# Patient Record
Sex: FEMALE | Race: WHITE | Employment: OTHER
[De-identification: names, ages, dates, MRNs, and addresses within clinical notes are randomized per-mention and may not be internally consistent; named-entity substitution may affect disease eponyms.]

---

## 2017-01-03 RX ORDER — MELOXICAM 7.5 MG/1
7.5 TABLET ORAL DAILY
Qty: 30 TABLET | Refills: 1 | Status: SHIPPED | OUTPATIENT
Start: 2017-01-03 | End: 2017-02-10 | Stop reason: SDUPTHER

## 2017-01-10 DIAGNOSIS — F41.9 ANXIETY: ICD-10-CM

## 2017-01-10 DIAGNOSIS — F33.0 DEPRESSION, MAJOR, RECURRENT, MILD (HCC): Chronic | ICD-10-CM

## 2017-01-10 RX ORDER — ALPRAZOLAM 0.5 MG/1
0.5 TABLET ORAL DAILY PRN
Qty: 30 TABLET | Refills: 0 | Status: SHIPPED | OUTPATIENT
Start: 2017-01-10 | End: 2017-02-10 | Stop reason: SDUPTHER

## 2017-02-10 ENCOUNTER — OFFICE VISIT (OUTPATIENT)
Dept: FAMILY MEDICINE CLINIC | Facility: CLINIC | Age: 57
End: 2017-02-10

## 2017-02-10 VITALS
DIASTOLIC BLOOD PRESSURE: 76 MMHG | SYSTOLIC BLOOD PRESSURE: 120 MMHG | TEMPERATURE: 99 F | WEIGHT: 155.2 LBS | BODY MASS INDEX: 30.31 KG/M2 | RESPIRATION RATE: 16 BRPM | HEART RATE: 72 BPM

## 2017-02-10 DIAGNOSIS — I10 ESSENTIAL HYPERTENSION: Chronic | ICD-10-CM

## 2017-02-10 DIAGNOSIS — F41.1 GAD (GENERALIZED ANXIETY DISORDER): ICD-10-CM

## 2017-02-10 DIAGNOSIS — F33.0 DEPRESSION, MAJOR, RECURRENT, MILD (HCC): Primary | ICD-10-CM

## 2017-02-10 PROCEDURE — G8484 FLU IMMUNIZE NO ADMIN: HCPCS | Performed by: NURSE PRACTITIONER

## 2017-02-10 PROCEDURE — 3014F SCREEN MAMMO DOC REV: CPT | Performed by: NURSE PRACTITIONER

## 2017-02-10 PROCEDURE — 1036F TOBACCO NON-USER: CPT | Performed by: NURSE PRACTITIONER

## 2017-02-10 PROCEDURE — G8427 DOCREV CUR MEDS BY ELIG CLIN: HCPCS | Performed by: NURSE PRACTITIONER

## 2017-02-10 PROCEDURE — 3017F COLORECTAL CA SCREEN DOC REV: CPT | Performed by: NURSE PRACTITIONER

## 2017-02-10 PROCEDURE — G8417 CALC BMI ABV UP PARAM F/U: HCPCS | Performed by: NURSE PRACTITIONER

## 2017-02-10 PROCEDURE — 99214 OFFICE O/P EST MOD 30 MIN: CPT | Performed by: NURSE PRACTITIONER

## 2017-02-10 RX ORDER — ALPRAZOLAM 0.5 MG/1
0.5 TABLET ORAL DAILY PRN
Qty: 30 TABLET | Refills: 0 | Status: SHIPPED | OUTPATIENT
Start: 2017-02-10 | End: 2017-03-22 | Stop reason: SDUPTHER

## 2017-02-10 RX ORDER — MELOXICAM 7.5 MG/1
15 TABLET ORAL DAILY
Qty: 60 TABLET | Refills: 3 | Status: SHIPPED | OUTPATIENT
Start: 2017-02-10 | End: 2017-07-07 | Stop reason: ALTCHOICE

## 2017-02-10 ASSESSMENT — ENCOUNTER SYMPTOMS
RHINORRHEA: 0
CHEST TIGHTNESS: 0
NAUSEA: 0
VOMITING: 0
ABDOMINAL PAIN: 0
EYE DISCHARGE: 0
BLOOD IN STOOL: 0
BACK PAIN: 1
CONSTIPATION: 0
SINUS PRESSURE: 0
SORE THROAT: 0
WHEEZING: 0
SHORTNESS OF BREATH: 0
DIARRHEA: 1
COUGH: 0
EYE REDNESS: 0

## 2017-03-22 DIAGNOSIS — F41.1 GAD (GENERALIZED ANXIETY DISORDER): ICD-10-CM

## 2017-03-22 RX ORDER — ALPRAZOLAM 0.5 MG/1
0.5 TABLET ORAL DAILY PRN
Qty: 30 TABLET | Refills: 0 | Status: SHIPPED | OUTPATIENT
Start: 2017-03-22 | End: 2017-04-20 | Stop reason: SDUPTHER

## 2017-04-20 DIAGNOSIS — F41.1 GAD (GENERALIZED ANXIETY DISORDER): ICD-10-CM

## 2017-04-20 RX ORDER — ALPRAZOLAM 0.5 MG/1
0.5 TABLET ORAL DAILY PRN
Qty: 30 TABLET | Refills: 0 | Status: SHIPPED | OUTPATIENT
Start: 2017-04-20 | End: 2017-05-16 | Stop reason: SDUPTHER

## 2017-04-28 ENCOUNTER — TELEPHONE (OUTPATIENT)
Dept: FAMILY MEDICINE CLINIC | Age: 57
End: 2017-04-28

## 2017-05-16 DIAGNOSIS — F41.1 GAD (GENERALIZED ANXIETY DISORDER): ICD-10-CM

## 2017-05-19 RX ORDER — ALPRAZOLAM 0.5 MG/1
0.5 TABLET ORAL DAILY PRN
Qty: 30 TABLET | Refills: 0 | Status: SHIPPED | OUTPATIENT
Start: 2017-05-19 | End: 2017-06-22 | Stop reason: SDUPTHER

## 2017-06-22 DIAGNOSIS — F41.1 GAD (GENERALIZED ANXIETY DISORDER): ICD-10-CM

## 2017-06-22 RX ORDER — ALPRAZOLAM 0.5 MG/1
0.5 TABLET ORAL DAILY PRN
Qty: 30 TABLET | Refills: 0 | Status: SHIPPED | OUTPATIENT
Start: 2017-06-22 | End: 2017-07-19 | Stop reason: SDUPTHER

## 2017-07-07 ENCOUNTER — OFFICE VISIT (OUTPATIENT)
Dept: FAMILY MEDICINE CLINIC | Age: 57
End: 2017-07-07

## 2017-07-07 ENCOUNTER — HOSPITAL ENCOUNTER (OUTPATIENT)
Age: 57
Setting detail: SPECIMEN
Discharge: HOME | End: 2017-07-07

## 2017-07-07 VITALS
TEMPERATURE: 99.2 F | WEIGHT: 152.6 LBS | RESPIRATION RATE: 14 BRPM | HEIGHT: 60 IN | SYSTOLIC BLOOD PRESSURE: 108 MMHG | BODY MASS INDEX: 29.96 KG/M2 | HEART RATE: 96 BPM | DIASTOLIC BLOOD PRESSURE: 78 MMHG

## 2017-07-07 DIAGNOSIS — N90.89 LABIAL SKIN TAG: ICD-10-CM

## 2017-07-07 DIAGNOSIS — Z13.31 POSITIVE DEPRESSION SCREENING: ICD-10-CM

## 2017-07-07 DIAGNOSIS — Z01.419 ENCOUNTER FOR GYNECOLOGICAL EXAMINATION WITHOUT ABNORMAL FINDING: Primary | ICD-10-CM

## 2017-07-07 DIAGNOSIS — Z12.39 BREAST CANCER SCREENING: ICD-10-CM

## 2017-07-07 PROCEDURE — G8431 POS CLIN DEPRES SCRN F/U DOC: HCPCS | Performed by: NURSE PRACTITIONER

## 2017-07-07 PROCEDURE — 99396 PREV VISIT EST AGE 40-64: CPT | Performed by: NURSE PRACTITIONER

## 2017-07-07 PROCEDURE — 96127 BRIEF EMOTIONAL/BEHAV ASSMT: CPT | Performed by: NURSE PRACTITIONER

## 2017-07-07 RX ORDER — PAROXETINE HYDROCHLORIDE 20 MG/1
1 TABLET, FILM COATED ORAL EVERY MORNING
Refills: 5 | COMMUNITY
Start: 2017-06-08 | End: 2017-07-28 | Stop reason: SDUPTHER

## 2017-07-07 ASSESSMENT — PATIENT HEALTH QUESTIONNAIRE - PHQ9
3. TROUBLE FALLING OR STAYING ASLEEP: 2
5. POOR APPETITE OR OVEREATING: 0
1. LITTLE INTEREST OR PLEASURE IN DOING THINGS: 3
6. FEELING BAD ABOUT YOURSELF - OR THAT YOU ARE A FAILURE OR HAVE LET YOURSELF OR YOUR FAMILY DOWN: 1
8. MOVING OR SPEAKING SO SLOWLY THAT OTHER PEOPLE COULD HAVE NOTICED. OR THE OPPOSITE, BEING SO FIGETY OR RESTLESS THAT YOU HAVE BEEN MOVING AROUND A LOT MORE THAN USUAL: 2
7. TROUBLE CONCENTRATING ON THINGS, SUCH AS READING THE NEWSPAPER OR WATCHING TELEVISION: 3
2. FEELING DOWN, DEPRESSED OR HOPELESS: 2
SUM OF ALL RESPONSES TO PHQ QUESTIONS 1-9: 16
10. IF YOU CHECKED OFF ANY PROBLEMS, HOW DIFFICULT HAVE THESE PROBLEMS MADE IT FOR YOU TO DO YOUR WORK, TAKE CARE OF THINGS AT HOME, OR GET ALONG WITH OTHER PEOPLE: 1
9. THOUGHTS THAT YOU WOULD BE BETTER OFF DEAD, OR OF HURTING YOURSELF: 0
SUM OF ALL RESPONSES TO PHQ9 QUESTIONS 1 & 2: 5
4. FEELING TIRED OR HAVING LITTLE ENERGY: 3

## 2017-07-07 ASSESSMENT — ENCOUNTER SYMPTOMS
WHEEZING: 0
EYE DISCHARGE: 0
CONSTIPATION: 0
CHEST TIGHTNESS: 0
RHINORRHEA: 0
BLOOD IN STOOL: 0
SHORTNESS OF BREATH: 0
EYE REDNESS: 0
SORE THROAT: 0
COUGH: 0
DIARRHEA: 1
VOMITING: 0
ABDOMINAL PAIN: 0
NAUSEA: 0
SINUS PRESSURE: 0
BACK PAIN: 1

## 2017-07-10 LAB
HPV SAMPLE: NORMAL
HPV SOURCE: NORMAL
HPV, GENOTYPE 16: NOT DETECTED
HPV, GENOTYPE 18: NOT DETECTED
HPV, HIGH RISK OTHER: NOT DETECTED
HPV, INTERPRETATION: NORMAL

## 2017-07-12 LAB — CYTOLOGY REPORT: NORMAL

## 2017-07-19 DIAGNOSIS — F41.1 GAD (GENERALIZED ANXIETY DISORDER): ICD-10-CM

## 2017-07-19 RX ORDER — ALPRAZOLAM 0.5 MG/1
0.5 TABLET ORAL DAILY PRN
Qty: 30 TABLET | Refills: 0 | Status: SHIPPED | OUTPATIENT
Start: 2017-07-19 | End: 2017-08-31 | Stop reason: SDUPTHER

## 2017-07-28 DIAGNOSIS — F41.9 ANXIETY: ICD-10-CM

## 2017-07-28 DIAGNOSIS — F33.0 DEPRESSION, MAJOR, RECURRENT, MILD (HCC): Chronic | ICD-10-CM

## 2017-07-28 RX ORDER — PAROXETINE HYDROCHLORIDE 20 MG/1
20 TABLET, FILM COATED ORAL EVERY MORNING
Qty: 30 TABLET | Refills: 4 | Status: SHIPPED | OUTPATIENT
Start: 2017-07-28 | End: 2018-01-08 | Stop reason: SDUPTHER

## 2017-08-31 DIAGNOSIS — F41.1 GAD (GENERALIZED ANXIETY DISORDER): ICD-10-CM

## 2017-08-31 RX ORDER — ALPRAZOLAM 0.5 MG/1
0.5 TABLET ORAL DAILY PRN
Qty: 30 TABLET | Refills: 0 | Status: SHIPPED | OUTPATIENT
Start: 2017-08-31 | End: 2017-10-11 | Stop reason: SDUPTHER

## 2017-10-11 DIAGNOSIS — F41.1 GAD (GENERALIZED ANXIETY DISORDER): ICD-10-CM

## 2017-10-11 RX ORDER — ALPRAZOLAM 0.5 MG/1
0.5 TABLET ORAL DAILY PRN
Qty: 30 TABLET | Refills: 0 | Status: SHIPPED | OUTPATIENT
Start: 2017-10-11 | End: 2017-11-09 | Stop reason: SDUPTHER

## 2017-10-11 NOTE — TELEPHONE ENCOUNTER
Last OV 7/7, last refill 8/31. OARRs reviewed by Audra Baig 8/31. BP  Health Maintenance   Topic Date Due    DTaP/Tdap/Td vaccine (1 - Tdap) 06/02/1977    Flu vaccine (1) 12/01/2017 (Originally 9/1/2017)    Breast cancer screen  03/15/2018    Lipid screen  12/16/2021    Cervical cancer screen  07/07/2022    Colon cancer screen colonoscopy  04/26/2023    Hepatitis C screen  Completed    HIV screen  Completed             (applicable per patient's age: Cancer Screenings, Depression Screening, Fall Risk Screening, Immunizations)    LDL Cholesterol (mg/dL)   Date Value   06/17/2016 118     LDL Calculated (mg/dL)   Date Value   12/16/2016 131     AST (U/L)   Date Value   12/16/2016 11     ALT (U/L)   Date Value   12/16/2016 6     BUN (mg/dL)   Date Value   12/16/2016 11      (goal A1C is < 7)   (goal LDL is <100) need 30-50% reduction from baseline     BP Readings from Last 3 Encounters:   07/07/17 108/78   02/10/17 120/76   12/15/16 124/84    (goal /80)      All Future Testing planned in [facility]:  Lab Frequency Next Occurrence   Comprehensive Metabolic Panel Once 19/16/6155   Lipid Panel Once 01/02/2018   CBC Once 01/02/2018   ROBERTH Digital Screen Bilateral Once 08/06/2017       Next Visit Date:  No future appointments.          Patient Active Problem List:     Essential hypertension     Depression, major, recurrent, mild (HCC)     Hematuria     Arthritis     Collagenous colitis     Lupus (HCC)     Anxiety     Chronic pain of both knees

## 2017-11-09 DIAGNOSIS — F41.1 GAD (GENERALIZED ANXIETY DISORDER): ICD-10-CM

## 2017-11-09 NOTE — TELEPHONE ENCOUNTER
LOV: 07/07/17  LR: 10/11/17    Health Maintenance   Topic Date Due    DTaP/Tdap/Td vaccine (1 - Tdap) 06/02/1977    Flu vaccine (1) 12/01/2017 (Originally 9/1/2017)    Breast cancer screen  03/15/2018    Lipid screen  12/16/2021    Cervical cancer screen  07/07/2022    Colon cancer screen colonoscopy  04/26/2023    Hepatitis C screen  Completed    HIV screen  Completed             (applicable per patient's age: Cancer Screenings, Depression Screening, Fall Risk Screening, Immunizations)    LDL Cholesterol (mg/dL)   Date Value   06/17/2016 118     LDL Calculated (mg/dL)   Date Value   12/16/2016 131     AST (U/L)   Date Value   12/16/2016 11     ALT (U/L)   Date Value   12/16/2016 6     BUN (mg/dL)   Date Value   12/16/2016 11      (goal A1C is < 7)   (goal LDL is <100) need 30-50% reduction from baseline     BP Readings from Last 3 Encounters:   07/07/17 108/78   02/10/17 120/76   12/15/16 124/84    (goal /80)      All Future Testing planned in [facility]:  Lab Frequency Next Occurrence   Comprehensive Metabolic Panel Once 08/20/8621   Lipid Panel Once 01/02/2018   CBC Once 01/02/2018   ROBERTH Digital Screen Bilateral Once 08/06/2017       Next Visit Date:  No future appointments.          Patient Active Problem List:     Essential hypertension     Depression, major, recurrent, mild (HCC)     Hematuria     Arthritis     Collagenous colitis     Lupus     Anxiety     Chronic pain of both knees

## 2017-11-09 NOTE — LETTER
[address] [address]  Phone: [phone]  Fax: 00 [address] Lexa Faria        November 22, 2017    [id number] [facility] [id number]      Dear Triny Freitas: We have been trying to contact you regarding your medications. Please contact the office at your earliest convenience. If you have any questions or concerns, please don't hesitate to call.     Sincerely,        Jeffery Brown CNP/BP

## 2017-11-10 RX ORDER — ALPRAZOLAM 0.5 MG/1
0.5 TABLET ORAL DAILY PRN
Qty: 30 TABLET | Refills: 0 | Status: SHIPPED | OUTPATIENT
Start: 2017-11-10 | End: 2017-12-11 | Stop reason: SDUPTHER

## 2017-11-17 NOTE — TELEPHONE ENCOUNTER
Attempted to contact patient but phone was picked up and went to dead air. Attempted to contact pt back but it was busy.

## 2017-12-11 DIAGNOSIS — F41.1 GAD (GENERALIZED ANXIETY DISORDER): ICD-10-CM

## 2017-12-11 RX ORDER — ALPRAZOLAM 0.5 MG/1
0.5 TABLET ORAL DAILY PRN
Qty: 30 TABLET | Refills: 0 | Status: SHIPPED | OUTPATIENT
Start: 2017-12-11 | End: 2018-01-08 | Stop reason: SDUPTHER

## 2018-01-08 ENCOUNTER — OFFICE VISIT (OUTPATIENT)
Dept: FAMILY MEDICINE CLINIC | Age: 58
End: 2018-01-08

## 2018-01-08 VITALS
HEIGHT: 60 IN | TEMPERATURE: 97.3 F | HEART RATE: 74 BPM | SYSTOLIC BLOOD PRESSURE: 121 MMHG | RESPIRATION RATE: 20 BRPM | DIASTOLIC BLOOD PRESSURE: 85 MMHG | BODY MASS INDEX: 31.02 KG/M2 | WEIGHT: 158 LBS

## 2018-01-08 DIAGNOSIS — F41.1 GAD (GENERALIZED ANXIETY DISORDER): Primary | ICD-10-CM

## 2018-01-08 DIAGNOSIS — M25.562 CHRONIC PAIN OF BOTH KNEES: ICD-10-CM

## 2018-01-08 DIAGNOSIS — M25.561 CHRONIC PAIN OF BOTH KNEES: ICD-10-CM

## 2018-01-08 DIAGNOSIS — G89.29 CHRONIC PAIN OF BOTH KNEES: ICD-10-CM

## 2018-01-08 DIAGNOSIS — F33.0 DEPRESSION, MAJOR, RECURRENT, MILD (HCC): Chronic | ICD-10-CM

## 2018-01-08 DIAGNOSIS — Z12.39 BREAST CANCER SCREENING: ICD-10-CM

## 2018-01-08 PROCEDURE — 99214 OFFICE O/P EST MOD 30 MIN: CPT | Performed by: NURSE PRACTITIONER

## 2018-01-08 RX ORDER — PAROXETINE HYDROCHLORIDE 20 MG/1
20 TABLET, FILM COATED ORAL EVERY MORNING
Qty: 30 TABLET | Refills: 5 | Status: SHIPPED | OUTPATIENT
Start: 2018-01-08 | End: 2019-02-06 | Stop reason: SDUPTHER

## 2018-01-08 RX ORDER — MELOXICAM 7.5 MG/1
7.5 TABLET ORAL DAILY
COMMUNITY
End: 2018-01-08 | Stop reason: SDUPTHER

## 2018-01-08 RX ORDER — ALPRAZOLAM 0.5 MG/1
0.5 TABLET ORAL DAILY PRN
Qty: 30 TABLET | Refills: 0 | Status: SHIPPED | OUTPATIENT
Start: 2018-01-08 | End: 2018-02-19 | Stop reason: SDUPTHER

## 2018-01-08 RX ORDER — MELOXICAM 7.5 MG/1
15 TABLET ORAL DAILY
Qty: 60 TABLET | Refills: 2 | Status: SHIPPED | OUTPATIENT
Start: 2018-01-08 | End: 2018-06-22 | Stop reason: SDUPTHER

## 2018-01-08 ASSESSMENT — ENCOUNTER SYMPTOMS
SORE THROAT: 0
BACK PAIN: 0
CHEST TIGHTNESS: 0
WHEEZING: 0
RHINORRHEA: 0
DIARRHEA: 1
EYE REDNESS: 0
ABDOMINAL PAIN: 0
SHORTNESS OF BREATH: 0
SINUS PRESSURE: 0
VOMITING: 0
NAUSEA: 0
CONSTIPATION: 0
COUGH: 0
BLOOD IN STOOL: 0
EYE DISCHARGE: 0

## 2018-01-08 NOTE — PROGRESS NOTES
Subjective:      Patient ID: Kathryn Weber is a 59 y.o. female. Visit Information    Have you changed or started any medications since your last visit including any over-the-counter medicines, vitamins, or herbal medicines? no   Have you stopped taking any of your medications? Is so, why? -  no  Are you having any side effects from any of your medications? - no    Have you seen any other physician or provider since your last visit?  no   Have you had any other diagnostic tests since your last visit?  no   Have you been seen in the emergency room and/or had an admission in a hospital since we last saw you? no   Have you had your routine dental cleaning in the past 6 months?  yes - routine     Do you have an active MyChart account? If no, what is the barrier? Yes    Patient Care Team:  Dipti Casper CNP as PCP - General (Family Nurse Practitioner)  Petra Marcos MD (Gastroenterology)    Medical History Review  Past Medical, Family, and Social History reviewed and does contribute to the patient presenting condition    Health Maintenance   Topic Date Due    Potassium monitoring  12/16/2017    Creatinine monitoring  12/16/2017    DTaP/Tdap/Td vaccine (1 - Tdap) 11/01/2018 (Originally 6[birth year]2/1977)    Flu vaccine (1) 11/01/2018 (Originally 9/1/2017)    Breast cancer screen  03/15/2018    Lipid screen  12/16/2021    Cervical cancer screen  07/07/2022    Colon cancer screen colonoscopy  04/26/2023    Hepatitis C screen  Completed    HIV screen  Completed       Patient presents today for routine follow up on her anxiety / depression. Mood is controlled on current dose of paxil. Using xanax as needed which works well for her anxiety. She has not completed fasting labs as she was waiting to see if she would qualify for benefits through her job this year. She did not qualify and currently does not have insurance - I did provide her with list of cost from lab and she will complete at later date.  She does

## 2018-02-19 DIAGNOSIS — F41.1 GAD (GENERALIZED ANXIETY DISORDER): ICD-10-CM

## 2018-02-19 NOTE — TELEPHONE ENCOUNTER
LOV: 01/08/18  RTO: 3 months  LR: 01/11/18 - verbalized by Kami Dupont at pharmacy  Last CSM: Not available for view    Health Maintenance   Topic Date Due    Potassium monitoring  12/16/2017    Creatinine monitoring  12/16/2017    Breast cancer screen  03/15/2018    DTaP/Tdap/Td vaccine (1 - Tdap) 11/01/2018 (Originally 6/2/1977)    Flu vaccine (1) 11/01/2018 (Originally 9/1/2017)    Lipid screen  12/16/2021    Cervical cancer screen  07/07/2022    Colon cancer screen colonoscopy  04/26/2023    Hepatitis C screen  Completed    HIV screen  Completed             (applicable per patient's age: Cancer Screenings, Depression Screening, Fall Risk Screening, Immunizations)    LDL Cholesterol (mg/dL)   Date Value   06/17/2016 118     LDL Calculated (mg/dL)   Date Value   12/16/2016 131     AST (U/L)   Date Value   12/16/2016 11     ALT (U/L)   Date Value   12/16/2016 6     BUN (mg/dL)   Date Value   12/16/2016 11      (goal A1C is < 7)   (goal LDL is <100) need 30-50% reduction from baseline     BP Readings from Last 3 Encounters:   01/08/18 121/85   07/07/17 108/78   02/10/17 120/76    (goal /80)      All Future Testing planned in [facility]:  Lab Frequency Next Occurrence   Comprehensive Metabolic Panel Once 86/65/0686   Lipid Panel Once 01/02/2018   CBC Once 01/02/2018   ROBERTH DIGITAL SCREEN W CAD BILATERAL Once 02/07/2018       Next Visit Date:  No future appointments.          Patient Active Problem List:     Essential hypertension     Depression, major, recurrent, mild (HCC)     Hematuria     Collagenous colitis     Lupus     SHUN (generalized anxiety disorder)     Chronic pain of both knees

## 2018-02-20 RX ORDER — ALPRAZOLAM 0.5 MG/1
0.5 TABLET ORAL DAILY PRN
Qty: 30 TABLET | Refills: 0 | Status: SHIPPED | OUTPATIENT
Start: 2018-02-20 | End: 2018-03-19 | Stop reason: SDUPTHER

## 2018-03-19 DIAGNOSIS — F41.1 GAD (GENERALIZED ANXIETY DISORDER): ICD-10-CM

## 2018-03-19 RX ORDER — ALPRAZOLAM 0.5 MG/1
0.5 TABLET ORAL DAILY PRN
Qty: 30 TABLET | Refills: 0 | Status: SHIPPED | OUTPATIENT
Start: 2018-03-19 | End: 2018-04-18 | Stop reason: SDUPTHER

## 2018-03-20 ENCOUNTER — OFFICE VISIT (OUTPATIENT)
Dept: FAMILY MEDICINE CLINIC | Age: 58
End: 2018-03-20

## 2018-03-20 VITALS
DIASTOLIC BLOOD PRESSURE: 72 MMHG | HEIGHT: 60 IN | WEIGHT: 157 LBS | BODY MASS INDEX: 30.82 KG/M2 | SYSTOLIC BLOOD PRESSURE: 114 MMHG | RESPIRATION RATE: 20 BRPM | TEMPERATURE: 98.4 F | HEART RATE: 78 BPM

## 2018-03-20 DIAGNOSIS — Z87.898 H/O MOTION SICKNESS: ICD-10-CM

## 2018-03-20 DIAGNOSIS — L50.9 URTICARIA OF UNKNOWN ORIGIN: Primary | ICD-10-CM

## 2018-03-20 PROCEDURE — 99213 OFFICE O/P EST LOW 20 MIN: CPT | Performed by: NURSE PRACTITIONER

## 2018-03-20 RX ORDER — PREDNISONE 10 MG/1
10 TABLET ORAL DAILY
COMMUNITY
End: 2018-06-22 | Stop reason: ALTCHOICE

## 2018-03-20 RX ORDER — TRIAMCINOLONE ACETONIDE 1 MG/G
CREAM TOPICAL 2 TIMES DAILY
Qty: 1 TUBE | Refills: 0 | Status: SHIPPED | OUTPATIENT
Start: 2018-03-20 | End: 2018-06-22 | Stop reason: ALTCHOICE

## 2018-03-20 RX ORDER — SCOLOPAMINE TRANSDERMAL SYSTEM 1 MG/1
1 PATCH, EXTENDED RELEASE TRANSDERMAL
Qty: 2 PATCH | Refills: 0 | Status: SHIPPED | OUTPATIENT
Start: 2018-03-20 | End: 2018-06-22 | Stop reason: ALTCHOICE

## 2018-03-20 RX ORDER — TRIAMCINOLONE ACETONIDE 1 MG/G
CREAM TOPICAL 2 TIMES DAILY
COMMUNITY
End: 2018-03-20 | Stop reason: SDUPTHER

## 2018-03-20 ASSESSMENT — ENCOUNTER SYMPTOMS
SHORTNESS OF BREATH: 0
COUGH: 0
URTICARIA: 1

## 2018-03-20 NOTE — PATIENT INSTRUCTIONS
May also take Zyrtec over-the-counter as directed (or claritin). May also add Zantac (over-the-counter). Finish taper of medication (prednisone) from Urgent Care. Patient Education        Hives: Care Instructions  Your Care Instructions  Hives are raised, red, itchy patches of skin. They are also called wheals or welts. They usually have red borders and pale centers. Hives range in size from ¼ inch to 3 inches or more across. They may seem to move from place to place on the skin. Several hives may form a large area of raised, red skin. You can get hives after an insect sting, after taking medicine or eating certain foods, or because of infection or stress. Other causes include plants, things you breathe in, makeup, heat, cold, sunlight, and latex. You cannot spread hives to other people. Hives may last a few minutes or a few days, but a single spot may last less than 36 hours. Follow-up care is a key part of your treatment and safety. Be sure to make and go to all appointments, and call your doctor if you are having problems. It's also a good idea to know your test results and keep a list of the medicines you take. How can you care for yourself at home? · Avoid whatever you think may have caused your hives, such as a certain food or medicine. However, you may not know the cause. · Put a cool, wet towel on the area to relieve itching. · Take an over-the-counter antihistamine, such as diphenhydramine (Benadryl), cetirizine (Zyrtec), or loratadine (Claritin), to help stop the hives and calm the itching. Read and follow directions on the label. These medicines can make you feel sleepy. Do not drive while using them. · Stay away from strong soaps, detergents, and chemicals. These can make itching worse. When should you call for help? Call 911 anytime you think you may need emergency care. For example, call if:  ? · You have symptoms of a severe allergic reaction.  These may include:  ¨ Sudden raised, red areas (hives) all over your body. ¨ Swelling of the throat, mouth, lips, or tongue. ¨ Trouble breathing. ¨ Passing out (losing consciousness). Or you may feel very lightheaded or suddenly feel weak, confused, or restless. ?Call your doctor now or seek immediate medical care if:  ? · You have symptoms of an allergic reaction, such as:  ¨ A rash or hives (raised, red areas on the skin). ¨ Itching. ¨ Swelling. ¨ Belly pain, nausea, or vomiting. ? · You get hives after you start a new medicine. ? · Hives have not gone away after 24 hours. ? Watch closely for changes in your health, and be sure to contact your doctor if:  ? · You do not get better as expected. Where can you learn more? Go to [email or web address] org and sign in to your ybuy account. Enter Y386 in the Incentive Targeting box to learn more about \"Hives: Care Instructions. \"     If you do not have an account, please click on the \"Sign Up Now\" link. Current as of: March 20, 2017  Content Version: 11.5  © 9712-9548 [employer]. Care instructions adapted under license by [facility] ([facility]). If you have questions about a medical condition or this instruction, always ask your healthcare professional. [address] any warranty or liability for your use of this information. Patient Education          scopolamine transdermal  Pronunciation:  skoe PAL a meen  Brand:  Transderm-Scop  What is the most important information I should know about scopolamine transdermal?  You should not use this medication if you are allergic to scopolamine or similar medications such as methscopolamine (Pamine) or hyoscyamine (Hyospaz, Levsin, Symax), or if you have narrow-angle glaucoma.   Before using scopolamine transdermal, tell your doctor if you have glaucoma, kidney or liver disease, epilepsy or other seizure disorder, a blockage in your intestines, or if you have a bladder obstruction or are unable to blockage in your intestines; or  · if you have a bladder obstruction or are unable to urinate. FDA pregnancy category C. It is not known whether scopolamine transdermal will harm an unborn baby. Tell your doctor if you are pregnant or plan to become pregnant while using this medication. Scopolamine can pass into breast milk and may harm a nursing baby. Do not use this medication without telling your doctor if you are breast-feeding a baby. Older adults may be more sensitive to the side effects of scopolamine transdermal.  Do not use this medication on a child. How should I use scopolamine transdermal?  Use exactly as prescribed by your doctor. Do not use in larger or smaller amounts or for longer than recommended. Follow the directions on your prescription label. The scopolamine transdermal skin patch is applied to a hairless area of skin just behind your ear. Wear only 1 patch at a time. Do not cut or tear the patch. For preventing motion sickness, the skin patch should be applied at least 4 hours before you will be exposed to a situation that may cause motion sickness. For preventing nausea and vomiting after surgery, the skin patch is usually applied the evening before surgery. Keep wearing the patch for 24 hours after your surgery, then remove it and throw it away. If the skin patch falls off, replace it with a new one. One patch may be worn for up to 3 days. If you need to use the medication for longer than 3 days, remove the patch and place a new one behind your other ear. After removing a patch, fold it closed with the sticky side in, and throw it away in a place where pets and children cannot reach it. Always wash your hands with soap and water after handling a scopolamine transdermal skin patch, whether you are applying it or removing it. To make sure there are no traces of this medication left on your skin after a patch is removed, wash the skin behind your ear where the patch was worn.  Use doctor. Where can I get more information? Your pharmacist can provide more information about scopolamine transdermal.    Remember, keep this and all other medicines out of the reach of children, never share your medicines with others, and use this medication only for the indication prescribed. Every effort has been made to ensure that the information provided by [facility] [address] is accurate, up-to-date, and complete, but no guarantee is made to that effect. Drug information contained herein may be time sensitive. [facility] information has been compiled for use by healthcare practitioners and consumers in the [place] and therefore [facility] does not warrant that uses outside of the [place] are appropriate, unless specifically indicated otherwise. [facility]'s drug information does not endorse drugs, diagnose patients or recommend therapy. [facility]'s drug information is an informational resource designed to assist licensed healthcare practitioners in caring for their patients and/or to serve consumers viewing this service as a supplement to, and not a substitute for, the expertise, skill, knowledge and judgment of healthcare practitioners. The absence of a warning for a given drug or drug combination in no way should be construed to indicate that the drug or drug combination is safe, effective or appropriate for any given patient. [facility] does not assume any responsibility for any aspect of healthcare administered with the aid of information [facility] provides. The information contained herein is not intended to cover all possible uses, directions, precautions, warnings, drug interactions, allergic reactions, or adverse effects. If you have questions about the drugs you are taking, check with your doctor, nurse or pharmacist.  Copyright 4118-4621 [address]. Version: 8.01. Revision date: 3/26/2011. Care instructions adapted under license by [facility] ([facility]).  If you have questions about a medical

## 2018-03-20 NOTE — PROGRESS NOTES
[address]  [address] [address]  Dept: [phone]    3/20/2018    CHIEF COMPLAINT    Chief Complaint   Patient presents with    Urticaria       HPI    Rani Harris is a 59 y.o. female who presents   Chief Complaint   Patient presents with    Urticaria   . Urticaria   This is a new problem. The current episode started in the past 7 days (3 days ago). The problem has been rapidly improving since onset. The affected locations include the abdomen, right elbow, right arm, left upper leg, right upper leg and back. The rash is characterized by redness. It is unknown if there was an exposure to a precipitant. Pertinent negatives include no cough, fever or shortness of breath. Past treatments include topical steroids and oral steroids. The treatment provided no relief. There is no history of allergies or asthma. Was seen at an  (Dr. Kristy Wilson? Unknown location pt unable to recall) and received IM injection of steroids, rx for steroid taper PO and rx for steroid cream.  Unknown exposure, no new foods, cleaning products, or soaps; pt thinks it was related to stress. Did use some \" lotion\" however did not have a reaction to this in the past.    Also is requesting a scopolamine patch for an upcoming cruise; pt has had motion sickness in the past and is concerned about this during her travel. REVIEW OF SYSTEMS    Review of Systems   Constitutional: Negative for fever. Respiratory: Negative for cough and shortness of breath. Cardiovascular: Negative for chest pain and leg swelling. Skin: Positive for rash (resolved with steroids).        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     GERD (gastroesophageal reflux disease)     Myalgia and myositis     Palpitations     Sprain of thoracic region     Systemic lupus erythematosus ([address]     Viremia     Vitiligo        FAMILY HISTORY    Family History   Problem Relation Age of Onset   [facility] Crohn's Disease Father     Other Father     Diabetes Mother     Arthritis Mother     Hypertension Mother     Stomach Cancer Maternal Grandmother     Colon Cancer Maternal Grandfather     Ovarian Cancer Sister      complications of PVD    Colon Cancer Paternal Grandfather        SOCIAL HISTORY    Social History     Social History    Marital status:      Spouse name: N/A    Number of children: N/A    Years of education: N/A     Social History Main Topics    Smoking status: Former Smoker     Packs/day: 1.00     Years: 15.00     Types: Cigarettes     Quit date: 1/1/2007    Smokeless tobacco: Never Used    Alcohol use 0.0 oz/week      Comment: rarely    Drug use: No    Sexual activity: Not Currently     Other Topics Concern    None     Social History Narrative    None       SURGICAL HISTORY    Past Surgical History:   Procedure Laterality Date    JOINT REPLACEMENT Left 2009    lt knee    JOINT REPLACEMENT Right 2/2016    MANDIBLE SURGERY      TOTAL HIP ARTHROPLASTY Left 10/25/2016       CURRENT MEDICATIONS    Current Outpatient Prescriptions   Medication Sig Dispense Refill    predniSONE (DELTASONE) 10 MG tablet Take 10 mg by mouth daily Taper take as directed      scopolamine (TRANSDERM-SCOP) transdermal patch Place 1 patch onto the skin every 72 hours 2 patch 0    triamcinolone (KENALOG) 0.1 % cream Apply topically 2 times daily 1 Tube 0    ALPRAZolam (XANAX) 0.5 MG tablet Take 1 tablet by mouth daily as needed for Sleep or Anxiety.  30 tablet 0    PARoxetine (PAXIL) 20 MG tablet Take 1 tablet by mouth every morning 30 tablet 5    meloxicam (MOBIC) 7.5 MG tablet Take 2 tablets by mouth daily 60 tablet 2    Cholecalciferol (VITAMIN D3) 5000 UNITS CAPS Take 1 capsule by mouth daily 30 capsule 0    calcium carbonate (CALCIUM 600) 600 MG TABS tablet Take 1 tablet by mouth 2 times daily 30 tablet 3     No current facility-administered medications for this visit. ALLERGIES    Allergies   Allergen Reactions    Ibuprofen Other (See Comments)     Red spots         PHYSICAL EXAM   Physical Exam   Constitutional: She is oriented to person, place, and time. She appears well-developed and well-nourished. HENT:   Head: Normocephalic and atraumatic. Eyes: Pupils are equal, round, and reactive to light. Neck: Normal range of motion. Cardiovascular: Normal rate, regular rhythm and normal heart sounds. Pulmonary/Chest: Effort normal and breath sounds normal.   Musculoskeletal: Normal range of motion. Neurological: She is alert and oriented to person, place, and time. Skin: Skin is warm and dry. No rash noted. Rash is not urticarial.   Psychiatric: She has a normal mood and affect. Her behavior is normal. Judgment and thought content normal.   Nursing note and vitals reviewed. Assessment/PLan  1. Urticaria of unknown origin    -complete steroid taper from UC  -may add claritin or zyrtec or benadryl for itching/hives, also add Zantac  -try to identify potential food allergy, environmental exposure or chemical; this may be related to stress or idiopathic urticaria but pt does not have a history of this  -notify office if re-occurrence of symptoms  - triamcinolone (KENALOG) 0.1 % cream; Apply topically 2 times daily  Dispense: 1 Tube; Refill: 0    2. H/O motion sickness    - scopolamine (TRANSDERM-SCOP) transdermal patch; Place 1 patch onto the skin every 72 hours  Dispense: 2 patch; Refill: 0      Elvira Close received counseling on the following healthy behaviors: nutrition, exercise and medication adherence  Reviewed prior labs and health maintenance. Continue current medications, diet and exercise. Discussed use, benefit, and side effects of prescribed medications. Barriers to medication compliance addressed.    Patient given educational materials - see patient instructions. All patient questions answered. Patient voiced understanding. Return if symptoms worsen or fail to improve.         Electronically signed by Rickey Lees NP on 3/20/18 at 10:55 AM

## 2018-04-18 DIAGNOSIS — F41.1 GAD (GENERALIZED ANXIETY DISORDER): ICD-10-CM

## 2018-04-18 RX ORDER — ALPRAZOLAM 0.5 MG/1
0.5 TABLET ORAL DAILY PRN
Qty: 30 TABLET | Refills: 0 | Status: SHIPPED | OUTPATIENT
Start: 2018-04-18 | End: 2018-05-23 | Stop reason: SDUPTHER

## 2018-05-23 DIAGNOSIS — F41.1 GAD (GENERALIZED ANXIETY DISORDER): ICD-10-CM

## 2018-05-23 RX ORDER — ALPRAZOLAM 0.5 MG/1
0.5 TABLET ORAL DAILY PRN
Qty: 30 TABLET | Refills: 0 | Status: SHIPPED | OUTPATIENT
Start: 2018-05-23 | End: 2018-06-22 | Stop reason: SDUPTHER

## 2018-06-22 ENCOUNTER — OFFICE VISIT (OUTPATIENT)
Dept: FAMILY MEDICINE CLINIC | Age: 58
End: 2018-06-22

## 2018-06-22 VITALS
BODY MASS INDEX: 30.66 KG/M2 | TEMPERATURE: 98.1 F | WEIGHT: 157 LBS | DIASTOLIC BLOOD PRESSURE: 82 MMHG | RESPIRATION RATE: 18 BRPM | HEART RATE: 80 BPM | SYSTOLIC BLOOD PRESSURE: 128 MMHG

## 2018-06-22 DIAGNOSIS — G89.29 CHRONIC PAIN OF BOTH KNEES: ICD-10-CM

## 2018-06-22 DIAGNOSIS — M25.561 CHRONIC PAIN OF BOTH KNEES: ICD-10-CM

## 2018-06-22 DIAGNOSIS — S16.1XXA STRAIN OF NECK MUSCLE, INITIAL ENCOUNTER: Primary | ICD-10-CM

## 2018-06-22 DIAGNOSIS — M25.562 CHRONIC PAIN OF BOTH KNEES: ICD-10-CM

## 2018-06-22 DIAGNOSIS — Z96.642 STATUS POST LEFT HIP REPLACEMENT: ICD-10-CM

## 2018-06-22 DIAGNOSIS — F41.1 GAD (GENERALIZED ANXIETY DISORDER): ICD-10-CM

## 2018-06-22 DIAGNOSIS — M25.552 LEFT HIP PAIN: ICD-10-CM

## 2018-06-22 DIAGNOSIS — I10 ESSENTIAL HYPERTENSION: Chronic | ICD-10-CM

## 2018-06-22 PROCEDURE — 99214 OFFICE O/P EST MOD 30 MIN: CPT | Performed by: NURSE PRACTITIONER

## 2018-06-22 NOTE — PROGRESS NOTES
Subjective:      Patient ID: Leopold Conger is a 59 y.o. female. Visit Information    Have you changed or started any medications since your last visit including any over-the-counter medicines, vitamins, or herbal medicines? no   Are you having any side effects from any of your medications? -  no  Have you stopped taking any of your medications? Is so, why? -  no    Have you seen any other physician or provider since your last visit? No  Have you had any other diagnostic tests since your last visit? No  Have you been seen in the emergency room and/or had an admission to a hospital since we last saw you? No  Have you had your routine dental cleaning in the past 6 months? no    Have you activated your [employer] account? If not, what are your barriers?  Yes     Patient Care Team:  SLOAN Suggs - CNP as PCP - General (Family Nurse Practitioner)  Christen Campuzano MD (Gastroenterology)    Medical History Review  Past Medical, Family, and Social History reviewed and does contribute to the patient presenting condition    Health Maintenance   Topic Date Due    Shingles Vaccine (1 of 2 - 2 Dose Series) 06/02/2008    Potassium monitoring  12/16/2017    Creatinine monitoring  12/16/2017    DTaP/Tdap/Td vaccine (1 - Tdap) 11/01/2018 (Originally 6/2/1977)    Flu vaccine (1) 11/01/2018 (Originally 9/1/2018)    Breast cancer screen  04/10/2020    Lipid screen  12/16/2021    Cervical cancer screen  07/07/2022    Colon cancer screen colonoscopy  04/26/2023    Hepatitis C screen  Completed    HIV screen  Completed     /82   Pulse 80   Temp 98.1 °F (36.7 °C) (Tympanic)   Resp 18   Wt 157 lb (71.2 kg)   BMI 30.66 kg/m²      PHQ Scores 7/7/2017 1/15/2016 7/27/2015   PHQ2 Score 5 5 6   PHQ9 Score 16 14 16     Interpretation of Total Score Depression Severity: 1-4 = Minimal depression, 5-9 = Mild depression, 10-14 = Moderate depression, 15-19 = Moderately severe depression, 20-27 = Severe on the right side, and 2+ on the left side. Radial pulses are 2+ on the right side, and 2+ on the left side. Posterior tibial pulses are 2+ on the right side, and 2+ on the left side. Pulmonary/Chest: Effort normal and breath sounds normal. No accessory muscle usage. No respiratory distress. She has no decreased breath sounds. She has no wheezes. Abdominal: Soft. Bowel sounds are normal. There is no tenderness. There is no rebound. Musculoskeletal:        Left hip: She exhibits decreased range of motion and tenderness. She exhibits no swelling and no crepitus. Right knee: She exhibits decreased range of motion. Tenderness found. Left knee: She exhibits decreased range of motion. Tenderness found. Lymphadenopathy:     She has no cervical adenopathy. Neurological: She is alert and oriented to person, place, and time. No cranial nerve deficit or sensory deficit. She exhibits normal muscle tone. Coordination normal.   Skin: Skin is warm and dry. No rash noted. Psychiatric: Her speech is normal and behavior is normal. Judgment and thought content normal. Her mood appears anxious. She is not withdrawn. Cognition and memory are normal. She does not exhibit a depressed mood. She expresses no suicidal ideation. She expresses no suicidal plans. Nursing note and vitals reviewed. Assessment / Plan:     1. Strain of neck muscle, initial encounter  Stretching exercises as provided. Flexeril as needed. Warm compresses as needed. Maintain proper alignment while sleeping. Monitor for worsening symptoms. Consider physical therapy referral with persistent/worsening symptoms. - cyclobenzaprine (FLEXERIL) 5 MG tablet; Take 1 tablet by mouth 3 times daily as needed for Muscle spasms  Dispense: 30 tablet; Refill: 0    2. Left hip pain  Obtain x-rays as ordered. Continue meloxicam and Flexeril as needed. Continue to monitor for worsening symptoms. Call office with any concerns.   - XR

## 2018-06-22 NOTE — PATIENT INSTRUCTIONS
Patient Education        Neck Strain or Sprain: Rehab Exercises  Your Care Instructions  Here are some examples of typical rehabilitation exercises for your condition. Start each exercise slowly. Ease off the exercise if you start to have pain. Your doctor or physical therapist will tell you when you can start these exercises and which ones will work best for you. How to do the exercises  Neck rotation    1. Sit in a firm chair, or stand up straight. 2. Keeping your chin level, turn your head to the right, and hold for 15 to 30 seconds. 3. Turn your head to the left and hold for 15 to 30 seconds. 4. Repeat 2 to 4 times to each side. Neck stretches    1. Look straight ahead, and tip your right ear to your right shoulder. Do not let your left shoulder rise up as you tip your head to the right. 2. Hold for 15 to 30 seconds. 3. Tilt your head to the left. Do not let your right shoulder rise up as you tip your head to the left. 4. Hold for 15 to 30 seconds. 5. Repeat 2 to 4 times to each side. Forward neck flexion    1. Sit in a firm chair, or stand up straight. 2. Bend your head forward. 3. Hold for 15 to 30 seconds. 4. Repeat 2 to 4 times. Lateral (side) bend strengthening    1. With your right hand, place your first two fingers on your right temple. 2. Start to bend your head to the side while using gentle pressure from your fingers to keep your head from bending. 3. Hold for about 6 seconds. 4. Repeat 8 to 12 times. 5. Switch hands and repeat the same exercise on your left side. Forward bend strengthening    1. Place your first two fingers of either hand on your forehead. 2. Start to bend your head forward while using gentle pressure from your fingers to keep your head from bending. 3. Hold for about 6 seconds. 4. Repeat 8 to 12 times. Neutral position strengthening    1. Using one hand, place your fingertips on the back of your head at the top of your neck.   2. Start to bend your head backward while using gentle pressure from your fingers to keep your head from bending. 3. Hold for about 6 seconds. 4. Repeat 8 to 12 times. Chin tuck    1. Lie on the floor with a rolled-up towel under your neck. Your head should be touching the floor. 2. Slowly bring your chin toward your chest.  3. Hold for a count of 6, and then relax for up to 10 seconds. 4. Repeat 8 to 12 times. Follow-up care is a key part of your treatment and safety. Be sure to make and go to all appointments, and call your doctor if you are having problems. It's also a good idea to know your test results and keep a list of the medicines you take. Where can you learn more? Go to [email or web address] org and sign in to your [employer] account. Enter M679 in the [employer] box to learn more about \"Neck Strain or Sprain: Rehab Exercises. \"     If you do not have an account, please click on the \"Sign Up Now\" link. Current as of: November 29, 2017  Content Version: 11.6  © 4141-2024 [employer]. Care instructions adapted under license by [facility] ([facility]). If you have questions about a medical condition or this instruction, always ask your healthcare professional. [place] [address] any warranty or liability for your use of this information. Patient Education        Rhomboid Muscle Strain: Rehab Exercises  Your Care Instructions  Here are some examples of typical rehabilitation exercises for your condition. Start each exercise slowly. Ease off the exercise if you start to have pain. Your doctor or physical therapist will tell you when you can start these exercises and which ones will work best for you. How to do the exercises  Lower neck and upper back (rhomboid) stretch    5. Stretch your arms out in front of your body. Clasp one hand on top of your other hand. 6. Gently reach out so that you feel your shoulder blades stretching away from each other.   7. Gently bend your head Health. If you have questions about a medical condition or this instruction, always ask your healthcare professional. [place] [address] any warranty or liability for your use of this information.

## 2018-06-23 RX ORDER — ALPRAZOLAM 0.5 MG/1
0.5 TABLET ORAL DAILY PRN
Qty: 30 TABLET | Refills: 0 | Status: SHIPPED | OUTPATIENT
Start: 2018-06-23 | End: 2018-07-31 | Stop reason: SDUPTHER

## 2018-06-23 RX ORDER — CYCLOBENZAPRINE HCL 5 MG
5 TABLET ORAL 3 TIMES DAILY PRN
Qty: 30 TABLET | Refills: 0 | Status: SHIPPED | OUTPATIENT
Start: 2018-06-23 | End: 2018-08-08 | Stop reason: SDUPTHER

## 2018-06-23 RX ORDER — MELOXICAM 7.5 MG/1
15 TABLET ORAL DAILY
Qty: 60 TABLET | Refills: 2 | Status: SHIPPED | OUTPATIENT
Start: 2018-06-23 | End: 2018-10-03 | Stop reason: SDUPTHER

## 2018-07-01 ASSESSMENT — ENCOUNTER SYMPTOMS
SORE THROAT: 0
DIARRHEA: 0
BACK PAIN: 0
COUGH: 0
ABDOMINAL PAIN: 0
VOMITING: 0
RHINORRHEA: 0
WHEEZING: 0
CHEST TIGHTNESS: 0
NAUSEA: 0
SHORTNESS OF BREATH: 0
CONSTIPATION: 0

## 2018-07-31 DIAGNOSIS — F41.1 GAD (GENERALIZED ANXIETY DISORDER): ICD-10-CM

## 2018-07-31 NOTE — TELEPHONE ENCOUNTER
LOV: 06/22/18  RTO: 3 months  LR: 06/23/18  Last CSM: 06/22/18    PATIENT REQUESTING AN INCREASE IN HER MELOXICAM AS SHE IS STILL HAVING SOME PAIN SINCE SHE IS STANDING ON [employer] FOR 8 OR MORE HOURS FOR WORK. Health Maintenance   Topic Date Due    Shingles Vaccine (1 of 2 - 2 Dose Series) 06/02/2008    Potassium monitoring  12/16/2017    Creatinine monitoring  12/16/2017    DTaP/Tdap/Td vaccine (1 - Tdap) 11/01/2018 (Originally 6/2/1977)    Flu vaccine (1) 11/01/2018 (Originally 9/1/2018)    Breast cancer screen  04/10/2020    Lipid screen  12/16/2021    Cervical cancer screen  07/07/2022    Colon cancer screen colonoscopy  04/26/2023    Hepatitis C screen  Completed    HIV screen  Completed             (applicable per patient's age: Cancer Screenings, Depression Screening, Fall Risk Screening, Immunizations)    LDL Cholesterol (mg/dL)   Date Value   06/17/2016 118     LDL Calculated (mg/dL)   Date Value   12/16/2016 131     AST (U/L)   Date Value   12/16/2016 11     ALT (U/L)   Date Value   12/16/2016 6     BUN (mg/dL)   Date Value   12/16/2016 11      (goal A1C is < 7)   (goal LDL is <100) need 30-50% reduction from baseline     BP Readings from Last 3 Encounters:   06/22/18 128/82   03/20/18 114/72   01/08/18 121/85    (goal /80)      All Future Testing planned in [facility]:  Lab Frequency Next Occurrence   ROBERTH DIGITAL SCREEN W CAD BILATERAL Once 02/07/2018   XR HIP LEFT (1 VIEW) Once 06/22/2018   Lipid, Fasting Once 07/09/2018   Comprehensive Metabolic Panel, Fasting Once 07/09/2018   CBC Once 07/09/2018       Next Visit Date:  No future appointments.          Patient Active Problem List:     Essential hypertension     Depression, major, recurrent, mild (HCC)     Hematuria     Collagenous colitis     Lupus     SHUN (generalized anxiety disorder)     Chronic pain of both knees

## 2018-08-01 RX ORDER — ALPRAZOLAM 0.5 MG/1
0.5 TABLET ORAL DAILY PRN
Qty: 30 TABLET | Refills: 0 | Status: SHIPPED | OUTPATIENT
Start: 2018-08-01 | End: 2018-09-06 | Stop reason: SDUPTHER

## 2018-08-08 DIAGNOSIS — S16.1XXA STRAIN OF NECK MUSCLE, INITIAL ENCOUNTER: ICD-10-CM

## 2018-08-09 RX ORDER — CYCLOBENZAPRINE HCL 5 MG
5 TABLET ORAL 3 TIMES DAILY PRN
Qty: 30 TABLET | Refills: 0 | Status: SHIPPED | OUTPATIENT
Start: 2018-08-09 | End: 2019-02-08 | Stop reason: ALTCHOICE

## 2018-09-06 DIAGNOSIS — F41.1 GAD (GENERALIZED ANXIETY DISORDER): ICD-10-CM

## 2018-09-06 RX ORDER — ALPRAZOLAM 0.5 MG/1
0.5 TABLET ORAL DAILY PRN
Qty: 30 TABLET | Refills: 0 | Status: SHIPPED | OUTPATIENT
Start: 2018-09-06 | End: 2018-10-16 | Stop reason: SDUPTHER

## 2018-10-03 DIAGNOSIS — M25.562 CHRONIC PAIN OF BOTH KNEES: ICD-10-CM

## 2018-10-03 DIAGNOSIS — M25.561 CHRONIC PAIN OF BOTH KNEES: ICD-10-CM

## 2018-10-03 DIAGNOSIS — G89.29 CHRONIC PAIN OF BOTH KNEES: ICD-10-CM

## 2018-10-03 RX ORDER — MELOXICAM 7.5 MG/1
15 TABLET ORAL DAILY
Qty: 60 TABLET | Refills: 5 | Status: SHIPPED | OUTPATIENT
Start: 2018-10-03 | End: 2019-02-08 | Stop reason: SDUPTHER

## 2018-10-16 DIAGNOSIS — F41.1 GAD (GENERALIZED ANXIETY DISORDER): ICD-10-CM

## 2018-10-16 RX ORDER — ALPRAZOLAM 0.5 MG/1
0.5 TABLET ORAL DAILY PRN
Qty: 30 TABLET | Refills: 0 | Status: SHIPPED | OUTPATIENT
Start: 2018-10-16 | End: 2018-12-27 | Stop reason: SDUPTHER

## 2018-10-16 NOTE — TELEPHONE ENCOUNTER
LOV 1-8-18  LRF 9-6-18    Health Maintenance   Topic Date Due    Shingles Vaccine (1 of 2 - 2 Dose Series) 06/02/2008    Potassium monitoring  12/16/2017    Creatinine monitoring  12/16/2017    DTaP/Tdap/Td vaccine (1 - Tdap) 11/01/2018 (Originally 6/2/1977)    Flu vaccine (1) 11/01/2018 (Originally 9/1/2018)    Breast cancer screen  04/10/2020    Lipid screen  12/16/2021    Cervical cancer screen  07/07/2022    Colon cancer screen colonoscopy  04/26/2023    Hepatitis C screen  Completed    HIV screen  Completed             (applicable per patient's age: Cancer Screenings, Depression Screening, Fall Risk Screening, Immunizations)    LDL Cholesterol (mg/dL)   Date Value   06/17/2016 118     LDL Calculated (mg/dL)   Date Value   12/16/2016 131     AST (U/L)   Date Value   12/16/2016 11     ALT (U/L)   Date Value   12/16/2016 6     BUN (mg/dL)   Date Value   12/16/2016 11      (goal A1C is < 7)   (goal LDL is <100) need 30-50% reduction from baseline     BP Readings from Last 3 Encounters:   06/22/18 128/82   03/20/18 114/72   01/08/18 121/85    (goal /80)      All Future Testing planned in [facility]:  Lab Frequency Next Occurrence   ROBERTH DIGITAL SCREEN W CAD BILATERAL Once 02/07/2018   Lipid, Fasting Once 07/09/2018   Comprehensive Metabolic Panel, Fasting Once 07/09/2018   CBC Once 07/09/2018       Next Visit Date:  Future Appointments  Date Time Provider Anna Chu   11/5/2018 10:00 AM Aj Priest, APRN - CNP [facility]            Patient Active Problem List:     Essential hypertension     Depression, major, recurrent, mild (HCC)     Hematuria     Collagenous colitis     Lupus     SHUN (generalized anxiety disorder)     Chronic pain of both knees

## 2018-12-27 DIAGNOSIS — F41.1 GAD (GENERALIZED ANXIETY DISORDER): ICD-10-CM

## 2018-12-28 RX ORDER — ALPRAZOLAM 0.5 MG/1
0.5 TABLET ORAL DAILY PRN
Qty: 30 TABLET | Refills: 0 | Status: SHIPPED | OUTPATIENT
Start: 2018-12-28 | End: 2019-02-08 | Stop reason: SDUPTHER

## 2019-02-06 DIAGNOSIS — F41.1 GAD (GENERALIZED ANXIETY DISORDER): ICD-10-CM

## 2019-02-06 DIAGNOSIS — F33.0 DEPRESSION, MAJOR, RECURRENT, MILD (HCC): Chronic | ICD-10-CM

## 2019-02-07 RX ORDER — PAROXETINE HYDROCHLORIDE 20 MG/1
20 TABLET, FILM COATED ORAL EVERY MORNING
Qty: 30 TABLET | Refills: 5 | Status: SHIPPED | OUTPATIENT
Start: 2019-02-07 | End: 2019-02-08 | Stop reason: SDUPTHER

## 2019-02-08 ENCOUNTER — HOSPITAL ENCOUNTER (OUTPATIENT)
Facility: CLINIC | Age: 59
End: 2019-02-08
Payer: COMMERCIAL

## 2019-02-08 ENCOUNTER — HOSPITAL ENCOUNTER (OUTPATIENT)
Age: 59
Setting detail: SPECIMEN
Discharge: HOME | End: 2019-02-08
Payer: COMMERCIAL

## 2019-02-08 ENCOUNTER — HOSPITAL ENCOUNTER (OUTPATIENT)
Dept: GENERAL RADIOLOGY | Facility: CLINIC | Age: 59
Discharge: HOME | End: 2019-02-10
Payer: COMMERCIAL

## 2019-02-08 ENCOUNTER — OFFICE VISIT (OUTPATIENT)
Dept: FAMILY MEDICINE CLINIC | Age: 59
End: 2019-02-08
Payer: COMMERCIAL

## 2019-02-08 VITALS
HEART RATE: 68 BPM | TEMPERATURE: 98 F | DIASTOLIC BLOOD PRESSURE: 70 MMHG | RESPIRATION RATE: 16 BRPM | BODY MASS INDEX: 31.64 KG/M2 | WEIGHT: 162 LBS | SYSTOLIC BLOOD PRESSURE: 116 MMHG

## 2019-02-08 DIAGNOSIS — R20.0 NUMBNESS AND TINGLING OF RIGHT ARM: ICD-10-CM

## 2019-02-08 DIAGNOSIS — I10 ESSENTIAL HYPERTENSION: Chronic | ICD-10-CM

## 2019-02-08 DIAGNOSIS — R20.2 NUMBNESS AND TINGLING OF RIGHT ARM: ICD-10-CM

## 2019-02-08 DIAGNOSIS — G89.29 CHRONIC PAIN OF BOTH KNEES: ICD-10-CM

## 2019-02-08 DIAGNOSIS — R20.0 NUMBNESS AND TINGLING IN LEFT ARM: Primary | ICD-10-CM

## 2019-02-08 DIAGNOSIS — M54.2 CERVICAL PAIN (NECK): ICD-10-CM

## 2019-02-08 DIAGNOSIS — R20.2 NUMBNESS AND TINGLING IN LEFT ARM: Primary | ICD-10-CM

## 2019-02-08 DIAGNOSIS — M25.561 CHRONIC PAIN OF BOTH KNEES: ICD-10-CM

## 2019-02-08 DIAGNOSIS — F41.1 GAD (GENERALIZED ANXIETY DISORDER): ICD-10-CM

## 2019-02-08 DIAGNOSIS — F33.0 DEPRESSION, MAJOR, RECURRENT, MILD (HCC): Chronic | ICD-10-CM

## 2019-02-08 DIAGNOSIS — R20.2 NUMBNESS AND TINGLING IN LEFT ARM: ICD-10-CM

## 2019-02-08 DIAGNOSIS — M25.562 CHRONIC PAIN OF BOTH KNEES: ICD-10-CM

## 2019-02-08 DIAGNOSIS — R20.0 NUMBNESS AND TINGLING IN LEFT ARM: ICD-10-CM

## 2019-02-08 LAB
ALBUMIN SERPL-MCNC: 4.1 G/DL (ref 3.5–5.2)
ALBUMIN/GLOBULIN RATIO: 1.4 (ref 1–2.5)
ALP BLD-CCNC: 68 U/L (ref 35–104)
ALT SERPL-CCNC: 13 U/L (ref 5–33)
ANION GAP SERPL CALCULATED.3IONS-SCNC: 12 MMOL/L (ref 9–17)
AST SERPL-CCNC: 15 U/L
BILIRUB SERPL-MCNC: 0.75 MG/DL (ref 0.3–1.2)
BUN BLDV-MCNC: 10 MG/DL (ref 8–23)
BUN/CREAT BLD: NORMAL (ref 9–20)
CALCIUM SERPL-MCNC: 9.1 MG/DL (ref 8.6–10.4)
CHLORIDE BLD-SCNC: 107 MMOL/L (ref 98–107)
CHOLESTEROL/HDL RATIO: 3.8
CHOLESTEROL: 198 MG/DL
CO2: 24 MMOL/L (ref 20–31)
CREAT SERPL-MCNC: 0.86 MG/DL (ref 0.5–0.9)
GFR AFRICAN AMERICAN: >60 ML/MIN
GFR NON-AFRICAN AMERICAN: >60 ML/MIN
GFR SERPL CREATININE-BSD FRML MDRD: NORMAL ML/MIN/{1.73_M2}
GFR SERPL CREATININE-BSD FRML MDRD: NORMAL ML/MIN/{1.73_M2}
GLUCOSE BLD-MCNC: 87 MG/DL (ref 70–99)
HCT VFR BLD CALC: 41.3 % (ref 36.3–47.1)
HDLC SERPL-MCNC: 52 MG/DL
HEMOGLOBIN: 13.2 G/DL (ref 11.9–15.1)
LDL CHOLESTEROL: 131 MG/DL (ref 0–130)
MCH RBC QN AUTO: 32.1 PG (ref 25.2–33.5)
MCHC RBC AUTO-ENTMCNC: 32 G/DL (ref 28.4–34.8)
MCV RBC AUTO: 100.5 FL (ref 82.6–102.9)
NRBC AUTOMATED: 0 PER 100 WBC
PDW BLD-RTO: 14.4 % (ref 11.8–14.4)
PLATELET # BLD: 286 K/UL (ref 138–453)
PMV BLD AUTO: 12.5 FL (ref 8.1–13.5)
POTASSIUM SERPL-SCNC: 4.4 MMOL/L (ref 3.7–5.3)
RBC # BLD: 4.11 M/UL (ref 3.95–5.11)
SODIUM BLD-SCNC: 143 MMOL/L (ref 135–144)
TOTAL PROTEIN: 7.1 G/DL (ref 6.4–8.3)
TRIGL SERPL-MCNC: 76 MG/DL
VLDLC SERPL CALC-MCNC: ABNORMAL MG/DL (ref 1–30)
WBC # BLD: 4 K/UL (ref 3.5–11.3)

## 2019-02-08 PROCEDURE — 99214 OFFICE O/P EST MOD 30 MIN: CPT | Performed by: NURSE PRACTITIONER

## 2019-02-08 PROCEDURE — 72052 X-RAY EXAM NECK SPINE 6/>VWS: CPT

## 2019-02-08 PROCEDURE — G0444 DEPRESSION SCREEN ANNUAL: HCPCS | Performed by: NURSE PRACTITIONER

## 2019-02-08 RX ORDER — MELOXICAM 7.5 MG/1
15 TABLET ORAL DAILY
Qty: 60 TABLET | Refills: 5 | Status: SHIPPED | OUTPATIENT
Start: 2019-02-08 | End: 2020-02-21 | Stop reason: SDUPTHER

## 2019-02-08 RX ORDER — ALPRAZOLAM 0.5 MG/1
0.5 TABLET ORAL DAILY PRN
Qty: 30 TABLET | Refills: 0 | Status: SHIPPED | OUTPATIENT
Start: 2019-02-08 | End: 2019-03-26 | Stop reason: SDUPTHER

## 2019-02-08 RX ORDER — PAROXETINE HYDROCHLORIDE 20 MG/1
30 TABLET, FILM COATED ORAL EVERY MORNING
Qty: 45 TABLET | Refills: 0
Start: 2019-02-08 | End: 2019-05-08 | Stop reason: SDUPTHER

## 2019-02-08 ASSESSMENT — PATIENT HEALTH QUESTIONNAIRE - PHQ9
10. IF YOU CHECKED OFF ANY PROBLEMS, HOW DIFFICULT HAVE THESE PROBLEMS MADE IT FOR YOU TO DO YOUR WORK, TAKE CARE OF THINGS AT HOME, OR GET ALONG WITH OTHER PEOPLE: 1
9. THOUGHTS THAT YOU WOULD BE BETTER OFF DEAD, OR OF HURTING YOURSELF: 0
8. MOVING OR SPEAKING SO SLOWLY THAT OTHER PEOPLE COULD HAVE NOTICED. OR THE OPPOSITE, BEING SO FIGETY OR RESTLESS THAT YOU HAVE BEEN MOVING AROUND A LOT MORE THAN USUAL: 0
SUM OF ALL RESPONSES TO PHQ9 QUESTIONS 1 & 2: 4
5. POOR APPETITE OR OVEREATING: 0
6. FEELING BAD ABOUT YOURSELF - OR THAT YOU ARE A FAILURE OR HAVE LET YOURSELF OR YOUR FAMILY DOWN: 0
7. TROUBLE CONCENTRATING ON THINGS, SUCH AS READING THE NEWSPAPER OR WATCHING TELEVISION: 0
SUM OF ALL RESPONSES TO PHQ QUESTIONS 1-9: 9
1. LITTLE INTEREST OR PLEASURE IN DOING THINGS: 3
SUM OF ALL RESPONSES TO PHQ QUESTIONS 1-9: 9
3. TROUBLE FALLING OR STAYING ASLEEP: 2
2. FEELING DOWN, DEPRESSED OR HOPELESS: 1
4. FEELING TIRED OR HAVING LITTLE ENERGY: 3

## 2019-02-08 ASSESSMENT — ENCOUNTER SYMPTOMS
RHINORRHEA: 0
CHEST TIGHTNESS: 0
WHEEZING: 0
BACK PAIN: 0
DIARRHEA: 1
SORE THROAT: 0
EYE DISCHARGE: 0
SINUS PRESSURE: 0
SHORTNESS OF BREATH: 0
BLOOD IN STOOL: 0
NAUSEA: 0
EYE REDNESS: 0
CONSTIPATION: 0
COUGH: 0
VOMITING: 0
ABDOMINAL PAIN: 0

## 2019-02-12 DIAGNOSIS — R20.2 NUMBNESS AND TINGLING IN LEFT ARM: Primary | ICD-10-CM

## 2019-02-12 DIAGNOSIS — R20.0 NUMBNESS AND TINGLING IN LEFT ARM: Primary | ICD-10-CM

## 2019-02-12 DIAGNOSIS — R20.2 NUMBNESS AND TINGLING OF RIGHT ARM: ICD-10-CM

## 2019-02-12 DIAGNOSIS — R20.0 NUMBNESS AND TINGLING OF RIGHT ARM: ICD-10-CM

## 2019-02-12 DIAGNOSIS — M54.2 CERVICAL PAIN (NECK): ICD-10-CM

## 2019-03-07 ENCOUNTER — OFFICE VISIT (OUTPATIENT)
Dept: NEUROLOGY | Age: 59
End: 2019-03-07
Payer: COMMERCIAL

## 2019-03-07 VITALS
HEIGHT: 60 IN | BODY MASS INDEX: 31.41 KG/M2 | DIASTOLIC BLOOD PRESSURE: 94 MMHG | WEIGHT: 160 LBS | HEART RATE: 74 BPM | SYSTOLIC BLOOD PRESSURE: 135 MMHG

## 2019-03-07 DIAGNOSIS — R20.0 NUMBNESS: Primary | ICD-10-CM

## 2019-03-07 PROCEDURE — 99204 OFFICE O/P NEW MOD 45 MIN: CPT | Performed by: PSYCHIATRY & NEUROLOGY

## 2019-03-07 RX ORDER — M-VIT,TX,IRON,MINS/CALC/FOLIC 27MG-0.4MG
1 TABLET ORAL DAILY
COMMUNITY

## 2019-03-13 ENCOUNTER — TELEPHONE (OUTPATIENT)
Dept: FAMILY MEDICINE CLINIC | Age: 59
End: 2019-03-13

## 2019-03-13 ENCOUNTER — HOSPITAL ENCOUNTER (EMERGENCY)
Facility: CLINIC | Age: 59
Discharge: HOME | End: 2019-03-13
Attending: EMERGENCY MEDICINE
Payer: COMMERCIAL

## 2019-03-13 VITALS
BODY MASS INDEX: 30.43 KG/M2 | HEART RATE: 68 BPM | HEIGHT: 60 IN | RESPIRATION RATE: 18 BRPM | DIASTOLIC BLOOD PRESSURE: 89 MMHG | TEMPERATURE: 97.4 F | OXYGEN SATURATION: 98 % | SYSTOLIC BLOOD PRESSURE: 144 MMHG | WEIGHT: 155 LBS

## 2019-03-13 DIAGNOSIS — N30.01 ACUTE CYSTITIS WITH HEMATURIA: Primary | ICD-10-CM

## 2019-03-13 DIAGNOSIS — N30.00 ACUTE CYSTITIS WITHOUT HEMATURIA: Primary | ICD-10-CM

## 2019-03-13 LAB
-: ABNORMAL
AMORPHOUS: ABNORMAL
BACTERIA: ABNORMAL
BILIRUBIN URINE: NEGATIVE
CASTS UA: ABNORMAL /LPF (ref 0–2)
COLOR: YELLOW
COMMENT UA: ABNORMAL
CRYSTALS, UA: ABNORMAL /HPF
EPITHELIAL CELLS UA: ABNORMAL /HPF (ref 0–5)
GLUCOSE URINE: NEGATIVE
KETONES, URINE: ABNORMAL
LEUKOCYTE ESTERASE, URINE: ABNORMAL
MUCUS: ABNORMAL
NITRITE, URINE: POSITIVE
OTHER OBSERVATIONS UA: ABNORMAL
PH UA: 5.5 (ref 5–8)
PROTEIN UA: ABNORMAL
RBC UA: ABNORMAL /HPF (ref 0–2)
RENAL EPITHELIAL, UA: ABNORMAL /HPF
SPECIFIC GRAVITY UA: 1.02 (ref 1–1.03)
TRICHOMONAS: ABNORMAL
TURBIDITY: ABNORMAL
URINE HGB: ABNORMAL
UROBILINOGEN, URINE: NORMAL
WBC UA: ABNORMAL /HPF (ref 0–5)
YEAST: ABNORMAL

## 2019-03-13 PROCEDURE — 87186 SC STD MICRODIL/AGAR DIL: CPT

## 2019-03-13 PROCEDURE — 6370000000 HC RX 637 (ALT 250 FOR IP): Performed by: EMERGENCY MEDICINE

## 2019-03-13 PROCEDURE — 99283 EMERGENCY DEPT VISIT LOW MDM: CPT

## 2019-03-13 PROCEDURE — 87086 URINE CULTURE/COLONY COUNT: CPT

## 2019-03-13 PROCEDURE — 87088 URINE BACTERIA CULTURE: CPT

## 2019-03-13 PROCEDURE — 81001 URINALYSIS AUTO W/SCOPE: CPT

## 2019-03-13 RX ORDER — PHENAZOPYRIDINE HYDROCHLORIDE 100 MG/1
200 TABLET, FILM COATED ORAL ONCE
Status: COMPLETED | OUTPATIENT
Start: 2019-03-13 | End: 2019-03-13

## 2019-03-13 RX ORDER — SULFAMETHOXAZOLE AND TRIMETHOPRIM 800; 160 MG/1; MG/1
1 TABLET ORAL ONCE
Status: COMPLETED | OUTPATIENT
Start: 2019-03-13 | End: 2019-03-13

## 2019-03-13 RX ORDER — SULFAMETHOXAZOLE AND TRIMETHOPRIM 800; 160 MG/1; MG/1
1 TABLET ORAL 2 TIMES DAILY
Qty: 14 TABLET | Refills: 0 | Status: SHIPPED | OUTPATIENT
Start: 2019-03-13 | End: 2019-03-13 | Stop reason: SINTOL

## 2019-03-13 RX ORDER — PHENAZOPYRIDINE HYDROCHLORIDE 200 MG/1
200 TABLET, FILM COATED ORAL 3 TIMES DAILY PRN
Qty: 6 TABLET | Refills: 0 | Status: SHIPPED | OUTPATIENT
Start: 2019-03-13 | End: 2019-03-16

## 2019-03-13 RX ORDER — CIPROFLOXACIN 500 MG/1
500 TABLET, FILM COATED ORAL 2 TIMES DAILY
Qty: 14 TABLET | Refills: 0 | Status: SHIPPED | OUTPATIENT
Start: 2019-03-13 | End: 2019-03-20

## 2019-03-13 RX ADMIN — PHENAZOPYRIDINE HYDROCHLORIDE 200 MG: 100 TABLET ORAL at 04:45

## 2019-03-13 RX ADMIN — SULFAMETHOXAZOLE AND TRIMETHOPRIM 1 TABLET: 800; 160 TABLET ORAL at 04:45

## 2019-03-13 ASSESSMENT — PAIN DESCRIPTION - PAIN TYPE: TYPE: ACUTE PAIN

## 2019-03-13 ASSESSMENT — PAIN DESCRIPTION - DESCRIPTORS: DESCRIPTORS: ACHING

## 2019-03-13 ASSESSMENT — PAIN SCALES - GENERAL: PAINLEVEL_OUTOF10: 7

## 2019-03-13 ASSESSMENT — PAIN DESCRIPTION - LOCATION: LOCATION: GROIN

## 2019-03-14 LAB
CULTURE: ABNORMAL
Lab: ABNORMAL
SPECIMEN DESCRIPTION: ABNORMAL

## 2019-03-26 DIAGNOSIS — F41.1 GAD (GENERALIZED ANXIETY DISORDER): ICD-10-CM

## 2019-03-26 RX ORDER — ALPRAZOLAM 0.5 MG/1
0.5 TABLET ORAL DAILY PRN
Qty: 30 TABLET | Refills: 0 | Status: SHIPPED | OUTPATIENT
Start: 2019-03-26 | End: 2019-04-24 | Stop reason: SDUPTHER

## 2019-03-27 ENCOUNTER — TELEPHONE (OUTPATIENT)
Dept: FAMILY MEDICINE CLINIC | Age: 59
End: 2019-03-27

## 2019-03-28 ENCOUNTER — HOSPITAL ENCOUNTER (OUTPATIENT)
Dept: MRI IMAGING | Facility: CLINIC | Age: 59
Discharge: HOME | End: 2019-03-30
Payer: COMMERCIAL

## 2019-03-28 DIAGNOSIS — R20.2 NUMBNESS AND TINGLING IN LEFT ARM: ICD-10-CM

## 2019-03-28 DIAGNOSIS — R20.0 NUMBNESS: ICD-10-CM

## 2019-03-28 DIAGNOSIS — M54.2 CERVICAL PAIN (NECK): ICD-10-CM

## 2019-03-28 DIAGNOSIS — R20.0 NUMBNESS AND TINGLING IN LEFT ARM: ICD-10-CM

## 2019-03-28 DIAGNOSIS — R20.0 NUMBNESS AND TINGLING OF RIGHT ARM: ICD-10-CM

## 2019-03-28 DIAGNOSIS — R20.2 NUMBNESS AND TINGLING OF RIGHT ARM: ICD-10-CM

## 2019-03-28 PROCEDURE — 6360000004 HC RX CONTRAST MEDICATION: Performed by: NURSE PRACTITIONER

## 2019-03-28 PROCEDURE — A9579 GAD-BASE MR CONTRAST NOS,1ML: HCPCS | Performed by: NURSE PRACTITIONER

## 2019-03-28 PROCEDURE — 70553 MRI BRAIN STEM W/O & W/DYE: CPT

## 2019-03-28 PROCEDURE — 72141 MRI NECK SPINE W/O DYE: CPT

## 2019-03-28 RX ADMIN — GADOTERIDOL 15 ML: 279.3 INJECTION, SOLUTION INTRAVENOUS at 10:56

## 2019-03-29 ENCOUNTER — HOSPITAL ENCOUNTER (EMERGENCY)
Facility: CLINIC | Age: 59
Discharge: HOME | End: 2019-03-29
Attending: EMERGENCY MEDICINE
Payer: COMMERCIAL

## 2019-03-29 VITALS
RESPIRATION RATE: 16 BRPM | TEMPERATURE: 98 F | OXYGEN SATURATION: 94 % | BODY MASS INDEX: 30.43 KG/M2 | HEIGHT: 60 IN | SYSTOLIC BLOOD PRESSURE: 156 MMHG | HEART RATE: 80 BPM | WEIGHT: 155 LBS | DIASTOLIC BLOOD PRESSURE: 88 MMHG

## 2019-03-29 DIAGNOSIS — N30.00 ACUTE CYSTITIS WITHOUT HEMATURIA: Primary | ICD-10-CM

## 2019-03-29 LAB
-: ABNORMAL
AMORPHOUS: ABNORMAL
BACTERIA: ABNORMAL
BILIRUBIN URINE: NEGATIVE
CASTS UA: ABNORMAL /LPF (ref 0–2)
COLOR: YELLOW
COMMENT UA: ABNORMAL
CRYSTALS, UA: ABNORMAL /HPF
EPITHELIAL CELLS UA: ABNORMAL /HPF (ref 0–5)
GLUCOSE URINE: NEGATIVE
KETONES, URINE: ABNORMAL
LEUKOCYTE ESTERASE, URINE: ABNORMAL
MUCUS: ABNORMAL
NITRITE, URINE: NEGATIVE
OTHER OBSERVATIONS UA: ABNORMAL
PH UA: 5.5 (ref 5–8)
PROTEIN UA: NEGATIVE
RBC UA: ABNORMAL /HPF (ref 0–2)
RENAL EPITHELIAL, UA: ABNORMAL /HPF
SPECIFIC GRAVITY UA: 1.02 (ref 1–1.03)
TRICHOMONAS: ABNORMAL
TURBIDITY: CLEAR
URINE HGB: ABNORMAL
UROBILINOGEN, URINE: NORMAL
WBC UA: ABNORMAL /HPF (ref 0–5)
YEAST: ABNORMAL

## 2019-03-29 PROCEDURE — 99283 EMERGENCY DEPT VISIT LOW MDM: CPT

## 2019-03-29 PROCEDURE — 87086 URINE CULTURE/COLONY COUNT: CPT

## 2019-03-29 PROCEDURE — 81001 URINALYSIS AUTO W/SCOPE: CPT

## 2019-03-29 PROCEDURE — 6370000000 HC RX 637 (ALT 250 FOR IP): Performed by: EMERGENCY MEDICINE

## 2019-03-29 RX ORDER — PHENAZOPYRIDINE HYDROCHLORIDE 200 MG/1
200 TABLET, FILM COATED ORAL 3 TIMES DAILY PRN
Qty: 6 TABLET | Refills: 0 | Status: SHIPPED | OUTPATIENT
Start: 2019-03-29 | End: 2019-04-01

## 2019-03-29 RX ORDER — PHENAZOPYRIDINE HYDROCHLORIDE 100 MG/1
200 TABLET, FILM COATED ORAL ONCE
Status: COMPLETED | OUTPATIENT
Start: 2019-03-29 | End: 2019-03-29

## 2019-03-29 RX ORDER — NITROFURANTOIN 25; 75 MG/1; MG/1
100 CAPSULE ORAL ONCE
Status: COMPLETED | OUTPATIENT
Start: 2019-03-29 | End: 2019-03-29

## 2019-03-29 RX ORDER — NITROFURANTOIN 25; 75 MG/1; MG/1
100 CAPSULE ORAL 2 TIMES DAILY
Qty: 14 CAPSULE | Refills: 0 | Status: SHIPPED | OUTPATIENT
Start: 2019-03-29 | End: 2019-04-05

## 2019-03-29 RX ADMIN — NITROFURANTOIN MONOHYDRATE/MACROCRYSTALLINE 100 MG: 25; 75 CAPSULE ORAL at 21:46

## 2019-03-29 RX ADMIN — PHENAZOPYRIDINE HYDROCHLORIDE 200 MG: 100 TABLET ORAL at 21:46

## 2019-03-29 ASSESSMENT — PAIN DESCRIPTION - LOCATION: LOCATION: OTHER (COMMENT)

## 2019-03-29 ASSESSMENT — PAIN SCALES - GENERAL: PAINLEVEL_OUTOF10: 6

## 2019-03-30 NOTE — ED PROVIDER NOTES
eMERGENCY dEPARTMENT eNCOUnter      Pt Name: Amber Soto  MRN: [id number]  [place] 1960  Date of evaluation: 3/29/2019      CHIEF COMPLAINT       Chief Complaint   Patient presents with    Back Pain     started 3 days ago    Dysuria     same as above         HISTORY OF PRESENT ILLNESS    Amber Soto is a 59 y.o. female who presents with back pain and dysuria. Patient states she was here, couple weeks ago for urinary tract infection after getting severely ill on Bactrim. They had changed her antibiotics over to Cipro. She states that she was eating dairy products with and feels that Cipro may not have been functional.  She has been complaining of continuation of some dysuria. She is complaining of some back pain. 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 SURGICAL HISTORY      has a past surgical history that includes joint replacement (Left, 2009); Mandible surgery; joint replacement (Right, 2/2016); and Total hip arthroplasty (Left, 10/25/2016). CURRENT MEDICATIONS       Discharge Medication List as of 3/29/2019  9:46 PM      CONTINUE these medications which have NOT CHANGED    Details   ALPRAZolam (XANAX) 0.5 MG tablet Take 1 tablet by mouth daily as needed for Sleep or Anxiety. , Disp-30 tablet, R-0Normal      Multiple Vitamins-Minerals (THERAPEUTIC MULTIVITAMIN-MINERALS) tablet Take 1 tablet by mouth dailyHistorical Med      PARoxetine (PAXIL) 20 MG tablet Take 1.5 tablets by mouth every morning, Disp-45 tablet, R-0Adjust Sig      meloxicam (MOBIC) 7.5 MG tablet Take 2 tablets by mouth daily, Disp-60 tablet, R-5Normal      calcium carbonate (CALCIUM 600) 600 MG TABS tablet Take 1 tablet by mouth 2 times daily, Disp-30 tablet, R-3OTC      Cholecalciferol (VITAMIN D3) 5000 UNITS CAPS Take 1 capsule by mouth daily, Disp-30 capsule, R-0OTC             ALLERGIES     is allergic to bactrim [sulfamethoxazole-trimethoprim] and ibuprofen. FAMILY HISTORY     indicated that her mother is alive. She indicated that her father is . She indicated that the status of her sister is unknown. She indicated that her maternal grandmother is . She indicated that her maternal grandfather is . She indicated that her paternal grandmother is . She indicated that her paternal grandfather is . family history includes Arthritis in her mother; Colon Cancer in her maternal grandfather and paternal grandfather; Crohn's Disease in her father; Diabetes in her mother; Hypertension in her mother; Other in her father; Ovarian Cancer in her sister; Stomach Cancer in her maternal grandmother. SOCIAL HISTORY      reports that she quit smoking about 12 years ago. Her smoking use included cigarettes. She has a 15.00 pack-year smoking history. She has never used smokeless tobacco. She reports that she drinks alcohol. She reports that she does not use drugs. PHYSICAL EXAM     INITIAL VITALS:  height is 5' (1.524 m) and weight is 70.3 kg (155 lb). Her oral temperature is 98 °F (36.7 °C). Her blood pressure is 156/88 (abnormal) and her pulse is 80. Her respiration is 16 and oxygen saturation is 94%. Gen.: Patient is well-hydrated, nontoxic range female in no apparent distress. HEENT: Head is atraumatic. Respiratory: Lung sounds are clear bilateral.  Cardiac: Heart is regular rate and rhythm. Back: No CVA tenderness.   GI: Abdomen soft, nontender, good active bowel sounds    DIFFERENTIAL DIAGNOSIS/ MDM:     Kidney stone, these medications    Details   nitrofurantoin, macrocrystal-monohydrate, (MACROBID) 100 MG capsule Take 1 capsule by mouth 2 times daily for 7 days, Disp-14 capsule, R-0Print      phenazopyridine (PYRIDIUM) 200 MG tablet Take 1 tablet by mouth 3 times daily as needed for Pain (bladder spasm/pain), Disp-6 tablet, R-0Print             (Please note that portions of this note were completed with a voice recognition program.  Efforts were made to edit the dictations but occasionally words are mis-transcribed.)    Barragan MD, F.A.C.E.P.   Attending Emergency Physician        Rafita Woods MD  03/29/19 1448

## 2019-03-31 LAB
CULTURE: NORMAL
Lab: NORMAL
SPECIMEN DESCRIPTION: NORMAL

## 2019-04-02 DIAGNOSIS — R93.7 ABNORMAL MRI, CERVICAL SPINE: Primary | ICD-10-CM

## 2019-04-15 ENCOUNTER — OFFICE VISIT (OUTPATIENT)
Dept: NEUROLOGY | Age: 59
End: 2019-04-15
Payer: COMMERCIAL

## 2019-04-15 ENCOUNTER — TELEPHONE (OUTPATIENT)
Dept: NEUROLOGY | Age: 59
End: 2019-04-15

## 2019-04-15 VITALS
HEART RATE: 74 BPM | SYSTOLIC BLOOD PRESSURE: 127 MMHG | HEIGHT: 60 IN | BODY MASS INDEX: 32 KG/M2 | DIASTOLIC BLOOD PRESSURE: 90 MMHG | WEIGHT: 163 LBS

## 2019-04-15 DIAGNOSIS — M54.12 CERVICAL RADICULOPATHY: Primary | ICD-10-CM

## 2019-04-15 PROCEDURE — 99214 OFFICE O/P EST MOD 30 MIN: CPT | Performed by: PSYCHIATRY & NEUROLOGY

## 2019-04-15 NOTE — TELEPHONE ENCOUNTER
UNABLE TO SCHEDULE FOLLOW UP ON CHECK OUT, SHE WILL CALL BACK TO SCHEDULE ONCE SHE GETS HER WORK SCHEDULE.   KS

## 2019-04-15 NOTE — PROGRESS NOTES
[facility], [address], [address]  Ph: [phone] or [phone]  FAX: [phone]            Dear Dr. Karon Fabry, APRN - CNP    The patient comes in today as a follow-up visit. She has a previous history of having episodic numbness and tingling strength in the left upper extremity and radiating down through her shoulders and right upper extremity specifically provoked by lifting heavy objects. On the last visit she underwent MRI scan of the cervical spine which I reviewed with her. MRI scan shows multilevel DJD at C3-C4, C4-C5, C5-C6 and C6-C7. The patient reports neck pain. She does not report having any more of these transient spells. Her last episode was almost 2 months ago. The patient does not report exacerbation of neck pain with any neck movement. Neurological work up:    MRI C spine 3/28/2019  Multilevel degenerative disc disease with associated uncovertebral and facet   hypertrophy without significant canal stenosis.       Moderate left foraminal narrowing at C3-4, severe right foraminal narrowing   at C4-5, mild left and moderate right foraminal narrowing at C5-6, as well as   moderate left foraminal narrowing at C6-7.      EEG  CT head  CTA head Neck                            REVIEW OF SYSTEMS    Constitutional Weight: absent, Appetite: absent, Fatigue: present   HEENT Ears: ringing, Eyes: glasses, Visual disturbance: absent   Reespiratory Shortness of breath: absent, Cough: absent   Cardivascular Chest pain: absent, Leg swelling :absent   GI Constipation: absent, Diarrhea: present, Swallowing change: absent    Urinary frequency: absent, Urinary urgency: absent, Urinary incontinence: absent   Musculoskeletal Neck pain: present, Back pain: present, Stiffness: present, Muscle pain: absent, Joint pain: present   Dermatological Hair loss: absent, Skin changes: present   Neurological Memory loss: absent, Confusion: absent, Seizures: absent, Trouble walking or imbalance: absent, Dizziness: absent, Weakness: present, Numbness: present Tremor: absent, Spasm: absent, Speech difficulty: absent, Headache: absent, Light sensitivity: absent   Psychiatric Anxiety: present, Hallucination: absent, Mood disorder: present   Hematologic Abnormal bleeding: absent, Anemia: absent, Clotting disorder: absent, Lymph gland changes: absent       General examination:    Head: Normocephalic, atraumatic  Eyes: Extraocular movements intact  Lungs: Respirations unlabored, chest wall no deformity  ENT: Normal external ear canals, no sinus tenderness  Heart: Regular rate rhythm  Abdomen: No masses, tenderness  Extremities: No cyanosis or edema, 2+ pulses  Skin: Intact, normal skin color    Neurological examination:    Mental status   Alert and oriented; intact memory with no confusion, speech or language problems; no hallucinations or delusions     Cranial nerves   II - visual fields intact to confrontation                                                III, IV, VI - extra-ocular muscles full: no pupillary defect; no SOURAV, no nystagmus, no ptosis   V - normal facial sensation                                                               VII - normal facial symmetry                                                             VIII - intact hearing                                                                             IX, X - symmetrical palate                                                                  XI - symmetrical shoulder shrug                                                       XII - midline tongue without atrophy or fasciculation     Motor function  Normal muscle bulk and tone; normal power 5/5, including fine motor movements     Sensory function Intact to touch, pin, vibration, proprioception     Cerebellar Intact fine motor movement. No involuntary movements or tremors     Reflex function Intact 2+ DTR and symmetric.  Negative Babinski     Gait Normal station and gait       Assessment Recommendations:  Cervical radiculopathy with multilevel DJD and MRI scan    Due to ongoing neck pain, I would refer the patient to pain management for evaluation for possible cervical HARLAN    Patient to follow up with me next 2-3 months. BRUCE WILSON, SLOAN - CNP , I would like to thank you for the consult. Please feel free to contact me if there remains any further question about the patient care. Johnie Mars M.D. Diplomate, American Board of Psychiatry and Neurology  Diplomate, American Board of Clinical Neurophysiology  Diplomate, American Board of Epilepsy           This note is created with the assistance of a speech-recognition program. While intending to generate a document that actually reflects the content of the visit, the document can still have some errors including those of syntax and sound a- like substitutions which may escape proofreading. In such instances, actual meaning can be extrapolated by contextual derivation.

## 2019-04-24 DIAGNOSIS — F41.1 GAD (GENERALIZED ANXIETY DISORDER): ICD-10-CM

## 2019-04-24 RX ORDER — ALPRAZOLAM 0.5 MG/1
0.5 TABLET ORAL DAILY PRN
Qty: 30 TABLET | Refills: 0 | Status: SHIPPED | OUTPATIENT
Start: 2019-04-24 | End: 2019-05-29 | Stop reason: SDUPTHER

## 2019-04-24 NOTE — TELEPHONE ENCOUNTER
LOV:2-8-19  ROV:5-8-19  LRF:3-26-19  Health Maintenance   Topic Date Due    DTaP/Tdap/Td vaccine (1 - Tdap) 06/02/1977    Shingles Vaccine (1 of 2) 06/02/2008    Flu vaccine (Season Ended) 02/07/2020 (Originally 9/1/2019)    Potassium monitoring  02/08/2020    Creatinine monitoring  02/08/2020    Breast cancer screen  04/10/2020    Cervical cancer screen  07/07/2022    Colon cancer screen colonoscopy  04/26/2023    Lipid screen  02/08/2024    Hepatitis C screen  Completed    HIV screen  Completed    Pneumococcal 0-64 years Vaccine  Aged Out             (applicable per patient's age: Cancer Screenings, Depression Screening, Fall Risk Screening, Immunizations)    LDL Cholesterol (mg/dL)   Date Value   02/08/2019 131 (H)     LDL Calculated (mg/dL)   Date Value   12/16/2016 131     AST (U/L)   Date Value   02/08/2019 15     ALT (U/L)   Date Value   02/08/2019 13     BUN (mg/dL)   Date Value   02/08/2019 10      (goal A1C is < 7)   (goal LDL is <100) need 30-50% reduction from baseline     BP Readings from Last 3 Encounters:   04/15/19 (!) 127/90   03/29/19 (!) 156/88   03/13/19 (!) 144/89    (goal /80)      All Future Testing planned in [facility]:      Next Visit Date:  Future Appointments   Date Time Provider Anna Chu   5/8/2019 11:00 AM Ruy Hooks, APRN - [id number] [place] [address]            Patient Active Problem List:     Essential hypertension     Depression, major, recurrent, mild (HCC)     Hematuria     Collagenous colitis     Lupus     SHUN (generalized anxiety disorder)     Chronic pain of both knees

## 2019-05-08 ENCOUNTER — OFFICE VISIT (OUTPATIENT)
Dept: FAMILY MEDICINE CLINIC | Age: 59
End: 2019-05-08
Payer: COMMERCIAL

## 2019-05-08 VITALS
WEIGHT: 164 LBS | RESPIRATION RATE: 16 BRPM | BODY MASS INDEX: 32.03 KG/M2 | DIASTOLIC BLOOD PRESSURE: 78 MMHG | SYSTOLIC BLOOD PRESSURE: 124 MMHG | TEMPERATURE: 97.6 F | HEART RATE: 84 BPM

## 2019-05-08 DIAGNOSIS — Z12.39 BREAST CANCER SCREENING: ICD-10-CM

## 2019-05-08 DIAGNOSIS — F41.1 GAD (GENERALIZED ANXIETY DISORDER): Primary | ICD-10-CM

## 2019-05-08 DIAGNOSIS — I10 ESSENTIAL HYPERTENSION: ICD-10-CM

## 2019-05-08 DIAGNOSIS — Z13.820 SCREENING FOR OSTEOPOROSIS: ICD-10-CM

## 2019-05-08 PROCEDURE — 99214 OFFICE O/P EST MOD 30 MIN: CPT | Performed by: NURSE PRACTITIONER

## 2019-05-08 RX ORDER — PAROXETINE 30 MG/1
30 TABLET, FILM COATED ORAL EVERY MORNING
Qty: 30 TABLET | Refills: 3 | Status: SHIPPED | OUTPATIENT
Start: 2019-05-08 | End: 2019-11-11 | Stop reason: SDUPTHER

## 2019-05-08 SDOH — HEALTH STABILITY: MENTAL HEALTH: HOW MANY STANDARD DRINKS CONTAINING ALCOHOL DO YOU HAVE ON A TYPICAL DAY?: 1 OR 2

## 2019-05-08 SDOH — HEALTH STABILITY: MENTAL HEALTH: HOW OFTEN DO YOU HAVE A DRINK CONTAINING ALCOHOL?: MONTHLY OR LESS

## 2019-05-08 ASSESSMENT — ENCOUNTER SYMPTOMS
CONSTIPATION: 0
NAUSEA: 0
ABDOMINAL PAIN: 0
WHEEZING: 0
BLOOD IN STOOL: 0
VOMITING: 0
BACK PAIN: 0
SHORTNESS OF BREATH: 0
EYE DISCHARGE: 0
EYE REDNESS: 0
RHINORRHEA: 0
COUGH: 0
CHEST TIGHTNESS: 0
DIARRHEA: 1
SORE THROAT: 0
SINUS PRESSURE: 0

## 2019-05-08 NOTE — PROGRESS NOTES
Subjective:      Patient ID: Momo Schaeffer is a 59 y.o. female. Visit Information    Have you changed or started any medications since your last visit including any over-the-counter medicines, vitamins, or herbal medicines? no   Are you having any side effects from any of your medications? -  no  Have you stopped taking any of your medications? Is so, why? -  no    Have you seen any other physician or provider since your last visit? No  Have you had any other diagnostic tests since your last visit? No  Have you been seen in the emergency room and/or had an admission to a hospital since we last saw you? No  Have you had your routine dental cleaning in the past 6 months? yes - routine    Have you activated your [employer] account? If not, what are your barriers?  Yes     Patient Care Team:  SLOAN Tello - CNP as PCP - General (Family Nurse Practitioner)  Deven Gonzalez MD (Gastroenterology)    Medical History Review  Past Medical, Family, and Social History reviewed and does contribute to the patient presenting condition    Health Maintenance   Topic Date Due    DTaP/Tdap/Td vaccine (1 - Tdap) 06/02/1977    Shingles Vaccine (1 of 2) 06/02/2008    Flu vaccine (Season Ended) 02/07/2020 (Originally 9/1/2019)    Potassium monitoring  02/08/2020    Creatinine monitoring  02/08/2020    Breast cancer screen  04/10/2020    Cervical cancer screen  07/07/2022    Colon cancer screen colonoscopy  04/26/2023    Lipid screen  02/08/2024    Hepatitis C screen  Completed    HIV screen  Completed    Pneumococcal 0-64 years Vaccine  Aged Out       [facility] Scores 2/8/2019 7/7/2017 1/15/2016 7/27/2015   PHQ2 Score 4 5 5 6   PHQ9 Score 9 16 14 16     Interpretation of Total Score DepressionSeverity: 1-4 = Minimal depression, 5-9 = Mild depression, 10-14 = Moderate depression, 15-19 = Moderately severe depression, 20-27 = Severe depression    Current Outpatient Medications   Medication Sig Dispense Refill    PARoxetine (PAXIL) 30 MG tablet Take 1 tablet by mouth every morning 30 tablet 3    metoprolol tartrate (LOPRESSOR) 25 MG tablet Take 1 tablet by mouth daily for 365 doses Hold if heart rate less than 60 30 tablet 3    ALPRAZolam (XANAX) 0.5 MG tablet Take 1 tablet by mouth daily as needed for Sleep or Anxiety. 30 tablet 0    Multiple Vitamins-Minerals (THERAPEUTIC MULTIVITAMIN-MINERALS) tablet Take 1 tablet by mouth daily      meloxicam (MOBIC) 7.5 MG tablet Take 2 tablets by mouth daily 60 tablet 5    Cholecalciferol (VITAMIN D3) 5000 UNITS CAPS Take 1 capsule by mouth daily 30 capsule 0    calcium carbonate (CALCIUM 600) 600 MG TABS tablet Take 1 tablet by mouth 2 times daily 30 tablet 3     No current facility-administered medications for this visit. Patient presents today for routine follow up on her anxiety / depression. Mood is ok would like to increase dose of paxil. Using xanax as needed which works well for her anxiety. Follow with neurology regarding neck pain. Will be starting physical therapy. Plan to see pain management for neck injections to help with pain. Has no other concerns. raul       Review of Systems   Constitutional: Positive for fatigue. Negative for activity change, appetite change, chills, diaphoresis, fever and unexpected weight change. Works as  on [employer]: Positive for congestion, postnasal drip and tinnitus (both ears for last couple of months). Negative for ear pain, rhinorrhea, sinus pressure and sore throat. Has been to dentist in last 6 months   Eyes: Negative for discharge, redness and visual disturbance. Diagnosed with blister behind retina, routinely to eye doctor   Respiratory: Negative for cough, chest tightness, shortness of breath and wheezing. Cardiovascular: Negative for chest pain, palpitations and leg swelling. Gastrointestinal: Positive for diarrhea (intermittent).  Negative for abdominal pain, blood in stool, constipation, nausea and vomiting. History of colitis. Last colonoscopy 4/2013   Genitourinary: Negative for decreased urine volume, difficulty urinating, dysuria, flank pain, frequency, hematuria and urgency. Musculoskeletal: Positive for arthralgias (chronic bilateral knees - mobic helps). Negative for back pain and myalgias. Skin: Negative. Negative for rash. Neurological: Positive for weakness and numbness (not as bad). Negative for dizziness, tremors, syncope, speech difficulty and headaches. Hematological: Does not bruise/bleed easily. Psychiatric/Behavioral: Positive for dysphoric mood (controlled with paxil) and sleep disturbance. Negative for agitation, self-injury and suicidal ideas. The patient is nervous/anxious (worse at night - ok control). Objective:     /78 (Site: Right Upper Arm, Position: Sitting, Cuff Size: Medium Adult)   Pulse 84   Temp 97.6 °F (36.4 °C) (Tympanic)   Resp 16   Wt 164 lb (74.4 kg)   BMI 32.03 kg/m²      Physical Exam   Constitutional: She is oriented to person, place, and time. Vital signs are normal. She appears well-developed. She is cooperative. No distress. overweight   HENT:   Head: Normocephalic and atraumatic. Right Ear: External ear normal.   Left Ear: External ear normal.   Nose: Nose normal.   Mouth/Throat: Oropharynx is clear and moist and mucous membranes are normal.   Eyes: Pupils are equal, round, and reactive to light. Conjunctivae and lids are normal. Right eye exhibits no discharge. Left eye exhibits no discharge. Neck: Trachea normal and normal range of motion. Neck supple. No JVD present. Carotid bruit is not present. Cardiovascular: Normal rate, regular rhythm, normal heart sounds and intact distal pulses. Pulses:       Carotid pulses are 2+ on the right side, and 2+ on the left side. Radial pulses are 2+ on the right side, and 2+ on the left side.         Posterior tibial pulses are 2+ on the right side, and Results   Component Value Date    LDLCHOLESTEROL 131 (H) 02/08/2019    LDLCHOLESTEROL 118 06/17/2016    LDLCHOLESTEROL 161 (H) 01/15/2016    LDLCALC 131 12/16/2016     Lab Results   Component Value Date    VLDL NOT REPORTED 02/08/2019    VLDL 15 12/16/2016    VLDL NOT REPORTED 06/17/2016     Lab Results   Component Value Date    CHOLHDLRATIO 3.8 02/08/2019    CHOLHDLRATIO 3.9 12/16/2016    CHOLHDLRATIO 3.6 06/17/2016       Plan:     Proceed with DEXA scan and mammogram as ordered  Ordered labs for August 2019  Restart lopressor with parameters to hold if heart rate less than 60  Increase Paxil to 30 mg daily  OARRS reviewed and appropriate  Continue Xanax as needed for severe anxiety only  Discussed proper use, side effects, and habit-forming potential  Advised may take up to 2 weeks to experience full effects of medication adjustment   Advised to participate in stress relieving activities  Recommend regular exercise  Recommend eating a healthy well-balanced diet  Advise on good sleep hygiene-going to the bed at same time and aiming for 8 hours of interrupted sleep each night  Monitor for worsening symptoms  Call office with concerns  Return in about 3 months (around 8/8/2019) for anxiety. Dharmesh Barrios received counseling on the following healthy behaviors: nutrition, exercise and medication adherence  Reviewed prior labs and health maintenance  Continue current medications, diet and exercise. Discussed use, benefit, and side effects of prescribed medications. Barriers to medication compliance addressed. Patient given educational materials - see patient instructions  Was a self-tracking handout given in paper form or via [employer]?  No    Requested Prescriptions     Signed Prescriptions Disp Refills    PARoxetine (PAXIL) 30 MG tablet 30 tablet 3     Sig: Take 1 tablet by mouth every morning    metoprolol tartrate (LOPRESSOR) 25 MG tablet 30 tablet 3     Sig: Take 1 tablet by mouth daily for 365 doses Hold if heart rate less than 60       All patient questions answered. Patient voiced understanding. Quality Measures    Body mass index is 32.03 kg/m². Elevated. Weight control planned discussed Healthy diet and regular exercise. BP: 124/78 Blood pressure is normal. Treatment plan consists of No treatment change needed.     Lab Results   Component Value Date    LDLCALC 131 12/16/2016    LDLCHOLESTEROL 131 (H) 02/08/2019    (goal LDL reduction with dx if diabetes is 50% LDL reduction)      PHQ Scores 2/8/2019 7/7/2017 1/15/2016 7/27/2015   PHQ2 Score 4 5 5 6   PHQ9 Score 9 16 14 16     Interpretation of Total Score Depression Severity: 1-4 = Minimal depression, 5-9 = Mild depression, 10-14 = Moderate depression, 15-19 = Moderately severe depression, 20-27 = Severe depression    The 10-year ASCVD risk score (Chencho Zaman, et al., 2013) is: 3.2%    Values used to calculate the score:      Age: 59 years      Sex: Female      Is Non- : No      Diabetic: No      Tobacco smoker: No      Systolic Blood Pressure: 221 mmHg      Is BP treated: No      HDL Cholesterol: 52 mg/dL      Total Cholesterol: 198 mg/dL            Electronically signed by SLOAN Vidal CNP on 5/8/2019 at 12:02 PM

## 2019-05-08 NOTE — PATIENT INSTRUCTIONS
another 5 to 10 minutes:  · Tighten and relax each muscle group in your body. You can begin at your toes and work your way up to your head. · Imagine your muscle groups relaxing and becoming heavy. · Empty your mind of all thoughts. · Let yourself relax more and more deeply. · Become aware of the state of calmness that surrounds you. · When your relaxation time is over, you can bring yourself back to alertness by moving your fingers and toes and then your hands and feet and then stretching and moving your entire body. Sometimes people fall asleep during relaxation, but they usually wake up shortly afterward. · Always give yourself time to return to full alertness before you drive a car or do anything that might cause an accident if you are not fully alert. Never play a relaxation tape while you drive a car. When should you call for help? Call 911 anytime you think you may need emergency care. For example, call if:    · You feel you cannot stop from hurting yourself or someone else.   Hiram Dial the numbers for these national suicide hotlines: 7-744-285-TALK (1-169.694.6367) and 2-940-THEXGZJ (2-859.826.1833). If you or someone you know talks about suicide or feeling hopeless, get help right away.   Watch closely for changes in your health, and be sure to contact your doctor if:    · You have anxiety or fear that affects your life.     · You have symptoms of anxiety that are new or different from those you had before. Where can you learn more? Go to [email or web address] and sign in to your [employer] account. Enter P754 in the [employer] box to learn more about \"Anxiety Disorder: Care Instructions. \"     If you do not have an account, please click on the \"Sign Up Now\" link. Current as of: September 11, 2018  Content Version: 12.0  © 5974-8159 Healthwise, Incorporated. Care instructions adapted under license by [facility] ([facility]).  If you have questions about a medical condition or this instruction, always ask your healthcare professional. [place] [address] any warranty or liability for your use of this information.

## 2019-05-21 ENCOUNTER — TELEPHONE (OUTPATIENT)
Dept: FAMILY MEDICINE CLINIC | Age: 59
End: 2019-05-21

## 2019-05-21 DIAGNOSIS — M81.0 OSTEOPOROSIS, UNSPECIFIED OSTEOPOROSIS TYPE, UNSPECIFIED PATHOLOGICAL FRACTURE PRESENCE: Primary | ICD-10-CM

## 2019-05-21 NOTE — TELEPHONE ENCOUNTER
Patient phoned and would like to know if patient can take Culturelle Probiotics with the medication that patient is currently taking. Please advise.

## 2019-05-22 RX ORDER — LACTOBACILLUS RHAMNOSUS GG 10B CELL
1 CAPSULE ORAL DAILY
COMMUNITY
End: 2019-07-13

## 2019-05-25 RX ORDER — IBANDRONATE SODIUM 150 MG/1
TABLET, FILM COATED ORAL
Qty: 1 TABLET | Refills: 5 | Status: SHIPPED | OUTPATIENT
Start: 2019-05-25 | End: 2019-08-27 | Stop reason: SDUPTHER

## 2019-05-28 DIAGNOSIS — F41.1 GAD (GENERALIZED ANXIETY DISORDER): ICD-10-CM

## 2019-05-28 NOTE — TELEPHONE ENCOUNTER
LOV:5-8-19  ROV:3 months  LRF:4-24-19  Health Maintenance   Topic Date Due    DTaP/Tdap/Td vaccine (1 - Tdap) 06/02/1977    Shingles Vaccine (1 of 2) 06/02/2008    Flu vaccine (Season Ended) 02/07/2020 (Originally 9/1/2019)    Potassium monitoring  02/08/2020    Creatinine monitoring  02/08/2020    Breast cancer screen  05/24/2021    Cervical cancer screen  07/07/2022    Colon cancer screen colonoscopy  04/26/2023    Lipid screen  02/08/2024    Hepatitis C screen  Completed    HIV screen  Completed    Pneumococcal 0-64 years Vaccine  Aged Out             (applicable per patient's age: Cancer Screenings, Depression Screening, Fall Risk Screening, Immunizations)    LDL Cholesterol (mg/dL)   Date Value   02/08/2019 131 (H)     LDL Calculated (mg/dL)   Date Value   12/16/2016 131     AST (U/L)   Date Value   02/08/2019 15     ALT (U/L)   Date Value   02/08/2019 13     BUN (mg/dL)   Date Value   02/08/2019 10      (goal A1C is < 7)   (goal LDL is <100) need 30-50% reduction from baseline     BP Readings from Last 3 Encounters:   05/08/19 124/78   04/15/19 (!) 127/90   03/29/19 (!) 156/88    (goal /80)      All Future Testing planned in [facility]:  Lab Frequency Next Occurrence   Comprehensive Metabolic Panel Once 82/20/9708   Lipid Panel Once 08/06/2019       Next Visit Date:  No future appointments.          Patient Active Problem List:     Essential hypertension     Depression, major, recurrent, mild (HCC)     Hematuria     Collagenous colitis     Lupus     SHUN (generalized anxiety disorder)     Chronic pain of both knees

## 2019-05-29 RX ORDER — ALPRAZOLAM 0.5 MG/1
0.5 TABLET ORAL DAILY PRN
Qty: 30 TABLET | Refills: 0 | Status: SHIPPED | OUTPATIENT
Start: 2019-05-29 | End: 2019-06-26 | Stop reason: SDUPTHER

## 2019-06-26 DIAGNOSIS — F41.1 GAD (GENERALIZED ANXIETY DISORDER): ICD-10-CM

## 2019-06-26 RX ORDER — ALPRAZOLAM 0.5 MG/1
0.5 TABLET ORAL DAILY PRN
Qty: 30 TABLET | Refills: 0 | Status: SHIPPED | OUTPATIENT
Start: 2019-06-26 | End: 2019-07-25 | Stop reason: SDUPTHER

## 2019-07-13 ENCOUNTER — APPOINTMENT (OUTPATIENT)
Dept: GENERAL RADIOLOGY | Facility: CLINIC | Age: 59
End: 2019-07-13
Payer: COMMERCIAL

## 2019-07-13 ENCOUNTER — HOSPITAL ENCOUNTER (EMERGENCY)
Facility: CLINIC | Age: 59
Discharge: HOME | End: 2019-07-13
Attending: SPECIALIST
Payer: COMMERCIAL

## 2019-07-13 VITALS
DIASTOLIC BLOOD PRESSURE: 101 MMHG | HEIGHT: 60 IN | RESPIRATION RATE: 18 BRPM | BODY MASS INDEX: 29.45 KG/M2 | OXYGEN SATURATION: 96 % | HEART RATE: 76 BPM | WEIGHT: 150 LBS | TEMPERATURE: 97.6 F | SYSTOLIC BLOOD PRESSURE: 139 MMHG

## 2019-07-13 DIAGNOSIS — M50.30 DDD (DEGENERATIVE DISC DISEASE), CERVICAL: Primary | ICD-10-CM

## 2019-07-13 DIAGNOSIS — M47.816 OSTEOARTHRITIS OF LUMBAR SPINE, UNSPECIFIED SPINAL OSTEOARTHRITIS COMPLICATION STATUS: ICD-10-CM

## 2019-07-13 LAB
ABSOLUTE EOS #: 0 K/UL (ref 0–0.4)
ABSOLUTE IMMATURE GRANULOCYTE: NORMAL K/UL (ref 0–0.3)
ABSOLUTE LYMPH #: 1.4 K/UL (ref 1–4.8)
ABSOLUTE MONO #: 0.4 K/UL (ref 0.1–1.2)
ANION GAP SERPL CALCULATED.3IONS-SCNC: 11 MMOL/L (ref 9–17)
BASOPHILS # BLD: 1 % (ref 0–2)
BASOPHILS ABSOLUTE: 0.1 K/UL (ref 0–0.2)
BUN BLDV-MCNC: 10 MG/DL (ref 8–23)
BUN/CREAT BLD: NORMAL (ref 9–20)
CALCIUM SERPL-MCNC: 9.2 MG/DL (ref 8.6–10.4)
CHLORIDE BLD-SCNC: 105 MMOL/L (ref 98–107)
CO2: 25 MMOL/L (ref 20–31)
CREAT SERPL-MCNC: 0.8 MG/DL (ref 0.5–0.9)
DIFFERENTIAL TYPE: NORMAL
EOSINOPHILS RELATIVE PERCENT: 1 % (ref 1–4)
GFR AFRICAN AMERICAN: >60 ML/MIN
GFR NON-AFRICAN AMERICAN: >60 ML/MIN
GFR SERPL CREATININE-BSD FRML MDRD: NORMAL ML/MIN/{1.73_M2}
GFR SERPL CREATININE-BSD FRML MDRD: NORMAL ML/MIN/{1.73_M2}
GLUCOSE BLD-MCNC: 90 MG/DL (ref 70–99)
HCT VFR BLD CALC: 39.3 % (ref 36–46)
HEMOGLOBIN: 13 G/DL (ref 12–16)
IMMATURE GRANULOCYTES: NORMAL %
LYMPHOCYTES # BLD: 25 % (ref 24–44)
MCH RBC QN AUTO: 31.6 PG (ref 26–34)
MCHC RBC AUTO-ENTMCNC: 33 G/DL (ref 31–37)
MCV RBC AUTO: 95.9 FL (ref 80–100)
MONOCYTES # BLD: 8 % (ref 2–11)
NRBC AUTOMATED: NORMAL PER 100 WBC
PDW BLD-RTO: 14.5 % (ref 12.5–15.4)
PLATELET # BLD: 252 K/UL (ref 140–450)
PLATELET ESTIMATE: NORMAL
PMV BLD AUTO: 10.1 FL (ref 6–12)
POTASSIUM SERPL-SCNC: 3.9 MMOL/L (ref 3.7–5.3)
RBC # BLD: 4.1 M/UL (ref 4–5.2)
RBC # BLD: NORMAL 10*6/UL
SEG NEUTROPHILS: 65 % (ref 36–66)
SEGMENTED NEUTROPHILS ABSOLUTE COUNT: 3.5 K/UL (ref 1.8–7.7)
SODIUM BLD-SCNC: 141 MMOL/L (ref 135–144)
WBC # BLD: 5.4 K/UL (ref 3.5–11)
WBC # BLD: NORMAL 10*3/UL

## 2019-07-13 PROCEDURE — 80048 BASIC METABOLIC PNL TOTAL CA: CPT

## 2019-07-13 PROCEDURE — 99283 EMERGENCY DEPT VISIT LOW MDM: CPT

## 2019-07-13 PROCEDURE — 36415 COLL VENOUS BLD VENIPUNCTURE: CPT

## 2019-07-13 PROCEDURE — 72100 X-RAY EXAM L-S SPINE 2/3 VWS: CPT

## 2019-07-13 PROCEDURE — 85025 COMPLETE CBC W/AUTO DIFF WBC: CPT

## 2019-07-13 RX ORDER — PREDNISONE 20 MG/1
20 TABLET ORAL 2 TIMES DAILY
Qty: 10 TABLET | Refills: 0 | Status: SHIPPED | OUTPATIENT
Start: 2019-07-13 | End: 2019-07-18

## 2019-07-13 ASSESSMENT — ENCOUNTER SYMPTOMS
ABDOMINAL PAIN: 0
WHEEZING: 0
SHORTNESS OF BREATH: 0
BACK PAIN: 1
NAUSEA: 0

## 2019-07-13 NOTE — ED PROVIDER NOTES
indicated that her maternal grandmother is . She indicated that her maternal grandfather is . She indicated that her paternal grandmother is . She indicated that her paternal grandfather is . family history includes Arthritis in her mother; Colon Cancer in her maternal grandfather and paternal grandfather; Crohn's Disease in her father; Diabetes in her mother; Hypertension in her mother; Other in her father; Ovarian Cancer in her sister; Stomach Cancer in her maternal grandmother. SOCIAL HISTORY      reports that she quit smoking about 12 years ago. Her smoking use included cigarettes. She has a 15.00 pack-year smoking history. She has never used smokeless tobacco. She reports that she drinks alcohol. She reports that she does not use drugs. PHYSICAL EXAM     INITIAL VITALS:  height is 5' (1.524 m) and weight is 68 kg (150 lb). Her temperature is 97.6 °F (36.4 °C). Her blood pressure is 139/101 (abnormal) and her pulse is 76. Her respiration is 18 and oxygen saturation is 96%. Physical Exam   Constitutional: She is oriented to person, place, and time. She appears well-developed and well-nourished. HENT:   Head: Normocephalic and atraumatic. Nose: Nose normal.   Mouth/Throat: Oropharynx is clear and moist.   Eyes: Pupils are equal, round, and reactive to light. EOM are normal.   Neck: Normal range of motion. Neck supple. Cardiovascular: Normal rate, regular rhythm, normal heart sounds and intact distal pulses. No murmur heard. Pulmonary/Chest: Effort normal and breath sounds normal. No respiratory distress. Abdominal: Soft. Bowel sounds are normal. She exhibits no distension. There is no tenderness. Neurological: She is alert and oriented to person, place, and time. Skin: Skin is warm and dry. Nursing note and vitals reviewed.         DIFFERENTIAL DIAGNOSIS/ MDM:     Degenerative disc disease, spinal stenosis, peripheral neuropathy    DIAGNOSTIC RESULTS

## 2019-07-25 DIAGNOSIS — F41.1 GAD (GENERALIZED ANXIETY DISORDER): ICD-10-CM

## 2019-07-25 RX ORDER — ALPRAZOLAM 0.5 MG/1
0.5 TABLET ORAL DAILY PRN
Qty: 30 TABLET | Refills: 0 | Status: SHIPPED | OUTPATIENT
Start: 2019-07-25 | End: 2019-08-27 | Stop reason: SDUPTHER

## 2019-08-15 ENCOUNTER — OFFICE VISIT (OUTPATIENT)
Dept: FAMILY MEDICINE CLINIC | Age: 59
End: 2019-08-15
Payer: COMMERCIAL

## 2019-08-15 VITALS
BODY MASS INDEX: 30.47 KG/M2 | SYSTOLIC BLOOD PRESSURE: 126 MMHG | WEIGHT: 156 LBS | TEMPERATURE: 97.7 F | HEART RATE: 72 BPM | RESPIRATION RATE: 14 BRPM | DIASTOLIC BLOOD PRESSURE: 80 MMHG

## 2019-08-15 DIAGNOSIS — M54.2 CERVICAL PAIN (NECK): ICD-10-CM

## 2019-08-15 DIAGNOSIS — M25.552 LEFT HIP PAIN: ICD-10-CM

## 2019-08-15 DIAGNOSIS — M70.62 TROCHANTERIC BURSITIS OF LEFT HIP: Primary | ICD-10-CM

## 2019-08-15 PROCEDURE — 99214 OFFICE O/P EST MOD 30 MIN: CPT | Performed by: NURSE PRACTITIONER

## 2019-08-15 PROCEDURE — 20610 DRAIN/INJ JOINT/BURSA W/O US: CPT | Performed by: PEDIATRICS

## 2019-08-15 RX ORDER — METHYLPREDNISOLONE ACETATE 40 MG/ML
40 INJECTION, SUSPENSION INTRA-ARTICULAR; INTRALESIONAL; INTRAMUSCULAR; SOFT TISSUE ONCE
Status: COMPLETED | OUTPATIENT
Start: 2019-08-15 | End: 2019-08-15

## 2019-08-15 RX ADMIN — METHYLPREDNISOLONE ACETATE 40 MG: 40 INJECTION, SUSPENSION INTRA-ARTICULAR; INTRALESIONAL; INTRAMUSCULAR; SOFT TISSUE at 17:05

## 2019-08-15 ASSESSMENT — ENCOUNTER SYMPTOMS
CONSTIPATION: 0
NAUSEA: 0
DIARRHEA: 0
ABDOMINAL PAIN: 0
SHORTNESS OF BREATH: 0
VOMITING: 0
COUGH: 0

## 2019-08-15 NOTE — PATIENT INSTRUCTIONS
Patient Education        Bursitis: Care Instructions  Your Care Instructions    A bursa is a small sac of fluid that helps the tissues around a joint slide over one another easily. Injury or overuse of a joint can cause pain, redness, and inflammation in the bursa (bursitis). Bursitis usually gets better if you avoid the activity that caused it. You can help prevent bursitis from coming back by doing stretching and strengthening exercises. You may also need to change the way you do some activities. Follow-up care is a key part of your treatment and safety. Be sure to make and go to all appointments, and call your doctor if you are having problems. It's also a good idea to know your test results and keep a list of the medicines you take. How can you care for yourself at home? · Put ice or a cold pack on the area for 10 to 20 minutes at a time. Try to do this every 1 to 2 hours for the next 3 days (when you are awake) or until the swelling goes down. Put a thin cloth between the ice and your skin. · After the 3 days of using ice, you may use heat on the area. You can use a hot water bottle; a warm, moist towel; or a heating pad set on low. You can also try alternating heat and ice. · Rest the area where you have pain. Stop any activities that cause pain. Switch to activities that do not stress the area. · Take pain medicines exactly as directed. ? If the doctor gave you a prescription medicine for pain, take it as prescribed. ? If you are not taking a prescription pain medicine, ask your doctor if you can take an over-the-counter medicine. ? Do not take two or more pain medicines at the same time unless the doctor told you to. Many pain medicines have acetaminophen, which is Tylenol. Too much acetaminophen (Tylenol) can be harmful. · To prevent stiffness, gently move the joint as much as you can without pain every day. As the pain gets better, keep doing range-of-motion exercises.  Ask your doctor for

## 2019-08-27 DIAGNOSIS — M81.0 OSTEOPOROSIS, UNSPECIFIED OSTEOPOROSIS TYPE, UNSPECIFIED PATHOLOGICAL FRACTURE PRESENCE: ICD-10-CM

## 2019-08-27 DIAGNOSIS — F41.1 GAD (GENERALIZED ANXIETY DISORDER): ICD-10-CM

## 2019-08-27 RX ORDER — IBANDRONATE SODIUM 150 MG/1
150 TABLET, FILM COATED ORAL
Qty: 1 TABLET | Refills: 5 | Status: SHIPPED | OUTPATIENT
Start: 2019-08-27 | End: 2020-01-10

## 2019-08-27 RX ORDER — ALPRAZOLAM 0.5 MG/1
0.5 TABLET ORAL DAILY PRN
Qty: 30 TABLET | Refills: 0 | Status: SHIPPED | OUTPATIENT
Start: 2019-08-27 | End: 2019-09-26 | Stop reason: SDUPTHER

## 2019-09-23 ENCOUNTER — HOSPITAL ENCOUNTER (OUTPATIENT)
Age: 59
Setting detail: SPECIMEN
Discharge: HOME | End: 2019-09-23
Payer: COMMERCIAL

## 2019-09-23 DIAGNOSIS — I10 ESSENTIAL HYPERTENSION: ICD-10-CM

## 2019-09-23 LAB
ALBUMIN SERPL-MCNC: 3.8 G/DL (ref 3.5–5.2)
ALBUMIN/GLOBULIN RATIO: 1.2 (ref 1–2.5)
ALP BLD-CCNC: 58 U/L (ref 35–104)
ALT SERPL-CCNC: 8 U/L (ref 5–33)
ANION GAP SERPL CALCULATED.3IONS-SCNC: 13 MMOL/L (ref 9–17)
AST SERPL-CCNC: 11 U/L
BILIRUB SERPL-MCNC: 0.3 MG/DL (ref 0.3–1.2)
BUN BLDV-MCNC: 13 MG/DL (ref 8–23)
BUN/CREAT BLD: ABNORMAL (ref 9–20)
CALCIUM SERPL-MCNC: 8.9 MG/DL (ref 8.6–10.4)
CHLORIDE BLD-SCNC: 106 MMOL/L (ref 98–107)
CHOLESTEROL/HDL RATIO: 3.7
CHOLESTEROL: 162 MG/DL
CO2: 27 MMOL/L (ref 20–31)
CREAT SERPL-MCNC: 0.79 MG/DL (ref 0.5–0.9)
GFR AFRICAN AMERICAN: >60 ML/MIN
GFR NON-AFRICAN AMERICAN: >60 ML/MIN
GFR SERPL CREATININE-BSD FRML MDRD: ABNORMAL ML/MIN/{1.73_M2}
GFR SERPL CREATININE-BSD FRML MDRD: ABNORMAL ML/MIN/{1.73_M2}
GLUCOSE BLD-MCNC: 72 MG/DL (ref 70–99)
HDLC SERPL-MCNC: 44 MG/DL
LDL CHOLESTEROL: 93 MG/DL (ref 0–130)
POTASSIUM SERPL-SCNC: 4.3 MMOL/L (ref 3.7–5.3)
SODIUM BLD-SCNC: 146 MMOL/L (ref 135–144)
TOTAL PROTEIN: 7.1 G/DL (ref 6.4–8.3)
TRIGL SERPL-MCNC: 123 MG/DL
VLDLC SERPL CALC-MCNC: NORMAL MG/DL (ref 1–30)

## 2019-09-26 DIAGNOSIS — F41.1 GAD (GENERALIZED ANXIETY DISORDER): ICD-10-CM

## 2019-09-26 RX ORDER — ALPRAZOLAM 0.5 MG/1
0.5 TABLET ORAL DAILY PRN
Qty: 30 TABLET | Refills: 0 | Status: SHIPPED | OUTPATIENT
Start: 2019-09-26 | End: 2019-11-01 | Stop reason: SDUPTHER

## 2019-09-26 NOTE — TELEPHONE ENCOUNTER
LOV 5/8/19  LRF 8/28/19  RTO 3 months    Health Maintenance   Topic Date Due    DTaP/Tdap/Td vaccine (1 - Tdap) 06/02/1977    Shingles Vaccine (1 of 2) 06/02/2008    Flu vaccine (1) 02/07/2020 (Originally 9/1/2019)    Potassium monitoring  09/23/2020    Creatinine monitoring  09/23/2020    Breast cancer screen  05/24/2021    Cervical cancer screen  07/07/2022    Colon cancer screen colonoscopy  04/26/2023    Lipid screen  09/23/2024    Hepatitis C screen  Completed    HIV screen  Completed    Pneumococcal 0-64 years Vaccine  Aged Out             (applicable per patient's age: Cancer Screenings, Depression Screening, Fall Risk Screening, Immunizations)    LDL Cholesterol (mg/dL)   Date Value   09/23/2019 93     LDL Calculated (mg/dL)   Date Value   12/16/2016 131     AST (U/L)   Date Value   09/23/2019 11     ALT (U/L)   Date Value   09/23/2019 8     BUN (mg/dL)   Date Value   09/23/2019 13      (goal A1C is < 7)   (goal LDL is <100) need 30-50% reduction from baseline     BP Readings from Last 3 Encounters:   08/15/19 126/80   07/13/19 (!) 139/101   05/08/19 124/78    (goal /80)      All Future Testing planned in [facility]:      Next Visit Date:  No future appointments.          Patient Active Problem List:     Essential hypertension     Depression, major, recurrent, mild (HCC)     Hematuria     Collagenous colitis     Lupus (HCC)     SHUN (generalized anxiety disorder)     Chronic pain of both knees  ,

## 2019-11-01 DIAGNOSIS — M81.0 OSTEOPOROSIS, UNSPECIFIED OSTEOPOROSIS TYPE, UNSPECIFIED PATHOLOGICAL FRACTURE PRESENCE: ICD-10-CM

## 2019-11-01 DIAGNOSIS — F41.1 GAD (GENERALIZED ANXIETY DISORDER): ICD-10-CM

## 2019-11-01 RX ORDER — ALPRAZOLAM 0.5 MG/1
0.5 TABLET ORAL DAILY PRN
Qty: 30 TABLET | Refills: 0 | Status: SHIPPED | OUTPATIENT
Start: 2019-11-01 | End: 2019-12-11 | Stop reason: SDUPTHER

## 2019-11-11 DIAGNOSIS — F41.1 GAD (GENERALIZED ANXIETY DISORDER): ICD-10-CM

## 2019-11-11 RX ORDER — PAROXETINE 30 MG/1
30 TABLET, FILM COATED ORAL EVERY MORNING
Qty: 30 TABLET | Refills: 5 | Status: SHIPPED | OUTPATIENT
Start: 2019-11-11 | End: 2020-02-21 | Stop reason: DRUGHIGH

## 2019-12-11 DIAGNOSIS — F41.1 GAD (GENERALIZED ANXIETY DISORDER): ICD-10-CM

## 2019-12-11 RX ORDER — ALPRAZOLAM 0.5 MG/1
0.5 TABLET ORAL DAILY PRN
Qty: 30 TABLET | Refills: 0 | Status: SHIPPED | OUTPATIENT
Start: 2019-12-11 | End: 2020-01-14 | Stop reason: SDUPTHER

## 2020-01-10 ENCOUNTER — TELEPHONE (OUTPATIENT)
Dept: FAMILY MEDICINE CLINIC | Age: 60
End: 2020-01-10

## 2020-01-10 RX ORDER — ALENDRONATE SODIUM 70 MG/1
70 TABLET ORAL
Qty: 4 TABLET | Refills: 3 | Status: SHIPPED | OUTPATIENT
Start: 2020-01-10 | End: 2020-02-21 | Stop reason: SDUPTHER

## 2020-01-14 ENCOUNTER — TELEPHONE (OUTPATIENT)
Dept: FAMILY MEDICINE CLINIC | Age: 60
End: 2020-01-14

## 2020-01-15 RX ORDER — ALPRAZOLAM 0.5 MG/1
0.5 TABLET ORAL DAILY PRN
Qty: 30 TABLET | Refills: 0 | Status: SHIPPED | OUTPATIENT
Start: 2020-01-15 | End: 2020-02-14

## 2020-02-21 ENCOUNTER — OFFICE VISIT (OUTPATIENT)
Dept: FAMILY MEDICINE CLINIC | Age: 60
End: 2020-02-21
Payer: COMMERCIAL

## 2020-02-21 VITALS
HEART RATE: 77 BPM | WEIGHT: 162.8 LBS | DIASTOLIC BLOOD PRESSURE: 84 MMHG | SYSTOLIC BLOOD PRESSURE: 112 MMHG | TEMPERATURE: 98.4 F | BODY MASS INDEX: 31.79 KG/M2

## 2020-02-21 LAB
AMPHETAMINE SCREEN, URINE: NEGATIVE
BARBITURATE SCREEN, URINE: NEGATIVE
BENZODIAZEPINE SCREEN, URINE: POSITIVE
BUPRENORPHINE URINE: NEGATIVE
COCAINE METABOLITE SCREEN URINE: NEGATIVE
GABAPENTIN SCREEN, URINE: NEGATIVE
MDMA URINE: NEGATIVE
METHADONE SCREEN, URINE: NEGATIVE
METHAMPHETAMINE, URINE: NEGATIVE
OPIATE SCREEN URINE: NEGATIVE
OXYCODONE SCREEN URINE: NEGATIVE
PHENCYCLIDINE SCREEN URINE: NEGATIVE
PROPOXYPHENE SCREEN, URINE: NEGATIVE
THC SCREEN, URINE: NEGATIVE
TRICYCLIC ANTIDEPRESSANTS, UR: NEGATIVE

## 2020-02-21 PROCEDURE — 99214 OFFICE O/P EST MOD 30 MIN: CPT | Performed by: NURSE PRACTITIONER

## 2020-02-21 PROCEDURE — 80305 DRUG TEST PRSMV DIR OPT OBS: CPT | Performed by: NURSE PRACTITIONER

## 2020-02-21 RX ORDER — ALPRAZOLAM 0.5 MG/1
0.5 TABLET ORAL NIGHTLY PRN
Qty: 30 TABLET | Refills: 0 | Status: SHIPPED | OUTPATIENT
Start: 2020-02-21 | End: 2020-04-06 | Stop reason: SDUPTHER

## 2020-02-21 RX ORDER — ALPRAZOLAM 0.5 MG/1
0.5 TABLET ORAL NIGHTLY PRN
COMMUNITY
End: 2020-02-21 | Stop reason: SDUPTHER

## 2020-02-21 RX ORDER — PAROXETINE HYDROCHLORIDE 40 MG/1
40 TABLET, FILM COATED ORAL DAILY
Qty: 30 TABLET | Refills: 1 | Status: SHIPPED | OUTPATIENT
Start: 2020-02-21 | End: 2020-07-02

## 2020-02-21 RX ORDER — ALENDRONATE SODIUM 70 MG/1
70 TABLET ORAL
Qty: 4 TABLET | Refills: 5 | Status: SHIPPED | OUTPATIENT
Start: 2020-02-21 | End: 2021-08-17 | Stop reason: SDUPTHER

## 2020-02-21 RX ORDER — MELOXICAM 7.5 MG/1
7.5 TABLET ORAL DAILY
Qty: 30 TABLET | Refills: 5 | Status: SHIPPED | OUTPATIENT
Start: 2020-02-21 | End: 2020-10-15

## 2020-02-21 ASSESSMENT — ENCOUNTER SYMPTOMS
VOMITING: 0
RHINORRHEA: 0
SORE THROAT: 0
WHEEZING: 0
BACK PAIN: 0
SINUS PRESSURE: 0
EYE ITCHING: 0
NAUSEA: 0
CHEST TIGHTNESS: 0
COUGH: 0
SHORTNESS OF BREATH: 0
EYE DISCHARGE: 0
ABDOMINAL PAIN: 0

## 2020-02-21 NOTE — PATIENT INSTRUCTIONS
Patient Education        Skin Cancer Prevention: Care Instructions  Your Care Instructions    Skin cancer is the abnormal growth of cells in the skin. It usually appears as a growth that changes in color, shape, or size. This can be a sore that does not heal or a change in a wart or a mole. Skin cancer is almost always curable when found early and treated. So it is important to see your doctor if you have any of these changes in your skin. Skin cancer is the most common type of cancer. It often appears on areas of the body that have been exposed to the sun, such as the head, face, neck, back, chest, or shoulders. Follow-up care is a key part of your treatment and safety. Be sure to make and go to all appointments, and call your doctor if you are having problems. It's also a good idea to know your test results and keep a list of the medicines you take. How can you care for yourself at home? · Wear a wide-brimmed hat and long sleeves and pants if you are going to be outdoors for a long time. · Avoid the sun between 10 a.m. and 4 p.m., which is the peak time for UV rays. · Wear sunscreen on exposed skin. Make sure to use a broad-spectrum sunscreen that has a sun protection factor (SPF) of 30 or higher. Use it every day, even when it is cloudy. · Do not use tanning booths or sunlamps. · Use lip balm or cream that has sun protection factor (SPF) to protect your lips from getting sunburned. · Wear sunglasses that block UV rays. When should you call for help? Call your doctor now or seek immediate medical care if:    · You have signs of infection, such as:  ? Increased pain, swelling, warmth, or redness. ? Red streaks leading from the area. ? Pus draining from the area. ? A fever.    Watch closely for changes in your health, and be sure to contact your doctor if:    · You see a change in your skin, such as a growth or mole that:  ? Grows bigger. This may happen very slowly. ? Changes color. ?  Changes

## 2020-02-21 NOTE — PROGRESS NOTES
[address] [facility]   [address]   [phone]    2/21/2020    Marline Bowles is a 62 y.o. female who presents today for her  medical conditions/complaints as noted below. Marline Bowles is c/o of No chief complaint on file. Linnea Ibarra HPI:     Patient presents to [facility] for follow up in care and to establish care with new provider. She denies any chest pain, shortness of breath, recent illness. She does need majority of her medications refilled today . She follows healthy diet regimen and cares for her grandchildren. Is concerned that having skin color changes and would like to be evaluated by dermatology to be safe.        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     GERD (gastroesophageal reflux disease)     Myalgia and myositis     Palpitations     RA (rheumatoid arthritis) ([address]     Sprain of thoracic region     Systemic lupus erythematosus ([address]     UTI (urinary tract infection)     Viremia     Vitiligo       Past Surgical History:   Procedure Laterality Date    JOINT REPLACEMENT Left 2009    lt knee    JOINT REPLACEMENT Right 2/2016    MANDIBLE SURGERY      TOTAL HIP ARTHROPLASTY Left 10/25/2016     Family History   Problem Relation Age of Onset    Crohn's Disease Father     Other Father     Diabetes Mother     Arthritis Mother     Hypertension Mother     Stomach Cancer Maternal Grandmother     Colon Cancer Maternal Grandfather     Ovarian Cancer Sister         complications of PVD    Colon Cancer Paternal Grandfather      Social History     Tobacco Use    Smoking status:  Meningococcal (ACWY) vaccine  Aged Out    Pneumococcal 0-64 years Vaccine  Aged Out       Subjective:      Review of Systems   Constitutional: Negative. Negative for activity change, appetite change, chills, fatigue and fever. HENT: Negative for congestion, ear pain, postnasal drip, rhinorrhea, sinus pressure and sore throat. Eyes: Negative for discharge and itching. Respiratory: Negative for cough, chest tightness, shortness of breath and wheezing. Cardiovascular: Negative for chest pain, palpitations and leg swelling. Gastrointestinal: Negative for abdominal pain, nausea and vomiting. Endocrine: Negative for polydipsia, polyphagia and polyuria. Genitourinary: Negative for dysuria. Musculoskeletal: Negative for arthralgias and back pain. Skin:        Patient concerned re: changes to skin pigmentation throughout    Neurological: Negative for dizziness, weakness, light-headedness and headaches. Psychiatric/Behavioral: Negative for agitation, decreased concentration, dysphoric mood and sleep disturbance. The patient is not nervous/anxious. Objective:     Physical Exam  Vitals signs and nursing note reviewed. Constitutional:       General: She is not in acute distress. Appearance: Normal appearance. She is well-developed, well-groomed and overweight. HENT:      Head: Normocephalic. Right Ear: Tympanic membrane, ear canal and external ear normal. There is no impacted cerumen. Left Ear: Tympanic membrane, ear canal and external ear normal. There is no impacted cerumen. Nose: Nose normal. No congestion or rhinorrhea. Mouth/Throat:      Mouth: Mucous membranes are moist.      Pharynx: Oropharynx is clear. No oropharyngeal exudate or posterior oropharyngeal erythema. Eyes:      Extraocular Movements: Extraocular movements intact. Conjunctiva/sclera: Conjunctivae normal.      Pupils: Pupils are equal, round, and reactive to light.    Neck: nightly as needed for Sleep for up to 30 doses. Dispense: 30 tablet; Refill: 0  - PARoxetine (PAXIL) 40 MG tablet; Take 1 tablet by mouth daily for 60 doses  Dispense: 30 tablet; Refill: 1  - POCT Rapid Drug Screen    3. Essential hypertension  - metoprolol tartrate (LOPRESSOR) 25 MG tablet; Take 1 tablet by mouth daily  Dispense: 30 tablet; Refill: 5    4. Chronic pain of both knees  - meloxicam (MOBIC) 7.5 MG tablet; Take 1 tablet by mouth daily  Dispense: 30 tablet; Refill: 5    5. Skin abnormalities  - Mikayla Mac MD, Dermatology, [place]    6. Osteoporosis, unspecified osteoporosis type, unspecified pathological fracture presence  - alendronate (FOSAMAX) 70 MG tablet; Take 1 tablet by mouth every 7 days  Dispense: 4 tablet; Refill: 5       Plan:      No follow-ups on file. Orders Placed This Encounter   Procedures   Mikayla Mac MD, Dermatology, [place]     Referral Priority:   Routine     Referral Type:   Eval and Treat     Referral Reason:   Specialty Services Required     Referred to Provider:   Abby Sanford MD     Requested Specialty:   Dermatology     Number of Visits Requested:   1    POCT Rapid Drug Screen     Orders Placed This Encounter   Medications    metoprolol tartrate (LOPRESSOR) 25 MG tablet     Sig: Take 1 tablet by mouth daily     Dispense:  30 tablet     Refill:  5    ALPRAZolam (XANAX) 0.5 MG tablet     Sig: Take 1 tablet by mouth nightly as needed for Sleep for up to 30 doses. Dispense:  30 tablet     Refill:  0    PARoxetine (PAXIL) 40 MG tablet     Sig: Take 1 tablet by mouth daily for 60 doses     Dispense:  30 tablet     Refill:  1    alendronate (FOSAMAX) 70 MG tablet     Sig: Take 1 tablet by mouth every 7 days     Dispense:  4 tablet     Refill:  5    meloxicam (MOBIC) 7.5 MG tablet     Sig: Take 1 tablet by mouth daily     Dispense:  30 tablet     Refill:  5       Patient given educational materials - see patient instructions.   Discussed use,

## 2020-03-30 ENCOUNTER — HOSPITAL ENCOUNTER (EMERGENCY)
Facility: CLINIC | Age: 60
Discharge: HOME | End: 2020-03-30
Attending: EMERGENCY MEDICINE
Payer: COMMERCIAL

## 2020-03-30 VITALS
HEIGHT: 60 IN | HEART RATE: 80 BPM | OXYGEN SATURATION: 93 % | BODY MASS INDEX: 29.45 KG/M2 | SYSTOLIC BLOOD PRESSURE: 134 MMHG | TEMPERATURE: 97.6 F | RESPIRATION RATE: 15 BRPM | WEIGHT: 150 LBS | DIASTOLIC BLOOD PRESSURE: 84 MMHG

## 2020-03-30 LAB
ABSOLUTE EOS #: 0.1 K/UL (ref 0–0.4)
ABSOLUTE IMMATURE GRANULOCYTE: NORMAL K/UL (ref 0–0.3)
ABSOLUTE LYMPH #: 1.3 K/UL (ref 1–4.8)
ABSOLUTE MONO #: 0.4 K/UL (ref 0.1–1.2)
ANION GAP SERPL CALCULATED.3IONS-SCNC: 15 MMOL/L (ref 9–17)
BASOPHILS # BLD: 1 % (ref 0–2)
BASOPHILS ABSOLUTE: 0.1 K/UL (ref 0–0.2)
BUN BLDV-MCNC: 11 MG/DL (ref 8–23)
BUN/CREAT BLD: NORMAL (ref 9–20)
CALCIUM SERPL-MCNC: 9.4 MG/DL (ref 8.6–10.4)
CHLORIDE BLD-SCNC: 102 MMOL/L (ref 98–107)
CO2: 22 MMOL/L (ref 20–31)
CREAT SERPL-MCNC: 0.9 MG/DL (ref 0.5–0.9)
DIFFERENTIAL TYPE: NORMAL
EOSINOPHILS RELATIVE PERCENT: 1 % (ref 1–4)
GFR AFRICAN AMERICAN: >60 ML/MIN
GFR NON-AFRICAN AMERICAN: >60 ML/MIN
GFR SERPL CREATININE-BSD FRML MDRD: NORMAL ML/MIN/{1.73_M2}
GFR SERPL CREATININE-BSD FRML MDRD: NORMAL ML/MIN/{1.73_M2}
GLUCOSE BLD-MCNC: 93 MG/DL (ref 70–99)
HCT VFR BLD CALC: 40.3 % (ref 36–46)
HEMOGLOBIN: 13.2 G/DL (ref 12–16)
IMMATURE GRANULOCYTES: NORMAL %
LYMPHOCYTES # BLD: 25 % (ref 24–44)
MCH RBC QN AUTO: 31.6 PG (ref 26–34)
MCHC RBC AUTO-ENTMCNC: 32.8 G/DL (ref 31–37)
MCV RBC AUTO: 96.1 FL (ref 80–100)
MONOCYTES # BLD: 8 % (ref 2–11)
NRBC AUTOMATED: NORMAL PER 100 WBC
PDW BLD-RTO: 14.3 % (ref 12.5–15.4)
PLATELET # BLD: 278 K/UL (ref 140–450)
PLATELET ESTIMATE: NORMAL
PMV BLD AUTO: 9.3 FL (ref 6–12)
POTASSIUM SERPL-SCNC: 4.1 MMOL/L (ref 3.7–5.3)
RBC # BLD: 4.19 M/UL (ref 4–5.2)
RBC # BLD: NORMAL 10*6/UL
SEG NEUTROPHILS: 65 % (ref 36–66)
SEGMENTED NEUTROPHILS ABSOLUTE COUNT: 3.5 K/UL (ref 1.8–7.7)
SODIUM BLD-SCNC: 139 MMOL/L (ref 135–144)
TROPONIN INTERP: NORMAL
TROPONIN T: NORMAL NG/ML
TROPONIN, HIGH SENSITIVITY: 8 NG/L (ref 0–14)
WBC # BLD: 5.4 K/UL (ref 3.5–11)
WBC # BLD: NORMAL 10*3/UL

## 2020-03-30 PROCEDURE — 85025 COMPLETE CBC W/AUTO DIFF WBC: CPT

## 2020-03-30 PROCEDURE — 84484 ASSAY OF TROPONIN QUANT: CPT

## 2020-03-30 PROCEDURE — 93005 ELECTROCARDIOGRAM TRACING: CPT | Performed by: EMERGENCY MEDICINE

## 2020-03-30 PROCEDURE — 99285 EMERGENCY DEPT VISIT HI MDM: CPT

## 2020-03-30 PROCEDURE — 80048 BASIC METABOLIC PNL TOTAL CA: CPT

## 2020-03-30 PROCEDURE — 36415 COLL VENOUS BLD VENIPUNCTURE: CPT

## 2020-03-30 NOTE — ED PROVIDER NOTES
tachycardic she is not hypoxic given this I do feel able to be followed up as an outpatient recommending that she continue her previous medications as already directed return to the ER for worsening of symptoms difficulty breathing fever or other concerns otherwise to follow-up with her family doctor within the next few days  At this time the patient is without objective evidence of an acute process requiring hospitalization or inpatient management. They have remained hemodynamically stable throughout their entire ED visit and are stable for discharge with outpatient follow-up. The patient understands that at this time there is no evidence for a more malignant underlying process, but the patient also understands that early in the process of an illness or injury, an emergency department workup can be falsely reassuring. Routine discharge counseling was given, and the patient understands that worsening, changing or persistent symptoms should prompt an immediate call or follow up with their primary physician or return to the emergency department. The importance of appropriate follow up was also discussed. I have reviewed the disposition diagnosis with the patient and or their family/guardian. I have answered their questions and given discharge instructions. They voiced understanding of these instructions and did not have any further questions or complaints. PROCEDURES:  None    FINAL IMPRESSION      1.  Palpitations          DISPOSITION/PLAN   DISPOSITION        CONDITION ON DISPOSITION:   Stable    PATIENT REFERRED TO:  SLOAN Perez - LAURA  [address]  [phone]    Call in 2 days        DISCHARGE MEDICATIONS:  New Prescriptions    No medications on file       (Please note that portions of this note were completed with a voicerecognition program.  Efforts were made to edit the dictations but occasionally words are mis-transcribed.)    Morales MD,

## 2020-03-31 LAB
EKG ATRIAL RATE: 80 BPM
EKG P AXIS: 56 DEGREES
EKG P-R INTERVAL: 174 MS
EKG Q-T INTERVAL: 380 MS
EKG QRS DURATION: 74 MS
EKG QTC CALCULATION (BAZETT): 438 MS
EKG R AXIS: 0 DEGREES
EKG T AXIS: 61 DEGREES
EKG VENTRICULAR RATE: 80 BPM

## 2020-04-06 DIAGNOSIS — F41.1 GAD (GENERALIZED ANXIETY DISORDER): ICD-10-CM

## 2020-04-06 RX ORDER — ALPRAZOLAM 0.5 MG/1
0.5 TABLET ORAL NIGHTLY PRN
Qty: 30 TABLET | Refills: 0 | Status: SHIPPED | OUTPATIENT
Start: 2020-04-06 | End: 2020-05-18 | Stop reason: SDUPTHER

## 2020-04-06 NOTE — TELEPHONE ENCOUNTER
LOV 2/21/20  RTO 6 months  LRF 2/21/20    Health Maintenance   Topic Date Due    DTaP/Tdap/Td vaccine (1 - Tdap) 06/02/1977    Shingles Vaccine (1 of 2) 06/02/2008    Flu vaccine (Season Ended) 09/01/2020    Potassium monitoring  03/30/2021    Creatinine monitoring  03/30/2021    Breast cancer screen  05/24/2021    Cervical cancer screen  07/07/2022    Colon cancer screen colonoscopy  04/26/2023    Lipid screen  09/23/2024    Hepatitis C screen  Completed    HIV screen  Completed    Hepatitis A vaccine  Aged Out    Hepatitis B vaccine  Aged Out    Hib vaccine  Aged Out    Meningococcal (ACWY) vaccine  Aged Out    Pneumococcal 0-64 years Vaccine  Aged Out             (applicable per patient's age: Cancer Screenings, Depression Screening, Fall Risk Screening, Immunizations)    LDL Cholesterol (mg/dL)   Date Value   09/23/2019 93     LDL Calculated (mg/dL)   Date Value   12/16/2016 131     AST (U/L)   Date Value   09/23/2019 11     ALT (U/L)   Date Value   09/23/2019 8     BUN (mg/dL)   Date Value   03/30/2020 11      (goal A1C is < 7)   (goal LDL is <100) need 30-50% reduction from baseline     BP Readings from Last 3 Encounters:   03/30/20 134/84   02/21/20 112/84   08/15/19 126/80    (goal /80)      All Future Testing planned in [facility]:      Next Visit Date:  Future Appointments   Date Time Provider Anna Chu   4/14/2020  2:45 PM Teo Chao MD [id number]            Patient Active Problem List:     Essential hypertension     Depression, major, recurrent, mild (HCC)     Hematuria     Collagenous colitis     Lupus ([facility] [address]     SHUN (generalized anxiety disorder)     Chronic pain of both knees

## 2020-04-06 NOTE — TELEPHONE ENCOUNTER
I would like patient to keep her appointment on April 14. If she would like she can make this a virtual visit.

## 2020-05-18 RX ORDER — ALPRAZOLAM 0.5 MG/1
0.5 TABLET ORAL NIGHTLY PRN
Qty: 30 TABLET | Refills: 0 | Status: SHIPPED | OUTPATIENT
Start: 2020-05-18 | End: 2020-07-02

## 2020-05-18 NOTE — TELEPHONE ENCOUNTER
Please call patient to make follow-up appointment. She is on a controlled substance and needs to be seen every 3 months. Only gave her enough of her Xanax for 30 days, she needs to make an appointment prior to her medication running out.

## 2020-06-29 DIAGNOSIS — F41.1 GAD (GENERALIZED ANXIETY DISORDER): ICD-10-CM

## 2020-06-29 RX ORDER — ALPRAZOLAM 0.5 MG/1
0.5 TABLET ORAL NIGHTLY PRN
Qty: 30 TABLET | Refills: 0 | Status: CANCELLED | OUTPATIENT
Start: 2020-06-29 | End: 2021-08-29

## 2020-07-02 RX ORDER — ALPRAZOLAM 0.5 MG/1
0.5 TABLET ORAL NIGHTLY PRN
Qty: 30 TABLET | Refills: 0 | Status: SHIPPED | OUTPATIENT
Start: 2020-07-02 | End: 2020-08-04 | Stop reason: SDUPTHER

## 2020-07-02 RX ORDER — PAROXETINE HYDROCHLORIDE 40 MG/1
40 TABLET, FILM COATED ORAL DAILY
Qty: 30 TABLET | Refills: 5 | Status: SHIPPED | OUTPATIENT
Start: 2020-07-02 | End: 2020-07-22 | Stop reason: DRUGHIGH

## 2020-07-02 NOTE — TELEPHONE ENCOUNTER
LOV 2-21-20  LRF Paxil 2-21-20, Xanax 5-18-20    Health Maintenance   Topic Date Due    DTaP/Tdap/Td vaccine (1 - Tdap) 06/02/1977    Shingles Vaccine (1 of 2) 06/02/2008    Flu vaccine (1) 09/01/2020    Potassium monitoring  03/30/2021    Creatinine monitoring  03/30/2021    Breast cancer screen  05/24/2021    Cervical cancer screen  07/07/2022    Colon cancer screen colonoscopy  04/26/2023    Lipid screen  09/23/2024    Hepatitis C screen  Completed    HIV screen  Completed    Hepatitis A vaccine  Aged Out    Hepatitis B vaccine  Aged Out    Hib vaccine  Aged Out    Meningococcal (ACWY) vaccine  Aged Out    Pneumococcal 0-64 years Vaccine  Aged Out             (applicable per patient's age: Cancer Screenings, Depression Screening, Fall Risk Screening, Immunizations)    LDL Cholesterol (mg/dL)   Date Value   09/23/2019 93     LDL Calculated (mg/dL)   Date Value   12/16/2016 131     AST (U/L)   Date Value   09/23/2019 11     ALT (U/L)   Date Value   09/23/2019 8     BUN (mg/dL)   Date Value   03/30/2020 11      (goal A1C is < 7)   (goal LDL is <100) need 30-50% reduction from baseline     BP Readings from Last 3 Encounters:   03/30/20 134/84   02/21/20 112/84   08/15/19 126/80    (goal /80)      All Future Testing planned in [facility]:      Next Visit Date:  Future Appointments   Date Time Provider Anna Chu   7/16/2020  2:20 PM Jeanine Whelan, APRN - CNP [facility]            Patient Active Problem List:     Essential hypertension     Depression, major, recurrent, mild (HCC)     Hematuria     Collagenous colitis     Lupus ([facility] [address]     SHUN (generalized anxiety disorder)     Chronic pain of both knees

## 2020-07-22 ENCOUNTER — OFFICE VISIT (OUTPATIENT)
Dept: FAMILY MEDICINE CLINIC | Age: 60
End: 2020-07-22
Payer: COMMERCIAL

## 2020-07-22 VITALS
OXYGEN SATURATION: 97 % | DIASTOLIC BLOOD PRESSURE: 80 MMHG | WEIGHT: 158.8 LBS | RESPIRATION RATE: 20 BRPM | BODY MASS INDEX: 31.01 KG/M2 | HEART RATE: 73 BPM | SYSTOLIC BLOOD PRESSURE: 112 MMHG

## 2020-07-22 PROCEDURE — 99214 OFFICE O/P EST MOD 30 MIN: CPT | Performed by: NURSE PRACTITIONER

## 2020-07-22 PROCEDURE — 90715 TDAP VACCINE 7 YRS/> IM: CPT | Performed by: NURSE PRACTITIONER

## 2020-07-22 PROCEDURE — 90471 IMMUNIZATION ADMIN: CPT | Performed by: NURSE PRACTITIONER

## 2020-07-22 RX ORDER — ZOSTER VACCINE RECOMBINANT, ADJUVANTED 50 MCG/0.5
0.5 KIT INTRAMUSCULAR SEE ADMIN INSTRUCTIONS
Qty: 0.5 ML | Refills: 0 | Status: SHIPPED | OUTPATIENT
Start: 2020-07-22 | End: 2021-01-18

## 2020-07-22 RX ORDER — PAROXETINE 30 MG/1
30 TABLET, FILM COATED ORAL DAILY
Qty: 30 TABLET | Refills: 3 | Status: SHIPPED | OUTPATIENT
Start: 2020-07-22 | End: 2021-01-22 | Stop reason: SDUPTHER

## 2020-07-22 RX ORDER — HYDROXYZINE PAMOATE 25 MG/1
25 CAPSULE ORAL 3 TIMES DAILY PRN
Qty: 30 CAPSULE | Refills: 1 | Status: SHIPPED | OUTPATIENT
Start: 2020-07-22 | End: 2020-08-21

## 2020-07-22 ASSESSMENT — ENCOUNTER SYMPTOMS
BACK PAIN: 0
CHEST TIGHTNESS: 0
VOMITING: 0
COUGH: 0
WHEEZING: 0
SINUS PRESSURE: 0
DIARRHEA: 0
SORE THROAT: 0
NAUSEA: 0
RHINORRHEA: 0
TROUBLE SWALLOWING: 0
CONSTIPATION: 0
SHORTNESS OF BREATH: 0
ABDOMINAL PAIN: 0

## 2020-07-22 NOTE — PROGRESS NOTES
[facility]   [address]  [phone]    7/26/2020    Visit Information    Have you changed or started any medications since your last visit including any over-the-counter medicines, vitamins, or herbal medicines? yes - Med list updated   Are you having any side effects from any of your medications? -  Mild jaw pain after taking fosamax. Notices 30 min after taking. Have you stopped taking any of your medications? Is so, why? -  no    Have you seen any other physician or provider since your last visit? Yes - Records Obtained [facility] ED 3/30/20   Have you had any other diagnostic tests since your last visit? Yes - Records Obtained EKG, Blood work   Have you been seen in the emergency room and/or had an admission to a hospital since we last saw you? Yes - Records Obtained [place] [facility]   Have you had your routine dental cleaning in the past 6 months? yes -      Have you activated your [employer] account? If not, what are your barriers? Yes     Patient Care Team:  SLOAN Hermosillo CNP as PCP - General (Nurse Practitioner Family)  SLOAN Hermosillo CNP [facility] PCP [facility]  Cathy Mccarthy MD (Gastroenterology)      Per Davalos is a 56 y.o. female who presents today for her  medical conditions/complaints as noted below. Per Davalos is c/o of Hypertension; Anxiety; Lupus; and Other (Mammogram order)  . HPI:     And is a pleasant 67-year-old  female. She presents today for follow-up. Patient states she takes her medications as prescribed with no side effects. Today patient denies any chest pain, shortness breath, recent upper respiratory infections, or fever. Patient does complain of increasing anxiety. She states the Xanax does not help her. Her  was recently diagnosed with lymphoma, the cancer is at an unknown source. In addition to that patient is currently staying with her 43-year-old mother to care for her. This is increasing stress and anxiety as she is unsure if she needs to stay with her mother, or go back home intent to her . Hypertension   This is a chronic problem. The current episode started more than 1 year ago. The problem is controlled. Associated symptoms include anxiety and palpitations. Pertinent negatives include no chest pain, headaches, peripheral edema or shortness of breath. Anxiety: Patient complains of evaluation of anxiety disorder and sleep disturbance. She has the following anxiety symptoms: difficulty concentrating, fatigue, insomnia, irritable. Onset of symptoms was approximately several months ago, gradually worsening since that time. She denies current suicidal and homicidal ideation. Possible organic causes contributing are: endocrine/metabolic. Risk factors: negative life event Spouse recently diagnosed with cancer, currently caring for her ill mother, recent pandemic. and previous episode of depression Previous treatment includes Paxil and Xanax and none. She complains of the following side effects from the treatment: none. Patient states her Paxil was more recently increased to 40 mg. Patient states that she feels this is too high, as it may make her jittery, and makes her not as focused. Patient is asking to be weaned back down to 30 mg on a daily basis. Also encourage patient to increase use of Atarax to help with her anxiety as it may also assist her to sleep, due to patient stating that the Xanax is not helping her insomnia symptoms.       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     GERD (gastroesophageal reflux disease)     Myalgia and myositis     Palpitations     RA (rheumatoid arthritis) (HCC)     Sprain of thoracic region     Systemic lupus erythematosus (HCC)     UTI (urinary tract infection)     Viremia     Vitiligo       Past Surgical History:   Procedure Laterality Date    JOINT REPLACEMENT Left     lt knee    JOINT REPLACEMENT Right 2016    MANDIBLE SURGERY      TOTAL HIP ARTHROPLASTY Left 10/25/2016     Family History   Problem Relation Age of Onset    Crohn's Disease Father     Other Father     Diabetes Mother     Arthritis Mother     Hypertension Mother     Stomach Cancer Maternal Grandmother     Colon Cancer Maternal Grandfather     Ovarian Cancer Sister         complications of PVD    Colon Cancer Paternal Grandfather      Social History     Tobacco Use    Smoking status: Former Smoker     Packs/day: 1.00     Years: 15.00     Pack years: 15.00     Types: Cigarettes     Last attempt to quit: 2007     Years since quittin.5    Smokeless tobacco: Never Used    Tobacco comment: nicorette gum use   Substance Use Topics    Alcohol use: Yes     Alcohol/week: 0.0 standard drinks     Frequency: Monthly or less     Drinks per session: 1 or 2     Binge frequency: Never     Comment: rarely      Current Outpatient Medications   Medication Sig Dispense Refill    hydrOXYzine (VISTARIL) 25 MG capsule Take 1 capsule by mouth 3 times daily as needed for Itching or Anxiety 30 capsule 1    PARoxetine (PAXIL) 30 MG tablet Take 1 tablet by mouth daily 30 tablet 3    zoster recombinant adjuvanted vaccine (SHINGRIX) 50 MCG/0.5ML SUSR injection Inject 0.5 mLs into the muscle See Admin Instructions 1 dose now and repeat in 2-6 months 0.5 mL 0    Handicap Placard MISC by Does not apply route 1 each 0    ALPRAZolam (XANAX) 0.5 MG tablet Take 1 tablet by mouth nightly as needed for Sleep.  30 tablet 0    metoprolol tartrate (LOPRESSOR) 25 MG tablet Take 1 tablet by mouth daily 30 tablet 5    Negative for arthralgias, back pain, gait problem and myalgias. Generalized   Skin: Negative for rash. Neurological: Negative for dizziness, syncope, weakness, light-headedness, numbness and headaches. Psychiatric/Behavioral: Positive for sleep disturbance. Negative for agitation. The patient is nervous/anxious. Objective:     Physical Exam  Vitals signs and nursing note reviewed. Constitutional:       General: She is not in acute distress. Appearance: Normal appearance. She is well-developed and well-groomed. She is obese. She is not ill-appearing or toxic-appearing. HENT:      Head: Normocephalic. Right Ear: Hearing, tympanic membrane, ear canal and external ear normal. There is no impacted cerumen. Tympanic membrane is not erythematous. Left Ear: Hearing, tympanic membrane, ear canal and external ear normal. There is no impacted cerumen. Tympanic membrane is not erythematous. Nose: Nose normal. No congestion or rhinorrhea. Mouth/Throat:      Lips: Pink. Mouth: Mucous membranes are moist.      Pharynx: Oropharynx is clear. No oropharyngeal exudate or posterior oropharyngeal erythema. Eyes:      General: Lids are normal.      Extraocular Movements: Extraocular movements intact. Right eye: No nystagmus. Left eye: No nystagmus. Conjunctiva/sclera: Conjunctivae normal.      Pupils: Pupils are equal, round, and reactive to light. Neck:      Musculoskeletal: No neck rigidity or muscular tenderness. Thyroid: No thyroid mass or thyroid tenderness. Cardiovascular:      Rate and Rhythm: Normal rate and regular rhythm. Pulses: Normal pulses. Carotid pulses are 2+ on the right side and 2+ on the left side. Radial pulses are 2+ on the right side and 2+ on the left side. Dorsalis pedis pulses are 2+ on the right side and 2+ on the left side. Heart sounds: Normal heart sounds, S1 normal and S2 normal. No murmur. - Handicap Placard MISC; by Does not apply route  Dispense: 1 each; Refill: 0    5. Chronic pain of both knees  -Stable, medicated, monitered   - Handicap Placard MISC; by Does not apply route  Dispense: 1 each; Refill: 0    6. Encounter for screening mammogram for breast cancer  - [facility]ERTH DIGITAL SCREEN W OR WO CAD BILATERAL; Future    7. Need for vaccination  - zoster recombinant adjuvanted vaccine [facility]) 50 MCG/0.5ML SUSR injection; Inject 0.5 mLs into the muscle See Admin Instructions 1 dose now and repeat in 2-6 months  Dispense: 0.5 mL; Refill: 0  - Tdap (age 10y-63y) IM (Adacel)       Plan:      Return in about 3 months (around 10/22/2020). Orders Placed This Encounter   Procedures    ROBERTH DIGITAL SCREEN W OR WO CAD BILATERAL     Standing Status:   Future     Standing Expiration Date:   10/22/2020     Order Specific Question:   Reason for exam:     Answer:   yearly    Tdap (age 10y-63y) IM (Adacel)   [address] [place]     Referral Priority:   Routine     Referral Type:   Eval and Treat     Referral Reason:   Specialty Services Required     Requested Specialty:   Psychology     Number of Visits Requested:   1     Orders Placed This Encounter   Medications    hydrOXYzine (VISTARIL) 25 MG capsule     Sig: Take 1 capsule by mouth 3 times daily as needed for Itching or Anxiety     Dispense:  30 capsule     Refill:  1    PARoxetine (PAXIL) 30 MG tablet     Sig: Take 1 tablet by mouth daily     Dispense:  30 tablet     Refill:  3    zoster recombinant adjuvanted vaccine (SHINGRIX) 50 MCG/0.5ML SUSR injection     Sig: Inject 0.5 mLs into the muscle See Admin Instructions 1 dose now and repeat in 2-6 months     Dispense:  0.5 mL     Refill:  0    Handicap Placard MISC     Sig: by Does not apply route     Dispense:  1 each     Refill:  0       Reviewed health maintenance, prior labs and imaging. Patient given educational materials - see patient instructions.     Discussed use, benefit, and side effects

## 2020-08-04 RX ORDER — ALPRAZOLAM 0.5 MG/1
0.5 TABLET ORAL NIGHTLY PRN
Qty: 30 TABLET | Refills: 0 | Status: SHIPPED | OUTPATIENT
Start: 2020-08-04 | End: 2020-09-11

## 2020-10-01 ENCOUNTER — TELEPHONE (OUTPATIENT)
Dept: FAMILY MEDICINE CLINIC | Age: 60
End: 2020-10-01

## 2020-10-01 RX ORDER — CYCLOBENZAPRINE HCL 5 MG
5 TABLET ORAL 2 TIMES DAILY PRN
Qty: 28 TABLET | Refills: 0 | Status: SHIPPED | OUTPATIENT
Start: 2020-10-01 | End: 2020-10-15

## 2020-10-01 NOTE — TELEPHONE ENCOUNTER
Patient requesting muscle relaxer for her neck, says she is in lots of stress from  being diagnosed with cancer. Says the neck pain causes nausea and headaches. Please advise.

## 2020-10-15 ENCOUNTER — OFFICE VISIT (OUTPATIENT)
Dept: FAMILY MEDICINE CLINIC | Age: 60
End: 2020-10-15
Payer: COMMERCIAL

## 2020-10-15 ENCOUNTER — HOSPITAL ENCOUNTER (OUTPATIENT)
Age: 60
Setting detail: SPECIMEN
Discharge: HOME | End: 2020-10-15
Payer: COMMERCIAL

## 2020-10-15 VITALS
TEMPERATURE: 97.9 F | BODY MASS INDEX: 31.41 KG/M2 | HEART RATE: 78 BPM | DIASTOLIC BLOOD PRESSURE: 74 MMHG | RESPIRATION RATE: 18 BRPM | SYSTOLIC BLOOD PRESSURE: 126 MMHG | HEIGHT: 60 IN | WEIGHT: 160 LBS | OXYGEN SATURATION: 99 %

## 2020-10-15 LAB
BILIRUBIN, POC: NEGATIVE
BLOOD URINE, POC: NORMAL
CLARITY, POC: CLEAR
COLOR, POC: YELLOW
COMPLEMENT C3: 122 MG/DL (ref 90–180)
COMPLEMENT C4: 21 MG/DL (ref 10–40)
GLUCOSE URINE, POC: NEGATIVE
KETONES, POC: NEGATIVE
LEUKOCYTE EST, POC: NORMAL
NITRITE, POC: NEGATIVE
PH, POC: 5.5
PROTEIN, POC: NEGATIVE
SPECIFIC GRAVITY, POC: >=1.03
UROBILINOGEN, POC: NORMAL

## 2020-10-15 PROCEDURE — 99214 OFFICE O/P EST MOD 30 MIN: CPT | Performed by: STUDENT IN AN ORGANIZED HEALTH CARE EDUCATION/TRAINING PROGRAM

## 2020-10-15 PROCEDURE — 81003 URINALYSIS AUTO W/O SCOPE: CPT | Performed by: STUDENT IN AN ORGANIZED HEALTH CARE EDUCATION/TRAINING PROGRAM

## 2020-10-15 RX ORDER — NITROFURANTOIN 25; 75 MG/1; MG/1
100 CAPSULE ORAL 2 TIMES DAILY
Qty: 20 CAPSULE | Refills: 0 | Status: SHIPPED | OUTPATIENT
Start: 2020-10-15 | End: 2020-10-25

## 2020-10-15 RX ORDER — HYDROXYCHLOROQUINE SULFATE 200 MG/1
200 TABLET, FILM COATED ORAL 2 TIMES DAILY
Qty: 60 TABLET | Refills: 1 | Status: SHIPPED | OUTPATIENT
Start: 2020-10-15 | End: 2020-12-30

## 2020-10-15 RX ORDER — TRAZODONE HYDROCHLORIDE 100 MG/1
100 TABLET ORAL NIGHTLY
Qty: 90 TABLET | Refills: 1 | Status: SHIPPED | OUTPATIENT
Start: 2020-10-15 | End: 2020-12-30 | Stop reason: ALTCHOICE

## 2020-10-15 SDOH — ECONOMIC STABILITY: FOOD INSECURITY: WITHIN THE PAST 12 MONTHS, YOU WORRIED THAT YOUR FOOD WOULD RUN OUT BEFORE YOU GOT MONEY TO BUY MORE.: NEVER TRUE

## 2020-10-15 SDOH — ECONOMIC STABILITY: TRANSPORTATION INSECURITY
IN THE PAST 12 MONTHS, HAS LACK OF TRANSPORTATION KEPT YOU FROM MEETINGS, WORK, OR FROM GETTING THINGS NEEDED FOR DAILY LIVING?: NO

## 2020-10-15 SDOH — ECONOMIC STABILITY: FOOD INSECURITY: WITHIN THE PAST 12 MONTHS, THE FOOD YOU BOUGHT JUST DIDN'T LAST AND YOU DIDN'T HAVE MONEY TO GET MORE.: NEVER TRUE

## 2020-10-15 SDOH — ECONOMIC STABILITY: INCOME INSECURITY: HOW HARD IS IT FOR YOU TO PAY FOR THE VERY BASICS LIKE FOOD, HOUSING, MEDICAL CARE, AND HEATING?: NOT HARD AT ALL

## 2020-10-15 SDOH — ECONOMIC STABILITY: TRANSPORTATION INSECURITY
IN THE PAST 12 MONTHS, HAS THE LACK OF TRANSPORTATION KEPT YOU FROM MEDICAL APPOINTMENTS OR FROM GETTING MEDICATIONS?: NO

## 2020-10-15 ASSESSMENT — ENCOUNTER SYMPTOMS
ABDOMINAL DISTENTION: 0
SORE THROAT: 0
COUGH: 0
WHEEZING: 0
ABDOMINAL PAIN: 0
BACK PAIN: 0
DIARRHEA: 0
SHORTNESS OF BREATH: 0
CHEST TIGHTNESS: 0
CONSTIPATION: 0

## 2020-10-15 NOTE — PROGRESS NOTES
Visit Information    Have you changed or started any medications since your last visit including any over-the-counter medicines, vitamins, or herbal medicines? no   Have you stopped taking any of your medications? Is so, why? -  no  Are you having any side effects from any of your medications? - no    Have you seen any other physician or provider since your last visit?  no   Have you had any other diagnostic tests since your last visit?  no   Have you been seen in the emergency room and/or had an admission in a hospital since we last saw you?  no   Have you had your routine dental cleaning in the past 6 months?  no     Do you have an active MyChart account? If no, what is the barrier?   Yes    Patient Care Team:  SLOAN Arroyo CNP as PCP - General (Nurse Practitioner Family)  SLOAN Arroyo CNP [facility]  Jenna Sun MD (Gastroenterology)    Medical History Review  Past Medical, Family, and Social History reviewed and does contribute to the patient presenting condition    Health Maintenance   Topic Date Due    Shingles Vaccine (1 of 2) 06/02/2008    Flu vaccine (1) 09/01/2020    Potassium monitoring  03/30/2021    Creatinine monitoring  03/30/2021    Breast cancer screen  05/24/2021    Cervical cancer screen  07/07/2022    Colon cancer screen colonoscopy  04/26/2023    Lipid screen  09/23/2024    DTaP/Tdap/Td vaccine (2 - Td) 07/22/2030    Hepatitis C screen  Completed    HIV screen  Completed    Hepatitis A vaccine  Aged Out    Hepatitis B vaccine  Aged Out    Hib vaccine  Aged Out    Meningococcal (ACWY) vaccine  Aged Out    Pneumococcal 0-64 years Vaccine  Aged Out

## 2020-10-15 NOTE — PROGRESS NOTES
Extraocular Movements: Extraocular movements intact. Neck:      Musculoskeletal: Normal range of motion and neck supple. Cardiovascular:      Rate and Rhythm: Normal rate and regular rhythm. Abdominal:      Comments: Suprapubic tenderness lower abdominal pain tenderness to moderate palpation   Musculoskeletal: Normal range of motion. Skin:     General: Skin is warm. Neurological:      General: No focal deficit present. Mental Status: She is alert and oriented to person, place, and time. Psychiatric:      Comments: Depressed mood high anxiety          Prior to Visit Medications    Medication Sig Taking? Authorizing Provider   nitrofurantoin, macrocrystal-monohydrate, (MACROBID) 100 MG capsule Take 1 capsule by mouth 2 times daily for 10 days Yes Kira Murphy MD   hydroxychloroquine (PLAQUENIL) 200 MG tablet Take 1 tablet by mouth 2 times daily Yes Kira Murphy MD   traZODone (DESYREL) 100 MG tablet Take 1 tablet by mouth nightly Yes Kira Murphy MD   cyclobenzaprine (FLEXERIL) 5 MG tablet Take 1 tablet by mouth 2 times daily as needed for Muscle spasms Yes SLOAN Steele CNP   ALPRAZolam (XANAX) 0.5 MG tablet Take 1 tablet by mouth nightly as needed for Sleep.  Yes SLOAN Steele CNP   PARoxetine (PAXIL) 30 MG tablet Take 1 tablet by mouth daily Yes SLOAN Steele CNP   Handicap Placard MISC by Does not apply route Yes LSOAN Steele CNP   metoprolol tartrate (LOPRESSOR) 25 MG tablet Take 1 tablet by mouth daily Yes SLOAN Steele CNP   alendronate (FOSAMAX) 70 MG tablet Take 1 tablet by mouth every 7 days Yes SLOAN Steele CNP   Multiple Vitamins-Minerals (THERAPEUTIC MULTIVITAMIN-MINERALS) tablet Take 1 tablet by mouth daily Yes Historical Provider, MD   Cholecalciferol (VITAMIN D3) 5000 UNITS CAPS Take 1 capsule by mouth daily Yes SLOAN Kerr CNP   calcium carbonate (CALCIUM 600) 600 MG TABS tablet Take 1 tablet by mouth 2 times daily Yes SLOAN Mckenzie CNP   zoster recombinant adjuvanted vaccine [facility]) 50 MCG/0.5ML SUSR injection Inject 0.5 mLs into the muscle See Admin Instructions 1 dose now and repeat in 2-6 months  Patient not taking: Reported on 10/15/2020  Ajdania NegronSLOAN costello CNP        Social History     Tobacco Use    Smoking status: Former Smoker     Packs/day: 1.00     Years: 15.00     Pack years: 15.00     Types: Cigarettes     Last attempt to quit: 2007     Years since quittin.7    Smokeless tobacco: Never Used    Tobacco comment: nicorette gum use   Substance Use Topics    Alcohol use: Yes     Alcohol/week: 0.0 standard drinks     Frequency: Monthly or less     Drinks per session: 1 or 2     Binge frequency: Never     Comment: rarely       Body mass index is 31.25 kg/m². Vitals:    10/15/20 1054   BP: 126/74   Site: Right Upper Arm   Position: Sitting   Cuff Size: Large Adult   Pulse: 78   Resp: 18   Temp: 97.9 °F (36.6 °C)   TempSrc: Temporal   SpO2: 99%   Weight: 160 lb (72.6 kg)   Height: 5' (1.524 m)        Diagnoses and all orders for this visit:    Urinary tract infection with hematuria, site unspecified  -     POCT Urinalysis No Micro (Auto)  -     Urinalysis, Micro; Future    Lupus (HCC)  -     AFL([facility]) - Patrica Lopez MD, Nephrology, [place]  -     hydroxychloroquine (PLAQUENIL) 200 MG tablet; Take 1 tablet by mouth 2 times daily  -     Protein / Creatinine Ratio, Urine; Future  -     Anti-Dna Antibody, Double-Stranded; Future  -     C3 Complement; Future  -     C4 Complement; Future    Hematuria due to chronic cystitis  -     AFL([facility]) - Patrica Lopez MD, Nephrology, [place]  -     hydroxychloroquine (PLAQUENIL) 200 MG tablet; Take 1 tablet by mouth 2 times daily  -     Protein / Creatinine Ratio, Urine; Future  -     Anti-Dna Antibody, Double-Stranded; Future  -     C3 Complement; Future  -     C4 Complement;  Future    Psychophysiological insomnia  -     traZODone (DESYREL) 100 MG tablet; Take 1 tablet by mouth nightly    Other orders  -     nitrofurantoin, macrocrystal-monohydrate, (MACROBID) 100 MG capsule; Take 1 capsule by mouth 2 times daily for 10 days      Patient has symptoms consistent with UTI  We will treat her with 10 days of Macrobid  She is to see a nephrologist because she has persistent hematuria in the setting of a previous lupus diagnosis I would like to rule out interstitial nephritis/nephritis/lupus nephritis. Has high anxiety is on Paxil take Xanax 3 times a week. We will repeat all lupus testing. Plaquenil sent to pharmacy but patient given strict instructions to not take this medication unless her lab work comes back positive for lupus. She is familiar with a rheumatologist that her daughter sees at [facility] she will be able to see him for consultation if her pending lab work comes back positive. She was prescribed trazodone she is on Paxil I gave her an explanation about the possibility of serotonin syndrome although it is not likely. In the meantime she will try valerian root and CBD prior to trying the trazodone.     She understands the possible side effects of her trazodone and Paxil being combined she is in agreement to try this and look out for side effects she understands risks and benefits including but not limited to serotonin syndrome and/or death    Saige Haskins MD

## 2020-10-16 LAB
-: ABNORMAL
AMORPHOUS: ABNORMAL
ANTI DNA DOUBLE STRANDED: 0 IU/ML
BACTERIA: ABNORMAL
BILIRUBIN URINE: NEGATIVE
CASTS UA: ABNORMAL /LPF (ref 0–8)
COLOR: YELLOW
CREATININE URINE: 193.9 MG/DL (ref 28–217)
CRYSTALS, UA: ABNORMAL /HPF
EPITHELIAL CELLS UA: ABNORMAL /HPF (ref 0–5)
GLUCOSE URINE: NEGATIVE
KETONES, URINE: NEGATIVE
LEUKOCYTE ESTERASE, URINE: ABNORMAL
MUCUS: ABNORMAL
NITRITE, URINE: NEGATIVE
OTHER OBSERVATIONS UA: ABNORMAL
PH UA: 5 (ref 5–8)
PROTEIN UA: NEGATIVE
RBC UA: ABNORMAL /HPF (ref 0–4)
RENAL EPITHELIAL, UA: ABNORMAL /HPF
SPECIFIC GRAVITY UA: 1.02 (ref 1–1.03)
TOTAL PROTEIN, URINE: 19 MG/DL
TRICHOMONAS: ABNORMAL
TURBIDITY: ABNORMAL
URINE HGB: ABNORMAL
URINE TOTAL PROTEIN CREATININE RATIO: 0.1 (ref 0–0.2)
UROBILINOGEN, URINE: NORMAL
WBC UA: ABNORMAL /HPF (ref 0–5)
YEAST: ABNORMAL

## 2020-10-26 ENCOUNTER — TELEPHONE (OUTPATIENT)
Dept: FAMILY MEDICINE CLINIC | Age: 60
End: 2020-10-26

## 2020-10-26 RX ORDER — ALPRAZOLAM 0.5 MG/1
0.5 TABLET ORAL 2 TIMES DAILY
Qty: 28 TABLET | Refills: 0 | Status: SHIPPED | OUTPATIENT
Start: 2020-10-26 | End: 2020-12-03 | Stop reason: SDUPTHER

## 2020-10-26 NOTE — TELEPHONE ENCOUNTER
Pt asking to have Xanax increased because  is going through chemo and pt feels very overwhelmed. Pt also stated she hasn't felt good since starting new medications from last appt. Pharmacy confirmed.

## 2020-12-03 DIAGNOSIS — F41.9 ANXIETY: ICD-10-CM

## 2020-12-03 RX ORDER — ALPRAZOLAM 0.5 MG/1
0.5 TABLET ORAL 2 TIMES DAILY
Qty: 28 TABLET | Refills: 0 | Status: SHIPPED | OUTPATIENT
Start: 2020-12-03 | End: 2020-12-17

## 2020-12-03 NOTE — TELEPHONE ENCOUNTER
LV 7/22/20  LRF 10/26/20  RTO 3 months    Health Maintenance   Topic Date Due    Shingles Vaccine (1 of 2) 06/02/2008    Flu vaccine (1) 09/01/2020    Potassium monitoring  03/30/2021    Creatinine monitoring  03/30/2021    Breast cancer screen  05/24/2021    Cervical cancer screen  07/07/2022    Colon cancer screen colonoscopy  04/26/2023    Lipid screen  09/23/2024    DTaP/Tdap/Td vaccine (2 - Td) 07/22/2030    Hepatitis C screen  Completed    HIV screen  Completed    Hepatitis A vaccine  Aged Out    Hepatitis B vaccine  Aged Out    Hib vaccine  Aged Out    Meningococcal (ACWY) vaccine  Aged Out    Pneumococcal 0-64 years Vaccine  Aged Out             (applicable per patient's age: Cancer Screenings, Depression Screening, Fall Risk Screening, Immunizations)    LDL Cholesterol (mg/dL)   Date Value   09/23/2019 93     LDL Calculated (mg/dL)   Date Value   12/16/2016 131     AST (U/L)   Date Value   09/23/2019 11     ALT (U/L)   Date Value   09/23/2019 8     BUN (mg/dL)   Date Value   03/30/2020 11      (goal A1C is < 7)   (goal LDL is <100) need 30-50% reduction from baseline     BP Readings from Last 3 Encounters:   10/15/20 126/74   07/22/20 112/80   03/30/20 134/84    (goal /80)      All Future Testing planned in [facility]:  Lab Frequency Next Occurrence   Urinalysis, Micro Once 10/15/2020   Protein / Creatinine Ratio, Urine Once 10/15/2020       Next Visit Date:  No future appointments.          Patient Active Problem List:     Essential hypertension     Depression, major, recurrent, mild (HCC)     Hematuria     Collagenous colitis     Lupus (HCC)     SHUN (generalized anxiety disorder)     Chronic pain of both knees

## 2020-12-21 ENCOUNTER — OFFICE VISIT (OUTPATIENT)
Dept: FAMILY MEDICINE CLINIC | Age: 60
End: 2020-12-21
Payer: COMMERCIAL

## 2020-12-21 VITALS
RESPIRATION RATE: 20 BRPM | OXYGEN SATURATION: 97 % | SYSTOLIC BLOOD PRESSURE: 116 MMHG | DIASTOLIC BLOOD PRESSURE: 84 MMHG | WEIGHT: 156.6 LBS | HEART RATE: 80 BPM | BODY MASS INDEX: 30.58 KG/M2 | TEMPERATURE: 98.3 F

## 2020-12-21 PROCEDURE — 1036F TOBACCO NON-USER: CPT | Performed by: NURSE PRACTITIONER

## 2020-12-21 PROCEDURE — 99214 OFFICE O/P EST MOD 30 MIN: CPT | Performed by: NURSE PRACTITIONER

## 2020-12-21 PROCEDURE — G8427 DOCREV CUR MEDS BY ELIG CLIN: HCPCS | Performed by: NURSE PRACTITIONER

## 2020-12-21 PROCEDURE — 3017F COLORECTAL CA SCREEN DOC REV: CPT | Performed by: NURSE PRACTITIONER

## 2020-12-21 PROCEDURE — G8417 CALC BMI ABV UP PARAM F/U: HCPCS | Performed by: NURSE PRACTITIONER

## 2020-12-21 PROCEDURE — 99354 PR PROLONGED SVC OUTPATIENT SETTING 1ST HOUR: CPT | Performed by: NURSE PRACTITIONER

## 2020-12-21 PROCEDURE — G8484 FLU IMMUNIZE NO ADMIN: HCPCS | Performed by: NURSE PRACTITIONER

## 2020-12-21 RX ORDER — ALPRAZOLAM 1 MG/1
1 TABLET ORAL 2 TIMES DAILY PRN
Qty: 30 TABLET | Refills: 0 | Status: SHIPPED | OUTPATIENT
Start: 2020-12-21 | End: 2021-01-19 | Stop reason: SDUPTHER

## 2020-12-21 RX ORDER — QUETIAPINE FUMARATE 25 MG/1
25 TABLET, FILM COATED ORAL NIGHTLY
Qty: 30 TABLET | Refills: 0 | Status: SHIPPED | OUTPATIENT
Start: 2020-12-21 | End: 2021-01-25 | Stop reason: SDUPTHER

## 2020-12-21 NOTE — PATIENT INSTRUCTIONS
Will increase Xanax from 0.5 mg to 1 mg twice a day as needed  Seroquel 25 mg at night for sleep  DO NOT take xanax with Seroquel   Appointment with Petr Sosa 12/22/2020 10:40 am  Follow up in office with me in 2 weeks

## 2020-12-21 NOTE — PROGRESS NOTES
[facility] [address]  [phone]    12/22/2020     Patient ID: Trista Gil is a 56 y.o. female. CHIEF COMPLAINT:     Trista Gil presents today for   Chief Complaint   Patient presents with    Anxiety     Shaking, nausea, throat tightness    Depression     Appettite is ok     Insomnia     Some bad dreams, difficulty falling asleep/staying asleep   . VISIT INFORMATION:      Have you changed or started any medications since your last visit including any over-the-counter medicines, vitamins, or herbal medicines? yes - Med list updated   Are you having any side effects from any of your medications? -  no  Have you stopped taking any of your medications? Is so, why? -  yes - Med list updated    Have you seen any other physician or provider since your last visit? No  Have you had any other diagnostic tests since your last visit? Yes - Records Obtained Labs   Have you been seen in the emergency room and/or had an admission to a hospital since we last saw you? No    Have you activated your [email or web address] account? If not, what are your barriers?  Yes    Patient Care Team:  SLOAN Torrez CNP as PCP - General (Nurse Practitioner Family)  SLOAN Torrez CNP as PCP - [facility] Gurmeet Jenkinsled Provider  Pawan Lynn MD (Gastroenterology)  Osmel Miller MD as Consulting Physician (Pain Management)  Bruna Narayan MD as Consulting Physician (Nephrology)  ILIR Leon (Licensed Clinical )    REVIEWED:     [x] Laboratory Results, Vital signs, Imaging, Active Problems, Immunizations, Current/Recently Discontinued Medications, Health Maintenance Activities Due, Referral Notes (if available) were reviewed per writer     [x] Reviewed Depression screening if taken or valid today or any other valid screening tool (others seen below) Interpretation of Total Score DepressionSeverity: 1-4 = Minimal depression, 5-9 = Mild depression, 10-14 = Moderate depression, 15-19 = Moderately severe depression, 20-27 =Severe depression    PHQ Scores 2/8/2019 7/7/2017 1/15/2016 7/27/2015   PHQ2 Score 4 5 5 6   PHQ9 Score 9 16 14 16       Interpretation of Total Score DepressionSeverity: 1-4 = Minimal depression, 5-9 = Mild depression, 10-14 = Moderate depression, 15-19 = Moderately severe depression, 20-27 = Severe depression    Allergies   Allergen Reactions    Bactrim [Sulfamethoxazole-Trimethoprim] Nausea And Vomiting     Current Outpatient Medications   Medication Sig Dispense Refill    ALPRAZolam (XANAX) 1 MG tablet Take 1 tablet by mouth 2 times daily as needed for Anxiety for up to 30 doses. 30 tablet 0    QUEtiapine (SEROQUEL) 25 MG tablet Take 1 tablet by mouth nightly 30 tablet 0    PARoxetine (PAXIL) 30 MG tablet Take 1 tablet by mouth daily 30 tablet 3    Handicap Placard MISC by Does not apply route 1 each 0    alendronate (FOSAMAX) 70 MG tablet Take 1 tablet by mouth every 7 days 4 tablet 5    Multiple Vitamins-Minerals (THERAPEUTIC MULTIVITAMIN-MINERALS) tablet Take 1 tablet by mouth daily      Cholecalciferol (VITAMIN D3) 5000 UNITS CAPS Take 1 capsule by mouth daily 30 capsule 0    calcium carbonate (CALCIUM 600) 600 MG TABS tablet Take 1 tablet by mouth 2 times daily 30 tablet 3    zoster recombinant adjuvanted vaccine (SHINGRIX) 50 MCG/0.5ML SUSR injection Inject 0.5 mLs into the muscle See Admin Instructions 1 dose now and repeat in 2-6 months (Patient not taking: Reported on 10/15/2020) 0.5 mL 0     No current facility-administered medications for this visit.       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     GERD (gastroesophageal reflux disease)     Myalgia and myositis     Palpitations     RA (rheumatoid arthritis) ([address]     Sprain of thoracic region     Systemic lupus erythematosus (HCC)     UTI (urinary tract infection)     Viremia     Vitiligo       Past Surgical History:   Procedure Laterality Date    JOINT REPLACEMENT Left     lt knee    JOINT REPLACEMENT Right 2016    MANDIBLE SURGERY      TOTAL HIP ARTHROPLASTY Left 10/25/2016     Family History   Problem Relation Age of Onset    Crohn's Disease Father     Other Father     Diabetes Mother     Arthritis Mother     Hypertension Mother     Stomach Cancer Maternal Grandmother     Colon Cancer Maternal Grandfather     Ovarian Cancer Sister         complications of PVD    Colon Cancer Paternal Grandfather      Social History     Tobacco Use    Smoking status: Former Smoker     Packs/day: 1.00     Years: 15.00     Pack years: 15.00     Types: Cigarettes     Quit date: 2007     Years since quittin.0    Smokeless tobacco: Never Used    Tobacco comment: nicorette gum use   Substance Use Topics    Alcohol use: Yes     Alcohol/week: 0.0 standard drinks     Frequency: Monthly or less     Drinks per session: 1 or 2     Binge frequency: Never     Comment: rarely      Health Maintenance   Topic Date Due    Shingles Vaccine (1 of 2) 2008    Flu vaccine (1) 2020    Potassium monitoring  2021    Creatinine monitoring  2021    Breast cancer screen  2021    Cervical cancer screen  2022    Colon cancer screen colonoscopy  2023    Lipid screen  2024    DTaP/Tdap/Td vaccine (2 - Td) 2030    Hepatitis C screen  Completed    HIV screen  Completed    Hepatitis A vaccine  Aged Out    Hepatitis B vaccine  Aged Out    Hib vaccine  Aged Out    Meningococcal (ACWY) vaccine  Aged Out    Pneumococcal 0-64 years Vaccine  Aged Out       VISIT SUMMARY:      Patient is a pleasant 78-year-old  female. She presents the office today unknowingly. Patient came without an appointment and was sent to  in tears.   Patient commenting she just needs to talk to somebody and needs something for her nerves. Due to high concern patient was asked to wait patiently for provider to see her. During the time patient was waiting she was able to relax and calm her nerves. It was noted the patient was very anxious and shaky upon initial encounter. I spent a significant amount of time with patient today. It is noted that she cares for her  at home who is currently undergoing chemotherapy for cancer. He has not been doing well. It is also noted that last Friday was a  of her 44-year-old son. Patient comments that she found him in the hallway at the Saturday prior unresponsive. They called 911 and she did everything including CPR until he arrived. Patient was taken to the hospital and he did not survive. Patient has complaints of extreme anxiety, sadness and insomnia. She denies any suicidal ideations or thoughts. During the visit we scheduled an urgent referral to behavioral health. An appointment was made for patient to speak with them first thing the next morning at 1030 per doxy. me visit. HISTORY OF PRESENT ILLNESS:     Anxiety: Patient complains of evaluation of anxiety disorder, panic attacks and sleep disturbance. She has the following anxiety symptoms: chest pain, difficulty concentrating, fatigue, feelings of losing control, insomnia, irritable, palpitations, racing thoughts. Onset of symptoms was approximately several months ago, rapidly worsening since that time. She denies current suicidal and homicidal ideation. Family history significant for alcoholism, anxiety, depression and substance abuse. Possible organic causes contributing are: endocrine/metabolic, neuro, nutritional. Risk factors: negative life event  undergoing chemotherapy, loss of son. Previous treatment includes Paxil and none. She complains of the following side effects from the treatment: none.     Depression: Patient complains of depression. She complains of depressed mood, difficulty concentrating, fatigue, feelings of worthlessness/guilt, impaired memory, insomnia and psychomotor agitation. Onset was approximately several months ago, rapidly worsening since that time. She denies current suicidal and homicidal plan or intent. REVIEW OF SYMPTOMS:      Review of Systems   Constitutional: Positive for appetite change and fatigue. Negative for unexpected weight change. Respiratory: Positive for chest tightness. Negative for cough, shortness of breath and wheezing. Cardiovascular: Positive for palpitations. Negative for chest pain and leg swelling. Neurological: Negative for dizziness, light-headedness and headaches. Psychiatric/Behavioral: Positive for agitation, decreased concentration and sleep disturbance. Negative for behavioral problems, self-injury and suicidal ideas. The patient is nervous/anxious. PHYSICAL EXAM:     /84 (Site: Right Upper Arm, Position: Sitting, Cuff Size: Medium Adult)   Pulse 80   Temp 98.3 °F (36.8 °C) (Temporal)   Resp 20   Wt 156 lb 9.6 oz (71 kg)   SpO2 97%   BMI 30.58 kg/m²      Physical Exam  Vitals signs and nursing note reviewed. Constitutional:       General: She is not in acute distress. Appearance: Normal appearance. She is well-developed and well-groomed. She is not ill-appearing or toxic-appearing. HENT:      Head: Normocephalic. Nose: Nose normal.      Mouth/Throat:      Lips: Pink. Pulmonary:      Effort: Pulmonary effort is normal. No respiratory distress. Skin:     Coloration: Skin is not cyanotic, jaundiced or pale. Neurological:      Mental Status: She is alert and oriented to person, place, and time. Psychiatric:         Attention and Perception: Attention and perception normal.         Mood and Affect: Mood is anxious and depressed. Affect is tearful. Speech: Speech normal.         Behavior: Behavior normal. Behavior is cooperative. appointment for tomorrow morning at 1030 per doxy. me  All patient questions answered  Patient voiced understanding to plan of care  Instructed to continue medications as discussed, healthy diet and exercise    Greater than 50% of this 75 minute visit was spent discussing plan of care in detail including ,but not limited to, education, counseling, treatment options for visit diagnosis, and coordination of care. Spent 65 minutes of this 75 minute appointment discussing plan of care in detail including but not limited to coordination of care, counseling, and education. Total visit time = 75 minutes     QUALITY MEASURES:     BMI Readings from Last 1 Encounters:   12/21/20 30.58 kg/m²        No results found for: LABA1C    BP Readings from Last 3 Encounters:   12/21/20 116/84   10/15/20 126/74   07/22/20 112/80        The 10-year ASCVD risk score (Lionel Phillips, et al., 2013) is: 3.3%    Values used to calculate the score:      Age: 56 years      Sex: Female      Is Non- : No      Diabetic: No      Tobacco smoker: No      Systolic Blood Pressure: 950 mmHg      Is BP treated: No      HDL Cholesterol: 44 mg/dL      Total Cholesterol: 162 mg/dL     This note is created with the assistance of a speech-recognition program. While intending to generate a document that actually reflects the content of the visit, no guarantees can be provided that every mistake has been identified and corrected by editing.     Electronically signed by ZACH Soto on 12/30/2020 at 5:14 AM

## 2020-12-22 ENCOUNTER — TELEPHONE (OUTPATIENT)
Dept: FAMILY MEDICINE CLINIC | Age: 60
End: 2020-12-22

## 2020-12-22 ENCOUNTER — VIRTUAL VISIT (OUTPATIENT)
Dept: PSYCHOLOGY | Age: 60
End: 2020-12-22
Payer: COMMERCIAL

## 2020-12-22 DIAGNOSIS — F33.0 DEPRESSION, MAJOR, RECURRENT, MILD (HCC): Primary | ICD-10-CM

## 2020-12-22 PROCEDURE — 90791 PSYCH DIAGNOSTIC EVALUATION: CPT | Performed by: SOCIAL WORKER

## 2020-12-22 ASSESSMENT — ENCOUNTER SYMPTOMS
COUGH: 0
SHORTNESS OF BREATH: 0
WHEEZING: 0
CHEST TIGHTNESS: 1

## 2021-01-05 ENCOUNTER — OFFICE VISIT (OUTPATIENT)
Dept: FAMILY MEDICINE CLINIC | Age: 61
End: 2021-01-05
Payer: COMMERCIAL

## 2021-01-05 VITALS — TEMPERATURE: 97.8 F | HEART RATE: 78 BPM

## 2021-01-05 DIAGNOSIS — F41.0 PANIC ATTACK DUE TO EXCEPTIONAL STRESS: Primary | ICD-10-CM

## 2021-01-05 DIAGNOSIS — F41.1 GAD (GENERALIZED ANXIETY DISORDER): ICD-10-CM

## 2021-01-05 DIAGNOSIS — F33.0 DEPRESSION, MAJOR, RECURRENT, MILD (HCC): Chronic | ICD-10-CM

## 2021-01-05 DIAGNOSIS — F43.0 PANIC ATTACK DUE TO EXCEPTIONAL STRESS: Primary | ICD-10-CM

## 2021-01-05 PROCEDURE — 1036F TOBACCO NON-USER: CPT | Performed by: NURSE PRACTITIONER

## 2021-01-05 PROCEDURE — 3017F COLORECTAL CA SCREEN DOC REV: CPT | Performed by: NURSE PRACTITIONER

## 2021-01-05 PROCEDURE — 99213 OFFICE O/P EST LOW 20 MIN: CPT | Performed by: NURSE PRACTITIONER

## 2021-01-05 PROCEDURE — G8427 DOCREV CUR MEDS BY ELIG CLIN: HCPCS | Performed by: NURSE PRACTITIONER

## 2021-01-05 PROCEDURE — G8484 FLU IMMUNIZE NO ADMIN: HCPCS | Performed by: NURSE PRACTITIONER

## 2021-01-05 PROCEDURE — G8417 CALC BMI ABV UP PARAM F/U: HCPCS | Performed by: NURSE PRACTITIONER

## 2021-01-05 NOTE — PROGRESS NOTES
2021    Kay Pro (:  1960) is a 56 y.o. female,Established patient, here for evaluation of the following chief complaint(s):  Panic Attack and Medication Check      ASSESSMENT/PLAN:  1. Panic attack due to exceptional stress  2. SHUN (generalized anxiety disorder)  3. Depression, major, recurrent, mild ([address]    Continue medications and follow with behavioral health for therapy. Return for Call if systems do not improve or get worse. SUBJECTIVE/OBJECTIVE:  Patient is a pleasant 29-year-old  female. She presents the office today to follow-up. Patient was recently seen in the office due to a significant episode of depression and anxiety. At that time patient was having a panic attack while in the office. We did start to treat her with pharmacology. She also is talking with a counselor/within our behavioral health. Patient is still tearful at today's visit however she is able to control her emotions. She does not feels necessary to make any changes in the pharmacology we initiated. She is having no significant side effects or symptoms. She states she still not able to rest or sleep during the night. Anxiety: Patient complains of evaluation of anxiety disorder, panic attacks and sleep disturbance. She has the following anxiety symptoms: chest pain, difficulty concentrating, fatigue, feelings of losing control, insomnia, irritable, paresthesias. Onset of symptoms was approximately a few months ago, stable since that time. She denies current suicidal and homicidal ideation. Family history significant for anxiety and depression. Possible organic causes contributing are: endocrine/metabolic, neuro, nutritional. Risk factors: negative life event loss of son. Review of Systems   Constitutional: Positive for activity change, appetite change and fatigue. Negative for unexpected weight change. Respiratory: Positive for chest tightness.  Negative for cough, shortness of breath and wheezing. Cardiovascular: Positive for palpitations. Negative for chest pain and leg swelling. Neurological: Negative for dizziness, light-headedness and headaches. Psychiatric/Behavioral: Positive for agitation, decreased concentration and sleep disturbance. Negative for behavioral problems, self-injury and suicidal ideas. The patient is nervous/anxious. Physical Exam  Vitals signs and nursing note reviewed. Constitutional:       General: She is not in acute distress. Appearance: Normal appearance. She is well-developed, well-groomed and overweight. She is not ill-appearing or toxic-appearing. HENT:      Head: Normocephalic. Nose: Nose normal.      Mouth/Throat:      Lips: Pink. Pulmonary:      Effort: Pulmonary effort is normal. No respiratory distress. Skin:     Coloration: Skin is not cyanotic, jaundiced or pale. Neurological:      Mental Status: She is alert and oriented to person, place, and time. Psychiatric:         Attention and Perception: Attention and perception normal.         Mood and Affect: Mood is anxious and depressed. Affect is labile. Speech: Speech normal.         Behavior: Behavior normal. Behavior is cooperative. Thought Content: Thought content normal. Thought content does not include homicidal or suicidal ideation. Thought content does not include homicidal or suicidal plan. Cognition and Memory: Cognition and memory normal.         Judgment: Judgment normal.           An electronic signature was used to authenticate this note. --SLOAN Pandey - CNP     This note is created with the assistance of a speech-recognition program. While intending to generate a document that actually reflects the content of the visit, no guarantees can be provided that every mistake has been identified and corrected by editing.

## 2021-01-06 PROBLEM — F43.0 PANIC ATTACK DUE TO EXCEPTIONAL STRESS: Status: ACTIVE | Noted: 2021-01-06

## 2021-01-06 PROBLEM — F41.0 PANIC ATTACK DUE TO EXCEPTIONAL STRESS: Status: ACTIVE | Noted: 2021-01-06

## 2021-01-06 ASSESSMENT — ENCOUNTER SYMPTOMS
SHORTNESS OF BREATH: 0
COUGH: 0
WHEEZING: 0
CHEST TIGHTNESS: 1

## 2021-01-08 NOTE — PROGRESS NOTES
ADULT BEHAVIORAL HEALTH ASSESSMENT  Paulie YossiNICK  Licensed Independent      Visit Date: 12/22/2020   Time of appointment: 10:40   Time spent with Patient: 60 minutes. This is patient's first appointment. Reason for Consult:  Depression     PCP: SLOAN Klein CNP      Pt and/or guardian was educated on the model of service to include a short-term intervention focused approach and as well as the limits of confidentiality (i.e. abuse reporting, suicide intervention, etc.). Pt and/or guardian indicated understanding. Pt and/or guardian provided informed consent for the behavioral health program.  Visit completed via audio over the telephone and consent for virtual visit was given. .   Patient Location: privately in pt home, [facility]  Provider Location: home office, [facility]  Ray Ovalle is a 56 y.o. female who presents for new evaluation and treatment of depression, sleeping problems and grief. She reports the following symptoms: depressed mood, decreased sleep (says has had insomnia for many years), fatigue/lack of energy and lack of motivation. Symptoms are causing impairment in her activities of daily living. Onset of symptoms was approximately several years ago. Symptoms occur most days of the week and have been gradually worsening since onset. Stressors contributing to the presenting problem include: recent sudden death of her son,  is on dialysis and she is his primary caretaker, elderly mother's primary caretaker as well    Ray Ovalle reports that her main concern and focus for the referral to [facility] is to focus on helping improve sleep and a daily schedule to accomplish tasks. CURRENT MEDICATIONS    Current Outpatient Medications:     ALPRAZolam (XANAX) 1 MG tablet, Take 1 tablet by mouth 2 times daily as needed for Anxiety for up to 30 doses. , Disp: 30 tablet, Rfl: 0 OTHER SUBSTANCES: She reports no history of drug use. MENTAL STATUS EXAM  Affective and emotional state appeared unable to assess affect due to phone visit. Suicidal ideation was denied. Homicidal ideation was denied. Hygiene was unable to assess due to phone visit  Dress was unable to assess due to phone visit  Behavior was Unable to assess due to phone visit  Attitude was Cooperative. Eye-contact was unable to assess due to phone visit. Speech is described as normal rate, normal volume and well articulated  Thought processes were logical without evidence of delusions, hallucinations, obsessions, or ming  Pt was oriented to person, place, time, and general circumstances with recent memory:  good and remote memory:  good. Insight is estimated to be good AEB a good  understanding of cyclical maladaptive patterns, and the ability to use insight to inform behavior change. Judgment was estimated to be good    PHQ Scores 2/8/2019 7/7/2017 1/15/2016 7/27/2015   PHQ2 Score 4 5 5 6   PHQ9 Score 9 16 14 16     Interpretation of Total Score Depression Severity: 1-4 = Minimal depression, 5-9 = Mild depression, 10-14 = Moderate depression, 15-19 = Moderately severe depression, 20-27 = Severe depression        ASSESSMENT  Nahed Raman presented to the appointment today for evaluation and treatment of symptoms of depression and grief. She is currently deemed no risk to herself or others and meets criteria for:  Major Depressive Disorder, recurrent, mild, AEB depressed most of the day, nearly every day, markedly diminished interest or pleasure in most activities, disturbance in sleep, psychomotor agitation or retardation, fatigue and low motivation. She will benefit from will benefit from brief and solution-focused consultation to address cognitive and behavioral interventions for depression symptoms. Tana Nation and/or guardian were in agreement with recommendations.     INTERVENTION orienting pt to process of brief behavioral health services and completing initial assessment of symptoms and needs, provided recommendations, sleep strategies    DIAGNOSIS  Haile Aranda was seen today for depression. Diagnoses and all orders for this visit:    Depression, major, recurrent, mild ([facility]        PLAN  Follow up in 2 weeks with [facility]  Is interactive complexity present?   No  Reason:  N/A  Additional Supporting Information:  N/A       Electronically signed by Brianna Sam on 1/8/2021 at 2:21 PM

## 2021-01-11 ENCOUNTER — VIRTUAL VISIT (OUTPATIENT)
Dept: PSYCHOLOGY | Age: 61
End: 2021-01-11
Payer: COMMERCIAL

## 2021-01-11 DIAGNOSIS — F33.0 DEPRESSION, MAJOR, RECURRENT, MILD (HCC): Primary | ICD-10-CM

## 2021-01-11 PROCEDURE — 90834 PSYTX W PT 45 MINUTES: CPT | Performed by: SOCIAL WORKER

## 2021-01-11 NOTE — PROGRESS NOTES
BEHAVIORAL HEALTH FOLLOW UP  NICK Cuadra  Behavioral Health Consultant      Visit Date: 1/11/2021   Time of appointment:  12:04   Time spent with Patient: 38 minutes. This is patient's second appointment. Pt and/or guardian was educated on the model of service to include a short-term intervention focused approach and as well as the limits of confidentiality (i.e. abuse reporting, suicide intervention, etc.). Pt and/or guardian indicated understanding. Pt and/or guardian provided informed consent for the behavioral health program.  Visit completed via audio over the telephone and consent for virtual visit was given. .   Patient Location: privately in pt home, [facility]  Provider Location: home office, [facility]    PCP: SLOAN Raymundo CNP      Reason for Consult: Follow-up       ISAI Cartagena is a 56 y.o. female who presents for follow up of depression, sleeping problems and grief. She continues to endorse frequently feeling stressed or overwhelmed, difficulty with sleep, crying spells, low mood and poor energy or motivation. Pt reports some improvement with rest with medication. Pt otherwise continues to not sleep well at night and take naps throughout the day. Pt discusses difficulty getting tasks done and feeling overwhelmed. Brings up grief over son. OBJECTIVE  Affective and emotional state appeared unable to assess affect due to phone visit. Suicidal thoughts or behaviors not present  Homicidal thoughts or behaviors not present  Hygiene was unable to assess due to phone visit  Dress was unable to assess due to phone visit  Behavior was Calm and engaged and Unable to assess due to phone visit  Attitude was Cooperative. Eye-contact was unable to assess due to phone visit.   Speech is described as normal rate, normal volume and well articulated  Thought processes were logical without evidence of delusions, hallucinations, obsessions, or ming Pt was oriented to person, place, time, and general circumstances with recent memory:  good and remote memory:  good. Insight and judgment are estimated to be good     PHQ Scores 2/8/2019 7/7/2017 1/15/2016 7/27/2015   PHQ2 Score 4 5 5 6   PHQ9 Score 9 16 14 16     Interpretation of Total Score Depression Severity: 1-4 = Minimal depression, 5-9 = Mild depression, 10-14 = Moderate depression, 15-19 = Moderately severe depression, 20-27 = Severe depression    No flowsheet data found. Interpretation of SHUN-7 score: 5-9 = mile anxiety, 10-14 - moderate anxiety, 15+ = severe anxiety. Recommend referral to behavioral health for scores 10 or greater. ASSESSMENT  Otilia Bloom presented to the appointment today for follow up and treatment of depression. She is currently deemed no risk to self or others. The main focus or themes of the visit today included reduction in depression and sadness, addressing sleep disturbance, work toward changes in target behavior (motivation to complete needed tasks at home) and working through grief. Debbie Cartagena is making progress with current treatment. She would benefit from further behavioral health consultation to address depression, grief. Debbie Cartagena was in agreement with recommendations. DIAGNOSIS  Debbie Cartagena was seen today for follow-up. Diagnoses and all orders for this visit:    Depression, major, recurrent, mild ([facility]        INTERVENTION  Therapeutic strategies included encouragement of expression of emotion, development of self-care mindset and commitment to self-care behaviors, building awareness, evaluation and challenging of problematic thinking patterns and developing behavioral activation plans. PLAN  Follow up in 2 weeks with [facility]  Is interactive complexity present?  No  Reason:  N/A  Additional Supporting Information:  N/A       Electronically signed by Jeanette Membreno on 1/11/2021 at 3:30 PM

## 2021-01-19 DIAGNOSIS — F41.1 GAD (GENERALIZED ANXIETY DISORDER): ICD-10-CM

## 2021-01-19 RX ORDER — ALPRAZOLAM 1 MG/1
1 TABLET ORAL 2 TIMES DAILY PRN
Qty: 30 TABLET | Refills: 0 | Status: SHIPPED | OUTPATIENT
Start: 2021-01-19 | End: 2021-02-25 | Stop reason: SDUPTHER

## 2021-01-22 DIAGNOSIS — F33.0 DEPRESSION, MAJOR, RECURRENT, MILD (HCC): Chronic | ICD-10-CM

## 2021-01-22 DIAGNOSIS — F41.1 GAD (GENERALIZED ANXIETY DISORDER): ICD-10-CM

## 2021-01-22 NOTE — TELEPHONE ENCOUNTER
Lov: 1/5/21  Lrf: 7/22/20  Na: 0  Health Maintenance   Topic Date Due    Shingles Vaccine (1 of 2) 06/02/2008    Flu vaccine (1) 09/01/2020    Potassium monitoring  03/30/2021    Creatinine monitoring  03/30/2021    Breast cancer screen  05/24/2021    Cervical cancer screen  07/07/2022    Colon cancer screen colonoscopy  04/26/2023    Lipid screen  09/23/2024    DTaP/Tdap/Td vaccine (2 - Td) 07/22/2030    Hepatitis C screen  Completed    HIV screen  Completed    Hepatitis A vaccine  Aged Out    Hepatitis B vaccine  Aged Out    Hib vaccine  Aged Out    Meningococcal (ACWY) vaccine  Aged Out    Pneumococcal 0-64 years Vaccine  Aged Out             (applicable per patient's age: Cancer Screenings, Depression Screening, Fall Risk Screening, Immunizations)    LDL Cholesterol (mg/dL)   Date Value   09/23/2019 93     LDL Calculated (mg/dL)   Date Value   12/16/2016 131     AST (U/L)   Date Value   09/23/2019 11     ALT (U/L)   Date Value   09/23/2019 8     BUN (mg/dL)   Date Value   03/30/2020 11      (goal A1C is < 7)   (goal LDL is <100) need 30-50% reduction from baseline     BP Readings from Last 3 Encounters:   12/21/20 116/84   10/15/20 126/74   07/22/20 112/80    (goal /80)      All Future Testing planned in [facility]:  Lab Frequency Next Occurrence   Urinalysis, Micro Once 10/15/2020   Protein / Creatinine Ratio, Urine Once 10/15/2020       Next Visit Date:  Future Appointments   Date Time Provider Anna Chu   1/25/2021 12:00 PM ILIR Garcia psyc [facility]            Patient Active Problem List:     Essential hypertension     Depression, major, recurrent, mild (HCC)     Hematuria     Collagenous colitis     Lupus ([facility]     SHUN (generalized anxiety disorder)     Chronic pain of both knees     Panic attack due to exceptional stress

## 2021-01-23 RX ORDER — PAROXETINE 30 MG/1
30 TABLET, FILM COATED ORAL DAILY
Qty: 30 TABLET | Refills: 2 | Status: SHIPPED | OUTPATIENT
Start: 2021-01-23 | End: 2021-05-03

## 2021-01-25 DIAGNOSIS — F33.0 DEPRESSION, MAJOR, RECURRENT, MILD (HCC): Chronic | ICD-10-CM

## 2021-01-25 DIAGNOSIS — F41.1 GAD (GENERALIZED ANXIETY DISORDER): ICD-10-CM

## 2021-01-25 DIAGNOSIS — G47.09 OTHER INSOMNIA: ICD-10-CM

## 2021-01-25 RX ORDER — PAROXETINE 30 MG/1
30 TABLET, FILM COATED ORAL DAILY
Qty: 30 TABLET | Refills: 2 | Status: CANCELLED | OUTPATIENT
Start: 2021-01-25 | End: 2021-04-25

## 2021-01-26 RX ORDER — QUETIAPINE FUMARATE 25 MG/1
25 TABLET, FILM COATED ORAL NIGHTLY
Qty: 30 TABLET | Refills: 2 | Status: SHIPPED | OUTPATIENT
Start: 2021-01-26 | End: 2021-04-19 | Stop reason: SDUPTHER

## 2021-01-26 NOTE — TELEPHONE ENCOUNTER
LOV 1/5/21  RTO If symptoms worsen  LRF 12/21/20    Health Maintenance   Topic Date Due    Shingles Vaccine (1 of 2) 06/02/2008    Flu vaccine (1) 09/01/2020    Potassium monitoring  03/30/2021    Creatinine monitoring  03/30/2021    Breast cancer screen  05/24/2021    Cervical cancer screen  07/07/2022    Colon cancer screen colonoscopy  04/26/2023    Lipid screen  09/23/2024    DTaP/Tdap/Td vaccine (2 - Td) 07/22/2030    Hepatitis C screen  Completed    HIV screen  Completed    Hepatitis A vaccine  Aged Out    Hepatitis B vaccine  Aged Out    Hib vaccine  Aged Out    Meningococcal (ACWY) vaccine  Aged Out    Pneumococcal 0-64 years Vaccine  Aged Out             (applicable per patient's age: Cancer Screenings, Depression Screening, Fall Risk Screening, Immunizations)    LDL Cholesterol (mg/dL)   Date Value   09/23/2019 93     LDL Calculated (mg/dL)   Date Value   12/16/2016 131     AST (U/L)   Date Value   09/23/2019 11     ALT (U/L)   Date Value   09/23/2019 8     BUN (mg/dL)   Date Value   03/30/2020 11      (goal A1C is < 7)   (goal LDL is <100) need 30-50% reduction from baseline     BP Readings from Last 3 Encounters:   12/21/20 116/84   10/15/20 126/74   07/22/20 112/80    (goal /80)      All Future Testing planned in [facility]:  Lab Frequency Next Occurrence   Urinalysis, Micro Once 10/15/2020   Protein / Creatinine Ratio, Urine Once 10/15/2020       Next Visit Date:  No future appointments.          Patient Active Problem List:     Essential hypertension     Depression, major, recurrent, mild (HCC)     Hematuria     Collagenous colitis     Lupus (HCC)     SHUN (generalized anxiety disorder)     Chronic pain of both knees     Panic attack due to exceptional stress

## 2021-02-25 DIAGNOSIS — F41.1 GAD (GENERALIZED ANXIETY DISORDER): ICD-10-CM

## 2021-02-25 RX ORDER — ALPRAZOLAM 1 MG/1
1 TABLET ORAL 2 TIMES DAILY PRN
Qty: 30 TABLET | Refills: 0 | Status: SHIPPED | OUTPATIENT
Start: 2021-02-25 | End: 2021-04-19 | Stop reason: SDUPTHER

## 2021-02-25 NOTE — TELEPHONE ENCOUNTER
LOV 1/5/21  RTO If symptoms worsen   LRF 1/19/21  CSM 12/11/19    Health Maintenance   Topic Date Due    Shingles Vaccine (1 of 2) 06/02/2008    Flu vaccine (1) 09/01/2020    Potassium monitoring  03/30/2021    Creatinine monitoring  03/30/2021    Breast cancer screen  05/24/2021    Cervical cancer screen  07/07/2022    Colon cancer screen colonoscopy  04/26/2023    Lipid screen  09/23/2024    DTaP/Tdap/Td vaccine (2 - Td) 07/22/2030    Hepatitis C screen  Completed    HIV screen  Completed    Hepatitis A vaccine  Aged Out    Hepatitis B vaccine  Aged Out    Hib vaccine  Aged Out    Meningococcal (ACWY) vaccine  Aged Out    Pneumococcal 0-64 years Vaccine  Aged Out             (applicable per patient's age: Cancer Screenings, Depression Screening, Fall Risk Screening, Immunizations)    LDL Cholesterol (mg/dL)   Date Value   09/23/2019 93     LDL Calculated (mg/dL)   Date Value   12/16/2016 131     AST (U/L)   Date Value   09/23/2019 11     ALT (U/L)   Date Value   09/23/2019 8     BUN (mg/dL)   Date Value   03/30/2020 11      (goal A1C is < 7)   (goal LDL is <100) need 30-50% reduction from baseline     BP Readings from Last 3 Encounters:   12/21/20 116/84   10/15/20 126/74   07/22/20 112/80    (goal /80)      All Future Testing planned in [facility]:  Lab Frequency Next Occurrence   Urinalysis, Micro Once 10/15/2021   Protein / Creatinine Ratio, Urine Once 10/15/2021       Next Visit Date:  No future appointments.          Patient Active Problem List:     Essential hypertension     Depression, major, recurrent, mild (HCC)     Hematuria     Collagenous colitis     Lupus (HCC)     SHUN (generalized anxiety disorder)     Chronic pain of both knees     Panic attack due to exceptional stress

## 2021-04-19 DIAGNOSIS — F41.1 GAD (GENERALIZED ANXIETY DISORDER): ICD-10-CM

## 2021-04-19 DIAGNOSIS — G47.09 OTHER INSOMNIA: ICD-10-CM

## 2021-04-19 RX ORDER — QUETIAPINE FUMARATE 25 MG/1
25 TABLET, FILM COATED ORAL NIGHTLY
Qty: 30 TABLET | Refills: 2 | Status: SHIPPED
Start: 2021-04-19 | End: 2021-05-24 | Stop reason: DRUGHIGH

## 2021-04-19 RX ORDER — ALPRAZOLAM 1 MG/1
1 TABLET ORAL 2 TIMES DAILY PRN
Qty: 30 TABLET | Refills: 0 | Status: SHIPPED | OUTPATIENT
Start: 2021-04-19 | End: 2021-07-08 | Stop reason: SDUPTHER

## 2021-04-19 NOTE — TELEPHONE ENCOUNTER
LOV 1/5/21  RTO If symptoms worsen  LRF 1/26/21 and 2/25/21  CSM 12/11/19    Health Maintenance   Topic Date Due    COVID-19 Vaccine (1) Never done    Shingles Vaccine (1 of 2) Never done    Potassium monitoring  03/30/2021    Creatinine monitoring  03/30/2021    Breast cancer screen  05/24/2021    Flu vaccine (Season Ended) 09/01/2021    Cervical cancer screen  07/07/2022    Colon cancer screen colonoscopy  04/26/2023    Lipid screen  09/23/2024    DTaP/Tdap/Td vaccine (2 - Td) 07/22/2030    Hepatitis C screen  Completed    HIV screen  Completed    Hepatitis A vaccine  Aged Out    Hepatitis B vaccine  Aged Out    Hib vaccine  Aged Out    Meningococcal (ACWY) vaccine  Aged Out    Pneumococcal 0-64 years Vaccine  Aged Out             (applicable per patient's age: Cancer Screenings, Depression Screening, Fall Risk Screening, Immunizations)    LDL Cholesterol (mg/dL)   Date Value   09/23/2019 93     LDL Calculated (mg/dL)   Date Value   12/16/2016 131     AST (U/L)   Date Value   09/23/2019 11     ALT (U/L)   Date Value   09/23/2019 8     BUN (mg/dL)   Date Value   03/30/2020 11      (goal A1C is < 7)   (goal LDL is <100) need 30-50% reduction from baseline     BP Readings from Last 3 Encounters:   12/21/20 116/84   10/15/20 126/74   07/22/20 112/80    (goal /80)      All Future Testing planned in [facility]:  Lab Frequency Next Occurrence   ROBERTH DIGITAL SCREEN W OR WO CAD BILATERAL Once 07/22/2021   Urinalysis, Micro Once 10/15/2021   Protein / Creatinine Ratio, Urine Once 10/15/2021       Next Visit Date:  No future appointments.          Patient Active Problem List:     Essential hypertension     Depression, major, recurrent, mild (HCC)     Hematuria     Collagenous colitis     Lupus (HCC)     SHUN (generalized anxiety disorder)     Chronic pain of both knees     Panic attack due to exceptional stress

## 2021-05-03 DIAGNOSIS — F33.0 DEPRESSION, MAJOR, RECURRENT, MILD (HCC): Chronic | ICD-10-CM

## 2021-05-03 DIAGNOSIS — F41.1 GAD (GENERALIZED ANXIETY DISORDER): ICD-10-CM

## 2021-05-03 RX ORDER — PAROXETINE 30 MG/1
30 TABLET, FILM COATED ORAL DAILY
Qty: 90 TABLET | Refills: 1 | Status: SHIPPED | OUTPATIENT
Start: 2021-05-03 | End: 2021-12-09

## 2021-05-03 NOTE — TELEPHONE ENCOUNTER
Last visit: 1/5/2021  Last Med refill: 1/23/21  Does patient have enough medication for 72 hours: No:     Next Visit Date:  No future appointments.     Health Maintenance   Topic Date Due    COVID-19 Vaccine (1) Never done    Shingles Vaccine (1 of 2) Never done    Potassium monitoring  03/30/2021    Creatinine monitoring  03/30/2021    Breast cancer screen  05/24/2021    Flu vaccine (Season Ended) 09/01/2021    Cervical cancer screen  07/07/2022    Colon cancer screen colonoscopy  04/26/2023    Lipid screen  09/23/2024    DTaP/Tdap/Td vaccine (2 - Td) 07/22/2030    Hepatitis C screen  Completed    HIV screen  Completed    Hepatitis A vaccine  Aged Out    Hepatitis B vaccine  Aged Out    Hib vaccine  Aged Out    Meningococcal (ACWY) vaccine  Aged Out    Pneumococcal 0-64 years Vaccine  Aged Out       No results found for: LABA1C          ( goal A1C is < 7)   No results found for: LABMICR  LDL Cholesterol (mg/dL)   Date Value   09/23/2019 93   02/08/2019 131 (H)     LDL Calculated (mg/dL)   Date Value   12/16/2016 131       (goal LDL is <100)   AST (U/L)   Date Value   09/23/2019 11     ALT (U/L)   Date Value   09/23/2019 8     BUN (mg/dL)   Date Value   03/30/2020 11     BP Readings from Last 3 Encounters:   12/21/20 116/84   10/15/20 126/74   07/22/20 112/80          (goal 120/80)    All Future Testing planned in [facility]  Lab Frequency Next Occurrence   ROBERTH DIGITAL SCREEN W OR WO CAD BILATERAL Once 07/22/2021   Urinalysis, Micro Once 10/15/2021   Protein / Creatinine Ratio, Urine Once 10/15/2021               Patient Active Problem List:     Essential hypertension     Depression, major, recurrent, mild (HCC)     Hematuria     Collagenous colitis     Lupus ([facility]     SHUN (generalized anxiety disorder)     Chronic pain of both knees     Panic attack due to exceptional stress

## 2021-05-07 ENCOUNTER — OFFICE VISIT (OUTPATIENT)
Dept: PRIMARY CARE CLINIC | Age: 61
End: 2021-05-07
Payer: COMMERCIAL

## 2021-05-07 VITALS
DIASTOLIC BLOOD PRESSURE: 87 MMHG | OXYGEN SATURATION: 99 % | BODY MASS INDEX: 30.47 KG/M2 | TEMPERATURE: 98 F | HEART RATE: 61 BPM | SYSTOLIC BLOOD PRESSURE: 128 MMHG | WEIGHT: 156 LBS

## 2021-05-07 DIAGNOSIS — M54.50 ACUTE BILATERAL LOW BACK PAIN, UNSPECIFIED WHETHER SCIATICA PRESENT: Primary | ICD-10-CM

## 2021-05-07 PROCEDURE — 1036F TOBACCO NON-USER: CPT | Performed by: FAMILY MEDICINE

## 2021-05-07 PROCEDURE — G8417 CALC BMI ABV UP PARAM F/U: HCPCS | Performed by: FAMILY MEDICINE

## 2021-05-07 PROCEDURE — 99214 OFFICE O/P EST MOD 30 MIN: CPT | Performed by: FAMILY MEDICINE

## 2021-05-07 PROCEDURE — G8427 DOCREV CUR MEDS BY ELIG CLIN: HCPCS | Performed by: FAMILY MEDICINE

## 2021-05-07 PROCEDURE — 96372 THER/PROPH/DIAG INJ SC/IM: CPT | Performed by: FAMILY MEDICINE

## 2021-05-07 PROCEDURE — 3017F COLORECTAL CA SCREEN DOC REV: CPT | Performed by: FAMILY MEDICINE

## 2021-05-07 RX ORDER — KETOROLAC TROMETHAMINE 30 MG/ML
60 INJECTION, SOLUTION INTRAMUSCULAR; INTRAVENOUS ONCE
Status: COMPLETED | OUTPATIENT
Start: 2021-05-07 | End: 2021-05-07

## 2021-05-07 RX ADMIN — KETOROLAC TROMETHAMINE 60 MG: 30 INJECTION, SOLUTION INTRAMUSCULAR; INTRAVENOUS at 13:44

## 2021-05-07 NOTE — PROGRESS NOTES
Subjective:  Ninfa Damon presents for   Chief Complaint   Patient presents with    Back Pain     Lowback/buttock pain x 5days. She states that she had bursitis in that area before. Cant recall any incident that would of caused this. Had a hip replacement in 2017, and sometimes it bothers her, but last night she couldnt turn in bed or anything. Then today it is worse and she can hardly walk or move. Hasnt taken anything for pain. Is slowly getting  Worse. Denies any trauma or other reason why this flared. Has know oa. Always has some discomfort in this same area. She has taken nothing for this. She does have mobic at home    No gi/gu sx. No radiculopathy    Patient Active Problem List   Diagnosis    Essential hypertension    Depression, major, recurrent, mild (HCC)    Hematuria    Collagenous colitis    Lupus ([facility]    SHUN (generalized anxiety disorder)    Chronic pain of both knees    Panic attack due to exceptional stress     ·     Objective:  Physical Exam   Vitals:   Vitals:    05/07/21 1313   BP: 128/87   Site: Left Upper Arm   Position: Sitting   Cuff Size: Medium Adult   Pulse: 61   Temp: 98 °F (36.7 °C)   SpO2: 99%   Weight: 156 lb (70.8 kg)     Wt Readings from Last 3 Encounters:   05/07/21 156 lb (70.8 kg)   12/21/20 156 lb 9.6 oz (71 kg)   10/15/20 160 lb (72.6 kg)     Ht Readings from Last 3 Encounters:   10/15/20 5' (1.524 m)   03/30/20 5' (1.524 m)   07/13/19 5' (1.524 m)     Body mass index is 30.47 kg/m². Constitutional: She is oriented to person, place, and time. She appears well-developed and well-nourished and in no acute distress. Answers all my questions appropriately. Head: Normocephalic and atraumatic. Walks very carefully and slowly. Unable to bend over and touch her toes. The whole time she has her hand over her left SI joint    Can twist her torso with slight increase in discomfort.     Has palpable discomfort only over her left SI joint    dtrs are

## 2021-05-24 ENCOUNTER — OFFICE VISIT (OUTPATIENT)
Dept: FAMILY MEDICINE CLINIC | Age: 61
End: 2021-05-24
Payer: COMMERCIAL

## 2021-05-24 ENCOUNTER — HOSPITAL ENCOUNTER (OUTPATIENT)
Age: 61
Setting detail: SPECIMEN
Discharge: HOME | End: 2021-05-24
Payer: COMMERCIAL

## 2021-05-24 VITALS
DIASTOLIC BLOOD PRESSURE: 83 MMHG | SYSTOLIC BLOOD PRESSURE: 117 MMHG | BODY MASS INDEX: 29.64 KG/M2 | HEART RATE: 80 BPM | OXYGEN SATURATION: 95 % | HEIGHT: 61 IN | WEIGHT: 157 LBS

## 2021-05-24 DIAGNOSIS — N30.21 HEMATURIA DUE TO CHRONIC CYSTITIS: ICD-10-CM

## 2021-05-24 DIAGNOSIS — R79.89 ELEVATED TSH: Primary | ICD-10-CM

## 2021-05-24 DIAGNOSIS — F33.0 DEPRESSION, MAJOR, RECURRENT, MILD (HCC): ICD-10-CM

## 2021-05-24 DIAGNOSIS — M32.9 LUPUS (HCC): ICD-10-CM

## 2021-05-24 DIAGNOSIS — I10 ESSENTIAL HYPERTENSION: ICD-10-CM

## 2021-05-24 DIAGNOSIS — F41.1 GAD (GENERALIZED ANXIETY DISORDER): ICD-10-CM

## 2021-05-24 DIAGNOSIS — F41.1 GAD (GENERALIZED ANXIETY DISORDER): Primary | ICD-10-CM

## 2021-05-24 LAB
-: NORMAL
ALBUMIN SERPL-MCNC: 4.2 G/DL (ref 3.5–5.2)
ALBUMIN/GLOBULIN RATIO: 1.3 (ref 1–2.5)
ALP BLD-CCNC: 75 U/L (ref 35–104)
ALT SERPL-CCNC: 13 U/L (ref 5–33)
AMORPHOUS: NORMAL
ANION GAP SERPL CALCULATED.3IONS-SCNC: 14 MMOL/L (ref 9–17)
AST SERPL-CCNC: 16 U/L
BACTERIA: NORMAL
BILIRUB SERPL-MCNC: 0.49 MG/DL (ref 0.3–1.2)
BILIRUBIN URINE: NEGATIVE
BUN BLDV-MCNC: 10 MG/DL (ref 8–23)
BUN/CREAT BLD: NORMAL (ref 9–20)
C-REACTIVE PROTEIN: <3 MG/L (ref 0–5)
CALCIUM SERPL-MCNC: 9.1 MG/DL (ref 8.6–10.4)
CASTS UA: NORMAL /LPF (ref 0–8)
CHLORIDE BLD-SCNC: 105 MMOL/L (ref 98–107)
CHOLESTEROL/HDL RATIO: 3.7
CHOLESTEROL: 212 MG/DL
CO2: 23 MMOL/L (ref 20–31)
COLOR: YELLOW
COMMENT UA: ABNORMAL
CREAT SERPL-MCNC: 0.9 MG/DL (ref 0.5–0.9)
CRYSTALS, UA: NORMAL /HPF
EPITHELIAL CELLS UA: NORMAL /HPF (ref 0–5)
FERRITIN: 56 UG/L (ref 13–150)
FOLATE: 9.8 NG/ML
GFR AFRICAN AMERICAN: >60 ML/MIN
GFR NON-AFRICAN AMERICAN: >60 ML/MIN
GFR SERPL CREATININE-BSD FRML MDRD: NORMAL ML/MIN/{1.73_M2}
GFR SERPL CREATININE-BSD FRML MDRD: NORMAL ML/MIN/{1.73_M2}
GLUCOSE FASTING: 76 MG/DL (ref 70–99)
GLUCOSE URINE: NEGATIVE
HCT VFR BLD CALC: 40.7 % (ref 36.3–47.1)
HDLC SERPL-MCNC: 58 MG/DL
HEMOGLOBIN: 12.7 G/DL (ref 11.9–15.1)
KETONES, URINE: NEGATIVE
LDL CHOLESTEROL: 139 MG/DL (ref 0–130)
LEUKOCYTE ESTERASE, URINE: ABNORMAL
MCH RBC QN AUTO: 31.3 PG (ref 25.2–33.5)
MCHC RBC AUTO-ENTMCNC: 31.2 G/DL (ref 28.4–34.8)
MCV RBC AUTO: 100.2 FL (ref 82.6–102.9)
MUCUS: NORMAL
NITRITE, URINE: NEGATIVE
NRBC AUTOMATED: 0 PER 100 WBC
OTHER OBSERVATIONS UA: NORMAL
PDW BLD-RTO: 14.3 % (ref 11.8–14.4)
PH UA: 6.5 (ref 5–8)
PLATELET # BLD: 331 K/UL (ref 138–453)
PMV BLD AUTO: 12.4 FL (ref 8.1–13.5)
POTASSIUM SERPL-SCNC: 4.1 MMOL/L (ref 3.7–5.3)
PROTEIN UA: NEGATIVE
RBC # BLD: 4.06 M/UL (ref 3.95–5.11)
RBC UA: NORMAL /HPF (ref 0–4)
RENAL EPITHELIAL, UA: NORMAL /HPF
RHEUMATOID FACTOR: <10 IU/ML
SEDIMENTATION RATE, ERYTHROCYTE: 47 MM (ref 0–30)
SODIUM BLD-SCNC: 142 MMOL/L (ref 135–144)
SPECIFIC GRAVITY UA: 1.02 (ref 1–1.03)
THYROXINE, FREE: 1.03 NG/DL (ref 0.93–1.7)
TOTAL PROTEIN: 7.5 G/DL (ref 6.4–8.3)
TRICHOMONAS: NORMAL
TRIGL SERPL-MCNC: 74 MG/DL
TSH SERPL DL<=0.05 MIU/L-ACNC: 5.95 MIU/L (ref 0.3–5)
TURBIDITY: CLEAR
URINE HGB: NEGATIVE
UROBILINOGEN, URINE: NORMAL
VITAMIN B-12: 291 PG/ML (ref 232–1245)
VLDLC SERPL CALC-MCNC: ABNORMAL MG/DL (ref 1–30)
WBC # BLD: 5.1 K/UL (ref 3.5–11.3)
WBC UA: NORMAL /HPF (ref 0–5)
YEAST: NORMAL

## 2021-05-24 PROCEDURE — 99214 OFFICE O/P EST MOD 30 MIN: CPT | Performed by: NURSE PRACTITIONER

## 2021-05-24 PROCEDURE — G8427 DOCREV CUR MEDS BY ELIG CLIN: HCPCS | Performed by: NURSE PRACTITIONER

## 2021-05-24 PROCEDURE — 1036F TOBACCO NON-USER: CPT | Performed by: NURSE PRACTITIONER

## 2021-05-24 PROCEDURE — G8417 CALC BMI ABV UP PARAM F/U: HCPCS | Performed by: NURSE PRACTITIONER

## 2021-05-24 PROCEDURE — 3017F COLORECTAL CA SCREEN DOC REV: CPT | Performed by: NURSE PRACTITIONER

## 2021-05-24 RX ORDER — QUETIAPINE FUMARATE 50 MG/1
50 TABLET, FILM COATED ORAL NIGHTLY
Qty: 30 TABLET | Refills: 2 | Status: SHIPPED | OUTPATIENT
Start: 2021-05-24 | End: 2021-08-17 | Stop reason: SDUPTHER

## 2021-05-24 ASSESSMENT — ENCOUNTER SYMPTOMS
BACK PAIN: 0
TROUBLE SWALLOWING: 0
CONSTIPATION: 0
DIARRHEA: 0
ABDOMINAL PAIN: 0
CHEST TIGHTNESS: 0
SHORTNESS OF BREATH: 0
BLOOD IN STOOL: 0
RHINORRHEA: 0
SINUS PRESSURE: 0
WHEEZING: 0
SORE THROAT: 0
COUGH: 0
NAUSEA: 0

## 2021-05-24 ASSESSMENT — VISUAL ACUITY: OU: 1

## 2021-05-24 NOTE — PROGRESS NOTES
are 2+ on the right side and 2+ on the left side. Heart sounds: Normal heart sounds, S1 normal and S2 normal. No murmur heard. Pulmonary:      Effort: Pulmonary effort is normal. No accessory muscle usage, prolonged expiration or respiratory distress. Breath sounds: Normal breath sounds and air entry. Abdominal:      General: There is no distension. Palpations: Abdomen is soft. Tenderness: There is no abdominal tenderness. Musculoskeletal:      Cervical back: Normal range of motion. No torticollis. No pain with movement. Right lower leg: No edema. Left lower leg: No edema. Lymphadenopathy:      Cervical: No cervical adenopathy. Skin:     General: Skin is warm and dry. Coloration: Skin is not ashen, cyanotic, jaundiced or pale. Neurological:      General: No focal deficit present. Mental Status: She is alert and oriented to person, place, and time. Motor: Tremor (hands, minimal) present. Gait: Gait is intact. Psychiatric:         Attention and Perception: Attention and perception normal.         Mood and Affect: Mood and affect normal.         Speech: Speech normal.         Behavior: Behavior normal. Behavior is cooperative. Thought Content: Thought content normal. Thought content does not include suicidal ideation. Thought content does not include suicidal plan. Cognition and Memory: Cognition and memory normal.         Judgment: Judgment normal.            On this date 5/24/2021 I have spent 30+ minutes reviewing previous notes, test results and face to face with the patient discussing the diagnosis and importance of compliance with the treatment plan as well as documenting on the day of the visit. An electronic signature was used to authenticate this note.     --SLOAN Hidalgo - CNP

## 2021-05-25 LAB
IRON SATURATION: 24 % (ref 20–55)
IRON: 70 UG/DL (ref 37–145)
TOTAL IRON BINDING CAPACITY: 288 UG/DL (ref 250–450)
UNSATURATED IRON BINDING CAPACITY: 218 UG/DL (ref 112–347)

## 2021-05-26 LAB
THYROGLOBULIN AB: <12 IU/ML (ref 0–40)
THYROID PEROXIDASE (TPO) AB: <4 IU/ML (ref 0–25)
VITAMIN D 25-HYDROXY: 28.5 NG/ML (ref 30–100)

## 2021-06-04 DIAGNOSIS — R76.8 ELEVATED ANTINUCLEAR ANTIBODY (ANA) LEVEL: Primary | ICD-10-CM

## 2021-06-04 LAB
ANTI DNA DOUBLE STRANDED: 5.5 IU/ML
ANTI-NUCLEAR ANTIBODY (ANA): POSITIVE
ENA ANTIBODIES SCREEN: 11 U/ML

## 2021-06-05 NOTE — RESULT ENCOUNTER NOTE
Response sent in [address] Please ensure patient is aware of results/response. Please fax referral with recent labs.

## 2021-06-08 LAB
ANTI JO-1 IGG: <0.4 U/ML
ANTI SSA: 109 U/ML
ANTI SSB: <0.3 U/ML
ANTI-CENTROMERE: <0.4 U/ML
ANTI-RNP70: <0.3 U/ML
ANTI-SCLERODERMA: <0.6 U/ML
ANTI-SMITH: 2 U/ML
ANTI-U1RNP: 0.8 U/ML

## 2021-07-08 DIAGNOSIS — F41.1 GAD (GENERALIZED ANXIETY DISORDER): ICD-10-CM

## 2021-07-08 RX ORDER — ALPRAZOLAM 1 MG/1
1 TABLET ORAL 2 TIMES DAILY PRN
Qty: 30 TABLET | Refills: 0 | Status: SHIPPED | OUTPATIENT
Start: 2021-07-08 | End: 2021-10-05 | Stop reason: SDUPTHER

## 2021-07-08 NOTE — TELEPHONE ENCOUNTER
----- Message from Becca Kraft sent at 7/7/2021  4:37 PM EDT -----  Subject: Refill Request    QUESTIONS  Name of Medication? ALPRAZolam (XANAX) 1 MG tablet  Patient-reported dosage and instructions? Take 1 tablet by mouth 2 times   daily as needed for Anxiety for up to 30 doses. 1MG  How many days do you have left? 1  Preferred Pharmacy? [id number] [facility] phone number (if available)? [phone]  ---------------------------------------------------------------------------  --------------  CALL BACK INFO  What is the best way for the office to contact you? OK to leave message on   voicemail, OK to respond with electronic message via [employer] portal (only   for patients who have registered [employer] account)  Preferred Call Back Phone Number?  [phone]

## 2021-07-08 NOTE — TELEPHONE ENCOUNTER
Last visit: 5/24/21  Last Med refill: 4/19/21    Next Visit Date:  Future Appointments   Date Time Provider Anna Chu   7/30/2021 11:00 AM SLOAN Sims CNP [facility] [address] Maintenance   Topic Date Due    COVID-19 Vaccine (1) Never done    Shingles Vaccine (1 of 2) Never done    Breast cancer screen  05/24/2021    Flu vaccine (1) 09/01/2021    Potassium monitoring  05/24/2022    Creatinine monitoring  05/24/2022    Cervical cancer screen  07/07/2022    Colon cancer screen colonoscopy  04/26/2023    Lipid screen  05/24/2026    DTaP/Tdap/Td vaccine (2 - Td or Tdap) 07/22/2030    Hepatitis C screen  Completed    HIV screen  Completed    Hepatitis A vaccine  Aged Out    Hepatitis B vaccine  Aged Out    Hib vaccine  Aged Out    Meningococcal (ACWY) vaccine  Aged Out    Pneumococcal 0-64 years Vaccine  Aged Out       No results found for: LABA1C          ( goal A1C is < 7)   No results found for: LABMICR  LDL Cholesterol (mg/dL)   Date Value   05/24/2021 139 (H)   09/23/2019 93     LDL Calculated (mg/dL)   Date Value   12/16/2016 131       (goal LDL is <100)   AST (U/L)   Date Value   05/24/2021 16     ALT (U/L)   Date Value   05/24/2021 13     BUN (mg/dL)   Date Value   05/24/2021 10     BP Readings from Last 3 Encounters:   05/24/21 117/83   05/07/21 128/87   12/21/20 116/84          (goal 120/80)    All Future Testing planned in [facility]  Lab Frequency Next Occurrence   ROBERTH DIGITAL SCREEN W OR WO CAD BILATERAL Once 07/22/2021   Urinalysis, Micro Once 10/15/2021   Protein / Creatinine Ratio, Urine Once 10/15/2021   Thyroid Antibodies Once 05/24/2022               Patient Active Problem List:     Essential hypertension     Depression, major, recurrent, mild (HCC)     Hematuria     Collagenous colitis     Lupus (HCC)     SHUN (generalized anxiety disorder)     Chronic pain of both knees     Panic attack due to exceptional stress

## 2021-08-17 ENCOUNTER — OFFICE VISIT (OUTPATIENT)
Dept: FAMILY MEDICINE CLINIC | Age: 61
End: 2021-08-17
Payer: COMMERCIAL

## 2021-08-17 VITALS
HEIGHT: 60 IN | DIASTOLIC BLOOD PRESSURE: 88 MMHG | BODY MASS INDEX: 30.55 KG/M2 | HEART RATE: 74 BPM | SYSTOLIC BLOOD PRESSURE: 117 MMHG | WEIGHT: 155.6 LBS | OXYGEN SATURATION: 99 %

## 2021-08-17 DIAGNOSIS — F41.1 GAD (GENERALIZED ANXIETY DISORDER): ICD-10-CM

## 2021-08-17 DIAGNOSIS — Z12.31 ENCOUNTER FOR SCREENING MAMMOGRAM FOR BREAST CANCER: ICD-10-CM

## 2021-08-17 DIAGNOSIS — M54.2 MUSCLE PAIN, CERVICAL: ICD-10-CM

## 2021-08-17 DIAGNOSIS — M81.0 OSTEOPOROSIS, UNSPECIFIED OSTEOPOROSIS TYPE, UNSPECIFIED PATHOLOGICAL FRACTURE PRESENCE: ICD-10-CM

## 2021-08-17 DIAGNOSIS — F33.0 DEPRESSION, MAJOR, RECURRENT, MILD (HCC): Primary | ICD-10-CM

## 2021-08-17 DIAGNOSIS — M62.838 MUSCLE SPASM: ICD-10-CM

## 2021-08-17 PROCEDURE — G8417 CALC BMI ABV UP PARAM F/U: HCPCS | Performed by: NURSE PRACTITIONER

## 2021-08-17 PROCEDURE — 1036F TOBACCO NON-USER: CPT | Performed by: NURSE PRACTITIONER

## 2021-08-17 PROCEDURE — G8427 DOCREV CUR MEDS BY ELIG CLIN: HCPCS | Performed by: NURSE PRACTITIONER

## 2021-08-17 PROCEDURE — 99214 OFFICE O/P EST MOD 30 MIN: CPT | Performed by: NURSE PRACTITIONER

## 2021-08-17 PROCEDURE — 3017F COLORECTAL CA SCREEN DOC REV: CPT | Performed by: NURSE PRACTITIONER

## 2021-08-17 RX ORDER — ALENDRONATE SODIUM 70 MG/1
70 TABLET ORAL
Qty: 12 TABLET | Refills: 1 | Status: SHIPPED | OUTPATIENT
Start: 2021-08-17 | End: 2021-11-20 | Stop reason: SDUPTHER

## 2021-08-17 RX ORDER — TIZANIDINE 2 MG/1
2 TABLET ORAL 3 TIMES DAILY PRN
Qty: 30 TABLET | Refills: 0 | Status: SHIPPED | OUTPATIENT
Start: 2021-08-17 | End: 2021-09-27

## 2021-08-17 RX ORDER — MELOXICAM 15 MG/1
15 TABLET ORAL DAILY
Qty: 90 TABLET | Refills: 1 | Status: SHIPPED | OUTPATIENT
Start: 2021-08-17 | End: 2022-08-16 | Stop reason: SDUPTHER

## 2021-08-17 RX ORDER — CAL/D3/MAG11/ZINC/COP/MANG/BOR 600 MG-800
1 TABLET ORAL 2 TIMES DAILY
Qty: 180 TABLET | Refills: 1 | Status: SHIPPED | OUTPATIENT
Start: 2021-08-17

## 2021-08-17 RX ORDER — ALPRAZOLAM 1 MG/1
1 TABLET ORAL NIGHTLY PRN
COMMUNITY
End: 2021-10-07 | Stop reason: SDUPTHER

## 2021-08-17 RX ORDER — QUETIAPINE FUMARATE 50 MG/1
50 TABLET, FILM COATED ORAL NIGHTLY
Qty: 90 TABLET | Refills: 1 | Status: SHIPPED
Start: 2021-08-17 | End: 2022-02-09 | Stop reason: ALTCHOICE

## 2021-08-17 RX ORDER — MELOXICAM 15 MG/1
15 TABLET ORAL PRN
COMMUNITY
End: 2021-08-17 | Stop reason: SDUPTHER

## 2021-08-17 ASSESSMENT — ENCOUNTER SYMPTOMS
ABDOMINAL PAIN: 0
CHEST TIGHTNESS: 0
SHORTNESS OF BREATH: 0
RHINORRHEA: 0
BACK PAIN: 0
WHEEZING: 0
CONSTIPATION: 0
DIARRHEA: 0
NAUSEA: 0
COUGH: 0
SINUS PRESSURE: 0
SORE THROAT: 0
BLOOD IN STOOL: 0
TROUBLE SWALLOWING: 0

## 2021-08-17 NOTE — PROGRESS NOTES
[address]  [phone]    8/17/21     Patient ID  Nereyda Campbell is a 59 y.o. female  Established patient    Chief Complaint  Nereyda Campbell presents today for Discuss Medications and Discuss Labs      ASSESSMENT  1. Depression, major, recurrent, mild (HCC)  -     QUEtiapine (SEROQUEL) 50 MG tablet; Take 1 tablet by mouth nightly, Disp-90 tablet, R-1Normal  2. SHUN (generalized anxiety disorder)  3. Muscle pain, cervical  -     meloxicam (MOBIC) 15 MG tablet; Take 1 tablet by mouth daily, Disp-90 tablet, R-1Normal  4. Muscle spasm  -     tiZANidine (ZANAFLEX) 2 MG tablet; Take 1 tablet by mouth 3 times daily as needed (muscle spasm), Disp-30 tablet, R-0Normal  5. Osteoporosis, unspecified osteoporosis type, unspecified pathological fracture presence  -     alendronate (FOSAMAX) 70 MG tablet; Take 1 tablet by mouth every 7 days for 12 days, Disp-12 tablet, R-1Normal  -     Caltrate 600+D Plus Minerals (CALTRATE) 600-800 MG-UNIT TABS tablet; Take 1 tablet by mouth 2 times daily, Disp-180 tablet, R-1Normal  6. Encounter for screening mammogram for breast cancer  -     ROBERTH DIGITAL SCREEN W OR WO CAD BILATERAL; Future       PLAN  Patient is to resume Seroquel. May take Xanax to help her sleep. She is not to take Xanax at the same time as muscle relaxer. Encouraged to please take your medications as prescribed. It is noted she has not taken her Mobic, or Fosamax recently    Return in about 3 months (around 11/17/2021) for Depression.     Patient Care Team:  SLOAN Ovalle CNP as PCP - General (Nurse Practitioner Family)  SLOAN Ovalle CNP as PCP - [facility]  Torsten Florian MD as Consulting Physician (Pain Management)  Meliza Womack MD as Consulting Physician (Nephrology)  Karla Negron MD as Consulting Physician (Internal Medicine)    SUBJECTIVE/OBJECTIVE    History of Present illness / Visit Summary     Have you seen any other physician or provider since your last visit? Rheumatologist  Have you had any other diagnostic tests since your last visit? No  Have you been seen in the emergency room and/or had an admission to a hospital since we last saw you? No    Is here today for follow-up appointment. She has been visiting her grandchildren in [place] quite often. After the passing of her son her daughter-in-law with her 2 grandchildren back to [place] where her parents reside. Patient's  is also currently receiving chemotherapy. He also has renal disease other comorbidities. Patient is struggling, more in the past 3 weeks. It is then noted that we recently sent her to rheumatology due to abnormal anti-inflammatory markers and an elevated RANDALL titer. Rheumatology did start her on a new medication, Zanaflex, which is a muscle relaxer. Patient was concerned taking this at night with her Seroquel. Patient chose to stop taking her Seroquel. After further discussion she is able to correlate the worsening of her symptoms at the same time she stopped the Seroquel. I strongly encourage patient to please take this medication and it is to be weaned off in the near future. I did emphasize that it was safe to take them both at night if she needs to take a muscle relaxer. We also discussed that she could take Xanax prior to bed as well. Xanax is for anxiety can also help with insomnia. It also can be used to relax her muscles. I did stress to patient I do not want her using Zanaflex at the same time of using the Xanax. Review of Systems  Review of Systems   Constitutional: Positive for fatigue. Negative for activity change, appetite change, chills and fever. HENT: Negative for congestion, ear pain, postnasal drip, rhinorrhea, sinus pressure, sneezing, sore throat and trouble swallowing. Respiratory: Negative for cough, chest tightness, shortness of breath and wheezing.     Cardiovascular: Negative for chest Speech: Speech normal.         Behavior: Behavior normal. Behavior is cooperative. Thought Content: Thought content normal. Thought content does not include suicidal ideation. Thought content does not include suicidal plan. Cognition and Memory: Cognition and memory normal.         Judgment: Judgment normal.       Electronically signed by SLOAN Ovalle CNP, APRN-CNP on 8/23/2021 at 12:45 AM    Please note that this chart was generated using voice recognition Dragon dictation software. Although every effort was made to ensure the accuracy of this automated transcription, some errors in transcription may have occurred.

## 2021-08-23 PROBLEM — M81.0 OSTEOPOROSIS: Status: ACTIVE | Noted: 2021-08-23

## 2021-09-16 ENCOUNTER — HOSPITAL ENCOUNTER (OUTPATIENT)
Dept: MAMMOGRAPHY | Facility: CLINIC | Age: 61
Discharge: HOME | End: 2021-09-18
Payer: COMMERCIAL

## 2021-09-16 DIAGNOSIS — Z12.31 ENCOUNTER FOR SCREENING MAMMOGRAM FOR BREAST CANCER: ICD-10-CM

## 2021-09-16 PROCEDURE — 77067 SCR MAMMO BI INCL CAD: CPT

## 2021-09-25 DIAGNOSIS — M62.838 MUSCLE SPASM: ICD-10-CM

## 2021-09-28 RX ORDER — TIZANIDINE 2 MG/1
2 TABLET ORAL 3 TIMES DAILY PRN
Qty: 30 TABLET | Refills: 0 | Status: SHIPPED | OUTPATIENT
Start: 2021-09-28 | End: 2021-11-04

## 2021-10-04 ENCOUNTER — PATIENT MESSAGE (OUTPATIENT)
Dept: FAMILY MEDICINE CLINIC | Age: 61
End: 2021-10-04

## 2021-10-04 DIAGNOSIS — F41.1 GAD (GENERALIZED ANXIETY DISORDER): ICD-10-CM

## 2021-10-05 NOTE — TELEPHONE ENCOUNTER
From: Emily Molina  To:  SLOAN Gonzalez CNP  Sent: 10/4/2021 8:06 PM EDT  Subject: Prescription Question    Refill on my xanax
Osteoporosis

## 2021-10-07 RX ORDER — ALPRAZOLAM 1 MG/1
1 TABLET ORAL NIGHTLY PRN
Qty: 15 TABLET | Refills: 0 | Status: SHIPPED | OUTPATIENT
Start: 2021-10-07 | End: 2021-11-19 | Stop reason: SDUPTHER

## 2021-11-03 DIAGNOSIS — M62.838 MUSCLE SPASM: ICD-10-CM

## 2021-11-04 RX ORDER — TIZANIDINE 2 MG/1
2 TABLET ORAL 3 TIMES DAILY PRN
Qty: 30 TABLET | Refills: 0 | Status: SHIPPED | OUTPATIENT
Start: 2021-11-04 | End: 2021-12-10 | Stop reason: SDUPTHER

## 2021-11-04 NOTE — TELEPHONE ENCOUNTER
LOV 8/17/21  RTO 3 months; F/U scheduled  [facility] 9/28/21    Health Maintenance   Topic Date Due    COVID-19 Vaccine (1) Never done    Shingles Vaccine (1 of 2) Never done    Flu vaccine (1) Never done    Potassium monitoring  05/24/2022    Creatinine monitoring  05/24/2022    Cervical cancer screen  07/07/2022    Colon cancer screen colonoscopy  04/26/2023    Breast cancer screen  09/16/2023    Lipid screen  05/24/2026    DTaP/Tdap/Td vaccine (2 - Td or Tdap) 07/22/2030    Hepatitis C screen  Completed    HIV screen  Completed    Hepatitis A vaccine  Aged Out    Hepatitis B vaccine  Aged Out    Hib vaccine  Aged Out    Meningococcal (ACWY) vaccine  Aged Out    Pneumococcal 0-64 years Vaccine  Aged Out             (applicable per patient's age: Cancer Screenings, Depression Screening, Fall Risk Screening, Immunizations)    LDL Cholesterol (mg/dL)   Date Value   05/24/2021 139 (H)     LDL Calculated (mg/dL)   Date Value   12/16/2016 131     AST (U/L)   Date Value   05/24/2021 16     ALT (U/L)   Date Value   05/24/2021 13     BUN (mg/dL)   Date Value   05/24/2021 10      (goal A1C is < 7)   (goal LDL is <100) need 30-50% reduction from baseline     BP Readings from Last 3 Encounters:   08/17/21 117/88   05/24/21 117/83   05/07/21 128/87    (goal /80)      All Future Testing planned in [facility]:  Lab Frequency Next Occurrence   Thyroid Antibodies Once 05/24/2022       Next Visit Date:  Future Appointments   Date Time Provider Anna Chu   11/17/2021 12:30 PM Jeanine Vasquez, APRN - CNP [facility]            Patient Active Problem List:     Essential hypertension     Depression, major, recurrent, mild (HCC)     Hematuria     Collagenous colitis     Lupus ([address]     SHUN (generalized anxiety disorder)     Chronic pain of both knees     Panic attack due to exceptional stress     Osteoporosis

## 2021-11-19 DIAGNOSIS — M81.0 OSTEOPOROSIS, UNSPECIFIED OSTEOPOROSIS TYPE, UNSPECIFIED PATHOLOGICAL FRACTURE PRESENCE: ICD-10-CM

## 2021-11-19 DIAGNOSIS — F41.1 GAD (GENERALIZED ANXIETY DISORDER): ICD-10-CM

## 2021-11-19 RX ORDER — ALPRAZOLAM 1 MG/1
1 TABLET ORAL NIGHTLY PRN
Qty: 15 TABLET | Refills: 0 | Status: SHIPPED | OUTPATIENT
Start: 2021-11-19 | End: 2021-12-10 | Stop reason: SDUPTHER

## 2021-11-19 NOTE — TELEPHONE ENCOUNTER
Lov: 8-17-21  Lrf: 8-17-21  Na: 12-15-21  Fosamax   Health Maintenance   Topic Date Due    COVID-19 Vaccine (1) Never done    Shingles Vaccine (1 of 2) Never done    Flu vaccine (1) Never done    Potassium monitoring  05/24/2022    Creatinine monitoring  05/24/2022    Cervical cancer screen  07/07/2022    Colon cancer screen colonoscopy  04/26/2023    Breast cancer screen  09/16/2023    Lipid screen  05/24/2026    DTaP/Tdap/Td vaccine (2 - Td or Tdap) 07/22/2030    Hepatitis C screen  Completed    HIV screen  Completed    Hepatitis A vaccine  Aged Out    Hepatitis B vaccine  Aged Out    Hib vaccine  Aged Out    Meningococcal (ACWY) vaccine  Aged Out    Pneumococcal 0-64 years Vaccine  Aged Out             (applicable per patient's age: Cancer Screenings, Depression Screening, Fall Risk Screening, Immunizations)    LDL Cholesterol (mg/dL)   Date Value   05/24/2021 139 (H)     LDL Calculated (mg/dL)   Date Value   12/16/2016 131     AST (U/L)   Date Value   05/24/2021 16     ALT (U/L)   Date Value   05/24/2021 13     BUN (mg/dL)   Date Value   05/24/2021 10      (goal A1C is < 7)   (goal LDL is <100) need 30-50% reduction from baseline     BP Readings from Last 3 Encounters:   08/17/21 117/88   05/24/21 117/83   05/07/21 128/87    (goal /80)      All Future Testing planned in [facility]:  Lab Frequency Next Occurrence   Thyroid Antibodies Once 05/24/2022       Next Visit Date:  Future Appointments   Date Time Provider Anna Chu   12/15/2021 11:30 AM Jeanine Griffiths APRN - CNP [facility]            Patient Active Problem List:     Essential hypertension     Depression, major, recurrent, mild (HCC)     Hematuria     Collagenous colitis     Lupus ([address]     SHUN (generalized anxiety disorder)     Chronic pain of both knees     Panic attack due to exceptional stress     Osteoporosis

## 2021-11-20 RX ORDER — ALENDRONATE SODIUM 70 MG/1
70 TABLET ORAL
Qty: 12 TABLET | Refills: 1 | Status: SHIPPED | OUTPATIENT
Start: 2021-11-20 | End: 2022-04-18 | Stop reason: SDUPTHER

## 2021-12-08 DIAGNOSIS — M62.838 MUSCLE SPASM: ICD-10-CM

## 2021-12-08 DIAGNOSIS — F41.1 GAD (GENERALIZED ANXIETY DISORDER): ICD-10-CM

## 2021-12-08 DIAGNOSIS — F33.0 DEPRESSION, MAJOR, RECURRENT, MILD (HCC): Chronic | ICD-10-CM

## 2021-12-09 NOTE — TELEPHONE ENCOUNTER
Last visit: 8/17/2021  Last Med refill:5/3/2021  Does patient have enough medication for 72 hours: Yes    Next Visit Date:  Future Appointments   Date Time Provider Anna Chu   12/15/2021 11:30 AM SLOAN Cunha - CNP [facility] [address] Maintenance   Topic Date Due    COVID-19 Vaccine (1) Never done    Shingles Vaccine (1 of 2) Never done    Flu vaccine (1) Never done    Potassium monitoring  05/24/2022    Creatinine monitoring  05/24/2022    Cervical cancer screen  07/07/2022    Colon cancer screen colonoscopy  04/26/2023    Breast cancer screen  09/16/2023    Lipid screen  05/24/2026    DTaP/Tdap/Td vaccine (2 - Td or Tdap) 07/22/2030    Hepatitis C screen  Completed    HIV screen  Completed    Hepatitis A vaccine  Aged Out    Hepatitis B vaccine  Aged Out    Hib vaccine  Aged Out    Meningococcal (ACWY) vaccine  Aged Out    Pneumococcal 0-64 years Vaccine  Aged Out       No results found for: LABA1C          ( goal A1C is < 7)   No results found for: LABMICR  LDL Cholesterol (mg/dL)   Date Value   05/24/2021 139 (H)   09/23/2019 93     LDL Calculated (mg/dL)   Date Value   12/16/2016 131       (goal LDL is <100)   AST (U/L)   Date Value   05/24/2021 16     ALT (U/L)   Date Value   05/24/2021 13     BUN (mg/dL)   Date Value   05/24/2021 10     BP Readings from Last 3 Encounters:   08/17/21 117/88   05/24/21 117/83   05/07/21 128/87          (goal 120/80)    All Future Testing planned in [facility]  Lab Frequency Next Occurrence   Thyroid Antibodies Once 05/24/2022               Patient Active Problem List:     Essential hypertension     Depression, major, recurrent, mild (HCC)     Hematuria     Collagenous colitis     Lupus (HCC)     SHUN (generalized anxiety disorder)     Chronic pain of both knees     Panic attack due to exceptional stress     Osteoporosis

## 2021-12-10 RX ORDER — ALPRAZOLAM 1 MG/1
1 TABLET ORAL NIGHTLY PRN
Qty: 15 TABLET | Refills: 0 | Status: SHIPPED | OUTPATIENT
Start: 2021-12-10 | End: 2022-01-09

## 2021-12-10 RX ORDER — TIZANIDINE 2 MG/1
2 TABLET ORAL 3 TIMES DAILY PRN
Qty: 30 TABLET | Refills: 0 | Status: SHIPPED | OUTPATIENT
Start: 2021-12-10 | End: 2022-01-11 | Stop reason: SDUPTHER

## 2021-12-10 RX ORDER — PAROXETINE 30 MG/1
30 TABLET, FILM COATED ORAL DAILY
Qty: 90 TABLET | Refills: 1 | Status: SHIPPED
Start: 2021-12-10 | End: 2022-02-09 | Stop reason: DRUGHIGH

## 2021-12-30 NOTE — TELEPHONE ENCOUNTER
Anesthesia Pre Eval Note    Anesthesia ROS/Med Hx    Overall Review:  EKG was reviewed     Anesthetic Complication History:  Patient does not have a history of anesthetic complications      Pulmonary Review:  Patient does not have a pulmonary history      Neuro/Psych Review:  Patient does not have a neuro/psych history       Cardiovascular Review:    Positive for hyperlipidemia    GI/HEPATIC/RENAL Review:  Patient does not have a GI/hepatic/renalhistory       End/Other Review:    Positive for anemia - Acute  Positive for cancer  Additional Results:     ALLERGIES:   -- Simvastatin -- MYALGIA    --  Joint pains, knees       Lab Results       Component                Value               Date                       WBC                      4.3                 12/29/2021                 WBC                      4.0 (L)             07/17/2019                 RBC                      2.33 (L)            12/29/2021                 RBC                      4.37                07/17/2019                 HGB                      7.7 (L)             12/30/2021                 HGB                      12.9                07/17/2019                 HCT                      24.6 (L)            12/30/2021                 HCT                      40.6                07/17/2019                 MCHC                     27.9 (L)            12/29/2021                 MCHC                     31.8 (L)            07/17/2019                 SODIUM                   139                 12/29/2021                 SODIUM                   143                 07/17/2019                 POTASSIUM                4.1                 12/29/2021                 POTASSIUM                4.2                 07/17/2019                 CHLORIDE                 104                 12/29/2021                 CHLORIDE                 105                 07/17/2019                 CO2                      24                  12/29/2021                 CO2    Please advise that she is on the highest dose of meloxicam that she can take. Do not recommend increase.                    28                  07/17/2019                 GLUCOSE                  106 (H)             12/29/2021                 GLUCOSE                  86                  07/17/2019                 BUN                      25 (H)              12/29/2021                 BUN                      24 (H)              07/17/2019                 CREATININE               1.48 (H)            12/29/2021                 CREATININE               1.07 (H)            07/17/2019                 GFRESTIMATE              33 (L)              12/29/2021                 GFRA                     58                  07/17/2019                 GFRNA                    50                  07/17/2019                 CALCIUM                  8.8                 12/29/2021                 CALCIUM                  9.2                 07/17/2019                 CALCIUM                  9.6                 04/07/2006                 PLT                      230                 12/29/2021                 PLT                      181                 07/17/2019                 PLT                      194                 07/30/2010                 INR                      1.0                 12/29/2021             Past Medical History:  No date: BCC (basal cell carcinoma of skin)      Comment:  Right edge ala  No date: IBS (irritable bowel syndrome)      Comment:  Placed on low-dose nortriptyline at bedtime by her                OB/GYN nurse practitioner  No date: Osteoarthritis of both knees  No date: Osteopenia      Comment:  Confirmed on DEXA scan in 2014 11/2015: Renal mass, right      Comment:  pT1aNX clear cell renal cell caricinoma, Grade 2, SM                negative S/P Partial R Nephrectomy  No date: Spondylosis of thoracic and lumbar spine    Past Surgical History:  04/01/1976: Appendectomy; N/A  10/12/2020: Colonoscopy remove lesions by snare      Comment:  Dr. Rocha, Serrated Adenoma, F/U PRN  12/01/1995: Colonoscopy w  biopsy; N/A      Comment:  Dr. Boyce, WNL, biopsies benign  09/16/2010: Colonoscopy w biopsy; N/A      Comment:  Dr. Rocha, Diverticulosis/Polyps, F/U 5 years  06/01/1975: D and c; N/A      Comment:  D&C, menorrhagia  10/12/2020: Esophagogastroduodenoscopy transoral flex w/bx single or   mult      Comment:  Dr. Rocha, Large Hiatal Hernia w/Avinash Lesions - Refer               to Surgery  10/01/1994: Flexible sigmoidoscopy diagnostic include specimens; N/A      Comment:  Dr. Lagunas, WNL to 30 cm.  No date: Joint replacement  02/24/2017: Laminec/facetect/foramin,lumbar; N/A      Comment:  Lumbar laminotomy, cyst resection and foraminotomy./[facility]               Dr Salter/ 2/24/17 01/29/2019: Lap partial nephrectomy; Right      Comment:  pT1aNX clear cell renal cell caricinoma, Grade 2, SM                negative/ Right robotic partial nephrectomy/ Dr. Alvarado  01/01/2003: Shoulder arthroscopy; Right      Comment:  Right. for torn rotator cuff  04/01/1976: Total abdom hysterectomy; N/A      Comment:  ERON with Ovaries Intact, fibroids  11/30/2015: Total hip replacement; Left      Comment:  Left hip replacement/abductor repair/Dr Mast   05/25/2016: Total hip replacement; Right      Comment:  Right hip replacement/Dr Mast        Prior to Admission medications :  Medication doxycycline hyclate (VIBRAMYCIN) 100 MG capsule, Sig Take 100 mg by mouth 2 times daily. Start date 12/05/21  Completed on 12/10/21, Start Date , End Date , Taking? Yes, Authorizing Provider Outside Provider    Medication nortriptyline (PAMELOR) 10 MG capsule, Sig Take 1 capsule by mouth nightly., Start Date 11/8/21, End Date , Taking? Yes, Authorizing Provider Rafat Rocha MD    Medication Calcium Carb-Cholecalciferol 600-500 MG-UNIT Cap, Sig Take 1 tablet by mouth daily. , Start Date , End Date , Taking? Yes, Authorizing Provider Outside Provider         Patient Vitals in the past 24 hrs:  12/30/21 1530, BP:(!) 172/80, Temp:36.6 °C  (97.8 °F), Temp src:Oral, Pulse:70, Resp:16, SpO2:99 %  12/30/21 1245, BP:(!) 164/72, Temp:36.6 °C (97.9 °F), Temp src:Oral, Pulse:62, Resp:16, SpO2:99 %  12/30/21 0930, BP:(!) 152/67, Temp:36.6 °C (97.9 °F), Temp src:Oral, Pulse:69, Resp:16, SpO2:100 %  12/30/21 0629, BP:132/59, Temp:36.8 °C (98.2 °F), Temp src:Oral, Pulse:62, Resp:18  12/30/21 0515, BP:(!) 144/60, Temp:36.9 °C (98.4 °F), Temp src:Oral, Pulse:67, Resp:18 12/30/21 0415, BP:(!) 146/66, Temp:36.3 °C (97.3 °F), Temp src:Oral, Pulse:66, Resp:18 12/30/21 0313, BP:(!) 151/67, Temp:36.9 °C (98.5 °F), Temp src:Oral, Pulse:67, Resp:18 12/30/21 0300, BP:(!) 170/68, Temp:36.8 °C (98.2 °F), Temp src:Oral, Pulse:67, Resp:18 12/30/21 0245, BP:(!) 163/71, Temp:36.9 °C (98.4 °F), Pulse:68, Resp:18  12/30/21 0045, Height:5' 4\" (1.626 m)  12/30/21 0044, BP:(!) 177/76, Temp:37.1 °C (98.7 °F), Temp src:Oral, Pulse:72, Resp:16, SpO2:100 %  12/30/21 0030, BP:(!) 152/71, Pulse:69, SpO2:98 %  12/30/21 0025, Temp:37.1 °C (98.8 °F), Temp src:Oral, Pulse:70, Resp:16  12/30/21 0001, BP:(!) 141/64, Pulse:77, SpO2:98 %  12/29/21 2330, BP:(!) 145/67, Pulse:71, SpO2:97 %  12/29/21 2300, BP:(!) 143/64, Pulse:72, SpO2:97 %  12/29/21 2245, BP:138/65, Pulse:71, SpO2:97 %  12/29/21 2235, BP:(!) 143/66, Pulse:71, SpO2:97 %  12/29/21 2234, Resp:16  12/29/21 2230, BP:(!) 140/64, Temp:37.1 °C (98.8 °F), Temp src:Oral, Pulse:71, SpO2:97 %  12/29/21 2225, BP:138/64, Pulse:72, SpO2:97 %  12/29/21 2220, BP:134/61, Pulse:71, SpO2:97 %  12/29/21 2215, BP:132/63, Temp:37 °C (98.6 °F), Pulse:71, Resp:16  12/29/21 2157, BP:(!) 163/66, Temp:37 °C (98.6 °F), Temp src:Oral, Pulse:71, Resp:16, SpO2:97 %  12/29/21 2037, BP:(!) 146/65, Pulse:75, SpO2:97 %  12/29/21 1719, BP:133/60, Temp:36.1 °C (97 °F), Pulse:92, Resp:12, SpO2:100 %, Height:5' 6\" (1.676 m), Weight:76 kg (167 lb 8.8 oz)      Relevant Problems   No relevant active problems       Physical Exam     Airway   Mallampati: II  TM Distance:  >3 FB  Neck ROM: Full    Cardiovascular  Cardiovascular exam normal    Dental Exam  Dental exam normal    Pulmonary Exam  Pulmonary exam normal    Abdominal Exam  Abdominal exam normal      Anesthesia Plan:    ASA Status: 3  Anesthesia Type: MAC    Induction: IntravenousPatient does not have a difficult airway or is not at risk of aspiration.   Premedication: None      Post-op Pain Management: Per Surgeon      Checklist  Reviewed: Lab Results, EKG, Beta Blocker Status, Nursing Notes, DNR Status, Medications, Care Everywhere, Allergies, Past Med History, Patient Summary, Problem list, NPO Status and Consultations  Consent/Risks Discussed Statement:  The proposed anesthetic plan, including its risks and benefits, have been discussed with the Patient along with the risks and benefits of alternatives. Questions were encouraged and answered and the patient and/or representative understands and agrees to proceed.        I discussed with the patient (and/or patient's legal representative) the risks and benefits of the proposed anesthesia plan, MAC, which may include services performed by other anesthesia providers.    Alternative anesthesia plans, if available, were reviewed with the patient (and/or patient's legal representative). Discussion has been held with the patient (and/or patient's legal representative) regarding risks of anesthesia, which include emergent situations that may require change in anesthesia plan.  The patient (and/or patient's legal representative) has indicated understanding, his/her questions have been answered, and he/she wishes to proceed with the planned anesthetic.      Informed Consent for Blood: Consented    Comments  Plan Comments: Discussed MAC. GA for backup discussed. Discussed risk of sore throat, dental and oropharyngeal trauma, allergic reaction, nerve injury, pulmonary complications, cardiac arrest and even death.

## 2022-01-11 DIAGNOSIS — F41.1 GAD (GENERALIZED ANXIETY DISORDER): Primary | ICD-10-CM

## 2022-01-11 DIAGNOSIS — M62.838 MUSCLE SPASM: ICD-10-CM

## 2022-01-11 RX ORDER — TIZANIDINE 2 MG/1
2 TABLET ORAL 3 TIMES DAILY PRN
Qty: 30 TABLET | Refills: 0 | Status: SHIPPED | OUTPATIENT
Start: 2022-01-11 | End: 2022-02-09 | Stop reason: SDUPTHER

## 2022-01-11 RX ORDER — ALPRAZOLAM 1 MG/1
1 TABLET ORAL NIGHTLY PRN
Qty: 15 TABLET | Refills: 0 | Status: SHIPPED | OUTPATIENT
Start: 2022-01-11 | End: 2022-02-01 | Stop reason: SDUPTHER

## 2022-01-11 NOTE — TELEPHONE ENCOUNTER
Last visit: 8-17-21  Last Med refill: 12-10-21  Does patient have enough medication for 72 hours: Yes    Next Visit Date:  No future appointments.     Health Maintenance   Topic Date Due    COVID-19 Vaccine (1) Never done    Depression Monitoring  Never done    Shingles Vaccine (1 of 2) Never done    Flu vaccine (1) Never done    Potassium monitoring  05/24/2022    Creatinine monitoring  05/24/2022    Cervical cancer screen  07/07/2022    Colon cancer screen colonoscopy  04/26/2023    Breast cancer screen  09/16/2023    Lipid screen  05/24/2026    DTaP/Tdap/Td vaccine (2 - Td or Tdap) 07/22/2030    Hepatitis C screen  Completed    HIV screen  Completed    Hepatitis A vaccine  Aged Out    Hepatitis B vaccine  Aged Out    Hib vaccine  Aged Out    Meningococcal (ACWY) vaccine  Aged Out    Pneumococcal 0-64 years Vaccine  Aged Out       No results found for: LABA1C          ( goal A1C is < 7)   No results found for: LABMICR  LDL Cholesterol (mg/dL)   Date Value   05/24/2021 139 (H)   09/23/2019 93     LDL Calculated (mg/dL)   Date Value   12/16/2016 131       (goal LDL is <100)   AST (U/L)   Date Value   05/24/2021 16     ALT (U/L)   Date Value   05/24/2021 13     BUN (mg/dL)   Date Value   05/24/2021 10     BP Readings from Last 3 Encounters:   08/17/21 117/88   05/24/21 117/83   05/07/21 128/87          (goal 120/80)    All Future Testing planned in [facility]  Lab Frequency Next Occurrence   Thyroid Antibodies Once 05/24/2022               Patient Active Problem List:     Essential hypertension     Depression, major, recurrent, mild (HCC)     Hematuria     Collagenous colitis     Lupus (HCC)     SHUN (generalized anxiety disorder)     Chronic pain of both knees     Panic attack due to exceptional stress     Osteoporosis

## 2022-02-01 DIAGNOSIS — F41.1 GAD (GENERALIZED ANXIETY DISORDER): ICD-10-CM

## 2022-02-01 RX ORDER — ALPRAZOLAM 1 MG/1
1 TABLET ORAL NIGHTLY PRN
Qty: 15 TABLET | Refills: 0 | Status: SHIPPED | OUTPATIENT
Start: 2022-02-01 | End: 2022-02-09 | Stop reason: SDUPTHER

## 2022-02-01 NOTE — TELEPHONE ENCOUNTER
Last visit: 08/17/21  Last Med refill: 01/11/22  Does patient have enough medication for 72 hours: NO.     Next Visit Date:  Future Appointments   Date Time Provider Anna Chu   2/9/2022  9:30 AM SLOAN Gomes - CNP [facility] [address] Maintenance   Topic Date Due    COVID-19 Vaccine (1) Never done    Depression Monitoring  Never done    Shingles Vaccine (1 of 2) Never done    Flu vaccine (1) Never done    Potassium monitoring  05/24/2022    Creatinine monitoring  05/24/2022    Cervical cancer screen  07/07/2022    Colon cancer screen colonoscopy  04/26/2023    Breast cancer screen  09/16/2023    Lipid screen  05/24/2026    DTaP/Tdap/Td vaccine (2 - Td or Tdap) 07/22/2030    Hepatitis C screen  Completed    HIV screen  Completed    Hepatitis A vaccine  Aged Out    Hepatitis B vaccine  Aged Out    Hib vaccine  Aged Out    Meningococcal (ACWY) vaccine  Aged Out    Pneumococcal 0-64 years Vaccine  Aged Out       No results found for: LABA1C          ( goal A1C is < 7)   No results found for: LABMICR  LDL Cholesterol (mg/dL)   Date Value   05/24/2021 139 (H)   09/23/2019 93     LDL Calculated (mg/dL)   Date Value   12/16/2016 131       (goal LDL is <100)   AST (U/L)   Date Value   05/24/2021 16     ALT (U/L)   Date Value   05/24/2021 13     BUN (mg/dL)   Date Value   05/24/2021 10     BP Readings from Last 3 Encounters:   08/17/21 117/88   05/24/21 117/83   05/07/21 128/87          (goal 120/80)    All Future Testing planned in [facility]  Lab Frequency Next Occurrence   Thyroid Antibodies Once 05/24/2022               Patient Active Problem List:     Essential hypertension     Depression, major, recurrent, mild (HCC)     Hematuria     Collagenous colitis     Lupus (HCC)     SUHN (generalized anxiety disorder)     Chronic pain of both knees     Panic attack due to exceptional stress     Osteoporosis

## 2022-02-09 ENCOUNTER — OFFICE VISIT (OUTPATIENT)
Dept: FAMILY MEDICINE CLINIC | Age: 62
End: 2022-02-09
Payer: COMMERCIAL

## 2022-02-09 VITALS
BODY MASS INDEX: 30.82 KG/M2 | SYSTOLIC BLOOD PRESSURE: 102 MMHG | HEART RATE: 68 BPM | RESPIRATION RATE: 16 BRPM | OXYGEN SATURATION: 96 % | WEIGHT: 157.8 LBS | TEMPERATURE: 98.2 F | DIASTOLIC BLOOD PRESSURE: 62 MMHG

## 2022-02-09 DIAGNOSIS — F33.0 DEPRESSION, MAJOR, RECURRENT, MILD (HCC): Primary | Chronic | ICD-10-CM

## 2022-02-09 DIAGNOSIS — F41.0 PANIC ATTACK DUE TO EXCEPTIONAL STRESS: ICD-10-CM

## 2022-02-09 DIAGNOSIS — G25.81 RESTLESS LEG SYNDROME: ICD-10-CM

## 2022-02-09 DIAGNOSIS — F41.1 GAD (GENERALIZED ANXIETY DISORDER): ICD-10-CM

## 2022-02-09 DIAGNOSIS — F43.0 PANIC ATTACK DUE TO EXCEPTIONAL STRESS: ICD-10-CM

## 2022-02-09 DIAGNOSIS — M62.838 MUSCLE SPASM: ICD-10-CM

## 2022-02-09 DIAGNOSIS — I10 ESSENTIAL HYPERTENSION: Chronic | ICD-10-CM

## 2022-02-09 DIAGNOSIS — R79.89 ELEVATED TSH: ICD-10-CM

## 2022-02-09 DIAGNOSIS — M81.0 OSTEOPOROSIS, UNSPECIFIED OSTEOPOROSIS TYPE, UNSPECIFIED PATHOLOGICAL FRACTURE PRESENCE: ICD-10-CM

## 2022-02-09 LAB
ALBUMIN SERPL-MCNC: 4.3 G/DL
ALP BLD-CCNC: 68 U/L
ALT SERPL-CCNC: 19 U/L
AST SERPL-CCNC: 16 U/L
BASOPHILS ABSOLUTE: NORMAL
BASOPHILS RELATIVE PERCENT: NORMAL
BILIRUB SERPL-MCNC: 0.8 MG/DL (ref 0.1–1.4)
BILIRUBIN URINE: 0 MG/DL
BLOOD, URINE: NEGATIVE
BUN BLDV-MCNC: 11 MG/DL
CALCIUM SERPL-MCNC: 9.4 MG/DL
CHLORIDE BLD-SCNC: 105 MMOL/L
CHOLESTEROL, TOTAL: 211 MG/DL
CHOLESTEROL/HDL RATIO: 4.1
CLARITY: CLEAR
CO2: 25 MMOL/L
COLOR: YELLOW
CREAT SERPL-MCNC: 0.81 MG/DL
EOSINOPHILS ABSOLUTE: NORMAL
EOSINOPHILS RELATIVE PERCENT: NORMAL
FERRITIN: 35 NG/ML (ref 9–150)
FOLATE: 12.5
GLUCOSE FASTING: 86 MG/DL
GLUCOSE URINE: NEGATIVE
HCT VFR BLD CALC: 38.5 % (ref 36–46)
HDLC SERPL-MCNC: 51 MG/DL (ref 35–70)
HEMOGLOBIN: 13.1 G/DL (ref 12–16)
IRON: 81
KETONES, URINE: NEGATIVE
LDL CHOLESTEROL CALCULATED: 144 MG/DL (ref 0–160)
LEUKOCYTE ESTERASE, URINE: ABNORMAL
LYMPHOCYTES ABSOLUTE: NORMAL
LYMPHOCYTES RELATIVE PERCENT: NORMAL
MAGNESIUM: 2.4 MG/DL
MCH RBC QN AUTO: 32.4 PG
MCHC RBC AUTO-ENTMCNC: 33.9 G/DL
MCV RBC AUTO: 96 FL
MONOCYTES ABSOLUTE: NORMAL
MONOCYTES RELATIVE PERCENT: NORMAL
NEUTROPHILS ABSOLUTE: NORMAL
NEUTROPHILS RELATIVE PERCENT: NORMAL
NITRITE, URINE: NEGATIVE
NONHDLC SERPL-MCNC: ABNORMAL MG/DL
PH UA: 5.5 (ref 4.5–8)
PLATELET # BLD: 293 K/ΜL
PMV BLD AUTO: 10.7 FL
POTASSIUM SERPL-SCNC: 3.9 MMOL/L
PROTEIN UA: NEGATIVE
RBC # BLD: 4.03 10^6/ΜL
SODIUM BLD-SCNC: 140 MMOL/L
SPECIFIC GRAVITY UA: 1.02 (ref 1–1.03)
TOTAL IRON BINDING CAPACITY: 354
TOTAL PROTEIN: 7.6 G/DL (ref 6.4–8.2)
TRIGL SERPL-MCNC: 79 MG/DL
TSH SERPL DL<=0.05 MIU/L-ACNC: 2.86 UIU/ML
UROBILINOGEN, URINE: NORMAL
VITAMIN B-12: 186
VITAMIN D 25-HYDROXY: 21.9
VITAMIN D2, 25 HYDROXY: ABNORMAL
VITAMIN D3,25 HYDROXY: ABNORMAL
VLDLC SERPL CALC-MCNC: 16 MG/DL
WBC # BLD: 5.1 10^3/ML

## 2022-02-09 PROCEDURE — 99215 OFFICE O/P EST HI 40 MIN: CPT | Performed by: NURSE PRACTITIONER

## 2022-02-09 RX ORDER — TIZANIDINE 2 MG/1
TABLET ORAL
Qty: 120 TABLET | Refills: 0 | Status: SHIPPED | OUTPATIENT
Start: 2022-02-09 | End: 2022-05-16 | Stop reason: SDUPTHER

## 2022-02-09 RX ORDER — ALPRAZOLAM 1 MG/1
TABLET ORAL
Qty: 60 TABLET | Refills: 0 | Status: SHIPPED | OUTPATIENT
Start: 2022-02-09 | End: 2022-05-10

## 2022-02-09 RX ORDER — PAROXETINE HYDROCHLORIDE 40 MG/1
40 TABLET, FILM COATED ORAL DAILY
Qty: 90 TABLET | Refills: 1 | Status: SHIPPED | OUTPATIENT
Start: 2022-02-09 | End: 2022-08-16 | Stop reason: SDUPTHER

## 2022-02-09 ASSESSMENT — PATIENT HEALTH QUESTIONNAIRE - PHQ9
6. FEELING BAD ABOUT YOURSELF - OR THAT YOU ARE A FAILURE OR HAVE LET YOURSELF OR YOUR FAMILY DOWN: 1
1. LITTLE INTEREST OR PLEASURE IN DOING THINGS: 2
2. FEELING DOWN, DEPRESSED OR HOPELESS: 3
8. MOVING OR SPEAKING SO SLOWLY THAT OTHER PEOPLE COULD HAVE NOTICED. OR THE OPPOSITE, BEING SO FIGETY OR RESTLESS THAT YOU HAVE BEEN MOVING AROUND A LOT MORE THAN USUAL: 0
10. IF YOU CHECKED OFF ANY PROBLEMS, HOW DIFFICULT HAVE THESE PROBLEMS MADE IT FOR YOU TO DO YOUR WORK, TAKE CARE OF THINGS AT HOME, OR GET ALONG WITH OTHER PEOPLE: 1
SUM OF ALL RESPONSES TO PHQ QUESTIONS 1-9: 16
3. TROUBLE FALLING OR STAYING ASLEEP: 3
5. POOR APPETITE OR OVEREATING: 3
7. TROUBLE CONCENTRATING ON THINGS, SUCH AS READING THE NEWSPAPER OR WATCHING TELEVISION: 1
SUM OF ALL RESPONSES TO PHQ QUESTIONS 1-9: 16
SUM OF ALL RESPONSES TO PHQ9 QUESTIONS 1 & 2: 5
4. FEELING TIRED OR HAVING LITTLE ENERGY: 3
9. THOUGHTS THAT YOU WOULD BE BETTER OFF DEAD, OR OF HURTING YOURSELF: 0

## 2022-02-09 ASSESSMENT — ENCOUNTER SYMPTOMS
TROUBLE SWALLOWING: 0
SHORTNESS OF BREATH: 0
COUGH: 0
ABDOMINAL PAIN: 0
BLOOD IN STOOL: 0
SINUS PRESSURE: 0
SORE THROAT: 0
CHEST TIGHTNESS: 0
BACK PAIN: 0
RHINORRHEA: 0
NAUSEA: 0
CONSTIPATION: 0
DIARRHEA: 0
WHEEZING: 0

## 2022-02-09 ASSESSMENT — COLUMBIA-SUICIDE SEVERITY RATING SCALE - C-SSRS
2. HAVE YOU ACTUALLY HAD ANY THOUGHTS OF KILLING YOURSELF?: NO
1. WITHIN THE PAST MONTH, HAVE YOU WISHED YOU WERE DEAD OR WISHED YOU COULD GO TO SLEEP AND NOT WAKE UP?: NO
6. HAVE YOU EVER DONE ANYTHING, STARTED TO DO ANYTHING, OR PREPARED TO DO ANYTHING TO END YOUR LIFE?: NO

## 2022-02-09 NOTE — PROGRESS NOTES
[address]  [phone]    2/9/22     Patient ID  April Macias is a 59 y.o. female  Established patient    Chief Complaint  April Macias presents today for Depression, Anxiety, Hypertension, and Fatigue (Ongoing years. Difficulty falling sleep. Worsening. Getting 3-4 hours of sleep at night. No motivation. )    Have you seen any other physician or provider since your last visit? Yes - Records Obtained Rheumatology- Dr. Odette Lee   Have you had any other diagnostic tests since your last visit? Yes - Records Obtained Mammogram   Have you been seen in the emergency room and/or had an admission to a hospital since we last saw you? No    ASSESSMENT/PLAN  1. Depression, major, recurrent, mild ([facility] [address]  Assessment & Plan:   Uncontrolled, continue current medications, continue current treatment plan, medication adherence emphasized and lifestyle modifications recommended  Orders:  -     Vitamin D 25 Hydroxy; Future  -     Vitamin B12 & Folate; Future  -     PARoxetine (PAXIL) 40 MG tablet; Take 1 tablet by mouth daily, Disp-90 tablet, R-1Normal  2. SHUN (generalized anxiety disorder)  -     ALPRAZolam (XANAX) 1 MG tablet; Take take up to twice a day for anxiety/insomnia, Disp-60 tablet, R-0Normal  3. Panic attack due to exceptional stress  4. Restless leg syndrome  -     Comprehensive Metabolic Panel, Fasting; Future  -     Ferritin; Future  -     Iron and TIBC; Future  5. Osteoporosis, unspecified osteoporosis type, unspecified pathological fracture presence  -     DEXA BONE DENSITY AXIAL SKELETON; Future  6. Essential hypertension  -     CBC; Future  -     Lipid Panel; Future  -     Urinalysis Reflex to Culture; Future  7. Muscle spasm  -     tiZANidine (ZANAFLEX) 2 MG tablet; Take 1-2 tabs every 8 hours as needed, Disp-120 tablet, R-0Normal  -     Magnesium; Future  8. Elevated TSH  -     TSH with Reflex;  Future    medications refilled   Monitor lab results for any potential changed required in pharmacology        Return in about 4 weeks (around 3/9/2022) for Anxiety, Depression. On this date 2/9/2022 I have spent 44+ minutes reviewing previous notes, test results and face to face with the patient discussing the diagnosis and importance of compliance with the treatment plan as well as documenting on the day of the visit. Patient Care Team:  SLOAN Bazzi CNP as PCP - General (Nurse Practitioner Family)  SLOAN Bazzi CNP as PCP - [facility]  Bunny Arias MD as Consulting Physician (Pain Management)  Naresh Doyle MD as Consulting Physician (Nephrology)  Tal Arguelles MD as Consulting Physician (Internal Medicine)    SUBJECTIVE/OBJECTIVE  History of Present illness / Visit Summary   Patient presents for follow up   Reviewed health maintenance and any recent labs/imaging   Anniversary of sonlilia death on 2/10       Anxiety  Patient complains of evaluation of anxiety disorder. Complains of the following anxiety symptoms: difficulty concentrating, fatigue, insomnia. Denies current suicidal and homicidal ideation. Complains of the following side effects from the treatment: none. Depression  Patient complains of depression. Complains of depressed mood, fatigue, hopelessness and insomnia. Denies current suicidal and homicidal plan or intent. Current treatment includes benzodiazepines xanax and Paxil and none. Review of Systems  Review of Systems   Constitutional: Positive for fatigue (worsening). Negative for activity change, appetite change, chills and fever. HENT: Negative for congestion, ear pain, postnasal drip, rhinorrhea, sinus pressure, sneezing, sore throat and trouble swallowing. Respiratory: Negative for cough, chest tightness, shortness of breath and wheezing. Cardiovascular: Negative for chest pain, palpitations and leg swelling.    Gastrointestinal: Negative for abdominal pain, blood in stool, constipation, diarrhea and nausea. Genitourinary: Negative for difficulty urinating, dysuria, frequency, hematuria and urgency. Follows with nephrology    Musculoskeletal: Positive for arthralgias (generalized ). Negative for back pain, gait problem, joint swelling and myalgias. Skin: Negative for rash and wound. Allergic/Immunologic: Negative for environmental allergies. Follows with rheumatology    Neurological: Positive for tremors (bilateral hands - improved). Negative for dizziness, syncope, light-headedness, numbness and headaches. Hematological: Does not bruise/bleed easily. Psychiatric/Behavioral: Positive for agitation, decreased concentration, dysphoric mood and sleep disturbance. Negative for self-injury and suicidal ideas. The patient is nervous/anxious. Worsening mood, anxiety, and depression  Stopped Seroquel due to concern with new medication from Rheumatology           Physical exam   Physical Exam  Vitals and nursing note reviewed. Constitutional:       General: She is not in acute distress. Appearance: Normal appearance. She is well-developed, well-groomed and overweight. She is not ill-appearing or toxic-appearing. Cardiovascular:      Rate and Rhythm: Normal rate and regular rhythm. No extrasystoles are present. Heart sounds: Normal heart sounds, S1 normal and S2 normal. No murmur heard. Pulmonary:      Effort: Pulmonary effort is normal. No prolonged expiration or respiratory distress. Breath sounds: Normal breath sounds and air entry. Skin:     General: Skin is warm and dry. Coloration: Skin is not ashen, cyanotic, jaundiced or pale. Neurological:      Mental Status: She is alert and oriented to person, place, and time. Motor: Motor function is intact. Gait: Gait is intact. Psychiatric:         Attention and Perception: Attention and perception normal.         Mood and Affect: Mood is depressed.  Affect is labile, flat and tearful. Speech: Speech normal.         Behavior: Behavior normal. Behavior is cooperative. Thought Content: Thought content normal. Thought content does not include suicidal ideation. Thought content does not include suicidal plan. Cognition and Memory: Cognition and memory normal.         Judgment: Judgment normal.           Electronically signed by SLOAN Hebert CNP, APRN-CNP on 2/20/2022 at 7:21 PM    Please note that this chart was generated using voice recognition Dragon dictation software. Although every effort was made to ensure the accuracy of this automated transcription, some errors in transcription may have occurred.     Chart review, assessment findings, and documentation completed with assistance of  of Nurse Practitioner program.

## 2022-02-11 DIAGNOSIS — G25.81 RESTLESS LEG SYNDROME: ICD-10-CM

## 2022-02-11 DIAGNOSIS — I10 ESSENTIAL HYPERTENSION: Chronic | ICD-10-CM

## 2022-02-11 DIAGNOSIS — R79.89 ELEVATED TSH: ICD-10-CM

## 2022-02-11 DIAGNOSIS — F33.0 DEPRESSION, MAJOR, RECURRENT, MILD (HCC): Chronic | ICD-10-CM

## 2022-02-11 DIAGNOSIS — M62.838 MUSCLE SPASM: ICD-10-CM

## 2022-02-13 DIAGNOSIS — E55.9 VITAMIN D DEFICIENCY: Primary | ICD-10-CM

## 2022-02-13 RX ORDER — ERGOCALCIFEROL 1.25 MG/1
50000 CAPSULE ORAL WEEKLY
Qty: 12 CAPSULE | Refills: 3 | Status: SHIPPED | OUTPATIENT
Start: 2022-02-13

## 2022-02-20 ASSESSMENT — ENCOUNTER SYMPTOMS: ALLERGIC/IMMUNOLOGIC COMMENTS: FOLLOWS WITH RHEUMATOLOGY

## 2022-04-18 ENCOUNTER — OFFICE VISIT (OUTPATIENT)
Dept: FAMILY MEDICINE CLINIC | Age: 62
End: 2022-04-18
Payer: COMMERCIAL

## 2022-04-18 VITALS
DIASTOLIC BLOOD PRESSURE: 70 MMHG | HEIGHT: 60 IN | BODY MASS INDEX: 31.18 KG/M2 | SYSTOLIC BLOOD PRESSURE: 120 MMHG | WEIGHT: 158.8 LBS

## 2022-04-18 DIAGNOSIS — M70.62 TROCHANTERIC BURSITIS OF LEFT HIP: Primary | ICD-10-CM

## 2022-04-18 DIAGNOSIS — M81.0 OSTEOPOROSIS, UNSPECIFIED OSTEOPOROSIS TYPE, UNSPECIFIED PATHOLOGICAL FRACTURE PRESENCE: ICD-10-CM

## 2022-04-18 PROCEDURE — 99214 OFFICE O/P EST MOD 30 MIN: CPT | Performed by: STUDENT IN AN ORGANIZED HEALTH CARE EDUCATION/TRAINING PROGRAM

## 2022-04-18 PROCEDURE — 96372 THER/PROPH/DIAG INJ SC/IM: CPT | Performed by: STUDENT IN AN ORGANIZED HEALTH CARE EDUCATION/TRAINING PROGRAM

## 2022-04-18 RX ORDER — METHYLPREDNISOLONE ACETATE 40 MG/ML
40 INJECTION, SUSPENSION INTRA-ARTICULAR; INTRALESIONAL; INTRAMUSCULAR; SOFT TISSUE ONCE
Status: SHIPPED | OUTPATIENT
Start: 2022-04-18

## 2022-04-18 RX ORDER — ALENDRONATE SODIUM 70 MG/1
70 TABLET ORAL
Qty: 12 TABLET | Refills: 3 | Status: SHIPPED | OUTPATIENT
Start: 2022-04-18 | End: 2023-05-16

## 2022-04-18 RX ORDER — KETOROLAC TROMETHAMINE 30 MG/ML
60 INJECTION, SOLUTION INTRAMUSCULAR; INTRAVENOUS ONCE
Status: COMPLETED | OUTPATIENT
Start: 2022-04-18 | End: 2022-04-18

## 2022-04-18 RX ADMIN — KETOROLAC TROMETHAMINE 60 MG: 30 INJECTION, SOLUTION INTRAMUSCULAR; INTRAVENOUS at 17:06

## 2022-04-18 SDOH — ECONOMIC STABILITY: FOOD INSECURITY: WITHIN THE PAST 12 MONTHS, THE FOOD YOU BOUGHT JUST DIDN'T LAST AND YOU DIDN'T HAVE MONEY TO GET MORE.: NEVER TRUE

## 2022-04-18 SDOH — ECONOMIC STABILITY: FOOD INSECURITY: WITHIN THE PAST 12 MONTHS, YOU WORRIED THAT YOUR FOOD WOULD RUN OUT BEFORE YOU GOT MONEY TO BUY MORE.: NEVER TRUE

## 2022-04-18 ASSESSMENT — SOCIAL DETERMINANTS OF HEALTH (SDOH): HOW HARD IS IT FOR YOU TO PAY FOR THE VERY BASICS LIKE FOOD, HOUSING, MEDICAL CARE, AND HEATING?: NOT HARD AT ALL

## 2022-04-18 NOTE — PROGRESS NOTES
Marie Madrid (:  1960) is a 59 y.o. female,Established patient, here for evaluation of the following chief complaint(s):  Hip Pain (bursitis flare on going 7-10 days)         ASSESSMENT/PLAN:  1. Trochanteric bursitis of left hip  -     ketorolac (TORADOL) injection 60 mg; 60 mg, IntraMUSCular, ONCE, 1 dose, On 22 at 1700Do not administer for more than 5 days. -     XR HIP LEFT (2-3 VIEWS); Future  -     methylPREDNISolone acetate (DEPO-MEDROL) injection 40 mg; 40 mg, IntraMUSCular, ONCE, 1 dose, On 22 at 1700  -     diclofenac (VOLTAREN) 50 MG EC tablet; Take 1 tablet by mouth 3 times daily (with meals), Disp-60 tablet, R-3Normal  2. Osteoporosis, unspecified osteoporosis type, unspecified pathological fracture presence  -     alendronate (FOSAMAX) 70 MG tablet; Take 1 tablet by mouth every 7 days for 12 days, Disp-12 tablet, R-3Normal      No follow-ups on file. Subjective   SUBJECTIVE/OBJECTIVE:  HPI:     Review of Systems Pertinent positives and negatives included in HPI 14 point ROS otherwise negative         Objective   Physical Exam  Vitals and nursing note reviewed. Constitutional:       Appearance: Normal appearance. HENT:      Head: Normocephalic and atraumatic. Eyes:      Extraocular Movements: Extraocular movements intact. Cardiovascular:      Rate and Rhythm: Normal rate and regular rhythm. Pulses: Normal pulses. Heart sounds: Normal heart sounds. Pulmonary:      Effort: Pulmonary effort is normal.      Breath sounds: Normal breath sounds. Abdominal:      General: Abdomen is flat. Palpations: Abdomen is soft. Musculoskeletal:         General: Normal range of motion. Cervical back: Normal range of motion and neck supple. Legs:       Comments: Point tenderness exquisitely tender   Skin:     General: Skin is warm. Neurological:      General: No focal deficit present.       Mental Status: She is alert and oriented to person, place, and time. An electronic signature was used to authenticate this note.     --Makeda Mack MD

## 2022-04-18 NOTE — PATIENT INSTRUCTIONS
Patient Education        Learning About a Hip Bursa Injection  What is a hip bursa injection? A bursa is a small sac of fluid that cushions an area between tendons and bones. The bursa on the outer side of the hip bone is called the trochanteric (say \"DHIW-hib-TKVX-ik\") bursa. Sometimes it can become swollen and painful. This condition is called bursitis. An injection into the bursa is a shot of medicine to reduce pain and swelling. The medicines may include pain relievers and steroid medicines. A steroid shot can sometimes help with short-term pain relief when other treatments haven'tworked. How is a hip bursa injection done? First the area over the bursa will be cleaned. Your doctor may use a tinyneedle to numb the skin over the area where you will get the injection. If a tiny needle is used to numb the area, your doctor will use another needle to inject the medicine. Your doctor may use a pain reliever, a steroid, orboth. You may feel some pressure or discomfort. Your doctor may put ice on the area before you go home. What can you expect after the injection? You will probably go home soon after your shot. You may have numbness over yourhip for a few hours. If your shot included both a pain reliever and a steroid, the pain will probably go away right away. But it might come back after a few hours. This might happen if the pain reliever wears off and the steroid has not started towork yet. The steroid medicine generally takes a few days to work. If the pain comes back, you can put ice or a cold pack on your hip for 10 to 20minutes at a time. Put a thin cloth between the ice and your skin. Follow your doctor's instructions carefully. Avoid strenuous activities on theday you get the shot, especially those that put stress on your hip. Steroids don't always work. But when they do, the pain relief can last forseveral days to a few months or longer.   Follow-up care is a key part of your treatment and safety. Be sure to make and go to all appointments, and call your doctor if you are having problems. It's also a good idea to know your test results and keep alist of the medicines you take. Where can you learn more? Go to https://chnaomie.[employer]. org and sign in to your [employer] account. Enter K566 in the [facility] box to learn more about \"Learning About a Hip Bursa Injection. \"     If you do not have an account, please click on the \"Sign Up Now\" link. Current as of: July 1, 2021               Content Version: 13.2  © 2006-2022 [employer]. Care instructions adapted under license by [facility] ([facility]). If you have questions about a medical condition or this instruction, always ask your healthcare professional. [address] any warranty or liability for your use of this information. Patient Education        Hip Bursitis: Care Instructions  Your Care Instructions     Bursitis is inflammation of the bursa. A bursa is a small sac of fluid that cushions a joint and helps it move easily. A bursa sits between a bone in the hip and the muscles and tendons in the thigh and buttock. Injury or overuse of the hip can cause bursitis. Activities that can lead to bursitis includetwisting and rapid joint movement. Bursitis can cause hip pain. Bursitis usually gets better if you avoid the activity that caused it. If pain lasts or gets worse despite home treatment, your doctor may draw fluid from the bursa through a needle. This may relieve your pain and help your doctor know if you have an infection. If so, your doctor will prescribe antibiotics. If you have inflammation only, you may get a corticosteroid shot to reduce swellingand pain. Sometimes surgery is needed to drain or remove the bursa. Follow-up care is a key part of your treatment and safety. Be sure to make and go to all appointments, and call your doctor if you are having problems.  It's also a good idea to know your test results and keep alist of the medicines you take. How can you care for yourself at home?  Put ice or a cold pack on your hip for 10 to 20 minutes at a time. Put a thin cloth between the ice and your skin.  After 3 days of using ice, you may use heat on your hip. You can use a hot water bottle, a heating pad set on low, or a warm, moist towel.  Rest your hip. Stop any activities that cause pain. Switch to activities that do not stress your hip.  Take your medicines exactly as prescribed. Call your doctor if you think you are having a problem with your medicine.  Ask your doctor if you can take an over-the-counter pain medicine, such as acetaminophen (Tylenol), ibuprofen (Advil, Motrin), or naproxen (Aleve). Be safe with medicines. Read and follow all instructions on the label.  To prevent stiffness, gently move the hip joint as much as you can without pain every day. As the pain gets better, keep doing range-of-motion exercises. Ask your doctor for exercises that will make the muscles around the hip joint stronger. Do these as directed.  You can slowly return to the activity that caused the pain, but do it with less effort until you can do it without pain or swelling. Be sure to warm up before and stretch after you do the activity. When should you call for help? Call your doctor now or seek immediate medical care if:     You have a fever.      You have increased swelling or redness in your hip.      You cannot use your hip, or the pain in your hip gets worse. Watch closely for changes in your health, and be sure to contact your doctor if:     You have pain for 2 weeks or longer despite home treatment. Where can you learn more? Go to https://danny.[employer]. org and sign in to your [employer] account. Enter F578 in the [employer] box to learn more about \"Hip Bursitis: Care Instructions. \"     If you do not have an account, please click on the \"Sign Up Now\" link. Current as of: July 1, 2021               Content Version: 13.2  © 2006-2022 [employer]. Care instructions adapted under license by [facility] ([facility]). If you have questions about a medical condition or this instruction, always ask your healthcare professional. [address] any warranty or liability for your use of this information. Patient Education        Hip Bursitis: Exercises  Introduction  Here are some examples of exercises for you to try. The exercises may be suggested for a condition or for rehabilitation. Start each exercise slowly. Ease off the exercises if you start to have pain. You will be told when to start these exercises and which ones will work bestfor you. How to do the exercises  Hip rotator stretch    1. Lie on your back with both knees bent and your feet flat on the floor. 2. Put the ankle of your affected leg on your opposite thigh near your knee. 3. Use your hand to gently push your knee away from your body until you feel a gentle stretch around your hip. 4. Hold the stretch for 15 to 30 seconds. 5. Repeat 2 to 4 times. 6. Repeat steps 1 through 5, but this time use your hand to gently pull your knee toward your opposite shoulder. Iliotibial band stretch    1. Lean sideways against a wall. If you are not steady on your feet, hold on to a chair or counter. 2. Stand on the leg with the affected hip, with that leg close to the wall. Then cross your other leg in front of it. 3. Let your affected hip drop out to the side of your body and against wall. Then lean away from your affected hip until you feel a stretch. 4. Hold the stretch for 15 to 30 seconds. 5. Repeat 2 to 4 times. Straight-leg raises to the outside    1. Lie on your side, with your affected hip on top. 2. Tighten the front thigh muscles of your top leg to keep your knee straight.   3. Keep your hip and your leg straight in line with the rest of your body, and keep your knee pointing forward. Do not drop your hip back. 4. Lift your top leg straight up toward the ceiling, about 12 inches off the floor. Hold for about 6 seconds, then slowly lower your leg. 5. Repeat 8 to 12 times. Clamshell    1. Lie on your side, with your affected hip on top and your head propped on a pillow. Keep your feet and knees together and your knees bent. 2. Raise your top knee, but keep your feet together. Do not let your hips roll back. Your legs should open up like a clamshell. 3. Hold for 6 seconds. 4. Slowly lower your knee back down. Rest for 10 seconds. 5. Repeat 8 to 12 times. Follow-up care is a key part of your treatment and safety. Be sure to make and go to all appointments, and call your doctor if you are having problems. It's also a good idea to know your test results and keep alist of the medicines you take. Where can you learn more? Go to https://Kuaidi Dache.[employer]. org and sign in to your [employer] account. Enter Y692 in the [employer] box to learn more about \"Hip Bursitis: Exercises. \"     If you do not have an account, please click on the \"Sign Up Now\" link. Current as of: July 1, 2021               Content Version: 13.2  © 5109-6648 [employer]. Care instructions adapted under license by [facility] ([facility]). If you have questions about a medical condition or this instruction, always ask your healthcare professional. [place] [address] any warranty or liability for your use of this information.

## 2022-04-28 ENCOUNTER — HOSPITAL ENCOUNTER (OUTPATIENT)
Dept: MAMMOGRAPHY | Facility: CLINIC | Age: 62
Discharge: HOME | End: 2022-04-30
Payer: COMMERCIAL

## 2022-04-28 DIAGNOSIS — M81.0 OSTEOPOROSIS, UNSPECIFIED OSTEOPOROSIS TYPE, UNSPECIFIED PATHOLOGICAL FRACTURE PRESENCE: ICD-10-CM

## 2022-04-28 PROCEDURE — 77080 DXA BONE DENSITY AXIAL: CPT

## 2022-05-09 DIAGNOSIS — F41.1 GAD (GENERALIZED ANXIETY DISORDER): ICD-10-CM

## 2022-05-09 NOTE — TELEPHONE ENCOUNTER
Last visit: 04/18/22  Last Med refill: 02/09/22  Does patient have enough medication for 72 hours: Yes    Next Visit Date:  Future Appointments   Date Time Provider Anna Chu   6/1/2022  2:45 PM Juanjo Valverde, [address] Maintenance   Topic Date Due    Shingles vaccine (1 of 2) 04/18/2023 (Originally 6/2/2008)    COVID-19 Vaccine (1) 04/14/2025 (Originally 6/2/1965)    Cervical cancer screen  07/07/2022    Flu vaccine (Season Ended) 09/01/2022    Depression Monitoring  02/09/2023    Potassium  02/09/2023    Creatinine  02/09/2023    Colorectal Cancer Screen  04/26/2023    Breast cancer screen  09/16/2023    Lipids  02/09/2027    DTaP/Tdap/Td vaccine (2 - Td or Tdap) 07/22/2030    Hepatitis C screen  Completed    HIV screen  Completed    Hepatitis A vaccine  Aged Out    Hepatitis B vaccine  Aged Out    Hib vaccine  Aged Out    Meningococcal (ACWY) vaccine  Aged Out    Pneumococcal 0-64 years Vaccine  Aged Out       No results found for: LABA1C          ( goal A1C is < 7)   No results found for: LABMICR  LDL Cholesterol (mg/dL)   Date Value   05/24/2021 139 (H)   09/23/2019 93     LDL Calculated (mg/dL)   Date Value   02/09/2022 144 (H)   12/16/2016 131       (goal LDL is <100)   AST (U/L)   Date Value   02/09/2022 16     ALT (U/L)   Date Value   02/09/2022 19     BUN (mg/dL)   Date Value   02/09/2022 11     BP Readings from Last 3 Encounters:   04/18/22 120/70   02/09/22 102/62   08/17/21 117/88          (goal 120/80)    All Future Testing planned in [facility]  Lab Frequency Next Occurrence   Thyroid Antibodies Once 05/24/2022   XR HIP LEFT (2-3 VIEWS) Once 04/18/2022               Patient Active Problem List:     Essential hypertension     Depression, major, recurrent, mild (HCC)     Hematuria     Collagenous colitis     Lupus ([facility] [address]     SHUN (generalized anxiety disorder)     Chronic pain of both knees     Panic attack due to exceptional stress Osteoporosis

## 2022-05-10 RX ORDER — ALPRAZOLAM 1 MG/1
TABLET ORAL
Qty: 60 TABLET | Refills: 0 | Status: SHIPPED | OUTPATIENT
Start: 2022-05-10 | End: 2022-07-12 | Stop reason: SDUPTHER

## 2022-05-11 NOTE — TELEPHONE ENCOUNTER
Given to scheduling for F/U. Alar Island Pedicle Flap Text: The defect edges were debeveled with a #15c scalpel blade.  Given the location of the defect, shape of the defect and the proximity to the alar rim an island pedicle advancement flap was deemed most appropriate.  Using a sterile surgical marker, an appropriate advancement flap was drawn incorporating the defect, outlining the appropriate donor tissue and placing the expected incisions within the nasal ala running parallel to the alar rim. The area thus outlined was incised with a #15 scalpel blade.  The skin margins were undermined minimally to an appropriate distance in all directions around the primary defect and laterally outward around the island pedicle utilizing iris scissors.  There was minimal undermining beneath the pedicle flap.

## 2022-05-16 ENCOUNTER — OFFICE VISIT (OUTPATIENT)
Dept: FAMILY MEDICINE CLINIC | Age: 62
End: 2022-05-16
Payer: COMMERCIAL

## 2022-05-16 VITALS
SYSTOLIC BLOOD PRESSURE: 112 MMHG | TEMPERATURE: 98.1 F | OXYGEN SATURATION: 95 % | DIASTOLIC BLOOD PRESSURE: 88 MMHG | WEIGHT: 157.2 LBS | BODY MASS INDEX: 30.7 KG/M2 | HEART RATE: 75 BPM | RESPIRATION RATE: 18 BRPM

## 2022-05-16 DIAGNOSIS — F41.1 GAD (GENERALIZED ANXIETY DISORDER): ICD-10-CM

## 2022-05-16 DIAGNOSIS — F33.0 DEPRESSION, MAJOR, RECURRENT, MILD (HCC): Chronic | ICD-10-CM

## 2022-05-16 DIAGNOSIS — F43.0 PANIC ATTACK DUE TO EXCEPTIONAL STRESS: ICD-10-CM

## 2022-05-16 DIAGNOSIS — F41.0 PANIC ATTACK DUE TO EXCEPTIONAL STRESS: ICD-10-CM

## 2022-05-16 DIAGNOSIS — M62.838 MUSCLE SPASM: ICD-10-CM

## 2022-05-16 DIAGNOSIS — M25.552 ACUTE HIP PAIN, LEFT: Primary | ICD-10-CM

## 2022-05-16 DIAGNOSIS — S76.012A STRAIN OF LEFT PSOAS MUSCLE, INITIAL ENCOUNTER: ICD-10-CM

## 2022-05-16 PROCEDURE — 99214 OFFICE O/P EST MOD 30 MIN: CPT | Performed by: NURSE PRACTITIONER

## 2022-05-16 RX ORDER — METHYLPREDNISOLONE 4 MG/1
TABLET ORAL
Qty: 21 TABLET | Refills: 0 | Status: SHIPPED | OUTPATIENT
Start: 2022-05-16 | End: 2022-05-22

## 2022-05-16 RX ORDER — TIZANIDINE 2 MG/1
2 TABLET ORAL EVERY 8 HOURS PRN
Qty: 90 TABLET | Refills: 0 | Status: SHIPPED | OUTPATIENT
Start: 2022-05-16

## 2022-05-16 RX ORDER — QUETIAPINE FUMARATE 50 MG/1
50 TABLET, FILM COATED ORAL NIGHTLY
Qty: 90 TABLET | Refills: 1 | Status: SHIPPED
Start: 2022-05-16 | End: 2022-08-16 | Stop reason: DRUGHIGH

## 2022-05-16 ASSESSMENT — ENCOUNTER SYMPTOMS
SINUS PRESSURE: 0
NAUSEA: 0
CHEST TIGHTNESS: 0
ABDOMINAL PAIN: 0
TROUBLE SWALLOWING: 0
BLOOD IN STOOL: 0
SORE THROAT: 0
RHINORRHEA: 0
COUGH: 0
SHORTNESS OF BREATH: 0
BACK PAIN: 0
ALLERGIC/IMMUNOLOGIC COMMENTS: FOLLOWS WITH RHEUMATOLOGY
WHEEZING: 0
CONSTIPATION: 0
DIARRHEA: 0

## 2022-05-16 NOTE — PROGRESS NOTES
[address]  [phone]    5/15/22     Patient ID  Cecily Palomo is a 59 y.o. female  Established patient    Chief Complaint  Cecily Palomo presents today for Depression, Anxiety, and Hypertension      ASSESSMENT/PLAN  1. Acute hip pain, left  -     methylPREDNISolone (MEDROL DOSEPACK) 4 MG tablet; Take by mouth., Disp-21 tablet, R-0Normal  2. Strain of left psoas muscle, initial encounter  3. Muscle spasm  -     tiZANidine (ZANAFLEX) 2 MG tablet; Take 1 tablet by mouth every 8 hours as needed (muslce spasm), Disp-90 tablet, R-0Normal  4. Depression, major, recurrent, mild (HCC)  -     QUEtiapine (SEROQUEL) 50 MG tablet; Take 1 tablet by mouth nightly, Disp-90 tablet, R-1Normal  5. SHUN (generalized anxiety disorder)  6. Panic attack due to exceptional stress     Okay to take Seroquel with Paxil  I do not want her to also take Xanax with these, xanax is for extreme anxiety, patient verbalized understanding   Encouraged deep tissue massage for psoas muscle strain     Return in about 3 months (around 8/16/2022) for Anxiety, Depression. On this date 5/16/2022 I have spent 32+ minutes reviewing previous notes, test results and face to face with the patient discussing the diagnosis and importance of compliance with the treatment plan as well as documenting on the day of the visit.     Patient Care Team:  SLOAN Bianchi CNP as PCP - General (Nurse Practitioner Family)  SLOAN Bianchi CNP as PCP - [facility] Provider  Cliff Benito MD as Consulting Physician (Pain Management)  Zahida Mcelroy MD as Consulting Physician (Nephrology)  Juan Pablo Garrett MD as Consulting Physician (Internal Medicine)  Dorothy Burch DO as Consulting Physician (Orthopedic Surgery)    SUBJECTIVE/OBJECTIVE  History of Present illness / Visit Summary   Patient presents for follow up   Reviewed health maintenance and any recent labs/imaging    Did not compelte XR order, reviewed dexa scan   Reviewed last OV noted from another provider in the office for acute hip pain. Noted IM injection ordered. PCP called in steroids to pharmacy, not documented? Confirmed with pharmacy     Anxiety  Patient complains of evaluation of anxiety disorder, panic attacks, post traumatic stress  disorder and sleep disturbance. Complains of the following anxiety symptoms: difficulty concentrating, fatigue, feelings of losing control, insomnia, irritable. Denies current suicidal and homicidal ideation. Complains of the following side effects from the treatment: none. Depression  Patient complains of depression. Complains of depressed mood, difficulty concentrating, fatigue, impaired memory, insomnia and psychomotor agitation. Denies current suicidal and homicidal plan or intent. Current treatment includes Paxil and Seroquel and Xanax  and none. Review of Systems  Review of Systems   Constitutional: Positive for fatigue (worsening). Negative for activity change, appetite change, chills and fever. HENT: Negative for congestion, ear pain, postnasal drip, rhinorrhea, sinus pressure, sneezing, sore throat and trouble swallowing. Respiratory: Negative for cough, chest tightness, shortness of breath and wheezing. Cardiovascular: Negative for chest pain, palpitations and leg swelling. Gastrointestinal: Negative for abdominal pain, blood in stool, constipation, diarrhea and nausea. Genitourinary: Negative for difficulty urinating, dysuria, frequency, hematuria and urgency. Follows with nephrology    Musculoskeletal: Positive for arthralgias (generalized ). Negative for back pain, gait problem, joint swelling and myalgias. Worsening left hip pain    Skin: Negative for rash and wound. Allergic/Immunologic: Negative for environmental allergies.         Follows with rheumatology    Neurological: Positive for tremors (bilateral hands - improved) and numbness (LLE ). Negative for dizziness, syncope, light-headedness and headaches. Hematological: Does not bruise/bleed easily. Psychiatric/Behavioral: Positive for agitation, decreased concentration, dysphoric mood and sleep disturbance. Negative for self-injury and suicidal ideas. The patient is nervous/anxious. Worsening mood, anxiety, and depression  Sons friend  of OD recently, her sons birthday was yesterday ( of OD)          Physical exam   Physical Exam  Vitals and nursing note reviewed. Constitutional:       General: She is not in acute distress. Appearance: Normal appearance. She is well-developed, well-groomed and overweight. She is not ill-appearing or toxic-appearing. Cardiovascular:      Rate and Rhythm: Normal rate and regular rhythm. No extrasystoles are present. Heart sounds: Normal heart sounds, S1 normal and S2 normal. No murmur heard. Pulmonary:      Effort: Pulmonary effort is normal. No prolonged expiration or respiratory distress. Breath sounds: Normal breath sounds and air entry. Musculoskeletal:      Lumbar back: Spasms and tenderness present. Decreased range of motion. Left hip: Tenderness present. No crepitus. Decreased range of motion. Normal strength. Right lower le+ Edema present. Left lower le+ Edema present. Skin:     General: Skin is warm and dry. Coloration: Skin is not ashen, cyanotic, jaundiced or pale. Neurological:      Mental Status: She is alert and oriented to person, place, and time. Motor: Motor function is intact. Gait: Gait is intact. Psychiatric:         Attention and Perception: Attention and perception normal.         Mood and Affect: Affect normal. Mood is anxious and depressed. Speech: Speech normal.         Behavior: Behavior normal. Behavior is cooperative. Thought Content: Thought content normal. Thought content does not include suicidal ideation.  Thought content does not include suicidal plan. Cognition and Memory: Cognition and memory normal.         Judgment: Judgment normal.           Electronically signed by SLOAN Bianchi CNP, APRN-CNP on 5/22/2022 at 9:31 PM    Please note that this chart was generated using voice recognition Dragon dictation software. Although every effort was made to ensure the accuracy of this automated transcription, some errors in transcription may have occurred.

## 2022-05-16 NOTE — PATIENT INSTRUCTIONS
May take Seroquel and Paxil together  Xanax for anxiety   Prefer to wait to take Xanax at night only if extreme anxiety as already should have taken Seroquel   Zanaflex for muscle spasm - try not to take with Xanax     Sudeep Willowbrook [phone] text     Hold Mobic when take steroid

## 2022-06-01 ENCOUNTER — OFFICE VISIT (OUTPATIENT)
Dept: ORTHOPEDIC SURGERY | Age: 62
End: 2022-06-01

## 2022-06-01 VITALS — BODY MASS INDEX: 30.82 KG/M2 | RESPIRATION RATE: 16 BRPM | HEIGHT: 60 IN | WEIGHT: 157 LBS | OXYGEN SATURATION: 100 %

## 2022-06-01 DIAGNOSIS — M70.62 GREATER TROCHANTERIC BURSITIS OF LEFT HIP: Primary | ICD-10-CM

## 2022-06-01 DIAGNOSIS — M25.552 LEFT HIP PAIN: Primary | ICD-10-CM

## 2022-06-01 PROCEDURE — 20610 DRAIN/INJ JOINT/BURSA W/O US: CPT | Performed by: ORTHOPAEDIC SURGERY

## 2022-06-01 PROCEDURE — 99204 OFFICE O/P NEW MOD 45 MIN: CPT | Performed by: ORTHOPAEDIC SURGERY

## 2022-06-01 NOTE — LETTER
Dr. Stvee Beltran, [facility] AND SPORTS MEDICINE  [place] [address]  Dept: [phone]  Dept Fax: [phone]        6/3/22    Patient: Talisha Luciano  YOB: 1960    Dear SLOAN Maldonado CNP,    I had the pleasure of seeing one of your patients, Chalino Tapia today in the office. Below are the relevant portions of my assessment and plan of care. IMPRESSION:  1. Greater trochanteric bursitis of left hip      PLAN:  The patient underwent corticosteroid injection to the left greater trochanteric bursa area today without complications and tolerated it well. We are going to get her into physical therapy for deep instrumented soft tissue massage, dry needling, other modalities as needed. I plan to see the patient back in 3 months or sooner as necessary. Thank you for allowing me to participate in the care of this patient. I will keep you updated on this patient's follow up and I look forward to serving you and your patients again in the future. Please don't hesitate to contact me at my mobile number [phone].         Doug Link

## 2022-06-03 RX ORDER — METHYLPREDNISOLONE ACETATE 80 MG/ML
80 INJECTION, SUSPENSION INTRA-ARTICULAR; INTRALESIONAL; INTRAMUSCULAR; SOFT TISSUE ONCE
Status: SHIPPED | OUTPATIENT
Start: 2022-06-03

## 2022-06-03 RX ORDER — BUPIVACAINE HYDROCHLORIDE 2.5 MG/ML
2 INJECTION, SOLUTION INFILTRATION; PERINEURAL ONCE
Status: SHIPPED | OUTPATIENT
Start: 2022-06-03

## 2022-06-03 ASSESSMENT — ENCOUNTER SYMPTOMS
SHORTNESS OF BREATH: 0
ROS SKIN COMMENTS: NEGATIVE FOR RASH
ABDOMINAL PAIN: 0
EYE DISCHARGE: 0

## 2022-06-03 NOTE — PROGRESS NOTES
[address] [place] [address] AND SPORTS MEDICINE  [place] [address]  Dept: [phone]    Ambulatory Orthopedic Consult      CHIEF COMPLAINT:    Chief Complaint   Patient presents with    Hip Pain     Left        HISTORY OF PRESENT ILLNESS:      The patient is a 57 y.o. female who is being seen at the request of  SLOAN Finn CNP for consultation and evaluation of left hip pain. The patient reportedly has a long history of left hip greater trochanteric bursitis. She has had a left total hip arthroplasty by Dr. Sangeetha Low in 2017 and notes the greater trochanteric bursa pain ever since. She has had multiple hip injections in the past and these seem to help. She had one 6 months ago by Dr. Kailey Quinteros and noted a short period of time with improvement. She would like to consider another corticosteroid injection today. .      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     GERD (gastroesophageal reflux disease)     Myalgia and myositis     Palpitations     RA (rheumatoid arthritis) ([address]     Sprain of thoracic region     Systemic lupus erythematosus ([address]     UTI (urinary tract infection)     Viremia     Vitiligo        Past Surgical History:    Past Surgical History:   Procedure Laterality Date    JOINT REPLACEMENT Left 2009    lt knee    JOINT REPLACEMENT Right 2/2016    MANDIBLE SURGERY      TOTAL HIP ARTHROPLASTY Left 10/25/2016       Current Medications:   Current Outpatient Medications   Medication Sig Dispense Refill    tiZANidine (ZANAFLEX) 2 MG tablet Take 1 tablet by mouth every 8 hours as needed (muslce spasm) 90 tablet 0    QUEtiapine (SEROQUEL) 50 MG tablet Take 1 tablet by mouth nightly 90 tablet 1  ALPRAZolam (XANAX) 1 MG tablet TAKE ONE TABLET BY MOUTH TWICE A DAY FOR ANXIETY/INSOMNIA 60 tablet 0    alendronate (FOSAMAX) 70 MG tablet Take 1 tablet by mouth every 7 days for 12 days 12 tablet 3    vitamin D (ERGOCALCIFEROL) 1.25 MG (87502 UT) CAPS capsule Take 1 capsule by mouth once a week 12 capsule 3    PARoxetine (PAXIL) 40 MG tablet Take 1 tablet by mouth daily 90 tablet 1    Caltrate 600+D Plus Minerals (CALTRATE) 600-800 MG-UNIT TABS tablet Take 1 tablet by mouth 2 times daily 180 tablet 1    meloxicam (MOBIC) 15 MG tablet Take 1 tablet by mouth daily 90 tablet 1    Handicap Placard MISC by Does not apply route 1 each 0    Multiple Vitamins-Minerals (THERAPEUTIC MULTIVITAMIN-MINERALS) tablet Take 1 tablet by mouth daily        Current Facility-Administered Medications   Medication Dose Route Frequency Provider Last Rate Last Admin    bupivacaine (MARCAINE) 0.25 % injection 5 mg  2 mL Intra-artICUlar Once Essiedilma Mendoza,         methylPREDNISolone acetate (DEPO-MEDROL) injection 80 mg  80 mg Intra-artICUlar Once Essiedilma Mendoza,         methylPREDNISolone acetate (DEPO-MEDROL) injection 40 mg  40 mg IntraMUSCular Once Linh Barnhart MD           Allergies:    Bactrim [sulfamethoxazole-trimethoprim]    Social History:   Social History     Socioeconomic History    Marital status:      Spouse name: Not on file    Number of children: Not on file    Years of education: Not on file    Highest education level: Not on file   Occupational History    Not on file   Tobacco Use    Smoking status: Former Smoker     Packs/day: 1.00     Years: 15.00     Pack years: 15.00     Types: Cigarettes     Quit date: 1/1/2007     Years since quitting: 15.4    Smokeless tobacco: Never Used    Tobacco comment: nicorette gum use   Vaping Use    Vaping Use: Never used   Substance and Sexual Activity    Alcohol use:  Yes     Alcohol/week: 0.0 standard drinks     Comment: rarely    Drug use: No    Sexual activity: Not Currently   Other Topics Concern    Not on file   Social History Narrative    Not on file     Social Determinants of Health     Financial Resource Strain: Low Risk     Difficulty of Paying Living Expenses: Not hard at all   Food Insecurity: No Food Insecurity    Worried About Running Out of Food in the Last Year: Never true    [address] in the Last Year: Never true   Transportation Needs:     Lack of Transportation (Medical): Not on file    Lack of Transportation (Non-Medical): Not on file   Physical Activity:     Days of Exercise per Week: Not on file    Minutes of Exercise per Session: Not on file   Stress:     Feeling of Stress : Not on file   Social Connections:     Frequency of Communication with Friends and Family: Not on file    Frequency of Social Gatherings with Friends and Family: Not on file    Attends [employer] Services: Not on file    Active Member of Clubs or Organizations: Not on file    Attends Club or Organization Meetings: Not on file    Marital Status: Not on file   Intimate Partner Violence:     Fear of Current or Ex-Partner: Not on file    Emotionally Abused: Not on file    Physically Abused: Not on file    Sexually Abused: Not on file   Housing Stability:     Unable to Pay for Housing in the Last Year: Not on file    Number of [place] in the Last Year: Not on file    Unstable Housing in the Last Year: Not on file       Family History:  Family History   Problem Relation Age of Onset    Crohn's Disease Father     Other Father     Diabetes Mother     Arthritis Mother     Hypertension Mother     Stomach Cancer Maternal Grandmother     Colon Cancer Maternal Grandfather     Ovarian Cancer Sister         complications of PVD    Colon Cancer Paternal Grandfather          REVIEW OF SYSTEMS:  Review of Systems   Constitutional: Positive for activity change. Negative for fever. HENT: Negative for dental problem.     Eyes: Negative for discharge. Respiratory: Negative for shortness of breath. Cardiovascular: Negative for chest pain. Gastrointestinal: Negative for abdominal pain. Genitourinary: Negative. Musculoskeletal: Positive for arthralgias. Skin:        Negative for rash   Neurological: Positive for weakness. Psychiatric/Behavioral: Negative for confusion. I have reviewed the CC, HPI, ROS, PMH, FHX, Social History, and if not present in this note, I have reviewed in the patient's chart. I agree with the documentation provided by other staff and have reviewed their documentation prior to providing my signature indicating agreement. PHYSICAL EXAM:  Resp 16   Ht 5' (1.524 m)   Wt 157 lb (71.2 kg)   SpO2 100%   BMI 30.66 kg/m²  Body mass index is 30.66 kg/m². Physical Exam  Gen: alert and oriented to person and place. Psych:  Appropriate affect; Appropriate knowledge base; Appropriate mood; No hallucinations; Head: normocephalic, atraumatic   Chest: symmetric chest excursion; nonlabored respiratory effort. Pelvis: stable; no obvious pelvis deformity  Ortho Exam  Extremity:Evaluation of the Left  hip reveals no outward deformity. Patient ambulates with minimal shortening weightbearing phase to the  Left hip. Negative hip log roll and Stinchfield test is appreciated on the affected side. Increased pain with palpation of the greater trochanteric region of the Left  hip and posterior to the greater trochanteric region is appreciated. Increased pain with piriformis stretch of the hip is noted. Motor, sensory, vascular examination to the affected lower extremity is grossly intact without focal deficits. Patient has full range of motion of the lumbosacral spine and the knee on the affected side. Left lateral hip incision is well-healed without signs of infection.     Radiology: XR HIP 2-3 VW W PELVIS LEFT    Result Date: 6/1/2022  History: Left hip pain status post left total hip arthroplasty Findings: Low AP pelvis, AP of the left hip, crosstable lateral x-ray of the left hip done in the office today shows left total hip arthroplasty in good position. No gross signs of loosening. Pedestal at the distal femoral stem is appreciated. Significant degenerative changes right hip are appreciated on the AP pelvis. No further evidence of fracture, subluxation, dislocation, radiopaque foreign body, radiopaque tumors noted. Impression: Left total hip arthroplasty in good position as described above. Incidental finding of right hip degenerative changes as described above. After informed consent was obtained verbally, the  Left  greater trochanteric area was prepped in a sterile fashion with betadine and locally anesthesized cutaneously with ethyl chloride spray. The greater trochanteric bursal area was then injected with 2 ml 0.25% marcaine plain mixed with 80mg depo-medrol. Sterile dressing was applied. The patient tolerated the procedure well without post-injection complication noted. ASSESSMENT:     1. Greater trochanteric bursitis of left hip         PLAN:       The patient underwent corticosteroid injection to the left greater trochanteric bursa area today without complications and tolerated it well. We are going to get her into physical therapy for deep instrumented soft tissue massage, dry needling, other modalities as needed. I plan to see the patient back in 3 months or sooner as necessary. No follow-ups on file.     Orders Placed This Encounter   Medications    bupivacaine (MARCAINE) 0.25 % injection 5 mg    methylPREDNISolone acetate (DEPO-MEDROL) injection 80 mg       Orders Placed This Encounter   Procedures    Ambulatory referral to Physical Therapy     Referral Priority:   Routine     Referral Type:   Eval and Treat     Referral Reason:   Specialty Services Required     Requested Specialty:   Physical Therapist     Number of Visits Requested:   1    ND ARTHROCENTESIS ASPIR&/INJ MAJOR JT/YOLANDA W/O US       This note is created with the assistance of a speech recognition program.  While intending to generate a document that actually reflects the content of the visit, the document can still have some errors including those of syntax and sound a like substitutions which may escape proof reading.  In such instances, actual meaning can be extrapolated by contextual diversion      Electronically signed by Colonel Latif on 6/3/2022 at 4:32 PM

## 2022-06-13 ENCOUNTER — HOSPITAL ENCOUNTER (OUTPATIENT)
Dept: PHYSICAL THERAPY | Facility: CLINIC | Age: 62
Setting detail: THERAPIES SERIES
Discharge: HOME | End: 2022-06-13
Payer: COMMERCIAL

## 2022-06-13 PROCEDURE — 97161 PT EVAL LOW COMPLEX 20 MIN: CPT

## 2022-06-13 PROCEDURE — 97110 THERAPEUTIC EXERCISES: CPT

## 2022-06-13 NOTE — CONSULTS
[x] [facility]  Outpatient Rehabilitation &  Therapy  [facility]   [place]: [phone]  F: [phone]      Physical Therapy Lower Extremity Evaluation    Date:  2022  Patient: Deven Valencia  : 1960  MRN: [id number]  Physician: Dr Fany Noble: Pita Wiggins Vs.BMN, Auth After Eval (Fill out form), $0Copay;Ded$0; Coinsurance 50%, OOP$8700/$291.59met  Medical Diagnosis: LLE hip trochanteric bursitis  Rehab Codes: M70.62, M62.81 (Muscle Weakness), M62.9 (Disorder of Muscle)  Onset date: 22    Next Dr's appt. : As needed     Subjective:   CC/HPI: Pt is a 58 yo female with left hip pain that has been present since her LLE FARHAN in . She had injections in the past 6 months ago being the most recent. She had an injection with Dr Garcia Brood 22 and has felt better since. Sent for PT at this time.        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      GERD (gastroesophageal reflux disease)      Myalgia and myositis      Palpitations      RA (rheumatoid arthritis) ([address]      Sprain of thoracic region      Systemic lupus erythematosus [address] [address]      UTI (urinary tract infection)      Viremia      Vitiligo                         [x] Refer to full medical chart  In EPIC     Tests: [x] X-Ray: LLE hip negative     [] MRI:    [] Other:     Medications:  [x] Refer to full medical record [] None [] Other:  Allergies:       [x] Refer to full medical record [] None [] Other:    ADL/IADL [x] Previously independent with all [x] Currently independent with all Who currently assists the patient with task     [] Previously independent with all except: [] Currently independent with all except:     Bathing  [] Assist [] Assist     Dress/grooming [] Assist [] Assist     Transfer/mobility [] Assist [] Assist     Feeding [] Assist [] Assist     Toileting [] Assist [] Assist     Driving [] Assist [] Assist     Housekeeping [] Assist [] Assist     Grocery shop/meal prep [] Assist [] Assist          Gait Prior level of function Current level of function    [x] Independent  [] Assist [x] Independent  [] Assist   Device: [] Independent [] Independent    [] Straight Cane [] Quad cane [] Straight Cane [] Quad cane    [] Standard walker [] Rolling walker   [] 4 wheeled walker [] Standard walker [] Rolling walker   [] 4 wheeled walker    [] Wheelchair [] Wheelchair       Marital Status    Home type 1 story home   Stairs from outside 3   Stairs inside -   Employment Retired    Job status -   Work Activities/duties  -   Recreational Activities No exercise regularly     She has a pool, bike at home        Pain present?  Yes    Location LLE lateral hip   Pain Rating currently 3/10   Pain at worse 6/10   Pain at best 3/10   Description of pain ache   Altered Sensation Intermittent with pain, superficial since FARHAN surgery    What makes it worse Walking, laundry up and down the steps    What makes it better rest   Symptom progression improved since shot    Sleep              Objective:    STRENGTH    Left Right   Hip Flex 4+    Ext 3-    ABD 3-    ADD 4    Knee Flex 5    Ext 5    Ankle DF 5    PF 5    INV 5    EVER 5             ROM  ° A/P    Left Right   Hip Flex     Ext 20    ER 45    IR 30    ABD 60          OBSERVATION No Deficit Deficit Not Tested Comments   Posture       Forward Head [] [x] []    Rounded Shoulders [] [x] []    Kyphosis [] [x] []    Gait [] [x] [] Analysis:  Decreased LLE hip strength, increased genu valgus/hip add bilat        FUNCTION Normal Difficult Unable   Ambulation [] [x] []   Lift/Carry [] [x] []   Stairs [] [x] []   Bending [] [x] []   Squat [] [x] []       Flexibility Normal Left tight Right tight   Hip flexor [] [x] []   quad [] [x] []   HS [] [x] []   piriformis [] [x] []   gastroc [] [x] []    [] [] []    [] [] []    [] [] []           Functional Test: LEFS Score: 48% functionally impaired       Assessment:    Patient would benefit from skilled physical therapy services in order to decrease pain, increase flexibility and strength to improve ADLs and decrease pain     Problems:    [] ? Pain  [] ? ROM  [] ? Strength  [] ? Function        Goals  MET NOT MET ON-  GOING  Details   Date Addressed:        STG: To be met in 10 treatments           1. ? Pain: Decrease pain levels to 4/10 with ADLs []  []  []      2. ? ROM: Increase flexibility and AROM limitations throughout to equal bilat to reduce difficulty with ADLs []  []  []      3. ? Strength: Increase MMT to 5/5 throughout to ease functional limitations and mobility  []  []  []     4. Independent with Home Exercise Programs []  []  []      []  []  []     Date Addressed:        LTG: To be met in 20 treatments       1. Improve score on assessment tool LEFS from 48% impairment to less than 20% impairment  []  []  []     2. Reduce pain levels to 1-2/10 or less with ADLs []  []  []      []  []  []                Patient goals: decrease pain     Rehab Potential:  [x] Good  [] Fair  [] Poor   Suggested Professional Referral:  [x] No  [] Yes:  Barriers to Goal Achievement[de-identified]  [x] No  [] Yes:  Domestic Concerns:  [x] No  [] Yes:    Pt. Education:  [x] Plans/Goals, Risks/Benefits discussed  [x] Home exercise program    Method of Education: [x] Verbal  [x] Demo  [x] Written  Comprehension of Education:  [x] Verbalizes understanding. [x] Demonstrates understanding. [x] Needs Review. [] Demonstrates/verbalizes understanding of HEP/Ed previously given. Access Code: [email or web address]  URL: [email or web address]  Date: 06/13/2022  Prepared by: Kermitt Eisenmenger    Exercises  Clamshell - 1 x daily - 7 x weekly - 3 sets - 10 reps  Supine Bridge - 1 x daily - 7 x weekly - 3 sets - 10 reps  Sidelying Hip Abduction - 1 x daily - 7 x weekly - 3 sets - 10 reps  Inside/Outside of thigh Roller - 1 x daily - 7 x weekly - 3 sets - 10 reps      Treatment Plan:  [x] Therapeutic Exercise    [] Aquatic Therapy   [x] Manual Therapy     [] Electrical Stimulation  [x] Instruction in HEP      [] Lumbar/Cervical Traction  [x] Neuromuscular Re-education [] Cold/hotpack  [] Iontophoresis: 4 mg/mL  [x] Vasocompression (GameReady)                    Dexamethasone Sodium  [x] Gait Training             Phosphate 40-80 mAmin         []  Medication allergies reviewed for use of    Dexamethasone Sodium Phosphate 4mg/ml     with iontophoresis treatments. Pt is not allergic. Frequency:  2 x/week for 20 visits    Todays Treatment:    Exercise    LLE Hip bursitis  Reps/ Time Weight/ Level Comments         Nustep            Hip Flexor Stretch      Calf Stretch   HEP   HS Stretch   HEP   Hip ER Stretch            Sidelying Hip Abd    HEP   Clamshells   HEP   Prone Hip Extension                                                                            Specific Instructions for next treatment: advance along program as tolerated     Evaluation Complexity:  History (Personal factors, comorbidities) [x] 0 [] 1-2 [] 3+   Exam (limitations, restrictions) [x] 1-2 [] 3 [] 4+   Clinical presentation (progression) [x] Stable [] Evolving  [] Unstable   Decision Making [x] Low [] Moderate [] High    [x] Low Complexity [] Moderate Complexity [] High Complexity       Treatment Charges: Mins Units   [x] Evaluation       [x]  Low       []  Moderate       []  High 30 1   []  Modalities     [x]  Ther Exercise 10 1   []  Manual Therapy     []  Ther Activities     []  Aquatics     []  Vasocompression     []  Other       TOTAL TREATMENT TIME: 45    Time in:1100   Time Out:1150    Electronically signed by: Michael Ryder PT        Physician Signature:________________________________Date:__________________  By signing above or cosigning this note, I have reviewed this plan of care and certify a need for medically necessary rehabilitation services.      *PLEASE SIGN ABOVE AND FAX BACK ALL PAGES*

## 2022-06-16 ENCOUNTER — HOSPITAL ENCOUNTER (OUTPATIENT)
Dept: PHYSICAL THERAPY | Facility: CLINIC | Age: 62
Setting detail: THERAPIES SERIES
Discharge: HOME | End: 2022-06-16
Payer: COMMERCIAL

## 2022-06-16 NOTE — FLOWSHEET NOTE
[] [facility]) - [facility] &  Therapy  [address]    [phone]  F: [phone]   [] [address]  [facility] [address]   [address]  P: [phone]  F: [phone]  [] [address] &  Therapy  [address] [facility]  P: [phone]  F: [phone] [] [address]  P: [phone]  F: [phone]  [x] [address]  [address] [facility] [address]   [address]   [place]: [phone]  F: [phone]   [] [facility]  [address] [facility] [address]  [place]: [phone]   F: [phone]     Physical Therapy Cancel/No Show note    Date: 2022  Patient: Rachel Santa  : 1960  MRN: [id number]    Cancels/No Shows to date: 1    For today's appointment patient:    []  Cancelled    [] Rescheduled appointment    [] No-show     Reason given by patient:    []  Patient ill    []  Conflicting appointment    [] No transportation      [] Conflict with work    [x] No reason given    [] Weather related    [] [id number]    [] Other:      Comments:        [x] Next appointment was confirmed    Electronically signed by: Luciano Leger

## 2022-06-20 ENCOUNTER — HOSPITAL ENCOUNTER (OUTPATIENT)
Dept: PHYSICAL THERAPY | Facility: CLINIC | Age: 62
Setting detail: THERAPIES SERIES
Discharge: HOME | End: 2022-06-20
Payer: COMMERCIAL

## 2022-06-20 PROCEDURE — 97110 THERAPEUTIC EXERCISES: CPT

## 2022-06-20 NOTE — FLOWSHEET NOTE
[x] [facility]  Outpatient Rehabilitation &  Therapy  [facility]   [place]: [phone]  F: [phone]      Physical Therapy Daily Treatment Note    Date:  2022  Patient Name:  Shaggy Payton    :  1960  MRN: [id number]  Physician: Dr Ingrid Roberson: Alexkristen  Vs.8 [address] 22-22 Auth#[id number] $0Copay;Ded$0;Cois50%  OOP$8700/$291.59met  Medical Diagnosis: LLE hip trochanteric bursitis                 Rehab Codes: M70.62, M62.81 (Muscle Weakness), M62.9 (Disorder of Muscle)  Onset date: 22                                         Next 's appt. : As needed     Visit# / total visits: 2     Cancels/No Shows:     Subjective:    Pain:  [x] Yes  [] No Location: LLE  Pain Rating: (0-10 scale) 2/10   Pain altered Tx:  [x] No  [] Yes  Action:  Comments: Patient arrived noting improvements in symptoms but also states she is on steroids for a dental issue. Objective:     Exercise     LLE Hip bursitis  Reps/ Time Weight/ Level Comments             Nustep  10'                 Hip Flexor Stretch  1' ea       Calf Stretch  3x30\"   HEP   HS Stretch  3x30\"   HEP   Hip ER Stretch  3x30\"                 Sidelying Hip Abd   x10   HEP   Clamshells  2x10   HEP   Prone Hip Extension         Bridges  2x10                4 way hip  x10  Orange      TG squat  2x10                                                                                           Specific Instructions for next treatment: advance along program as tolerated     Treatment Charges: Mins Units   []  Modalities     [x]  Ther Exercise 30 2   []  Manual Therapy     []  Ther Activities     []  Aquatics     []  Vasocompression     []  Other     Total Treatment time 30 2       Assessment: [x] Progressing toward goals. Progressed strength program this date. Patient noted fatigue and soreness throughout treatment. Focused on mat stretching with fair tolerance.  Will monitor patients response to progressions and update HEP next visit. [] No change. [] Other:  [x] Patient would continue to benefit from skilled physical therapy services in order to: to decrease pain, increase flexibility and strength to improve ADLs and decrease pain.       Goals  MET NOT MET ON-  GOING  Details   Date Addressed:            STG: To be met in 10 treatments  ?          1. ? Pain: Decrease pain levels to 4/10 with ADLs []? ?  []??  []??      2. ? ROM: Increase flexibility and AROM limitations throughout to equal bilat to reduce difficulty with ADLs []? ?  []??  []??      3. ? Strength: Increase MMT to 5/5 throughout to ease functional limitations and mobility  []? ?  []??  []??      4. Independent with Home Exercise Programs []? ?  []??  []??        []? ?  []??  []??      Date Addressed:            LTG: To be met in 20 treatments           1. Improve score on assessment tool LEFS from 48% impairment to less than 20% impairment  []??  []??  []??      2. Reduce pain levels to 1-2/10 or less with ADLs []? ?  []??  []??        []? ?  []??  []??                     Patient goals: decrease pain     Pt. Education:  [x] Yes  [] No  [x] Reviewed Prior HEP/Ed  Method of Education: [x] Verbal  [] Demo  [] Written  Comprehension of Education:  [x] Verbalizes understanding. [] Demonstrates understanding. [] Needs review. [x] Demonstrates/verbalizes HEP/Ed previously given. Plan: [x] Continue current frequency toward long and short term goals. [x] Specific Instructions for subsequent treatments: progress strength program per patients tolerance.        Time In: 1800              Time Out: [address]    Electronically signed by:  Sully Castro PTA

## 2022-06-23 ENCOUNTER — HOSPITAL ENCOUNTER (OUTPATIENT)
Dept: PHYSICAL THERAPY | Facility: CLINIC | Age: 62
Setting detail: THERAPIES SERIES
Discharge: HOME | End: 2022-06-23
Payer: COMMERCIAL

## 2022-06-23 PROCEDURE — 97110 THERAPEUTIC EXERCISES: CPT

## 2022-06-23 NOTE — FLOWSHEET NOTE
[x] [facility]  Outpatient Rehabilitation &  Therapy  [facility]   [place]: [phone]  F: [phone]      Physical Therapy Daily Treatment Note    Date:  2022  Patient Name:  Tracie Suárez    :  1960  MRN: [id number]  Physician: Dr Ethan Elkins: Nithin Woodroselines Vs.8 [address] 22-22 Auth#[id number] $0Copay;Ded$0;Cois50%  OOP$8700/$291.59met  Medical Diagnosis: LLE hip trochanteric bursitis                 Rehab Codes: M70.62, M62.81 (Muscle Weakness), M62.9 (Disorder of Muscle)  Onset date: 22                                         Next 's appt. : As needed     Visit# / total visits: 3/8     Cancels/No Shows:     Subjective:    Pain:  [x] Yes  [] No Location: LLE  Pain Rating: (0-10 scale) 2/10   Pain altered Tx:  [x] No  [] Yes  Action:  Comments: Patient arrived noting minimal discomfort in L hip, notes feeling tightness in gluteal area. Objective:     Exercise     LLE Hip bursitis  Reps/ Time Weight/ Level Comments             Nustep  10'                 Hip Flexor Stretch  1' ea       Calf Stretch  3x30\"   HEP   HS Stretch  3x30\"   HEP   Hip ER Stretch  3x30\"                 Sidelying Hip Abd   x10   HEP   Clamshells  2x10  [place] HEP   Prone Hip Extension  2x10       Bridges  2x10                4 way hip  2x10  [place]      TG squat  2x10        Lunges  2x10                                                                             DI to L piriformis      Specific Instructions for next treatment: advance along program as tolerated     Treatment Charges: Mins Units   []  Modalities     [x]  Ther Exercise 40 3   []  Manual Therapy     []  Ther Activities     []  Aquatics     []  Vasocompression     []  Other     Total Treatment time 40 3       Assessment: [x] Progressing toward goals. Added piriformis release this date with tension noted and fair tolerance.  Patient noted decreased tension post treatment. Issued updated HEP and issued resistive band. [] No change. [] Other:  [x] Patient would continue to benefit from skilled physical therapy services in order to: to decrease pain, increase flexibility and strength to improve ADLs and decrease pain.       Goals  MET NOT MET ON-  GOING  Details   Date Addressed:            STG: To be met in 10 treatments  ?          1. ? Pain: Decrease pain levels to 4/10 with ADLs []? ?  []??  []??      2. ? ROM: Increase flexibility and AROM limitations throughout to equal bilat to reduce difficulty with ADLs []? ?  []??  []??      3. ? Strength: Increase MMT to 5/5 throughout to ease functional limitations and mobility  []? ?  []??  []??      4. Independent with Home Exercise Programs []? ?  []??  []??        []? ?  []??  []??      Date Addressed:            LTG: To be met in 20 treatments           1. Improve score on assessment tool LEFS from 48% impairment to less than 20% impairment  []??  []??  []??      2. Reduce pain levels to 1-2/10 or less with ADLs []? ?  []??  []??        []? ?  []??  []??                     Patient goals: decrease pain     Pt. Education:  [x] Yes  [] No  [x] Reviewed Prior HEP/Ed  Method of Education: [x] Verbal  [] Demo  [x] Written:  Access Code: [email or web address]  URL: [email or web address]  Date: 06/23/2022  Prepared by: Mere Julian    Exercises  Standing Hip Abduction with Anchored Resistance - 1 x daily - 7 x weekly - 3 sets - 10 reps  Standing Hip Extension with Anchored Resistance - 1 x daily - 7 x weekly - 3 sets - 10 reps  Standing Hip Adduction with Anchored Resistance - 1 x daily - 7 x weekly - 3 sets - 10 reps  Standing Repeated Hip Flexion with Resistance - 1 x daily - 7 x weekly - 3 sets - 10 reps  Standard Lunge - 1 x daily - 7 x weekly - 3 sets - 10 reps     Comprehension of Education:  [x] Verbalizes understanding. [] Demonstrates understanding. [] Needs review.   [x] Demonstrates/verbalizes HEP/Ed previously given.     Plan: [x] Continue current frequency toward long and short term goals. [x] Specific Instructions for subsequent treatments: progress strength program per patients tolerance.        Time In: 1800              Time Out:  1850    Electronically signed by:  Barbara Castano PTA

## 2022-06-30 ENCOUNTER — HOSPITAL ENCOUNTER (OUTPATIENT)
Dept: PHYSICAL THERAPY | Facility: CLINIC | Age: 62
Setting detail: THERAPIES SERIES
Discharge: HOME | End: 2022-06-30
Payer: COMMERCIAL

## 2022-06-30 PROCEDURE — 97110 THERAPEUTIC EXERCISES: CPT

## 2022-06-30 NOTE — FLOWSHEET NOTE
[x] [facility]""  Outpatient Rehabilitation &  Therapy  [facility]   [place]: [phone]  F: [phone]      Physical Therapy Daily Treatment Note    Date:  2022  Patient Name:  Nimo Rizo    :  1960  MRN: [id number]  Physician: Dr Mckinney Sa: Dayday Castillo Vs.8 [address] 22-22 Auth#[id number] $0Copay;Ded$0;Cois50%  OOP$8700/$291.59met  Medical Diagnosis: LLE hip trochanteric bursitis                 Rehab Codes: M70.62, M62.81 (Muscle Weakness), M62.9 (Disorder of Muscle)  Onset date: 22                                         Next 's appt. : As needed     Visit# / total visits:      Cancels/No Shows:     Subjective:    Pain:  [x] Yes  [] No Location: LLE  Pain Rating: (0-10 scale) 0/10   Pain altered Tx:  [x] No  [] Yes  Action:  Comments: Patient arrived stating she has some soreness around her L piriformis and feels it will sometimes Graciela Rogers out. \" Patient reports she has not done any exercises because she unloaded 44 bags of mulch and has been swimming and \"that is enough exercise. \"     Objective:     Exercise     LLE Hip bursitis  Reps/ Time Weight/ Level Comments             Nustep  10'                 Hip Flexor Stretch  1' ea       Calf Stretch  3x30\"   HEP   HS Stretch  3x30\"   HEP   Piriformis stretch   3x30\"                 Sidelying Hip Abd   x10   HEP   Clamshells  2x10  [place] HEP   Prone Hip Extension  2x10       Bridges  2x10                4 way hip  x15  [place]      TGym squat  2x10  L20      Lunges  2x10                                     DI to bilateral piriformis R>L     Specific Instructions for next treatment: advance along program as tolerated     Treatment Charges: Mins Units   []  Modalities     [x]  Ther Exercise 35 2   []  Manual Therapy     []  Ther Activities     []  Aquatics     []  Vasocompression     []  Other     Total Treatment time 35 2       Assessment: [x] Progressing toward goals. Address manual with significant muscle tension noted in R>L, improved mobility post. Educated patient to stretch more often if she is doing yard work due to increased muscle tension. Continued with program, patient notes quick fatigue requiring rest breaks throughout. Discussed using tennis ball to apply self MFR to her piriformis along with a handout to follow. Will continue to advance program next visit. [] No change. [] Other:  [x] Patient would continue to benefit from skilled physical therapy services in order to: to decrease pain, increase flexibility and strength to improve ADLs and decrease pain.       Goals  MET NOT MET ON-  GOING  Details   Date Addressed:            STG: To be met in 10 treatments  ?          1. ? Pain: Decrease pain levels to 4/10 with ADLs []? ?  []??  []??      2. ? ROM: Increase flexibility and AROM limitations throughout to equal bilat to reduce difficulty with ADLs []? ?  []??  []??      3. ? Strength: Increase MMT to 5/5 throughout to ease functional limitations and mobility  []? ?  []??  []??      4. Independent with Home Exercise Programs []? ?  []??  []??        []? ?  []??  []??      Date Addressed:            LTG: To be met in 20 treatments           1. Improve score on assessment tool LEFS from 48% impairment to less than 20% impairment  []??  []??  []??      2. Reduce pain levels to 1-2/10 or less with ADLs []? ?  []??  []??        []? ?  []??  []??                     Patient goals: decrease pain     Pt. Education:  [x] Yes  [] No  [x] Reviewed Prior HEP/Ed  Method of Education: [x] Verbal  [] Demo  [x] Written:  Access Code: [email or web address]  URL: [email or web address]  Date: 06/23/2022  Prepared by: Jericho Show    Exercises  Standing Hip Abduction with Anchored Resistance - 1 x daily - 7 x weekly - 3 sets - 10 reps  Standing Hip Extension with Anchored Resistance - 1 x daily - 7 x weekly - 3 sets - 10 reps  Standing Hip Adduction with Anchored Resistance - 1 x daily - 7 x weekly - 3 sets - 10 reps  Standing Repeated Hip Flexion with Resistance - 1 x daily - 7 x weekly - 3 sets - 10 reps  Standard Lunge - 1 x daily - 7 x weekly - 3 sets - 10 reps     Access Code: [email or web address]  URL: [email or web address]  Date: 06/30/2022  Prepared by: Shreyas Rodriguez    Exercises  Supine Piriformis Stretch - 1 x daily - 7 x weekly - 3 sets - 30 second hold  Piriformis Mobilization with Small Ball - 1 x daily - 7 x weekly - as long as you need to hold      Comprehension of Education:  [x] Verbalizes understanding. [x] Demonstrates understanding. [] Needs review. [x] Demonstrates/verbalizes HEP/Ed previously given. Plan: [x] Continue current frequency toward long and short term goals. [x] Specific Instructions for subsequent treatments: progress strength program per patients tolerance.        Time In: 5:55pm              Time Out: 6:40pm      Electronically signed by:  Shreyas Rodriguez PTA

## 2022-07-12 DIAGNOSIS — F41.1 GAD (GENERALIZED ANXIETY DISORDER): ICD-10-CM

## 2022-07-12 RX ORDER — ALPRAZOLAM 1 MG/1
TABLET ORAL
Qty: 60 TABLET | Refills: 0 | Status: SHIPPED | OUTPATIENT
Start: 2022-07-12 | End: 2022-09-09 | Stop reason: SDUPTHER

## 2022-07-12 NOTE — TELEPHONE ENCOUNTER
Last visit: 05/16/2022  Last Med refill: 05/10/2022  Does patient have enough medication for 72 hours: Yes    Next Visit Date:  Future Appointments   Date Time Provider Anna Chu   7/13/2022  6:00 PM Jt Nick, PTA [address]   7/15/2022 11:30 AM Jesus Manuel Lazo, PTA [facility] [address]   8/16/2022  2:00 PM Jeanine Trevino, APRN - CNP [place] [facility] [address] Maintenance   Topic Date Due    Cervical cancer screen  07/07/2022    Shingles vaccine (1 of 2) 04/18/2023 (Originally 6/2/2008)    COVID-19 Vaccine (1) 04/14/2025 (Originally 6/2/1965)    Flu vaccine (1) 09/01/2022    Depression Monitoring  02/09/2023    Colorectal Cancer Screen  04/26/2023    Breast cancer screen  09/16/2023    Lipids  02/09/2027    DTaP/Tdap/Td vaccine (2 - Td or Tdap) 07/22/2030    Hepatitis C screen  Completed    HIV screen  Completed    Hepatitis A vaccine  Aged Out    Hepatitis B vaccine  Aged Out    Hib vaccine  Aged Out    Meningococcal (ACWY) vaccine  Aged Out    Pneumococcal 0-64 years Vaccine  Aged Out       No results found for: LABA1C          ( goal A1C is < 7)   No results found for: LABMICR  LDL Cholesterol (mg/dL)   Date Value   05/24/2021 139 (H)   09/23/2019 93     LDL Calculated (mg/dL)   Date Value   02/09/2022 144 (H)   12/16/2016 131       (goal LDL is <100)   AST (U/L)   Date Value   02/09/2022 16     ALT (U/L)   Date Value   02/09/2022 19     BUN (mg/dL)   Date Value   02/09/2022 11     BP Readings from Last 3 Encounters:   05/16/22 112/88   04/18/22 120/70   02/09/22 102/62          (goal 120/80)    All Future Testing planned in [facility]  Lab Frequency Next Occurrence   XR HIP LEFT (2-3 VIEWS) Once 04/18/2022               Patient Active Problem List:     Essential hypertension     Depression, major, recurrent, mild (HCC)     Hematuria     Collagenous colitis     Lupus (HCC)     SHUN (generalized anxiety disorder)     Chronic pain of both knees     Panic attack due to exceptional stress     Osteoporosis

## 2022-08-16 ENCOUNTER — OFFICE VISIT (OUTPATIENT)
Dept: FAMILY MEDICINE CLINIC | Age: 62
End: 2022-08-16
Payer: COMMERCIAL

## 2022-08-16 VITALS
SYSTOLIC BLOOD PRESSURE: 112 MMHG | DIASTOLIC BLOOD PRESSURE: 84 MMHG | TEMPERATURE: 97.6 F | BODY MASS INDEX: 29.49 KG/M2 | WEIGHT: 151 LBS | HEART RATE: 74 BPM | RESPIRATION RATE: 16 BRPM | OXYGEN SATURATION: 97 %

## 2022-08-16 DIAGNOSIS — F51.04 PSYCHOPHYSIOLOGICAL INSOMNIA: ICD-10-CM

## 2022-08-16 DIAGNOSIS — R00.2 HEART PALPITATIONS: ICD-10-CM

## 2022-08-16 DIAGNOSIS — M62.838 MUSCLE SPASM: ICD-10-CM

## 2022-08-16 DIAGNOSIS — M25.512 ACUTE PAIN OF LEFT SHOULDER: Primary | ICD-10-CM

## 2022-08-16 DIAGNOSIS — F41.1 GAD (GENERALIZED ANXIETY DISORDER): ICD-10-CM

## 2022-08-16 DIAGNOSIS — I10 ESSENTIAL HYPERTENSION: ICD-10-CM

## 2022-08-16 DIAGNOSIS — F33.0 DEPRESSION, MAJOR, RECURRENT, MILD (HCC): ICD-10-CM

## 2022-08-16 DIAGNOSIS — M54.2 MUSCLE PAIN, CERVICAL: ICD-10-CM

## 2022-08-16 PROCEDURE — 99214 OFFICE O/P EST MOD 30 MIN: CPT | Performed by: NURSE PRACTITIONER

## 2022-08-16 RX ORDER — PAROXETINE HYDROCHLORIDE 40 MG/1
40 TABLET, FILM COATED ORAL DAILY
Qty: 90 TABLET | Refills: 1 | Status: SHIPPED | OUTPATIENT
Start: 2022-08-16 | End: 2023-08-16

## 2022-08-16 RX ORDER — MELOXICAM 15 MG/1
15 TABLET ORAL DAILY
Qty: 90 TABLET | Refills: 1 | Status: SHIPPED | OUTPATIENT
Start: 2022-08-16 | End: 2023-02-12

## 2022-08-16 RX ORDER — QUETIAPINE FUMARATE 25 MG/1
25 TABLET, FILM COATED ORAL NIGHTLY
Qty: 90 TABLET | Refills: 1 | Status: SHIPPED | OUTPATIENT
Start: 2022-08-16 | End: 2022-10-28 | Stop reason: ALTCHOICE

## 2022-08-16 ASSESSMENT — ENCOUNTER SYMPTOMS
SHORTNESS OF BREATH: 0
BLOOD IN STOOL: 0
COUGH: 0
CONSTIPATION: 0
CHEST TIGHTNESS: 1
NAUSEA: 0
WHEEZING: 0
BACK PAIN: 0
SORE THROAT: 0
RHINORRHEA: 0
TROUBLE SWALLOWING: 0
ABDOMINAL PAIN: 0
SINUS PRESSURE: 0
ALLERGIC/IMMUNOLOGIC COMMENTS: FOLLOWS WITH RHEUMATOLOGY
DIARRHEA: 0

## 2022-08-16 NOTE — PROGRESS NOTES
[address]  [phone]    8/16/22     Patient ID  June Martinez is a 57 y.o. female  Established patient    Chief Complaint  June Martinez presents today for Depression, Anxiety, and Shoulder Pain (LT side. Onset 1 month. Massage therapy- tried infared heat)    Have you seen any other physician or provider since your last visit? Yes - Records Obtained Ortho, PT  Have you had any other diagnostic tests since your last visit? Yes - Records Obtained XR Hip  Have you been seen in the emergency room and/or had an admission to a hospital since we last saw you? No     ASSESSMENT/PLAN  1. Acute pain of left shoulder  2. Muscle pain, cervical  -     meloxicam (MOBIC) 15 MG tablet; Take 1 tablet by mouth in the morning., Disp-90 tablet, R-1Normal  3. Muscle spasm  4. Depression, major, recurrent, mild (HCC)  -     PARoxetine (PAXIL) 40 MG tablet; Take 1 tablet by mouth in the morning., Disp-90 tablet, R-1Normal  5. SHUN (generalized anxiety disorder)  -     Holter Monitor 48 Hour; Future  -     Echocardiogram complete; Future  6. Psychophysiological insomnia  -     QUEtiapine (SEROQUEL) 25 MG tablet; Take 1 tablet by mouth nightly, Disp-90 tablet, R-1Normal  7. Essential hypertension  -     VL DUP CAROTID BILATERAL; Future  -     EKG 12 lead; Future  8. Heart palpitations  -     Holter Monitor 48 Hour; Future  -     Echocardiogram complete; Future  -     VL DUP CAROTID BILATERAL; Future  -     EKG 12 lead; Future     Send to [place] - left shoulder pain  Plan to wait for imaging until ortho seen? Cardiac workup for ongoing palpitations. Refer to cardiology? Medications refilled. Return in about 2 months (around 10/16/2022) for Depression, Anxiety.       Patient Care Team:  SLOAN Payton CNP as PCP - General (Nurse Practitioner Family)  SLOAN Payton CNP as PCP - [facility] Provider  Maribell Resendez MD as Consulting concentration, dysphoric mood and sleep disturbance. Negative for self-injury and suicidal ideas. The patient is nervous/anxious. Physical exam   Physical Exam  Vitals and nursing note reviewed. Constitutional:       General: She is not in acute distress. Appearance: Normal appearance. She is well-developed, well-groomed and overweight. She is not ill-appearing or toxic-appearing. Cardiovascular:      Rate and Rhythm: Normal rate and regular rhythm. No extrasystoles are present. Heart sounds: Normal heart sounds, S1 normal and S2 normal. No murmur heard. Pulmonary:      Effort: Pulmonary effort is normal. No prolonged expiration or respiratory distress. Breath sounds: Normal breath sounds and air entry. Musculoskeletal:      Left shoulder: Tenderness and bony tenderness present. No swelling, effusion or crepitus. Decreased range of motion. Decreased strength. Right lower leg: No edema. Left lower leg: No edema. Skin:     General: Skin is warm and dry. Coloration: Skin is not ashen, cyanotic, jaundiced or pale. Neurological:      Mental Status: She is alert and oriented to person, place, and time. Motor: Motor function is intact. Gait: Gait is intact. Psychiatric:         Attention and Perception: Attention and perception normal.         Mood and Affect: Mood and affect normal.         Speech: Speech normal.         Behavior: Behavior normal. Behavior is cooperative. Thought Content: Thought content normal. Thought content does not include suicidal ideation. Thought content does not include suicidal plan. Cognition and Memory: Cognition and memory normal.         Judgment: Judgment normal.         Electronically signed by Christyne Dayhoff, APRN - CNP, APRN-CNP on 8/16/2022 at 9:10 PM    Please note that this chart was generated using voice recognition Dragon dictation software.   Although every effort was made to ensure the accuracy of

## 2022-09-08 DIAGNOSIS — F41.1 GAD (GENERALIZED ANXIETY DISORDER): ICD-10-CM

## 2022-09-08 NOTE — TELEPHONE ENCOUNTER
Last visit: 8/16/22  Last Med refill: 7/12/22  Does patient have enough medication for 72 hours: Yes    Next Visit Date:  Future Appointments   Date Time Provider Anna Chu   10/28/2022 10:30 AM SLOAN Parker CNP [facility] [address] Maintenance   Topic Date Due    Cervical cancer screen  07/07/2022    Flu vaccine (1) Never done    Shingles vaccine (1 of 2) 04/18/2023 (Originally 6/2/2008)    COVID-19 Vaccine (1) 04/14/2025 (Originally 1960)    Depression Monitoring  02/09/2023    Colorectal Cancer Screen  04/26/2023    Breast cancer screen  09/16/2023    Lipids  02/09/2027    DTaP/Tdap/Td vaccine (2 - Td or Tdap) 07/22/2030    Hepatitis C screen  Completed    HIV screen  Completed    Hepatitis A vaccine  Aged Out    Hepatitis B vaccine  Aged Out    Hib vaccine  Aged Out    Meningococcal (ACWY) vaccine  Aged Out    Pneumococcal 0-64 years Vaccine  Aged Out       No results found for: LABA1C          ( goal A1C is < 7)   No results found for: LABMICR  LDL Cholesterol (mg/dL)   Date Value   05/24/2021 139 (H)   09/23/2019 93     LDL Calculated (mg/dL)   Date Value   02/09/2022 144 (H)   12/16/2016 131       (goal LDL is <100)   AST (U/L)   Date Value   02/09/2022 16     ALT (U/L)   Date Value   02/09/2022 19     BUN (mg/dL)   Date Value   02/09/2022 11     BP Readings from Last 3 Encounters:   08/16/22 112/84   05/16/22 112/88   04/18/22 120/70          (goal 120/80)    All Future Testing planned in [facility]  Lab Frequency Next Occurrence   XR HIP LEFT (2-3 VIEWS) Once 04/18/2022   Holter Monitor 48 Hour Once 08/17/2022   Echocardiogram complete Once 08/16/2022   VL DUP CAROTID BILATERAL Once 08/16/2022   EKG 12 lead Once 08/16/2022               Patient Active Problem List:     Essential hypertension     Depression, major, recurrent, mild (HCC)     Hematuria     Collagenous colitis     Lupus (HCC)     SHUN (generalized anxiety disorder)     Chronic pain of both knees     Panic attack due to exceptional stress     Osteoporosis

## 2022-09-09 RX ORDER — ALPRAZOLAM 1 MG/1
1 TABLET ORAL 2 TIMES DAILY
Qty: 60 TABLET | Refills: 0 | Status: SHIPPED | OUTPATIENT
Start: 2022-09-09 | End: 2022-10-09

## 2022-09-16 DIAGNOSIS — M25.512 LEFT SHOULDER PAIN, UNSPECIFIED CHRONICITY: Primary | ICD-10-CM

## 2022-09-19 ENCOUNTER — OFFICE VISIT (OUTPATIENT)
Dept: ORTHOPEDIC SURGERY | Age: 62
End: 2022-09-19
Payer: COMMERCIAL

## 2022-09-19 VITALS — RESPIRATION RATE: 12 BRPM | WEIGHT: 151 LBS | BODY MASS INDEX: 29.64 KG/M2 | HEIGHT: 60 IN

## 2022-09-19 DIAGNOSIS — M54.12 CERVICAL RADICULOPATHY: ICD-10-CM

## 2022-09-19 DIAGNOSIS — M75.102 ROTATOR CUFF SYNDROME OF LEFT SHOULDER: Primary | ICD-10-CM

## 2022-09-19 DIAGNOSIS — M25.512 TRIGGER POINT OF LEFT SHOULDER REGION: ICD-10-CM

## 2022-09-19 PROCEDURE — 20611 DRAIN/INJ JOINT/BURSA W/US: CPT | Performed by: PHYSICIAN ASSISTANT

## 2022-09-19 PROCEDURE — 99213 OFFICE O/P EST LOW 20 MIN: CPT | Performed by: PHYSICIAN ASSISTANT

## 2022-09-19 RX ORDER — LIDOCAINE HYDROCHLORIDE 10 MG/ML
2 INJECTION, SOLUTION INFILTRATION; PERINEURAL ONCE
Status: COMPLETED | OUTPATIENT
Start: 2022-09-19 | End: 2022-09-19

## 2022-09-19 RX ORDER — METHYLPREDNISOLONE ACETATE 80 MG/ML
80 INJECTION, SUSPENSION INTRA-ARTICULAR; INTRALESIONAL; INTRAMUSCULAR; SOFT TISSUE ONCE
Status: COMPLETED | OUTPATIENT
Start: 2022-09-19 | End: 2022-09-19

## 2022-09-19 RX ORDER — TIZANIDINE 4 MG/1
4 TABLET ORAL 3 TIMES DAILY
Qty: 30 TABLET | Refills: 0 | Status: SHIPPED | OUTPATIENT
Start: 2022-09-19 | End: 2022-09-29

## 2022-09-19 RX ADMIN — METHYLPREDNISOLONE ACETATE 80 MG: 80 INJECTION, SUSPENSION INTRA-ARTICULAR; INTRALESIONAL; INTRAMUSCULAR; SOFT TISSUE at 13:07

## 2022-09-19 RX ADMIN — LIDOCAINE HYDROCHLORIDE 2 ML: 10 INJECTION, SOLUTION INFILTRATION; PERINEURAL at 13:07

## 2022-09-19 ASSESSMENT — ENCOUNTER SYMPTOMS
ABDOMINAL PAIN: 0
ABDOMINAL DISTENTION: 0
RESPIRATORY NEGATIVE: 1
APNEA: 0
CONSTIPATION: 0
NAUSEA: 0
COUGH: 0
SHORTNESS OF BREATH: 0
COLOR CHANGE: 0
DIARRHEA: 0
VOMITING: 0
CHEST TIGHTNESS: 0

## 2022-09-19 NOTE — PROGRESS NOTES
[address] [facility]  [address] [place] [id number]  Dept: [phone]  Dept Fax: [phone]        Left Shoulder - New Patient     Chief Complaint:     Chief Complaint   Patient presents with    Shoulder Pain     Left Shoulder Pain     HPI:     Ruy Ko is a 57y.o. year old right hand dominant female that has had pain in the left shoulder for 2 months. As far as any trauma to the shoulder, the patient indicates none. She states she was cleaning the pool and began having some shoulder pain. He was seen by her primary care office 8/26/2022 for her left shoulder pain. The pain is worse at night and when doing overhead activities. Weakness of the shoulder has been noted. The pain restricts activities such as sleep, lifting, shut her car door. The pain does not seem to improve with time. The following interventions/medications have been tried mobic, Tylenol, ice, heat. The patient has not had a corticosteroid injection. The patient has not tried physical therapy. The patient has not has surgery. The opposite shoulder is  okay. Neck pain has not been present. She states that sometimes when she is laying flat she will have pain under her armpit and into her shoulder blade. She does have a history of degenerative disc disease of the neck. Review of Systems   Constitutional:  Positive for activity change. Negative for appetite change, fatigue and fever. Respiratory: Negative. Negative for apnea, cough, chest tightness and shortness of breath. Cardiovascular: Negative. Negative for chest pain, palpitations and leg swelling. Gastrointestinal:  Negative for abdominal distention, abdominal pain, constipation, diarrhea, nausea and vomiting. Genitourinary:  Negative for difficulty urinating, dysuria and hematuria. Musculoskeletal:  Positive for arthralgias.  Negative for gait problem, joint swelling and myalgias. Skin:  Negative for color change and rash. Neurological:  Positive for weakness. Negative for dizziness, numbness and headaches. Psychiatric/Behavioral:  Positive for sleep disturbance. 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     GERD (gastroesophageal reflux disease)     Myalgia and myositis     Palpitations     RA (rheumatoid arthritis) ([facility])     Sprain of thoracic region     Systemic lupus erythematosus (HCC)     UTI (urinary tract infection)     Viremia     Vitiligo        Past Surgical History:    Past Surgical History:   Procedure Laterality Date    JOINT REPLACEMENT Left 2009    lt knee    JOINT REPLACEMENT Right 2/2016    MANDIBLE SURGERY      TOTAL HIP ARTHROPLASTY Left 10/25/2016       CurrentMedications:   Current Outpatient Medications   Medication Sig Dispense Refill    tiZANidine (ZANAFLEX) 4 MG tablet Take 1 tablet by mouth 3 times daily for 10 days 30 tablet 0    ALPRAZolam (XANAX) 1 MG tablet Take 1 tablet by mouth 2 times daily for 30 days. TAKE ONE TABLET BY MOUTH TWICE A DAY FOR ANXIETY/INSOMNIA 60 tablet 0    meloxicam (MOBIC) 15 MG tablet Take 1 tablet by mouth in the morning. 90 tablet 1    QUEtiapine (SEROQUEL) 25 MG tablet Take 1 tablet by mouth nightly 90 tablet 1    PARoxetine (PAXIL) 40 MG tablet Take 1 tablet by mouth in the morning.  90 tablet 1    tiZANidine (ZANAFLEX) 2 MG tablet Take 1 tablet by mouth every 8 hours as needed (muslce spasm) 90 tablet 0    alendronate (FOSAMAX) 70 MG tablet Take 1 tablet by mouth every 7 days for 12 days 12 tablet 3    vitamin D (ERGOCALCIFEROL) 1.25 MG (70148 UT) CAPS capsule Take 1 capsule by mouth once a week 12 capsule 3    Caltrate 600+D Plus Minerals (CALTRATE) 600-800 MG-UNIT TABS tablet Take 1 tablet by mouth 2 times daily 180 tablet 1    Handicap Placard MISC by Does not apply route 1 each 0    Multiple Vitamins-Minerals (THERAPEUTIC MULTIVITAMIN-MINERALS) tablet Take 1 tablet by mouth daily        Current Facility-Administered Medications   Medication Dose Route Frequency Provider Last Rate Last Admin    bupivacaine (MARCAINE) 0.25 % injection 5 mg  2 mL Intra-artICUlar Once Leopold Midget, DO        methylPREDNISolone acetate (DEPO-MEDROL) injection 80 mg  80 mg Intra-artICUlar Once Leopold Midget, DO        methylPREDNISolone acetate (DEPO-MEDROL) injection 40 mg  40 mg IntraMUSCular Once Talon Alex MD           Allergies:    Bactrim [sulfamethoxazole-trimethoprim]    Social History:   Social History     Socioeconomic History    Marital status:      Spouse name: None    Number of children: None    Years of education: None    Highest education level: None   Tobacco Use    Smoking status: Former     Packs/day: 1.00     Years: 15.00     Pack years: 15.00     Types: Cigarettes     Quit date: 1/1/2007     Years since quitting: 15.7    Smokeless tobacco: Never    Tobacco comments:     nicorette gum use   Vaping Use    Vaping Use: Never used   Substance and Sexual Activity    Alcohol use:  Yes     Alcohol/week: 0.0 standard drinks     Comment: rarely    Drug use: No    Sexual activity: Not Currently     Social Determinants of Health     Financial Resource Strain: Low Risk     Difficulty of Paying Living Expenses: Not hard at all   Food Insecurity: No Food Insecurity    Worried About Running Out of Food in the Last Year: Never true    Ran Out of Food in the Last Year: Never true       Family History:  Family History   Problem Relation Age of Onset    Crohn's Disease Father     Other Father     Diabetes Mother     Arthritis Mother     Hypertension Mother     Stomach Cancer Maternal Grandmother     Colon Cancer Maternal Grandfather     Ovarian Cancer Sister         complications of PVD    Colon Cancer Paternal Grandfather        I have reviewed the CC, HPI, ROS, PMH, FHX, Social History, and if not present in this note, I have reviewed in the patient's chart. I agree with the documentation provided by other staff and have reviewed their documentation prior to providing my signature indicating agreement. Vitals:   Resp 12   Ht 5' (1.524 m)   Wt 151 lb (68.5 kg)   BMI 29.49 kg/m²  Body mass index is 29.49 kg/m². Physical Examination:     Orthopedics:    GENERAL: Alert and oriented X3 in no acute distress. SKIN: Intact without lesions or ulcerations. NEURO: Musculoskeletal and axillary nerves intact to sensory and motor testing. VASC: Capillary refill is less than 3 seconds. Left Shoulder Exam    GEN: Alert and oriented X 3, in no acute distress. SKIN: Intact without rashes, lesions, or ulcerations. NEURO: Musculoskeletal ans axillary nerves intact to sensory and motor testing. VASC: Cap refill less than than 3 secs. Negative Adson's test, Negative Laura's test.  ROM: 150 degrees of forward elevation, 45 degrees of external rotation in neutral, 90 degrees of external rotation in abduction, internal rotation to T8. STRENGTH: Supraspinatus 5/5, external rotators 5/5. MUSC: No atrophy, negative subscap lift off or belly press test.  IMP: + Neer's sign, + Hawkin's sign, no Coracoid impingement, + painful arc, + pain with cross body abduction. PALP: no pain over anterolateral acromion, + pain over AC joint, + pain over traps/rhomboids. INST: + [place]'s test, + Speed's test    Cervical Spine Exam    GEN:  Alert and oriented X 3 in no acute distress  GAIT:  Normal speed and steady  SKIN:  Intact without lesions or ulcerations. ROM: Cervical spine contour has normal lordosis. ROM is not painful. There is no limitation of: Forward flexion, Extension, Right side bending, Left side bending, Right rotation or Left rotation. PALP: Palpation reveals trapezius trigger point.   NEURO: Sensation intact to light touch over C-5 through T-1 dermatomes. Reflexes:           Bicep: 2+/4           Tricep: 2+/4          Brachioradialis. 2+]/4   VASC: Cap refill less than 2 sec.  negative Adson, Micaela and hyperabduction tests. TESTS:   Lamb's reflex: negative  Diadochokinesis test: negative   Babinski: negative   Clonus: negative   Spurling's maneuver: positive on Left side. Lhermitte's sign: negative  Shoulder abduction relief sign: negative     Valentina's sign: negative        Assessment:     1. Rotator cuff syndrome of left shoulder    2. Trigger point of left shoulder region    3. Cervical radiculopathy        Procedures:    Procedure: yes    Subacromial Bursa Injection    Location: Left Shoulder  Procedure: I discussed in detail the risks, benefits and complications of an injection which included but are not limited to infection, skin reactions, hot swollen joint and anaphylaxis with the patient. The patient verbalized understanding and gave informed consent for the injection. The skin was prepped with betadine in a sterile fashion. Under these sterile conditions, a Proximus ultrasound unit with a variable frequency linear transducer was used for precise placement of a 22-gauge needle into the subacromial bursa. A clean technique was utilized using sterile gloves and after prepping the patient under the stated sterile conditions, the patient was placed in the Seated position on the exam table. The posterior soft spot approximately 2 cm distal and 2 cm medial to the posterior acromial edge was identified and marked and a 3 cc solution containing 2 cc of 1% Lidocainewith 1 cc containing 80 mg of Depomedrol was injected into the subacromial space with the 22-gauge needle. The needled was withdrawn and the injection site was cleansed. A Band-Aid was placed over the injection site. There was no resistance to the injection and the patient tolerated the procedure well without difficulty.  Adverse reactions of the injection was discussed with the patient including signs of infection, increasing pain, redness, swelling, warmth, fever, chills and the patient was instructed to call immediately with any of these symptoms. Successful needle placement was achieved and final images were taken and saved in the patient's chart for the permanent record. The images are stored on SD card in the machine until downloaded to the patient's chart. Radiology:   SHOULDER X-RAY    Two views of the left shoulder and 2 views of the scapula, including AP, scapular Y, outlet and axillary views reveal: The glenohumeral joint is well reduced with mild arthritic changes. Proximal migration of the humeral head has not occurred. Acromion is a type I. The acromioclavicular joint shows mild degenerative changes. Impression: Negative radiographs of the left shoulder      Plan:   Treatment : I reviewed the x-ray with the patient and I informed them that the x-ray shows some very mild degenerative changes but overall is negative. We discussed the etiologies and natural histories of rotator cuff syndrome on the left shoulder with the trapezius trigger point and cervical radiculopathy. We discussed the various treatment alternatives including anti-inflammatory medications, physical therapy, injections, further imaging studies and as a last result surgery. During today's visit, discussed I think majority of her pain is coming from her shoulder but I think she also has a little cervical radiculopathy going. Like her to go to physical therapy a week after she gets her injection. I do want her to start there exercises of her neck and her shoulder on her own just with some stretching. At this time, the patient has opted for a cortisone injection into the left subacromial bursa to help reduce inflammation and pain. The injection site should never get red, hot, or swollen and if it does the patient will contact our office right away.  The patient may experience a increase in soreness the first 24-48 hours due to a cortisone flair and can take anti-inflammatories for a short period of time to reduce that soreness. The patient should not submerge the injection site in water for a minimum of 24 hours to avoid infection. This means no lakes, pools, ponds, or hot tubs for 24 hours. If the patient is diabetic the injection may increase their blood sugar for up to one week. The patient can do this cortisone injection once every 3 months as needed. If the injections stop working and do not give the patient relief the patient should consider surgical interventions to produce long term relief. A physical therapy prescription was given. Next visit we could consider a bicep tendon sheath injection if she still having pain or a AC joint injection. Also states she has had significant pain sleeping so due to the cervical radiculopathy I will give her some Zanaflex that she can use at night to help her sleep and will also help her be more successful at therapy. I did explain that she cannot drive and take the Zanaflex. They can be cut in half if they make her too sleepy. She can continue the meloxicam that she has at home. Patient should return to the clinic in 6 weeks to follow up with Margie Le PA-C. The patient will call the office immediately with any problems.       Orders Placed This Encounter   Medications    methylPREDNISolone acetate (DEPO-MEDROL) injection 80 mg    lidocaine 1 % injection 2 mL    tiZANidine (ZANAFLEX) 4 MG tablet     Sig: Take 1 tablet by mouth 3 times daily for 10 days     Dispense:  30 tablet     Refill:  0         Orders Placed This Encounter   Procedures    [facility] Physical [facility]     Referral Priority:   Routine     Referral Type:   Eval and Treat     Referral Reason:   Specialty Services Required     Requested Specialty:   Physical Therapist     Number of Visits Requested:   1         This note is created with the assistance of a speech recognition program.  While intending to generate a document that actually reflects the content of the visit, the document can still have some errors including those of syntax and sound a like substitutions which may escape proof reading.   In such instances, actual meaning can be extrapolated by contextual diversion     Electronically signed by Dane Cyr PA-C, on 9/19/2022 at 2:21 PM

## 2022-10-28 ENCOUNTER — OFFICE VISIT (OUTPATIENT)
Dept: FAMILY MEDICINE CLINIC | Age: 62
End: 2022-10-28
Payer: COMMERCIAL

## 2022-10-28 ENCOUNTER — HOSPITAL ENCOUNTER (OUTPATIENT)
Age: 62
Setting detail: SPECIMEN
Discharge: HOME | End: 2022-10-28

## 2022-10-28 VITALS
TEMPERATURE: 97.4 F | OXYGEN SATURATION: 96 % | WEIGHT: 151.8 LBS | HEIGHT: 60 IN | BODY MASS INDEX: 29.8 KG/M2 | DIASTOLIC BLOOD PRESSURE: 80 MMHG | HEART RATE: 76 BPM | SYSTOLIC BLOOD PRESSURE: 108 MMHG | RESPIRATION RATE: 18 BRPM

## 2022-10-28 DIAGNOSIS — Z01.419 PAP SMEAR, AS PART OF ROUTINE GYNECOLOGICAL EXAMINATION: Primary | ICD-10-CM

## 2022-10-28 DIAGNOSIS — F33.0 DEPRESSION, MAJOR, RECURRENT, MILD (HCC): ICD-10-CM

## 2022-10-28 DIAGNOSIS — Z12.31 SCREENING MAMMOGRAM FOR BREAST CANCER: ICD-10-CM

## 2022-10-28 PROCEDURE — 99396 PREV VISIT EST AGE 40-64: CPT | Performed by: NURSE PRACTITIONER

## 2022-10-28 PROCEDURE — 3074F SYST BP LT 130 MM HG: CPT | Performed by: NURSE PRACTITIONER

## 2022-10-28 PROCEDURE — 3078F DIAST BP <80 MM HG: CPT | Performed by: NURSE PRACTITIONER

## 2022-10-28 RX ORDER — BUPROPION HYDROCHLORIDE 150 MG/1
150 TABLET ORAL EVERY MORNING
Qty: 30 TABLET | Refills: 1 | Status: SHIPPED | OUTPATIENT
Start: 2022-10-28

## 2022-10-28 NOTE — PROGRESS NOTES
[facility]  [address] [place] [address] [address]  [address]  [phone]    10/28/22     Patient ID: Mike Martinez is a 57 y.o. female. Established patient    Chief Complaint  Mike Martinez presents today for Gynecologic Exam    Have you seen any other physician or provider since your last visit? Yes - Records Obtained Ortho  Have you had any other diagnostic tests since your last visit? Yes - Records Obtained XR LT Shoulder  Have you been seen in the emergency room and/or had an admission to a hospital since we last saw you? No     ASSESSMENT/PLAN  1. Pap smear, as part of routine gynecological examination  -     PAP Smear; Future  2. Screening mammogram for breast cancer  -     ROBERTH DIGITAL SCREEN W OR WO CAD BILATERAL; Future  3. Depression, major, recurrent, mild (HCC)  -     buPROPion (WELLBUTRIN XL) 150 MG extended release tablet; Take 1 tablet by mouth every morning, Disp-30 tablet, R-1Normal     Medications refilled. Return in about 2 months (around 12/28/2022) for Depression. Patient Care Team:  SLOAN Carvalho CNP as PCP - General (Nurse Practitioner Family)  SLOAN Carvalho CNP as PCP - [facility]  Joe Moreno MD as Consulting Physician (Pain Management)  Camille Douglas MD as Consulting Physician (Nephrology)  Nelia Petersen MD as Consulting Physician (Internal Medicine)  Kevin Tao DO as Consulting Physician (Orthopedic Surgery)    SUBJECTIVE/OBJECTIVE    History of Present illness / Visit Summary   Presents for pelvic exam       GYN History:  Menarche: 10    LMP: 12/1999  Hx abnormal PAP: no  FH of GYN cancer: no     Self-breast exams: yes  Hx of abnormal mammogram: no  FH of breast cancer: no  Previous DEXA scan: yes- , abnormal:     Sexually Active: not sexually active  Dyspareunia:  NA  ST1 Hx: na    Regular exercise: no.    Blood transfusion or tattooed?: no.    Domestic violence in her life is absent.        Review of Systems  Review of Systems   Constitutional:  Negative for activity change, appetite change, chills, fatigue, fever and unexpected weight change. Respiratory:  Negative for cough, chest tightness and shortness of breath. Cardiovascular:  Negative for chest pain, palpitations and leg swelling. Gastrointestinal:  Negative for abdominal distention, abdominal pain, blood in stool, constipation, diarrhea and nausea. Genitourinary:  Negative for decreased urine volume, difficulty urinating, dysuria, frequency, hematuria, menstrual problem, urgency, vaginal bleeding and vaginal discharge. Skin:  Negative for rash. Neurological:  Negative for dizziness and headaches. Hematological:  Does not bruise/bleed easily. Psychiatric/Behavioral:  Negative for agitation, decreased concentration, self-injury, sleep disturbance and suicidal ideas. The patient is not nervous/anxious. Physical exam   Physical Exam  Vitals and nursing note reviewed. Exam conducted with a chaperone present. Constitutional:       General: She is not in acute distress. Appearance: Normal appearance. She is well-developed, well-groomed and overweight. She is not ill-appearing or toxic-appearing. Pulmonary:      Effort: Pulmonary effort is normal. No respiratory distress. Chest:      Comments: Breast exam deferred   Abdominal:      Hernia: There is no hernia in the left inguinal area or right inguinal area. Genitourinary:     General: Normal vulva. Exam position: Supine. Pubic Area: No rash. Sage stage (genital): 5. Labia:         Right: No rash, tenderness or lesion. Left: No rash, tenderness or lesion. Urethra: No prolapse or urethral lesion. Vagina: Normal. No vaginal discharge, erythema, bleeding or lesions. Cervix: Normal.      Uterus: Normal.       Adnexa: Right adnexa normal and left adnexa normal.      Rectum: Normal.   Musculoskeletal:      Right lower leg: No edema. Left lower leg: No edema. Lymphadenopathy:      Lower Body: No right inguinal adenopathy. No left inguinal adenopathy. Skin:     General: Skin is warm and dry. Coloration: Skin is not ashen, cyanotic, jaundiced or pale. Neurological:      Mental Status: She is alert and oriented to person, place, and time. Gait: Gait is intact. Psychiatric:         Attention and Perception: Attention and perception normal.         Mood and Affect: Mood and affect normal.         Speech: Speech normal.         Behavior: Behavior normal. Behavior is cooperative. Thought Content: Thought content normal. Thought content does not include suicidal ideation. Thought content does not include suicidal plan. Cognition and Memory: Cognition and memory normal.         Judgment: Judgment normal.         Electronically signed by SLOAN Shane - CNP, SLOAN-CNP on 11/7/2022 at 8:28 PM    Please note that this chart was generated using voice recognition Dragon dictation software. Although every effort was made to ensure the accuracy of this automated transcription, some errors in transcription may have occurred.

## 2022-11-03 LAB — CYTOLOGY REPORT: NORMAL

## 2022-11-07 ASSESSMENT — ENCOUNTER SYMPTOMS
SHORTNESS OF BREATH: 0
NAUSEA: 0
BLOOD IN STOOL: 0
COUGH: 0
CHEST TIGHTNESS: 0
CONSTIPATION: 0
ABDOMINAL DISTENTION: 0
ABDOMINAL PAIN: 0
DIARRHEA: 0

## 2022-11-15 DIAGNOSIS — F41.1 GAD (GENERALIZED ANXIETY DISORDER): ICD-10-CM

## 2022-11-15 RX ORDER — ALPRAZOLAM 1 MG/1
TABLET ORAL
Qty: 60 TABLET | Refills: 0 | Status: SHIPPED | OUTPATIENT
Start: 2022-11-15 | End: 2022-12-15

## 2022-11-15 NOTE — TELEPHONE ENCOUNTER
Last visit: 10/28/22  Last Med refill: 9/9/22  Does patient have enough medication for 72 hours: Yes    Next Visit Date:  Future Appointments   Date Time Provider Anna Chu   1/10/2023 12:30 PM SLOAN Le CNP [facility] [address] Maintenance   Topic Date Due    Flu vaccine (1) Never done    Shingles vaccine (1 of 2) 04/18/2023 (Originally 6/2/2008)    COVID-19 Vaccine (1) 04/14/2025 (Originally 1960)    Depression Monitoring  02/09/2023    Colorectal Cancer Screen  04/26/2023    Breast cancer screen  09/16/2023    Cervical cancer screen  10/28/2025    Lipids  02/09/2027    DTaP/Tdap/Td vaccine (2 - Td or Tdap) 07/22/2030    Hepatitis C screen  Completed    HIV screen  Completed    Hepatitis A vaccine  Aged Out    Hib vaccine  Aged Out    Meningococcal (ACWY) vaccine  Aged Out    Pneumococcal 0-64 years Vaccine  Aged Out       No results found for: LABA1C          ( goal A1C is < 7)   No results found for: LABMICR  LDL Cholesterol (mg/dL)   Date Value   05/24/2021 139 (H)   09/23/2019 93     LDL Calculated (mg/dL)   Date Value   02/09/2022 144 (H)   12/16/2016 131       (goal LDL is <100)   AST (U/L)   Date Value   02/09/2022 16     ALT (U/L)   Date Value   02/09/2022 19     BUN (mg/dL)   Date Value   02/09/2022 11     BP Readings from Last 3 Encounters:   10/28/22 108/80   08/16/22 112/84   05/16/22 112/88          (goal 120/80)    All Future Testing planned in [facility]  Lab Frequency Next Occurrence   XR HIP LEFT (2-3 VIEWS) Once 04/18/2022   Holter Monitor 48 Hour Once 08/17/2022   VL DUP CAROTID BILATERAL Once 08/16/2022   PAP Smear Once 10/28/2022   ROBERTH DIGITAL SCREEN W OR WO CAD BILATERAL Once 10/28/2022               Patient Active Problem List:     Essential hypertension     Depression, major, recurrent, mild (HCC)     Hematuria     Collagenous colitis     Lupus ([facility] [address]     SHUN (generalized anxiety disorder)     Chronic pain of both knees     Panic attack due to exceptional stress     Osteoporosis

## 2022-12-22 DIAGNOSIS — F41.1 GAD (GENERALIZED ANXIETY DISORDER): Primary | ICD-10-CM

## 2022-12-22 RX ORDER — ALPRAZOLAM 1 MG/1
TABLET ORAL
Qty: 60 TABLET | Refills: 0 | Status: SHIPPED | OUTPATIENT
Start: 2022-12-22 | End: 2023-01-19

## 2023-01-10 ENCOUNTER — OFFICE VISIT (OUTPATIENT)
Dept: FAMILY MEDICINE CLINIC | Age: 63
End: 2023-01-10
Payer: COMMERCIAL

## 2023-01-10 VITALS
DIASTOLIC BLOOD PRESSURE: 78 MMHG | TEMPERATURE: 97.8 F | SYSTOLIC BLOOD PRESSURE: 106 MMHG | OXYGEN SATURATION: 96 % | WEIGHT: 152.6 LBS | HEART RATE: 78 BPM | BODY MASS INDEX: 29.8 KG/M2 | RESPIRATION RATE: 16 BRPM

## 2023-01-10 DIAGNOSIS — E55.9 VITAMIN D DEFICIENCY: ICD-10-CM

## 2023-01-10 DIAGNOSIS — F51.04 PSYCHOPHYSIOLOGICAL INSOMNIA: ICD-10-CM

## 2023-01-10 DIAGNOSIS — M32.9 LUPUS (HCC): ICD-10-CM

## 2023-01-10 DIAGNOSIS — F41.1 GAD (GENERALIZED ANXIETY DISORDER): ICD-10-CM

## 2023-01-10 DIAGNOSIS — E53.8 B12 DEFICIENCY: ICD-10-CM

## 2023-01-10 DIAGNOSIS — E78.2 MIXED HYPERLIPIDEMIA: ICD-10-CM

## 2023-01-10 DIAGNOSIS — M25.552 ACUTE HIP PAIN, LEFT: ICD-10-CM

## 2023-01-10 DIAGNOSIS — G25.81 RESTLESS LEG SYNDROME: ICD-10-CM

## 2023-01-10 DIAGNOSIS — F33.0 DEPRESSION, MAJOR, RECURRENT, MILD (HCC): Primary | ICD-10-CM

## 2023-01-10 DIAGNOSIS — I10 ESSENTIAL HYPERTENSION: ICD-10-CM

## 2023-01-10 DIAGNOSIS — R00.2 HEART PALPITATIONS: ICD-10-CM

## 2023-01-10 PROCEDURE — 3074F SYST BP LT 130 MM HG: CPT | Performed by: NURSE PRACTITIONER

## 2023-01-10 PROCEDURE — 3078F DIAST BP <80 MM HG: CPT | Performed by: NURSE PRACTITIONER

## 2023-01-10 PROCEDURE — 99214 OFFICE O/P EST MOD 30 MIN: CPT | Performed by: NURSE PRACTITIONER

## 2023-01-10 ASSESSMENT — PATIENT HEALTH QUESTIONNAIRE - PHQ9
SUM OF ALL RESPONSES TO PHQ QUESTIONS 1-9: 14
SUM OF ALL RESPONSES TO PHQ9 QUESTIONS 1 & 2: 4
SUM OF ALL RESPONSES TO PHQ QUESTIONS 1-9: 14
2. FEELING DOWN, DEPRESSED OR HOPELESS: 2
1. LITTLE INTEREST OR PLEASURE IN DOING THINGS: 2
7. TROUBLE CONCENTRATING ON THINGS, SUCH AS READING THE NEWSPAPER OR WATCHING TELEVISION: 3
5. POOR APPETITE OR OVEREATING: 1
10. IF YOU CHECKED OFF ANY PROBLEMS, HOW DIFFICULT HAVE THESE PROBLEMS MADE IT FOR YOU TO DO YOUR WORK, TAKE CARE OF THINGS AT HOME, OR GET ALONG WITH OTHER PEOPLE: 2
6. FEELING BAD ABOUT YOURSELF - OR THAT YOU ARE A FAILURE OR HAVE LET YOURSELF OR YOUR FAMILY DOWN: 0
9. THOUGHTS THAT YOU WOULD BE BETTER OFF DEAD, OR OF HURTING YOURSELF: 0
SUM OF ALL RESPONSES TO PHQ QUESTIONS 1-9: 14
4. FEELING TIRED OR HAVING LITTLE ENERGY: 3
3. TROUBLE FALLING OR STAYING ASLEEP: 3
8. MOVING OR SPEAKING SO SLOWLY THAT OTHER PEOPLE COULD HAVE NOTICED. OR THE OPPOSITE, BEING SO FIGETY OR RESTLESS THAT YOU HAVE BEEN MOVING AROUND A LOT MORE THAN USUAL: 0
SUM OF ALL RESPONSES TO PHQ QUESTIONS 1-9: 14

## 2023-01-10 ASSESSMENT — ENCOUNTER SYMPTOMS
ABDOMINAL PAIN: 0
SORE THROAT: 0
RHINORRHEA: 0
COUGH: 0
ALLERGIC/IMMUNOLOGIC COMMENTS: FOLLOWS WITH RHEUMATOLOGY
BLOOD IN STOOL: 0
BACK PAIN: 0
TROUBLE SWALLOWING: 0
SINUS PRESSURE: 0
NAUSEA: 0
CHEST TIGHTNESS: 1
DIARRHEA: 0
SHORTNESS OF BREATH: 0
CONSTIPATION: 0
WHEEZING: 0

## 2023-01-10 NOTE — PATIENT INSTRUCTIONS
[address] [facility] and [address] [place], [place] [address] [place] [address]   Seth Rosales, [address] [facility]   [phone]

## 2023-01-10 NOTE — PROGRESS NOTES
[address]  [phone]    1/10/23     Patient ID  Francie John is a 57 y.o. female  Established patient    Chief Complaint  Francie John presents today for Hypertension, Osteoporosis, Depression, Lupus, and Anxiety (Tired all the time. Cannot sleep. Onset years. Consistent night time routine. Nothing works. )    Have you seen any other physician or provider since your last visit? Dentist- Antibiotics for abscessed tooth. Amoxicillin given- patient completed  Have you had any other diagnostic tests since your last visit? Yes - Records Obtained PAP  Have you been seen in the emergency room and/or had an admission to a hospital since we last saw you? No     ASSESSMENT/PLAN  1. Depression, major, recurrent, mild ([facility] [address]  Assessment & Plan:   Borderline controlled, continue current medications and continue current treatment plan  2. SHUN (generalized anxiety disorder)  -     TSH with Reflex; Future  3. Psychophysiological insomnia  4. Acute hip pain, left  5. Essential hypertension  -     CBC with Auto Differential; Future  -     Comprehensive Metabolic Panel, Fasting; Future  -     Urinalysis with Reflex to Culture; Future  6. Mixed hyperlipidemia  -     Lipid Panel; Future  7. Lupus ([facility] [address]  -     CBC with Auto Differential; Future  8. Vitamin D deficiency  -     Vitamin D 25 Hydroxy; Future  9. B12 deficiency  -     TSH with Reflex; Future  -     Vitamin B12 & Folate; Future  10. Restless leg syndrome  -     TSH with Reflex; Future  11. Heart palpitations  -     EKG 12 lead; Future  -     Holter Monitor 48 Hour; Future  -     ECHO Complete 2D W Doppler W Color; Future     Discussed scheduling OV with established surgeon for hip pain. Potential injection? Need to schedule previously ordered cardiac testing, echo, EKG monitor     Return in about 3 months (around 4/10/2023) for no improvement in symptoms.       Patient Care Team:  Julio Hernandez Suan Usha - CNP as PCP - General (Nurse Practitioner Family)  SLOAN Anguiano CNP as PCP - [facility] Empaneled Provider  Darya Grewal MD as Consulting Physician (Pain Management)  Victorina Richter MD as Consulting Physician (Nephrology)  Natalee Farias MD as Consulting Physician (Internal Medicine)  Yaneth Escoto DO as Consulting Physician (Orthopedic Surgery)    SUBJECTIVE/OBJECTIVE  History of Present illness / Visit Summary   Patient presents today for follow up   Reviewed health maintenance and any recent labs/imaging        Review of Systems  Review of Systems   Constitutional:  Positive for fatigue (worsening). Negative for activity change, appetite change, chills and fever. HENT:  Negative for congestion, ear pain, postnasal drip, rhinorrhea, sinus pressure, sneezing, sore throat and trouble swallowing. Respiratory:  Positive for chest tightness. Negative for cough, shortness of breath and wheezing. Cardiovascular:  Positive for palpitations. Negative for chest pain and leg swelling. Ongoing palpitations. On and off for over 1 year. Thought originally anxiety? States experiences 2 separate feelings? One is more flutter sensation, comes and goes. Other feels as though pounding? No associated symptoms. Had cardiac work up years ago. Gastrointestinal:  Negative for abdominal pain, blood in stool, constipation, diarrhea and nausea. Genitourinary:  Negative for difficulty urinating, dysuria, frequency, hematuria and urgency. Follows with nephrology    Musculoskeletal:  Positive for arthralgias (left hip). Negative for back pain, gait problem, joint swelling and myalgias. Follows with ortho for hip pain. Skin:  Negative for rash and wound. Allergic/Immunologic: Negative for environmental allergies. Follows with rheumatology    Neurological:  Negative for dizziness, tremors (bilateral hands - improved), syncope, light-headedness, numbness and headaches. Hematological:  Does not bruise/bleed easily. Psychiatric/Behavioral:  Positive for agitation, decreased concentration, dysphoric mood and sleep disturbance. Negative for self-injury and suicidal ideas. The patient is nervous/anxious. Physical exam   Physical Exam  Vitals and nursing note reviewed. Constitutional:       General: She is not in acute distress. Appearance: Normal appearance. She is well-developed, well-groomed and overweight. She is not ill-appearing or toxic-appearing. Cardiovascular:      Rate and Rhythm: Normal rate and regular rhythm. No extrasystoles are present. Heart sounds: Normal heart sounds, S1 normal and S2 normal. No murmur heard. Pulmonary:      Effort: Pulmonary effort is normal. No prolonged expiration or respiratory distress. Breath sounds: Normal breath sounds and air entry. Musculoskeletal:      Left hip: Tenderness present. Decreased range of motion. Right lower le+ Edema present. Left lower le+ Edema present. Skin:     General: Skin is warm and dry. Coloration: Skin is not ashen, cyanotic, jaundiced or pale. Neurological:      Mental Status: She is alert and oriented to person, place, and time. Motor: Motor function is intact. Gait: Gait is intact. Psychiatric:         Attention and Perception: Attention and perception normal.         Mood and Affect: Affect normal. Mood is anxious. Speech: Speech normal.         Behavior: Behavior normal. Behavior is cooperative. Thought Content: Thought content normal. Thought content does not include suicidal ideation. Thought content does not include suicidal plan. Cognition and Memory: Cognition and memory normal.         Judgment: Judgment normal.         Electronically signed by SLOAN Son CNP, APRN-CNP on 2023 at 10:17 PM    Please note that this chart was generated using voice recognition Dragon dictation software. Although every effort was made to ensure the accuracy of this automated transcription, some errors in transcription may have occurred.

## 2023-01-23 PROBLEM — E53.8 B12 DEFICIENCY: Status: ACTIVE | Noted: 2023-01-23

## 2023-01-23 PROBLEM — E78.2 MIXED HYPERLIPIDEMIA: Status: ACTIVE | Noted: 2023-01-23

## 2023-01-23 PROBLEM — F51.04 PSYCHOPHYSIOLOGICAL INSOMNIA: Status: ACTIVE | Noted: 2023-01-23

## 2023-01-23 PROBLEM — E55.9 VITAMIN D DEFICIENCY: Status: ACTIVE | Noted: 2023-01-23

## 2023-01-23 PROBLEM — G25.81 RESTLESS LEG SYNDROME: Status: ACTIVE | Noted: 2023-01-23

## 2023-01-27 ENCOUNTER — TELEPHONE (OUTPATIENT)
Dept: FAMILY MEDICINE CLINIC | Age: 63
End: 2023-01-27

## 2023-01-27 NOTE — TELEPHONE ENCOUNTER
Patient states she is feeling better since her ER visit. Patient inquiring if she should continue the atb from the DDS? She has only taken one dose prior to ER incident.   Patient states she still has a slight lump in the area of the root canal.

## 2023-01-27 NOTE — TELEPHONE ENCOUNTER
ED Follow up Call   [facility]   requested records     Reason for ED visit:  Left jaw pain , tingling down left arm   Status:     [facility]     Did you call your PCP prior to going to the ED? Yes     Did you receive a discharge instructions from the Emergency Room? Review of Instructions:     Understands what to report/when to return?:     Understands discharge instructions?:     Following discharge instructions?:     If not why? Are there any new complaints of pain? New Pain Meds? Constipation prophylaxis needed? If you have a wound is the dressing clean, dry, and intact? Understands wound care regimen? Are there any other complaints/concerns that you wish to tell your provider? FU appts/Provider:    Future Appointments   Date Time Provider Anna Chu   4/12/2023  1:00 PM SLOAN Daly CNP [facility]           New Medications?:         Medication Reconciliation by phone -   Understands Medications? Taking Medications? Can you swallow your pills? Any further needs in the home i.e. Equipment?       Link to services in community?:     Which services:

## 2023-01-30 ENCOUNTER — TELEPHONE (OUTPATIENT)
Dept: FAMILY MEDICINE CLINIC | Age: 63
End: 2023-01-30
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
## 2023-01-31 ENCOUNTER — HOSPITAL ENCOUNTER (OUTPATIENT)
Dept: GENERAL RADIOLOGY | Age: 63
Discharge: HOME | End: 2023-02-02
Payer: COMMERCIAL

## 2023-01-31 ENCOUNTER — OFFICE VISIT (OUTPATIENT)
Dept: FAMILY MEDICINE CLINIC | Age: 63
End: 2023-01-31
Payer: COMMERCIAL

## 2023-01-31 ENCOUNTER — HOSPITAL ENCOUNTER (OUTPATIENT)
Age: 63
Discharge: HOME | End: 2023-02-02
Payer: COMMERCIAL

## 2023-01-31 VITALS
OXYGEN SATURATION: 98 % | DIASTOLIC BLOOD PRESSURE: 84 MMHG | SYSTOLIC BLOOD PRESSURE: 134 MMHG | WEIGHT: 144.6 LBS | RESPIRATION RATE: 18 BRPM | HEART RATE: 73 BPM | BODY MASS INDEX: 28.24 KG/M2 | TEMPERATURE: 97.2 F

## 2023-01-31 DIAGNOSIS — G89.29 CHRONIC PAIN OF BOTH KNEES: ICD-10-CM

## 2023-01-31 DIAGNOSIS — M25.512 ACUTE PAIN OF LEFT SHOULDER: ICD-10-CM

## 2023-01-31 DIAGNOSIS — M25.512 ACUTE PAIN OF LEFT SHOULDER: Primary | ICD-10-CM

## 2023-01-31 DIAGNOSIS — M25.552 ACUTE HIP PAIN, LEFT: ICD-10-CM

## 2023-01-31 DIAGNOSIS — M25.561 CHRONIC PAIN OF BOTH KNEES: ICD-10-CM

## 2023-01-31 DIAGNOSIS — M25.562 CHRONIC PAIN OF BOTH KNEES: ICD-10-CM

## 2023-01-31 PROCEDURE — 3078F DIAST BP <80 MM HG: CPT | Performed by: NURSE PRACTITIONER

## 2023-01-31 PROCEDURE — 3074F SYST BP LT 130 MM HG: CPT | Performed by: NURSE PRACTITIONER

## 2023-01-31 PROCEDURE — 73030 X-RAY EXAM OF SHOULDER: CPT

## 2023-01-31 PROCEDURE — 99214 OFFICE O/P EST MOD 30 MIN: CPT | Performed by: NURSE PRACTITIONER

## 2023-01-31 RX ORDER — CYCLOBENZAPRINE HCL 5 MG
10 TABLET ORAL 3 TIMES DAILY PRN
Qty: 42 TABLET | Refills: 0 | Status: SHIPPED | OUTPATIENT
Start: 2023-01-31 | End: 2023-02-22
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
## 2023-02-02 ENCOUNTER — OFFICE VISIT (OUTPATIENT)
Dept: ORTHOPEDIC SURGERY | Age: 63
End: 2023-02-02

## 2023-02-02 ENCOUNTER — TELEPHONE (OUTPATIENT)
Dept: FAMILY MEDICINE CLINIC | Age: 63
End: 2023-02-02

## 2023-02-02 VITALS — HEIGHT: 60 IN | BODY MASS INDEX: 28.27 KG/M2 | WEIGHT: 144 LBS | RESPIRATION RATE: 14 BRPM

## 2023-02-02 DIAGNOSIS — M25.551 BILATERAL HIP PAIN: ICD-10-CM

## 2023-02-02 DIAGNOSIS — M54.50 ACUTE BILATERAL LOW BACK PAIN WITHOUT SCIATICA: Primary | ICD-10-CM
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
## 2023-02-02 DIAGNOSIS — M25.551 RIGHT HIP PAIN: Primary | ICD-10-CM

## 2023-02-02 DIAGNOSIS — M25.552 BILATERAL HIP PAIN: ICD-10-CM

## 2023-02-02 DIAGNOSIS — S43.52XA SPRAIN OF LEFT ACROMIOCLAVICULAR LIGAMENT, INITIAL ENCOUNTER: ICD-10-CM

## 2023-02-02 RX ORDER — METHYLPREDNISOLONE 4 MG/1
TABLET ORAL
Qty: 1 KIT | Refills: 0 | Status: SHIPPED | OUTPATIENT
Start: 2023-02-02

## 2023-02-02 ASSESSMENT — ENCOUNTER SYMPTOMS
ABDOMINAL PAIN: 0
RESPIRATORY NEGATIVE: 1
NAUSEA: 0
DIARRHEA: 0
CONSTIPATION: 0
VOMITING: 0
APNEA: 0
CHEST TIGHTNESS: 0
COUGH: 0
SHORTNESS OF BREATH: 0
COLOR CHANGE: 0
ABDOMINAL DISTENTION: 0

## 2023-02-02 NOTE — TELEPHONE ENCOUNTER
Patient feels she may need to restart the BP medication (Metroprolol) as discussed during her last visit. She feels her BP has been increased lately.   [facility]
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
## 2023-02-03 ENCOUNTER — HOSPITAL ENCOUNTER (OUTPATIENT)
Dept: PHYSICAL THERAPY | Facility: CLINIC | Age: 63
Setting detail: THERAPIES SERIES
Discharge: HOME | End: 2023-02-03
Payer: COMMERCIAL

## 2023-02-03 PROCEDURE — 97110 THERAPEUTIC EXERCISES: CPT

## 2023-02-03 PROCEDURE — 97161 PT EVAL LOW COMPLEX 20 MIN: CPT

## 2023-02-03 PROCEDURE — 97140 MANUAL THERAPY 1/> REGIONS: CPT
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
## 2023-02-05 NOTE — TELEPHONE ENCOUNTER
Her and I will need to discuss this further together. We did talk about her increased blood pressure and her elevated heart rate, however she also has ongoing pain in which she needs to follow-up with her orthopedic surgeon. Once her pain is resolved, more controlled, we can discuss prescribing beta-blocker.

## 2023-02-06 ENCOUNTER — HOSPITAL ENCOUNTER (OUTPATIENT)
Dept: PHYSICAL THERAPY | Facility: CLINIC | Age: 63
Setting detail: THERAPIES SERIES
Discharge: HOME | End: 2023-02-06
Payer: COMMERCIAL

## 2023-02-06 DIAGNOSIS — F41.1 GAD (GENERALIZED ANXIETY DISORDER): ICD-10-CM

## 2023-02-06 PROCEDURE — 97110 THERAPEUTIC EXERCISES: CPT

## 2023-02-06 RX ORDER — ALPRAZOLAM 1 MG/1
TABLET ORAL
Qty: 60 TABLET | Refills: 0 | Status: SHIPPED | OUTPATIENT
Start: 2023-02-06 | End: 2023-03-06

## 2023-02-06 NOTE — TELEPHONE ENCOUNTER
Patient requesting a refill of medication to please be sent today for she is getting on a plane @ 2000. Patient states this would help with her anxiety with flying.        Last visit: 01/31/2023  Last Med refill: 12/22/2022  Does patient have enough medication for 72 hours: No:     Next Visit Date:  Future Appointments   Date Time Provider Anna Kimberley   2/6/2023 10:30 AM Cherene Divers, PT [facility] [address]   2/17/2023 11:00 AM Cherene Divers, PT [facility] [address]    3/16/2023 11:00 AM Loren Hinkle PA-C [facility]   4/12/2023  1:00 PM [facility]       Health Maintenance   Topic Date Due    Shingles vaccine (1 of 2) 04/18/2023 (Originally 6/2/2008)    Flu vaccine (1) 01/10/2024 (Originally 8/1/2022)    COVID-19 Vaccine (1) 04/14/2025 (Originally 1960)    Colorectal Cancer Screen  04/26/2023    Breast cancer screen  09/16/2023    Depression Monitoring  01/10/2024    Cervical cancer screen  10/28/2025    Lipids  02/09/2027    DTaP/Tdap/Td vaccine (2 - Td or Tdap) 07/22/2030    Hepatitis C screen  Completed    HIV screen  Completed    Hepatitis A vaccine  Aged Out    Hib vaccine  Aged Out    Meningococcal (ACWY) vaccine  Aged Out    Pneumococcal 0-64 years Vaccine  Aged Out       No results found for: LABA1C          ( goal A1C is < 7)   No results found for: LABMICR  LDL Cholesterol (mg/dL)   Date Value   05/24/2021 139 (H)   09/23/2019 93     LDL Calculated (mg/dL)   Date Value   02/09/2022 144 (H)   12/16/2016 131       (goal LDL is <100)   AST (U/L)   Date Value   02/09/2022 16     ALT (U/L)   Date Value   02/09/2022 19     BUN (mg/dL)   Date Value   02/09/2022 11     BP Readings from Last 3 Encounters:   01/31/23 134/84   01/26/23 (!) 158/98   01/10/23 106/78          (goal 120/80)    All Future Testing planned in [facility]  Lab Frequency Next Occurrence   XR HIP LEFT (2-3 VIEWS) Once 04/18/2022   VL DUP CAROTID BILATERAL Once 08/16/2022   PAP Smear Once 10/28/2022   ROBERTH DIGITAL SCREEN W OR WO CAD BILATERAL Once 10/28/2022   CBC with Auto Differential Once 01/10/2023   Comprehensive Metabolic Panel, Fasting Once 01/10/2023   Lipid Panel Once 01/10/2023   TSH with Reflex Once 01/10/2023   Urinalysis with Reflex to Culture Once 01/10/2023   Vitamin B12 & Folate Once 01/10/2023   Vitamin D 25 Hydroxy Once 01/10/2023   EKG 12 lead Once 01/23/2023   Holter Monitor 48 Hour Once 01/23/2023   ECHO Complete 2D W Doppler W Color Once 01/23/2023               Patient Active Problem List:     Essential hypertension     Depression, major, recurrent, mild (HCC)     Hematuria     Collagenous colitis     Lupus (HCC)     SHUN (generalized anxiety disorder)     Chronic pain of both knees     Panic attack due to exceptional stress     Osteoporosis     Vitamin D deficiency     B12 deficiency     Restless leg syndrome     Mixed hyperlipidemia     Psychophysiological insomnia
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
## 2023-02-17 ENCOUNTER — APPOINTMENT (OUTPATIENT)
Dept: PHYSICAL THERAPY | Facility: CLINIC | Age: 63
End: 2023-02-17
Payer: COMMERCIAL

## 2023-02-20 DIAGNOSIS — M25.512 ACUTE PAIN OF LEFT SHOULDER: ICD-10-CM

## 2023-02-20 ASSESSMENT — ENCOUNTER SYMPTOMS
TROUBLE SWALLOWING: 0
RHINORRHEA: 0
CONSTIPATION: 0
NAUSEA: 0
ABDOMINAL PAIN: 0
SINUS PRESSURE: 0
SHORTNESS OF BREATH: 0
BLOOD IN STOOL: 0
DIARRHEA: 0
ALLERGIC/IMMUNOLOGIC COMMENTS: FOLLOWS WITH RHEUMATOLOGY
BACK PAIN: 0
CHEST TIGHTNESS: 1
WHEEZING: 0
SORE THROAT: 0
COUGH: 0

## 2023-02-21 ENCOUNTER — HOSPITAL ENCOUNTER (OUTPATIENT)
Dept: PHYSICAL THERAPY | Facility: CLINIC | Age: 63
Setting detail: THERAPIES SERIES
Discharge: HOME | End: 2023-02-21
Payer: COMMERCIAL

## 2023-02-21 PROCEDURE — 97110 THERAPEUTIC EXERCISES: CPT

## 2023-02-21 NOTE — TELEPHONE ENCOUNTER
LOV 1/31/23  RTO 3 months; F/U scheduled  [facility] 1/31/23    Health Maintenance   Topic Date Due    Shingles vaccine (1 of 2) 04/18/2023 (Originally 6/2/2008)    Flu vaccine (1) 01/10/2024 (Originally 8/1/2022)    COVID-19 Vaccine (1) 04/14/2025 (Originally 1960)    Colorectal Cancer Screen  04/26/2023    Breast cancer screen  09/16/2023    Depression Monitoring  01/10/2024    Cervical cancer screen  10/28/2025    Lipids  02/09/2027    DTaP/Tdap/Td vaccine (2 - Td or Tdap) 07/22/2030    Hepatitis C screen  Completed    HIV screen  Completed    Hepatitis A vaccine  Aged Out    Hib vaccine  Aged Out    Meningococcal (ACWY) vaccine  Aged Out    Pneumococcal 0-64 years Vaccine  Aged Out             (applicable per patient's age: Cancer Screenings, Depression Screening, Fall Risk Screening, Immunizations)    LDL Cholesterol (mg/dL)   Date Value   05/24/2021 139 (H)     LDL Calculated (mg/dL)   Date Value   02/09/2022 144 (H)     AST (U/L)   Date Value   02/09/2022 16     ALT (U/L)   Date Value   02/09/2022 19     BUN (mg/dL)   Date Value   02/09/2022 11      (goal A1C is < 7)   (goal LDL is <100) need 30-50% reduction from baseline     BP Readings from Last 3 Encounters:   01/31/23 134/84   01/26/23 (!) 158/98   01/10/23 106/78    (goal /80)      All Future Testing planned in [facility]:  Lab Frequency Next Occurrence   XR HIP LEFT (2-3 VIEWS) Once 04/18/2022   VL DUP CAROTID BILATERAL Once 08/16/2022   PAP Smear Once 10/28/2022   ROBERTH DIGITAL SCREEN W OR WO CAD BILATERAL Once 10/28/2022   CBC with Auto Differential Once 01/10/2023   Comprehensive Metabolic Panel, Fasting Once 01/10/2023   Lipid Panel Once 01/10/2023   TSH with Reflex Once 01/10/2023   Urinalysis with Reflex to Culture Once 01/10/2023   Vitamin B12 & Folate Once 01/10/2023   Vitamin D 25 Hydroxy Once 01/10/2023   EKG 12 lead Once 01/23/2023   Holter Monitor 48 Hour Once 01/23/2023   ECHO Complete 2D W Doppler W Color Once 01/23/2023       Next Visit Date:  Future Appointments   Date Time Provider Anna Chu   2/21/2023 11:00 AM Bibi Chaidez, PT [facility] [address]   3/16/2023 11:00 AM Jn Aleman PA-C Sports [facility]   4/12/2023  1:00 PM Jeanine Alcantara, APRN - CNP [facility]            Patient Active Problem List:     Essential hypertension     Depression, major, recurrent, mild (HCC)     Hematuria     Collagenous colitis     Lupus ([facility] [address]     SHUN (generalized anxiety disorder)     Chronic pain of both knees     Panic attack due to exceptional stress     Osteoporosis     Vitamin D deficiency     B12 deficiency     Restless leg syndrome     Mixed hyperlipidemia     Psychophysiological insomnia
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
## 2023-02-22 RX ORDER — CYCLOBENZAPRINE HCL 5 MG
10 TABLET ORAL 3 TIMES DAILY PRN
Qty: 90 TABLET | Refills: 0 | Status: SHIPPED | OUTPATIENT
Start: 2023-02-22 | End: 2024-02-22

## 2023-02-28 ENCOUNTER — HOSPITAL ENCOUNTER (OUTPATIENT)
Dept: PHYSICAL THERAPY | Facility: CLINIC | Age: 63
Setting detail: THERAPIES SERIES
Discharge: HOME | End: 2023-02-28
Payer: COMMERCIAL

## 2023-02-28 PROCEDURE — 97140 MANUAL THERAPY 1/> REGIONS: CPT

## 2023-02-28 PROCEDURE — 97110 THERAPEUTIC EXERCISES: CPT
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
## 2023-03-02 ENCOUNTER — HOSPITAL ENCOUNTER (OUTPATIENT)
Dept: PHYSICAL THERAPY | Facility: CLINIC | Age: 63
Setting detail: THERAPIES SERIES
Discharge: HOME | End: 2023-03-02
Payer: COMMERCIAL

## 2023-03-02 PROCEDURE — 97110 THERAPEUTIC EXERCISES: CPT
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
## 2023-03-07 ENCOUNTER — HOSPITAL ENCOUNTER (OUTPATIENT)
Dept: PHYSICAL THERAPY | Facility: CLINIC | Age: 63
Setting detail: THERAPIES SERIES
Discharge: HOME | End: 2023-03-07
Payer: COMMERCIAL

## 2023-03-07 DIAGNOSIS — F33.0 DEPRESSION, MAJOR, RECURRENT, MILD (HCC): ICD-10-CM

## 2023-03-07 PROCEDURE — 97110 THERAPEUTIC EXERCISES: CPT
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
## 2023-03-09 ENCOUNTER — HOSPITAL ENCOUNTER (OUTPATIENT)
Dept: PHYSICAL THERAPY | Facility: CLINIC | Age: 63
Setting detail: THERAPIES SERIES
Discharge: HOME | End: 2023-03-09
Payer: COMMERCIAL

## 2023-03-09 PROCEDURE — 97110 THERAPEUTIC EXERCISES: CPT
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
## 2023-03-09 NOTE — TELEPHONE ENCOUNTER
LOV 1/31/23  RTO 3 months; F/U scheduled  [facility] 10/28/22    Health Maintenance   Topic Date Due    Shingles vaccine (1 of 2) 04/18/2023 (Originally 6/2/2008)    Flu vaccine (1) 01/10/2024 (Originally 8/1/2022)    COVID-19 Vaccine (1) 04/14/2025 (Originally 1960)    Colorectal Cancer Screen  04/26/2023    Breast cancer screen  09/16/2023    Depression Monitoring  01/10/2024    Cervical cancer screen  10/28/2025    Lipids  02/09/2027    DTaP/Tdap/Td vaccine (2 - Td or Tdap) 07/22/2030    Hepatitis C screen  Completed    HIV screen  Completed    Hepatitis A vaccine  Aged Out    Hib vaccine  Aged Out    Meningococcal (ACWY) vaccine  Aged Out    Pneumococcal 0-64 years Vaccine  Aged Out             (applicable per patient's age: Cancer Screenings, Depression Screening, Fall Risk Screening, Immunizations)    LDL Cholesterol (mg/dL)   Date Value   05/24/2021 139 (H)     LDL Calculated (mg/dL)   Date Value   02/09/2022 144 (H)     AST (U/L)   Date Value   02/09/2022 16     ALT (U/L)   Date Value   02/09/2022 19     BUN (mg/dL)   Date Value   02/09/2022 11      (goal A1C is < 7)   (goal LDL is <100) need 30-50% reduction from baseline     BP Readings from Last 3 Encounters:   01/31/23 134/84   01/26/23 (!) 158/98   01/10/23 106/78    (goal /80)      All Future Testing planned in [facility]:  Lab Frequency Next Occurrence   XR HIP LEFT (2-3 VIEWS) Once 04/18/2022   VL DUP CAROTID BILATERAL Once 08/16/2022   PAP Smear Once 10/28/2022   ROBERTH DIGITAL SCREEN W OR WO CAD BILATERAL Once 10/28/2022   CBC with Auto Differential Once 01/10/2023   Comprehensive Metabolic Panel, Fasting Once 01/10/2023   Lipid Panel Once 01/10/2023   TSH with Reflex Once 01/10/2023   Urinalysis with Reflex to Culture Once 01/10/2023   Vitamin B12 & Folate Once 01/10/2023   Vitamin D 25 Hydroxy Once 01/10/2023   EKG 12 lead Once 01/23/2023   Holter Monitor 48 Hour Once 01/23/2023   ECHO Complete 2D W Doppler W Color Once 01/23/2023       Next Visit Date:  Future Appointments   Date Time Provider Anna Harrisi   3/9/2023 11:00 AM Dev Hernandez, PTA [facility]   3/14/2023 11:00 AM Dev Hernandez, PTA [facility]   3/16/2023 11:00 AM Chichi Upton PA-C [facility]   4/12/2023  1:00 PM Jeanine Urbina, APRN - CNP [facility]            Patient Active Problem List:     Essential hypertension     Depression, major, recurrent, mild (HCC)     Hematuria     Collagenous colitis     Lupus ([facility] [address]     SHUN (generalized anxiety disorder)     Chronic pain of both knees     Panic attack due to exceptional stress     Osteoporosis     Vitamin D deficiency     B12 deficiency     Restless leg syndrome     Mixed hyperlipidemia     Psychophysiological insomnia

## 2023-03-11 RX ORDER — BUPROPION HYDROCHLORIDE 150 MG/1
TABLET ORAL
Qty: 30 TABLET | Refills: 5 | Status: SHIPPED | OUTPATIENT
Start: 2023-03-11 | End: 2024-03-11

## 2023-03-14 ENCOUNTER — HOSPITAL ENCOUNTER (OUTPATIENT)
Dept: PHYSICAL THERAPY | Facility: CLINIC | Age: 63
Setting detail: THERAPIES SERIES
Discharge: HOME | End: 2023-03-14
Payer: COMMERCIAL

## 2023-03-14 PROCEDURE — 97110 THERAPEUTIC EXERCISES: CPT
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
## 2023-03-16 ENCOUNTER — OFFICE VISIT (OUTPATIENT)
Dept: ORTHOPEDIC SURGERY | Age: 63
End: 2023-03-16

## 2023-03-16 VITALS — HEIGHT: 60 IN | RESPIRATION RATE: 16 BRPM | BODY MASS INDEX: 28.47 KG/M2 | WEIGHT: 145 LBS

## 2023-03-16 DIAGNOSIS — M70.62 GREATER TROCHANTERIC BURSITIS OF LEFT HIP: ICD-10-CM

## 2023-03-16 DIAGNOSIS — M75.102 ROTATOR CUFF SYNDROME, LEFT: Primary | ICD-10-CM

## 2023-03-16 RX ORDER — LIDOCAINE HYDROCHLORIDE 10 MG/ML
2 INJECTION, SOLUTION INFILTRATION; PERINEURAL ONCE
Status: COMPLETED | OUTPATIENT
Start: 2023-03-16 | End: 2023-03-16

## 2023-03-16 RX ORDER — METHYLPREDNISOLONE ACETATE 80 MG/ML
80 INJECTION, SUSPENSION INTRA-ARTICULAR; INTRALESIONAL; INTRAMUSCULAR; SOFT TISSUE ONCE
Status: COMPLETED | OUTPATIENT
Start: 2023-03-16 | End: 2023-03-16

## 2023-03-16 RX ADMIN — LIDOCAINE HYDROCHLORIDE 2 ML: 10 INJECTION, SOLUTION INFILTRATION; PERINEURAL at 12:50

## 2023-03-16 RX ADMIN — METHYLPREDNISOLONE ACETATE 80 MG: 80 INJECTION, SUSPENSION INTRA-ARTICULAR; INTRALESIONAL; INTRAMUSCULAR; SOFT TISSUE at 12:51

## 2023-03-16 ASSESSMENT — ENCOUNTER SYMPTOMS
CHEST TIGHTNESS: 0
NAUSEA: 0
APNEA: 0
SHORTNESS OF BREATH: 0
DIARRHEA: 0
ABDOMINAL PAIN: 0
RESPIRATORY NEGATIVE: 1
COLOR CHANGE: 0
VOMITING: 0
ABDOMINAL DISTENTION: 0
CONSTIPATION: 0
COUGH: 0

## 2023-03-23 ENCOUNTER — HOSPITAL ENCOUNTER (OUTPATIENT)
Dept: MRI IMAGING | Facility: CLINIC | Age: 63
Discharge: HOME | End: 2023-03-25
Payer: COMMERCIAL

## 2023-03-23 DIAGNOSIS — M75.102 ROTATOR CUFF SYNDROME, LEFT: ICD-10-CM

## 2023-03-23 PROCEDURE — 73221 MRI JOINT UPR EXTREM W/O DYE: CPT

## 2023-03-24 NOTE — RESULT ENCOUNTER NOTE
Patient has a large full-thickness rotator cuff tear. She will need an appointment with Dr. Mary Moore to discuss surgical options.

## 2023-04-03 DIAGNOSIS — M54.2 MUSCLE PAIN, CERVICAL: ICD-10-CM

## 2023-04-03 DIAGNOSIS — M25.512 ACUTE PAIN OF LEFT SHOULDER: ICD-10-CM

## 2023-04-03 RX ORDER — CYCLOBENZAPRINE HCL 5 MG
10 TABLET ORAL 3 TIMES DAILY PRN
Qty: 90 TABLET | Refills: 0 | Status: SHIPPED | OUTPATIENT
Start: 2023-04-03 | End: 2024-04-02

## 2023-04-03 RX ORDER — MELOXICAM 15 MG/1
15 TABLET ORAL DAILY
Qty: 90 TABLET | Refills: 1 | Status: SHIPPED | OUTPATIENT
Start: 2023-04-03 | End: 2023-09-30

## 2023-04-03 NOTE — TELEPHONE ENCOUNTER
----- Message from Christine Quinteros sent at 4/3/2023  2:31 PM EDT -----  Subject: Refill Request    QUESTIONS  Name of Medication? cyclobenzaprine (FLEXERIL) 5 MG tablet  Patient-reported dosage and instructions? 5mg x3 daily  How many days do you have left? 3  Preferred Pharmacy? [facility] [id number]  Pharmacy phone number (if available)? [phone]  ---------------------------------------------------------------------------  --------------,  Name of Medication? meloxicam (MOBIC) 15 MG tablet  Patient-reported dosage and instructions? 15 mg daily   How many days do you have left? 3  Preferred Pharmacy? [facility] [id number]  Pharmacy phone number (if available)? [phone]  ---------------------------------------------------------------------------  --------------,  Name of Medication? buPROPion (WELLBUTRIN XL) 150 MG extended release   tablet  Patient-reported dosage and instructions? 150 mg daily   How many days do you have left? 4  Preferred Pharmacy? [facility] [id number]  Pharmacy phone number (if available)? [phone]  ---------------------------------------------------------------------------  --------------  CALL BACK INFO  What is the best way for the office to contact you? OK to leave message on   voicemail  Preferred Call Back Phone Number? [phone]  ---------------------------------------------------------------------------  --------------  SCRIPT ANSWERS  Relationship to Patient?  Self

## 2023-04-03 NOTE — TELEPHONE ENCOUNTER
LOV 1/31/23  RTO 3 months; F/U scheduled  LRF 8/16/22 and 2/22/23    Health Maintenance   Topic Date Due    Colorectal Cancer Screen  04/26/2023    Shingles vaccine (1 of 2) 04/18/2023 (Originally 6/2/2008)    Flu vaccine (Season Ended) 01/10/2024 (Originally 8/1/2023)    COVID-19 Vaccine (1) 04/14/2025 (Originally 1960)    Breast cancer screen  09/16/2023    Depression Monitoring  01/10/2024    Diabetes screen  02/09/2025    Cervical cancer screen  10/28/2025    Lipids  02/09/2027    DTaP/Tdap/Td vaccine (2 - Td or Tdap) 07/22/2030    Hepatitis C screen  Completed    HIV screen  Completed    Hepatitis A vaccine  Aged Out    Hib vaccine  Aged Out    Meningococcal (ACWY) vaccine  Aged Out    Pneumococcal 0-64 years Vaccine  Aged Out             (applicable per patient's age: Cancer Screenings, Depression Screening, Fall Risk Screening, Immunizations)    LDL Cholesterol (mg/dL)   Date Value   05/24/2021 139 (H)     LDL Calculated (mg/dL)   Date Value   02/09/2022 144 (H)     AST (U/L)   Date Value   02/09/2022 16     ALT (U/L)   Date Value   02/09/2022 19     BUN (mg/dL)   Date Value   02/09/2022 11      (goal A1C is < 7)   (goal LDL is <100) need 30-50% reduction from baseline     BP Readings from Last 3 Encounters:   01/31/23 134/84   01/26/23 (!) 158/98   01/10/23 106/78    (goal /80)      All Future Testing planned in [facility]:  Lab Frequency Next Occurrence   XR HIP LEFT (2-3 VIEWS) Once 04/18/2022   VL DUP CAROTID BILATERAL Once 08/16/2022   PAP Smear Once 10/28/2022   ROBERTH DIGITAL SCREEN W OR WO CAD BILATERAL Once 10/28/2022   CBC with Auto Differential Once 01/10/2023   Comprehensive Metabolic Panel, Fasting Once 01/10/2023   Lipid Panel Once 01/10/2023   TSH with Reflex Once 01/10/2023   Urinalysis with Reflex to Culture Once 01/10/2023   Vitamin B12 & Folate Once 01/10/2023   Vitamin D 25 Hydroxy Once 01/10/2023   Holter Monitor 48 Hour Once 01/23/2023   ECHO Complete 2D W Doppler W Color Once

## 2023-04-05 ENCOUNTER — OFFICE VISIT (OUTPATIENT)
Dept: ORTHOPEDIC SURGERY | Age: 63
End: 2023-04-05
Payer: COMMERCIAL

## 2023-04-05 VITALS — WEIGHT: 147 LBS | HEIGHT: 60 IN | RESPIRATION RATE: 16 BRPM | BODY MASS INDEX: 28.86 KG/M2

## 2023-04-05 DIAGNOSIS — S46.012A TRAUMATIC COMPLETE TEAR OF LEFT ROTATOR CUFF, INITIAL ENCOUNTER: Primary | ICD-10-CM

## 2023-04-05 PROCEDURE — 99213 OFFICE O/P EST LOW 20 MIN: CPT | Performed by: ORTHOPAEDIC SURGERY
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
## 2023-04-17 DIAGNOSIS — F41.1 GAD (GENERALIZED ANXIETY DISORDER): ICD-10-CM

## 2023-04-17 NOTE — TELEPHONE ENCOUNTER
future appointments.          Patient Active Problem List:     Essential hypertension     Depression, major, recurrent, mild (HCC)     Hematuria     Collagenous colitis     Lupus (HCC)     SHUN (generalized anxiety disorder)     Chronic pain of both knees     Panic attack due to exceptional stress     Osteoporosis     Vitamin D deficiency     B12 deficiency     Restless leg syndrome     Mixed hyperlipidemia     Psychophysiological insomnia

## 2023-04-19 ENCOUNTER — TELEPHONE (OUTPATIENT)
Dept: ORTHOPEDIC SURGERY | Age: 63
End: 2023-04-19

## 2023-04-19 DIAGNOSIS — Z01.818 PRE-OP TESTING: Primary | ICD-10-CM

## 2023-04-19 RX ORDER — ALPRAZOLAM 1 MG/1
TABLET ORAL
Qty: 60 TABLET | Refills: 0 | Status: SHIPPED | OUTPATIENT
Start: 2023-04-19 | End: 2023-05-15

## 2023-05-01 DIAGNOSIS — F33.0 DEPRESSION, MAJOR, RECURRENT, MILD (HCC): ICD-10-CM

## 2023-05-01 RX ORDER — PAROXETINE HYDROCHLORIDE 40 MG/1
TABLET, FILM COATED ORAL
Qty: 90 TABLET | Refills: 1 | Status: SHIPPED | OUTPATIENT
Start: 2023-05-01

## 2023-05-02 ENCOUNTER — HOSPITAL ENCOUNTER (OUTPATIENT)
Dept: PREADMISSION TESTING | Age: 63
Discharge: HOME | End: 2023-05-06
Payer: COMMERCIAL

## 2023-05-02 ENCOUNTER — HOSPITAL ENCOUNTER (OUTPATIENT)
Age: 63
Discharge: HOME | End: 2023-05-02
Payer: COMMERCIAL

## 2023-05-02 VITALS
TEMPERATURE: 98.6 F | DIASTOLIC BLOOD PRESSURE: 90 MMHG | SYSTOLIC BLOOD PRESSURE: 117 MMHG | OXYGEN SATURATION: 97 % | WEIGHT: 149 LBS | HEART RATE: 86 BPM | BODY MASS INDEX: 29.25 KG/M2 | RESPIRATION RATE: 18 BRPM | HEIGHT: 60 IN

## 2023-05-02 DIAGNOSIS — M32.9 LUPUS (HCC): ICD-10-CM

## 2023-05-02 DIAGNOSIS — E53.8 B12 DEFICIENCY: ICD-10-CM

## 2023-05-02 DIAGNOSIS — E78.2 MIXED HYPERLIPIDEMIA: ICD-10-CM

## 2023-05-02 DIAGNOSIS — E55.9 VITAMIN D DEFICIENCY: ICD-10-CM

## 2023-05-02 DIAGNOSIS — Z01.818 PRE-OP TESTING: ICD-10-CM

## 2023-05-02 DIAGNOSIS — I10 ESSENTIAL HYPERTENSION: ICD-10-CM

## 2023-05-02 DIAGNOSIS — F41.1 GAD (GENERALIZED ANXIETY DISORDER): ICD-10-CM

## 2023-05-02 DIAGNOSIS — G25.81 RESTLESS LEG SYNDROME: ICD-10-CM

## 2023-05-02 LAB
ABSOLUTE EOS #: 0.05 K/UL (ref 0–0.44)
ABSOLUTE IMMATURE GRANULOCYTE: 0.01 K/UL (ref 0–0.3)
ABSOLUTE LYMPH #: 1.05 K/UL (ref 1.1–3.7)
ABSOLUTE MONO #: 0.36 K/UL (ref 0.1–1.2)
ALBUMIN SERPL-MCNC: 4.4 G/DL (ref 3.5–5.2)
ALP SERPL-CCNC: 72 U/L (ref 35–104)
ALT SERPL-CCNC: 20 U/L (ref 5–33)
ANION GAP SERPL CALCULATED.3IONS-SCNC: 11 MMOL/L (ref 9–17)
AST SERPL-CCNC: 18 U/L
BASOPHILS # BLD: 1 % (ref 0–2)
BASOPHILS ABSOLUTE: 0.04 K/UL (ref 0–0.2)
BILIRUB SERPL-MCNC: 0.4 MG/DL (ref 0.3–1.2)
BILIRUBIN URINE: NEGATIVE
BILIRUBIN URINE: NEGATIVE
BUN SERPL-MCNC: 14 MG/DL (ref 8–23)
BUN/CREAT BLD: 13 (ref 9–20)
CALCIUM SERPL-MCNC: 9.1 MG/DL (ref 8.6–10.4)
CHLORIDE SERPL-SCNC: 101 MMOL/L (ref 98–107)
CO2 SERPL-SCNC: 26 MMOL/L (ref 20–31)
COLOR: YELLOW
COLOR: YELLOW
CREAT SERPL-MCNC: 1.06 MG/DL (ref 0.5–0.9)
EOSINOPHILS RELATIVE PERCENT: 1 % (ref 1–4)
EPITHELIAL CELLS UA: NORMAL /HPF (ref 0–5)
EPITHELIAL CELLS UA: NORMAL /HPF (ref 0–5)
FOLATE SERPL-MCNC: 15.8 NG/ML
GFR SERPL CREATININE-BSD FRML MDRD: 59 ML/MIN/1.73M2
GLUCOSE SERPL-MCNC: 92 MG/DL (ref 70–99)
GLUCOSE UR STRIP.AUTO-MCNC: NEGATIVE MG/DL
GLUCOSE UR STRIP.AUTO-MCNC: NEGATIVE MG/DL
HCT VFR BLD AUTO: 39.2 % (ref 36.3–47.1)
HCT VFR BLD AUTO: 39.2 % (ref 36.3–47.1)
HGB BLD-MCNC: 12.6 G/DL (ref 11.9–15.1)
HGB BLD-MCNC: 12.6 G/DL (ref 11.9–15.1)
IMMATURE GRANULOCYTES: 0 %
KETONES UR STRIP.AUTO-MCNC: NEGATIVE MG/DL
KETONES UR STRIP.AUTO-MCNC: NEGATIVE MG/DL
LEUKOCYTE ESTERASE UR QL STRIP.AUTO: ABNORMAL
LEUKOCYTE ESTERASE UR QL STRIP.AUTO: ABNORMAL
LYMPHOCYTES # BLD: 23 % (ref 24–43)
MCH RBC QN AUTO: 32.1 PG (ref 25.2–33.5)
MCH RBC QN AUTO: 32.1 PG (ref 25.2–33.5)
MCHC RBC AUTO-ENTMCNC: 32.1 G/DL (ref 28.4–34.8)
MCHC RBC AUTO-ENTMCNC: 32.1 G/DL (ref 28.4–34.8)
MCV RBC AUTO: 100 FL (ref 82.6–102.9)
MCV RBC AUTO: 100 FL (ref 82.6–102.9)
MONOCYTES # BLD: 8 % (ref 3–12)
NITRITE UR QL STRIP.AUTO: NEGATIVE
NITRITE UR QL STRIP.AUTO: NEGATIVE
NRBC AUTOMATED: 0 PER 100 WBC
NRBC AUTOMATED: 0 PER 100 WBC
PDW BLD-RTO: 14.9 % (ref 11.8–14.4)
PDW BLD-RTO: 14.9 % (ref 11.8–14.4)
PLATELET # BLD AUTO: 340 K/UL (ref 138–453)
PLATELET # BLD AUTO: 340 K/UL (ref 138–453)
PMV BLD AUTO: 10.8 FL (ref 8.1–13.5)
PMV BLD AUTO: 10.8 FL (ref 8.1–13.5)
POTASSIUM SERPL-SCNC: 4.4 MMOL/L (ref 3.7–5.3)
PROT SERPL-MCNC: 7.8 G/DL (ref 6.4–8.3)
PROT UR STRIP.AUTO-MCNC: 7 MG/DL (ref 5–8)
PROT UR STRIP.AUTO-MCNC: 7 MG/DL (ref 5–8)
PROT UR STRIP.AUTO-MCNC: NEGATIVE MG/DL
PROT UR STRIP.AUTO-MCNC: NEGATIVE MG/DL
RBC # BLD: 3.92 M/UL (ref 3.95–5.11)
RBC # BLD: 3.92 M/UL (ref 3.95–5.11)
RBC # BLD: ABNORMAL 10*6/UL
RBC CLUMPS #/AREA URNS AUTO: NORMAL /HPF (ref 0–2)
RBC CLUMPS #/AREA URNS AUTO: NORMAL /HPF (ref 0–2)
SEG NEUTROPHILS: 67 % (ref 36–65)
SEGMENTED NEUTROPHILS ABSOLUTE COUNT: 3.15 K/UL (ref 1.5–8.1)
SODIUM SERPL-SCNC: 138 MMOL/L (ref 135–144)
SPECIFIC GRAVITY UA: 1.01 (ref 1–1.03)
SPECIFIC GRAVITY UA: 1.01 (ref 1–1.03)
TURBIDITY: CLEAR
TURBIDITY: CLEAR
URINE HGB: NEGATIVE
URINE HGB: NEGATIVE
UROBILINOGEN, URINE: NORMAL
UROBILINOGEN, URINE: NORMAL
VIT B12 SERPL-MCNC: 410 PG/ML (ref 232–1245)
WBC # BLD AUTO: 4.7 K/UL (ref 3.5–11.3)
WBC # BLD AUTO: 4.7 K/UL (ref 3.5–11.3)
WBC UA: NORMAL /HPF (ref 0–5)
WBC UA: NORMAL /HPF (ref 0–5)

## 2023-05-02 PROCEDURE — 81001 URINALYSIS AUTO W/SCOPE: CPT

## 2023-05-02 PROCEDURE — 80061 LIPID PANEL: CPT

## 2023-05-02 PROCEDURE — 82306 VITAMIN D 25 HYDROXY: CPT

## 2023-05-02 PROCEDURE — 85025 COMPLETE CBC W/AUTO DIFF WBC: CPT

## 2023-05-02 PROCEDURE — 80053 COMPREHEN METABOLIC PANEL: CPT

## 2023-05-02 PROCEDURE — 82746 ASSAY OF FOLIC ACID SERUM: CPT

## 2023-05-02 PROCEDURE — 82607 VITAMIN B-12: CPT

## 2023-05-02 PROCEDURE — 84443 ASSAY THYROID STIM HORMONE: CPT

## 2023-05-02 PROCEDURE — 36415 COLL VENOUS BLD VENIPUNCTURE: CPT

## 2023-05-02 PROCEDURE — 87086 URINE CULTURE/COLONY COUNT: CPT

## 2023-05-02 PROCEDURE — 85027 COMPLETE CBC AUTOMATED: CPT

## 2023-05-02 ASSESSMENT — PAIN DESCRIPTION - LOCATION: LOCATION: SHOULDER

## 2023-05-02 ASSESSMENT — PAIN DESCRIPTION - ORIENTATION: ORIENTATION: LEFT

## 2023-05-02 ASSESSMENT — PAIN SCALES - GENERAL: PAINLEVEL_OUTOF10: 7

## 2023-05-02 ASSESSMENT — PAIN DESCRIPTION - PAIN TYPE: TYPE: CHRONIC PAIN
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
## 2023-05-02 NOTE — PRE-PROCEDURE INSTRUCTIONS
Surgery date: 5/22/23    Arrival time: Dr. Elizabeth FRANCES office will call to confirm     Enter the hospital through the Main Entrance, take the lobby elevators to the second floor and check in at the Surgery Registration desk. Continue to take your home medications as you normally do up to and including the night before surgery with the exception of blood thinning medications. Blood Thinning Medications:  Please stop prescription blood thinning medications such as Apixaban (Eliquis); Clopidogrel (Plavix); Dabigatran (Pradaxa); Prasugrel (Effient); Rivaroxaban (Xarelto); Ticagrelor (Brilinta); Warfarin (Coumadin) only as directed by your surgeon and/or the prescribing physician    Some common examples of other medications that can thin your blood are: Aspirin, Ibuprofen (Advil, Motrin), Naproxen (Aleve), Meloxicam (Mobic), Celecoxib (Celebrex), Fish Oil, many Herbal Supplements. These medications should usually be stopped at least 7 days prior to surgery. Please stop taking meloxicam and all vitamins/supplements 7 days prior to surgery. Tylenol is OK to take for pain the week prior to surgery. Failure to stop certain medications may interfere with your scheduled surgery. If you receive instructions from your surgeon regarding what medications to stop prior to surgery, please follow those specific instructions. Please take the following medication(s) the day of surgery with small sips of water: Wellbutrin, Paxil    Showering Before Surgery: You can play an important role in your own health by carefully washing before surgery. Shower the night before and the morning of surgery using the instructions below. If you are allergic to Chlorhexidine Gluconate (CHG) use antibacterial soap instead. If you were given Chlorhexidine soap, please follow the instructions included with the soap to shower the night before and the morning of surgery.   If you were not given Chlorhexidine,

## 2023-05-03 LAB
25(OH)D3 SERPL-MCNC: 56.6 NG/ML
ALBUMIN SERPL-MCNC: 4.5 G/DL (ref 3.5–5.2)
ALBUMIN/GLOBULIN RATIO: 1.3 (ref 1–2.5)
ALP SERPL-CCNC: 75 U/L (ref 35–104)
ALT SERPL-CCNC: 21 U/L (ref 5–33)
ANION GAP SERPL CALCULATED.3IONS-SCNC: 23 MMOL/L (ref 9–17)
AST SERPL-CCNC: 23 U/L
BILIRUB SERPL-MCNC: 0.4 MG/DL (ref 0.3–1.2)
BUN SERPL-MCNC: 15 MG/DL (ref 8–23)
CALCIUM SERPL-MCNC: 9.8 MG/DL (ref 8.6–10.4)
CHLORIDE SERPL-SCNC: 108 MMOL/L (ref 98–107)
CHOLEST SERPL-MCNC: 248 MG/DL
CHOLESTEROL/HDL RATIO: 4.5
CO2 SERPL-SCNC: 17 MMOL/L (ref 20–31)
CREAT SERPL-MCNC: 1.11 MG/DL (ref 0.5–0.9)
GFR SERPL CREATININE-BSD FRML MDRD: 56 ML/MIN/1.73M2
GLUCOSE SERPL-MCNC: 83 MG/DL (ref 70–99)
HDLC SERPL-MCNC: 55 MG/DL
LDLC SERPL CALC-MCNC: 173 MG/DL (ref 0–130)
MICROORGANISM SPEC CULT: NORMAL
POTASSIUM SERPL-SCNC: 4.9 MMOL/L (ref 3.7–5.3)
PROT SERPL-MCNC: 8.1 G/DL (ref 6.4–8.3)
SODIUM SERPL-SCNC: 148 MMOL/L (ref 135–144)
SPECIMEN DESCRIPTION: NORMAL
TRIGL SERPL-MCNC: 98 MG/DL
TSH SERPL-ACNC: 2.41 UIU/ML (ref 0.3–5)

## 2023-05-03 NOTE — PROGRESS NOTES
Spoke with Darcy Arroyo at Dr. Sridhar Grigsby office - patient states her  is terminally ill and she is his caregiver. Discussed the need for an adult  and someone to help her at home post op. Additionally, her PCP previously ordered a Holter monitor and echo that have not been completed. Patient advised to schedule these tests to be completed prior to surgery, Darcy Arroyo aware.

## 2023-05-07 DIAGNOSIS — N28.9 RENAL INSUFFICIENCY: Primary | ICD-10-CM

## 2023-05-11 ENCOUNTER — TELEPHONE (OUTPATIENT)
Dept: FAMILY MEDICINE CLINIC | Age: 63
End: 2023-05-11

## 2023-05-12 ENCOUNTER — TELEPHONE (OUTPATIENT)
Dept: FAMILY MEDICINE CLINIC | Age: 63
End: 2023-05-12

## 2023-05-16 ENCOUNTER — HOSPITAL ENCOUNTER (OUTPATIENT)
Dept: NON INVASIVE DIAGNOSTICS | Age: 63
Discharge: HOME | End: 2023-05-16
Payer: COMMERCIAL

## 2023-05-16 DIAGNOSIS — R00.2 HEART PALPITATIONS: ICD-10-CM

## 2023-05-16 LAB
LV EF: 63 %
LVEF MODALITY: NORMAL

## 2023-05-16 PROCEDURE — 93306 TTE W/DOPPLER COMPLETE: CPT

## 2023-05-17 DIAGNOSIS — S46.012A TRAUMATIC COMPLETE TEAR OF LEFT ROTATOR CUFF, INITIAL ENCOUNTER: ICD-10-CM

## 2023-05-17 DIAGNOSIS — Z98.890 STATUS POST SURGERY: Primary | ICD-10-CM

## 2023-05-19 ENCOUNTER — OFFICE VISIT (OUTPATIENT)
Dept: FAMILY MEDICINE CLINIC | Age: 63
End: 2023-05-19
Payer: COMMERCIAL

## 2023-05-19 VITALS
SYSTOLIC BLOOD PRESSURE: 130 MMHG | HEIGHT: 61 IN | DIASTOLIC BLOOD PRESSURE: 90 MMHG | RESPIRATION RATE: 16 BRPM | HEART RATE: 88 BPM | TEMPERATURE: 97.3 F | OXYGEN SATURATION: 98 % | BODY MASS INDEX: 28.58 KG/M2 | WEIGHT: 151.4 LBS

## 2023-05-19 DIAGNOSIS — M81.0 OSTEOPOROSIS, UNSPECIFIED OSTEOPOROSIS TYPE, UNSPECIFIED PATHOLOGICAL FRACTURE PRESENCE: ICD-10-CM

## 2023-05-19 DIAGNOSIS — M25.512 ACUTE PAIN OF LEFT SHOULDER: ICD-10-CM

## 2023-05-19 DIAGNOSIS — Z01.818 PREOP GENERAL PHYSICAL EXAM: Primary | ICD-10-CM

## 2023-05-19 DIAGNOSIS — E55.9 VITAMIN D DEFICIENCY: ICD-10-CM

## 2023-05-19 PROCEDURE — 99242 OFF/OP CONSLTJ NEW/EST SF 20: CPT | Performed by: NURSE PRACTITIONER

## 2023-05-19 PROCEDURE — 3074F SYST BP LT 130 MM HG: CPT | Performed by: NURSE PRACTITIONER

## 2023-05-19 PROCEDURE — 3078F DIAST BP <80 MM HG: CPT | Performed by: NURSE PRACTITIONER

## 2023-05-19 RX ORDER — ALENDRONATE SODIUM 70 MG/1
70 TABLET ORAL
Qty: 12 TABLET | Refills: 3 | Status: SHIPPED | OUTPATIENT
Start: 2023-05-19 | End: 2023-05-31

## 2023-05-19 RX ORDER — ERGOCALCIFEROL 1.25 MG/1
50000 CAPSULE ORAL WEEKLY
Qty: 12 CAPSULE | Refills: 3 | Status: SHIPPED | OUTPATIENT
Start: 2023-05-19

## 2023-05-19 RX ORDER — CYCLOBENZAPRINE HCL 5 MG
10 TABLET ORAL 3 TIMES DAILY PRN
Qty: 90 TABLET | Refills: 0 | Status: SHIPPED | OUTPATIENT
Start: 2023-05-19 | End: 2024-05-18

## 2023-05-23 DIAGNOSIS — R00.2 HEART PALPITATIONS: Primary | ICD-10-CM

## 2023-05-29 ASSESSMENT — ENCOUNTER SYMPTOMS
DIARRHEA: 0
BACK PAIN: 0
ABDOMINAL PAIN: 0
CONSTIPATION: 0
CHEST TIGHTNESS: 0
COUGH: 0
ABDOMINAL DISTENTION: 0
SHORTNESS OF BREATH: 0
BLOOD IN STOOL: 0

## 2023-05-31 DIAGNOSIS — F41.1 GAD (GENERALIZED ANXIETY DISORDER): ICD-10-CM

## 2023-05-31 RX ORDER — ALPRAZOLAM 1 MG/1
TABLET ORAL
Qty: 60 TABLET | Refills: 0 | Status: SHIPPED | OUTPATIENT
Start: 2023-05-31 | End: 2023-06-28

## 2023-06-05 ENCOUNTER — TELEPHONE (OUTPATIENT)
Dept: ORTHOPEDIC SURGERY | Age: 63
End: 2023-06-05

## 2023-06-05 NOTE — TELEPHONE ENCOUNTER
Patient called and wanted Dr Radha Becerra to know that she has a cardiology appt on July 6 and pt will call after appt to see what Dr Radha Becerra would like to do as far as surgery shoulder left, thank you

## 2023-06-20 ENCOUNTER — OFFICE VISIT (OUTPATIENT)
Dept: CARDIOLOGY CLINIC | Age: 63
End: 2023-06-20
Payer: COMMERCIAL

## 2023-06-20 VITALS
WEIGHT: 153.6 LBS | OXYGEN SATURATION: 95 % | TEMPERATURE: 97.2 F | BODY MASS INDEX: 29.5 KG/M2 | SYSTOLIC BLOOD PRESSURE: 136 MMHG | DIASTOLIC BLOOD PRESSURE: 86 MMHG | HEART RATE: 76 BPM | RESPIRATION RATE: 20 BRPM

## 2023-06-20 DIAGNOSIS — R00.2 HEART PALPITATIONS: ICD-10-CM

## 2023-06-20 DIAGNOSIS — I10 ESSENTIAL HYPERTENSION: Primary | Chronic | ICD-10-CM

## 2023-06-20 PROCEDURE — 93000 ELECTROCARDIOGRAM COMPLETE: CPT | Performed by: NURSE PRACTITIONER

## 2023-06-20 PROCEDURE — 3079F DIAST BP 80-89 MM HG: CPT | Performed by: NURSE PRACTITIONER

## 2023-06-20 PROCEDURE — 3075F SYST BP GE 130 - 139MM HG: CPT | Performed by: NURSE PRACTITIONER

## 2023-06-20 PROCEDURE — 99204 OFFICE O/P NEW MOD 45 MIN: CPT | Performed by: NURSE PRACTITIONER

## 2023-06-20 NOTE — PROGRESS NOTES
visualization    Indications:Palpitations. History / Tech. Comments:  Procedure explained to patient. Patient Status: Outpatient    Height: 60 inches Weight: 145 pounds BSA: 1.63 m^2 BMI: 28.32 kg/m^2    Rhythm: Within normal limits HR: 76 bpm    CONCLUSIONS    Summary  Left ventricle is normal in size and wall thickness. Global left ventricular systolic function is normal with an estimated  ejection fraction of 60 - 65 % . No obvious wall motion abnormality seen. Grade I (mild) left ventricular diastolic dysfunction. Mild mitral regurgitation. Mild tricuspid regurgitation. No pericardial effusion is seen. Signature  ----------------------------------------------------------------------------   Electronically signed by Peyton Piña(Sonographer) on 05/16/2023 09:24   AM  ----------------------------------------------------------------------------    ----------------------------------------------------------------------------   Electronically signed by Nita Boyd(Interpreting physician) on   05/17/2023 10:07 PM  ----------------------------------------------------------------------------  FINDINGS  Left Atrium  Left atrium is normal in size. Left Ventricle  Left ventricle is normal in size and wall thickness. Global left ventricular systolic function is normal with an estimated  ejection fraction of 60- 65 % . No obvious wall motion abnormality seen. Grade I (mild) left ventricular diastolic dysfunction. Right Atrium  Right atrium is normal in size. Right Ventricle  Normal right ventricular size and function. Mitral Valve  Normal mitral valve structure. Mild mitral regurgitation. Aortic Valve  Normal aortic valve structure and function without stenosis or  regurgitation. Tricuspid Valve  Normal tricuspid valve leaflets. Mild tricuspid regurgitation. Pulmonic Valve  The pulmonic valve is normal in structure. Pericardial Effusion  No pericardial effusion is seen.   Pleural Effusion  No

## 2023-07-10 DIAGNOSIS — F33.0 DEPRESSION, MAJOR, RECURRENT, MILD (HCC): ICD-10-CM

## 2023-07-10 NOTE — TELEPHONE ENCOUNTER
LOV 5/19/2023     RTO 7/19/2023  LRF 3/11/2023          Controlled Substance Monitoring:    Acute and Chronic Pain Monitoring:   RX Monitoring 12/11/2019   Attestation -   Periodic Controlled Substance Monitoring No signs of potential drug abuse or diversion identified.    Chronic Pain > 80 MEDD -

## 2023-07-11 DIAGNOSIS — M25.512 ACUTE PAIN OF LEFT SHOULDER: ICD-10-CM

## 2023-07-11 RX ORDER — BUPROPION HYDROCHLORIDE 150 MG/1
150 TABLET ORAL EVERY MORNING
Qty: 30 TABLET | Refills: 5 | Status: SHIPPED | OUTPATIENT
Start: 2023-07-11 | End: 2024-07-11

## 2023-07-12 RX ORDER — CYCLOBENZAPRINE HCL 5 MG
TABLET ORAL
Qty: 90 TABLET | Refills: 0 | Status: SHIPPED | OUTPATIENT
Start: 2023-07-12

## 2023-07-12 NOTE — TELEPHONE ENCOUNTER
LOV 5/19/23  RTO F/U scheduled  LRF 5/19/23    Health Maintenance   Topic Date Due    Shingles vaccine (1 of 2) Never done    Colorectal Cancer Screen  04/26/2023    Pneumococcal 65+ years Vaccine (1 - PCV) Never done    COVID-19 Vaccine (1) 04/14/2025 (Originally 1960)    Flu vaccine (1) 08/01/2023    Breast cancer screen  09/16/2023    Depression Monitoring  01/10/2024    GFR test (Diabetes, CKD 3-4, OR last GFR 15-59)  05/02/2024    Cervical cancer screen  10/28/2025    Lipids  05/02/2028    DTaP/Tdap/Td vaccine (2 - Td or Tdap) 07/22/2030    DEXA (modify frequency per FRAX score)  Completed    Hepatitis C screen  Completed    HIV screen  Completed    Hepatitis A vaccine  Aged Out    Hib vaccine  Aged Out    Meningococcal (ACWY) vaccine  Aged Out    Diabetes screen  Discontinued             (applicable per patient's age: Cancer Screenings, Depression Screening, Fall Risk Screening, Immunizations)    LDL Cholesterol (mg/dL)   Date Value   05/02/2023 173 (H)     LDL Calculated (mg/dL)   Date Value   02/09/2022 144 (H)     AST (U/L)   Date Value   05/02/2023 23     ALT (U/L)   Date Value   05/02/2023 21     BUN (mg/dL)   Date Value   05/02/2023 15      (goal A1C is < 7)   (goal LDL is <100) need 30-50% reduction from baseline     BP Readings from Last 3 Encounters:   06/20/23 136/86   05/19/23 (!) 130/90   05/02/23 (!) 117/90    (goal /80)      All Future Testing planned in [facility]:  Lab Frequency Next Occurrence   VL DUP CAROTID BILATERAL Once 08/16/2022   PAP Smear Once 10/28/2022   ROBERTH DIGITAL SCREEN W OR WO CAD BILATERAL Once 78/56/6789   Basic Metabolic Panel Once 83/05/5613       Next Visit Date:  Future Appointments   Date Time Provider [address]   7/19/2023 10:30 AM Jeanine Ang, APRN - [facility]   8/8/2023  9:45 AM Kamryn Florentino PA-C [facility]            Patient Active Problem List:     Essential hypertension     Depression, major, recurrent, mild ([address])

## 2023-07-13 DIAGNOSIS — F41.1 GAD (GENERALIZED ANXIETY DISORDER): Primary | ICD-10-CM

## 2023-07-14 NOTE — TELEPHONE ENCOUNTER
LOV  7/13/23  RTO 7/19/23  LRF 5/31/23          Controlled Substance Monitoring:    Acute and Chronic Pain Monitoring:   RX Monitoring 12/11/2019   Attestation -   Periodic Controlled Substance Monitoring No signs of potential drug abuse or diversion identified.    Chronic Pain > 80 MEDD -

## 2023-07-16 RX ORDER — ALPRAZOLAM 1 MG/1
1 TABLET ORAL 2 TIMES DAILY
Qty: 60 TABLET | Refills: 0 | Status: SHIPPED | OUTPATIENT
Start: 2023-07-16 | End: 2023-08-15

## 2023-07-19 ENCOUNTER — HOSPITAL ENCOUNTER (OUTPATIENT)
Age: 63
Setting detail: SPECIMEN
Discharge: HOME | End: 2023-07-19

## 2023-07-19 ENCOUNTER — OFFICE VISIT (OUTPATIENT)
Dept: FAMILY MEDICINE CLINIC | Age: 63
End: 2023-07-19
Payer: MEDICARE

## 2023-07-19 VITALS
DIASTOLIC BLOOD PRESSURE: 86 MMHG | TEMPERATURE: 96.7 F | BODY MASS INDEX: 29.58 KG/M2 | RESPIRATION RATE: 18 BRPM | WEIGHT: 154 LBS | OXYGEN SATURATION: 96 % | SYSTOLIC BLOOD PRESSURE: 122 MMHG | HEART RATE: 65 BPM

## 2023-07-19 DIAGNOSIS — F41.1 GAD (GENERALIZED ANXIETY DISORDER): ICD-10-CM

## 2023-07-19 DIAGNOSIS — Z51.81 ENCOUNTER FOR THERAPEUTIC DRUG LEVEL MONITORING: ICD-10-CM

## 2023-07-19 DIAGNOSIS — F33.0 DEPRESSION, MAJOR, RECURRENT, MILD (HCC): Chronic | ICD-10-CM

## 2023-07-19 DIAGNOSIS — N28.9 RENAL INSUFFICIENCY: ICD-10-CM

## 2023-07-19 DIAGNOSIS — M25.512 ACUTE PAIN OF LEFT SHOULDER: Primary | ICD-10-CM

## 2023-07-19 LAB
ALCOHOL URINE: ABNORMAL
AMPHETAMINE SCREEN, URINE: NEGATIVE
ANION GAP SERPL CALCULATED.3IONS-SCNC: 14 MMOL/L (ref 9–17)
BARBITURATE SCREEN, URINE: NEGATIVE
BENZODIAZEPINE SCREEN, URINE: POSITIVE
BUN SERPL-MCNC: 20 MG/DL (ref 8–23)
BUPRENORPHINE URINE: ABNORMAL
CALCIUM SERPL-MCNC: 9.2 MG/DL (ref 8.6–10.4)
CHLORIDE SERPL-SCNC: 100 MMOL/L (ref 98–107)
CO2 SERPL-SCNC: 24 MMOL/L (ref 20–31)
COCAINE METABOLITE SCREEN URINE: NEGATIVE
CREAT SERPL-MCNC: 1.1 MG/DL (ref 0.5–0.9)
FENTANYL SCREEN, URINE: ABNORMAL
GABAPENTIN SCREEN, URINE: ABNORMAL
GFR SERPL CREATININE-BSD FRML MDRD: 56 ML/MIN/1.73M2
GLUCOSE SERPL-MCNC: 76 MG/DL (ref 70–99)
MDMA URINE: NEGATIVE
METHADONE SCREEN, URINE: NEGATIVE
METHAMPHETAMINE, URINE: NEGATIVE
OPIATE SCREEN URINE: ABNORMAL
OXYCODONE SCREEN URINE: NEGATIVE
PHENCYCLIDINE SCREEN URINE: NEGATIVE
POTASSIUM SERPL-SCNC: 4.4 MMOL/L (ref 3.7–5.3)
PROPOXYPHENE SCREEN, URINE: ABNORMAL
SODIUM SERPL-SCNC: 138 MMOL/L (ref 135–144)
SYNTHETIC CANNABINOIDS(K2) SCREEN, URINE: ABNORMAL
THC SCREEN, URINE: NEGATIVE
TRAMADOL SCREEN URINE: ABNORMAL
TRICYCLIC ANTIDEPRESSANTS, UR: POSITIVE

## 2023-07-19 PROCEDURE — 3074F SYST BP LT 130 MM HG: CPT | Performed by: NURSE PRACTITIONER

## 2023-07-19 PROCEDURE — G8427 DOCREV CUR MEDS BY ELIG CLIN: HCPCS | Performed by: NURSE PRACTITIONER

## 2023-07-19 PROCEDURE — 99214 OFFICE O/P EST MOD 30 MIN: CPT | Performed by: NURSE PRACTITIONER

## 2023-07-19 PROCEDURE — 3017F COLORECTAL CA SCREEN DOC REV: CPT | Performed by: NURSE PRACTITIONER

## 2023-07-19 PROCEDURE — 1090F PRES/ABSN URINE INCON ASSESS: CPT | Performed by: NURSE PRACTITIONER

## 2023-07-19 PROCEDURE — G8399 PT W/DXA RESULTS DOCUMENT: HCPCS | Performed by: NURSE PRACTITIONER

## 2023-07-19 PROCEDURE — 1036F TOBACCO NON-USER: CPT | Performed by: NURSE PRACTITIONER

## 2023-07-19 PROCEDURE — G8419 CALC BMI OUT NRM PARAM NOF/U: HCPCS | Performed by: NURSE PRACTITIONER

## 2023-07-19 PROCEDURE — 3078F DIAST BP <80 MM HG: CPT | Performed by: NURSE PRACTITIONER

## 2023-07-19 PROCEDURE — 80305 DRUG TEST PRSMV DIR OPT OBS: CPT | Performed by: NURSE PRACTITIONER

## 2023-07-19 PROCEDURE — 1123F ACP DISCUSS/DSCN MKR DOCD: CPT | Performed by: NURSE PRACTITIONER

## 2023-07-19 ASSESSMENT — ENCOUNTER SYMPTOMS
BACK PAIN: 0
CONSTIPATION: 0
SHORTNESS OF BREATH: 0
COUGH: 0
BLOOD IN STOOL: 0
DIARRHEA: 0
CHEST TIGHTNESS: 0
ABDOMINAL PAIN: 0
ABDOMINAL DISTENTION: 0

## 2023-07-19 NOTE — PROGRESS NOTES
[address]   [address]  [phone]    7/19/23     Patient ID  Lila Cotton is a 70 y.o. female  Established patient    Chief Complaint  Lila Cotton presents today for Results (Post cardiac workup- Cardio cleared low risk), Hypertension, and Anxiety    Have you seen any other physician or provider since your last visit? Yes - Records Obtained Cardiology   Have you had any other diagnostic tests since your last visit? Yes - Records Obtained  Have you been seen in the emergency room and/or had an admission to a hospital since we last saw you? No     ASSESSMENT/PLAN  1. Acute pain of left shoulder  2. Depression, major, recurrent, mild ([address])  Assessment & Plan:   Borderline controlled, continue current medications and continue current treatment plan  3. SHNU (generalized anxiety disorder)  4. Encounter for therapeutic drug level monitoring  -     POCT Rapid Drug Screen       Encouraged to take BB and xanax with sip water morning off surgery     Return in about 3 months (around 10/19/2023) for med check, controlled. Patient Care Team:  SLOAN Gomez CNP as PCP - General (Nurse Practitioner Family)  SLOAN Gomez CNP as PCP - Empaneled Provider  Dora Celis MD as Consulting Physician (Pain Management)  Nelson Mayo MD as Consulting Physician (Nephrology)  Alyson Langford MD as Consulting Physician (Internal Medicine)  Ponce Robert DO as Consulting Physician (Orthopedic Surgery)    SUBJECTIVE/OBJECTIVE  History of Present illness / Visit Summary   Patient presents today for follow up   Reviewed health maintenance and any recent labs/imaging      Plans for left shoulder surgery next Tuesday   Rescheduled twice     6/20/223 cardiology -   HOLTER MONITOR 5/23/23  Predominant Rhythm was Sinus Rhythm/ Sinus Tachycardia. Min. HR: 63 bpm, Avg. HR: 77 bpm, Max.  HR: 116 bpm.  1 patient event with symptoms of Not specified and

## 2023-07-25 ENCOUNTER — ANESTHESIA (OUTPATIENT)
Dept: OPERATING ROOM | Age: 63
End: 2023-07-25
Payer: MEDICARE

## 2023-07-25 ENCOUNTER — ANESTHESIA EVENT (OUTPATIENT)
Dept: OPERATING ROOM | Age: 63
End: 2023-07-25
Payer: MEDICARE

## 2023-07-25 ENCOUNTER — HOSPITAL ENCOUNTER (OUTPATIENT)
Age: 63
Setting detail: OUTPATIENT SURGERY
Discharge: HOME | End: 2023-07-25
Attending: ORTHOPAEDIC SURGERY | Admitting: ORTHOPAEDIC SURGERY
Payer: MEDICARE

## 2023-07-25 VITALS
RESPIRATION RATE: 15 BRPM | SYSTOLIC BLOOD PRESSURE: 132 MMHG | TEMPERATURE: 97.2 F | HEIGHT: 60 IN | BODY MASS INDEX: 29.45 KG/M2 | HEART RATE: 65 BPM | WEIGHT: 150 LBS | DIASTOLIC BLOOD PRESSURE: 77 MMHG | OXYGEN SATURATION: 94 %

## 2023-07-25 DIAGNOSIS — G89.18 POST-OPERATIVE PAIN: Primary | ICD-10-CM

## 2023-07-25 PROCEDURE — 2580000003 HC RX 258: Performed by: ORTHOPAEDIC SURGERY

## 2023-07-25 PROCEDURE — 3600000013 HC SURGERY LEVEL 3 ADDTL 15MIN: Performed by: ORTHOPAEDIC SURGERY

## 2023-07-25 PROCEDURE — 64415 NJX AA&/STRD BRCH PLXS IMG: CPT | Performed by: ANESTHESIOLOGY

## 2023-07-25 PROCEDURE — 3600000003 HC SURGERY LEVEL 3 BASE: Performed by: ORTHOPAEDIC SURGERY

## 2023-07-25 PROCEDURE — 3700000001 HC ADD 15 MINUTES (ANESTHESIA): Performed by: ORTHOPAEDIC SURGERY

## 2023-07-25 PROCEDURE — L3650 SO 8 ABD RESTRAINT PRE OTS: HCPCS | Performed by: ORTHOPAEDIC SURGERY

## 2023-07-25 PROCEDURE — 2580000003 HC RX 258: Performed by: ANESTHESIOLOGY

## 2023-07-25 PROCEDURE — 6360000002 HC RX W HCPCS: Performed by: ANESTHESIOLOGY

## 2023-07-25 PROCEDURE — C9290 INJ, BUPIVACAINE LIPOSOME: HCPCS | Performed by: ANESTHESIOLOGY

## 2023-07-25 PROCEDURE — 6360000002 HC RX W HCPCS: Performed by: ORTHOPAEDIC SURGERY

## 2023-07-25 PROCEDURE — 7100000011 HC PHASE II RECOVERY - ADDTL 15 MIN: Performed by: ORTHOPAEDIC SURGERY

## 2023-07-25 PROCEDURE — C9399 UNCLASSIFIED DRUGS OR BIOLOG: HCPCS | Performed by: NURSE ANESTHETIST, CERTIFIED REGISTERED

## 2023-07-25 PROCEDURE — 7100000010 HC PHASE II RECOVERY - FIRST 15 MIN: Performed by: ORTHOPAEDIC SURGERY

## 2023-07-25 PROCEDURE — 6360000002 HC RX W HCPCS: Performed by: NURSE ANESTHETIST, CERTIFIED REGISTERED

## 2023-07-25 PROCEDURE — C1713 ANCHOR/SCREW BN/BN,TIS/BN: HCPCS | Performed by: ORTHOPAEDIC SURGERY

## 2023-07-25 PROCEDURE — 2500000003 HC RX 250 WO HCPCS: Performed by: NURSE ANESTHETIST, CERTIFIED REGISTERED

## 2023-07-25 PROCEDURE — 2720000010 HC SURG SUPPLY STERILE: Performed by: ORTHOPAEDIC SURGERY

## 2023-07-25 PROCEDURE — 7100000000 HC PACU RECOVERY - FIRST 15 MIN: Performed by: ORTHOPAEDIC SURGERY

## 2023-07-25 PROCEDURE — 7100000001 HC PACU RECOVERY - ADDTL 15 MIN: Performed by: ORTHOPAEDIC SURGERY

## 2023-07-25 PROCEDURE — 2709999900 HC NON-CHARGEABLE SUPPLY: Performed by: ORTHOPAEDIC SURGERY

## 2023-07-25 PROCEDURE — 3700000000 HC ANESTHESIA ATTENDED CARE: Performed by: ORTHOPAEDIC SURGERY

## 2023-07-25 DEVICE — IMPLANTABLE DEVICE: Type: IMPLANTABLE DEVICE | Site: SHOULDER | Status: FUNCTIONAL

## 2023-07-25 RX ORDER — SODIUM CHLORIDE 9 MG/ML
INJECTION, SOLUTION INTRAVENOUS PRN
Status: DISCONTINUED | OUTPATIENT
Start: 2023-07-25 | End: 2023-07-25 | Stop reason: HOSPADM

## 2023-07-25 RX ORDER — LIDOCAINE HYDROCHLORIDE 10 MG/ML
1 INJECTION, SOLUTION EPIDURAL; INFILTRATION; INTRACAUDAL; PERINEURAL
Status: DISCONTINUED | OUTPATIENT
Start: 2023-07-26 | End: 2023-07-25 | Stop reason: HOSPADM

## 2023-07-25 RX ORDER — MAGNESIUM HYDROXIDE 1200 MG/15ML
LIQUID ORAL CONTINUOUS PRN
Status: COMPLETED | OUTPATIENT
Start: 2023-07-25 | End: 2023-07-25

## 2023-07-25 RX ORDER — OXYCODONE HYDROCHLORIDE AND ACETAMINOPHEN 5; 325 MG/1; MG/1
1 TABLET ORAL EVERY 6 HOURS PRN
Qty: 28 TABLET | Refills: 0 | Status: SHIPPED | OUTPATIENT
Start: 2023-07-25 | End: 2023-08-01

## 2023-07-25 RX ORDER — SODIUM CHLORIDE 0.9 % (FLUSH) 0.9 %
5-40 SYRINGE (ML) INJECTION PRN
Status: DISCONTINUED | OUTPATIENT
Start: 2023-07-25 | End: 2023-07-25 | Stop reason: HOSPADM

## 2023-07-25 RX ORDER — BUPIVACAINE HYDROCHLORIDE 2.5 MG/ML
INJECTION, SOLUTION INFILTRATION; PERINEURAL PRN
Status: DISCONTINUED | OUTPATIENT
Start: 2023-07-25 | End: 2023-07-25 | Stop reason: ALTCHOICE

## 2023-07-25 RX ORDER — FENTANYL CITRATE 50 UG/ML
INJECTION, SOLUTION INTRAMUSCULAR; INTRAVENOUS PRN
Status: DISCONTINUED | OUTPATIENT
Start: 2023-07-25 | End: 2023-07-25 | Stop reason: SDUPTHER

## 2023-07-25 RX ORDER — DOCUSATE SODIUM 100 MG/1
100 CAPSULE, LIQUID FILLED ORAL 2 TIMES DAILY PRN
Qty: 20 CAPSULE | Refills: 0 | Status: SHIPPED | OUTPATIENT
Start: 2023-07-25

## 2023-07-25 RX ORDER — PROPOFOL 10 MG/ML
INJECTION, EMULSION INTRAVENOUS PRN
Status: DISCONTINUED | OUTPATIENT
Start: 2023-07-25 | End: 2023-07-25 | Stop reason: SDUPTHER

## 2023-07-25 RX ORDER — BUPIVACAINE HYDROCHLORIDE 5 MG/ML
INJECTION, SOLUTION EPIDURAL; INTRACAUDAL
Status: COMPLETED | OUTPATIENT
Start: 2023-07-25 | End: 2023-07-25

## 2023-07-25 RX ORDER — ONDANSETRON 2 MG/ML
INJECTION INTRAMUSCULAR; INTRAVENOUS PRN
Status: DISCONTINUED | OUTPATIENT
Start: 2023-07-25 | End: 2023-07-25 | Stop reason: SDUPTHER

## 2023-07-25 RX ORDER — HYDROMORPHONE HYDROCHLORIDE 1 MG/ML
0.5 INJECTION, SOLUTION INTRAMUSCULAR; INTRAVENOUS; SUBCUTANEOUS EVERY 5 MIN PRN
Status: DISCONTINUED | OUTPATIENT
Start: 2023-07-25 | End: 2023-07-25 | Stop reason: HOSPADM

## 2023-07-25 RX ORDER — SODIUM CHLORIDE, SODIUM LACTATE, POTASSIUM CHLORIDE, CALCIUM CHLORIDE 600; 310; 30; 20 MG/100ML; MG/100ML; MG/100ML; MG/100ML
INJECTION, SOLUTION INTRAVENOUS CONTINUOUS
Status: DISCONTINUED | OUTPATIENT
Start: 2023-07-25 | End: 2023-07-25 | Stop reason: HOSPADM

## 2023-07-25 RX ORDER — SODIUM CHLORIDE 0.9 % (FLUSH) 0.9 %
5-40 SYRINGE (ML) INJECTION EVERY 12 HOURS SCHEDULED
Status: DISCONTINUED | OUTPATIENT
Start: 2023-07-25 | End: 2023-07-25 | Stop reason: HOSPADM

## 2023-07-25 RX ORDER — DEXAMETHASONE SODIUM PHOSPHATE 10 MG/ML
INJECTION, SOLUTION INTRAMUSCULAR; INTRAVENOUS PRN
Status: DISCONTINUED | OUTPATIENT
Start: 2023-07-25 | End: 2023-07-25 | Stop reason: SDUPTHER

## 2023-07-25 RX ORDER — FENTANYL CITRATE 50 UG/ML
25 INJECTION, SOLUTION INTRAMUSCULAR; INTRAVENOUS EVERY 5 MIN PRN
Status: DISCONTINUED | OUTPATIENT
Start: 2023-07-25 | End: 2023-07-25 | Stop reason: HOSPADM

## 2023-07-25 RX ORDER — SODIUM CHLORIDE 9 MG/ML
INJECTION, SOLUTION INTRAVENOUS CONTINUOUS
Status: DISCONTINUED | OUTPATIENT
Start: 2023-07-25 | End: 2023-07-25 | Stop reason: HOSPADM

## 2023-07-25 RX ORDER — ROCURONIUM BROMIDE 10 MG/ML
INJECTION, SOLUTION INTRAVENOUS PRN
Status: DISCONTINUED | OUTPATIENT
Start: 2023-07-25 | End: 2023-07-25 | Stop reason: SDUPTHER

## 2023-07-25 RX ORDER — LIDOCAINE HYDROCHLORIDE 20 MG/ML
INJECTION, SOLUTION EPIDURAL; INFILTRATION; INTRACAUDAL; PERINEURAL PRN
Status: DISCONTINUED | OUTPATIENT
Start: 2023-07-25 | End: 2023-07-25 | Stop reason: SDUPTHER

## 2023-07-25 RX ORDER — MIDAZOLAM HYDROCHLORIDE 1 MG/ML
2 INJECTION INTRAMUSCULAR; INTRAVENOUS ONCE
Status: COMPLETED | OUTPATIENT
Start: 2023-07-25 | End: 2023-07-25

## 2023-07-25 RX ORDER — EPHEDRINE SULFATE/0.9% NACL/PF 50 MG/5 ML
SYRINGE (ML) INTRAVENOUS PRN
Status: DISCONTINUED | OUTPATIENT
Start: 2023-07-25 | End: 2023-07-25 | Stop reason: SDUPTHER

## 2023-07-25 RX ORDER — ONDANSETRON 2 MG/ML
4 INJECTION INTRAMUSCULAR; INTRAVENOUS
Status: DISCONTINUED | OUTPATIENT
Start: 2023-07-25 | End: 2023-07-25 | Stop reason: HOSPADM

## 2023-07-25 RX ADMIN — SODIUM CHLORIDE, POTASSIUM CHLORIDE, SODIUM LACTATE AND CALCIUM CHLORIDE: 600; 310; 30; 20 INJECTION, SOLUTION INTRAVENOUS at 11:47

## 2023-07-25 RX ADMIN — SUGAMMADEX 200 MG: 100 INJECTION, SOLUTION INTRAVENOUS at 15:44

## 2023-07-25 RX ADMIN — Medication 5 MG: at 14:20

## 2023-07-25 RX ADMIN — Medication 2000 MG: at 14:11

## 2023-07-25 RX ADMIN — PROPOFOL 125 MG: 10 INJECTION, EMULSION INTRAVENOUS at 14:04

## 2023-07-25 RX ADMIN — BUPIVACAINE HYDROCHLORIDE 10 ML: 5 INJECTION, SOLUTION EPIDURAL; INTRACAUDAL; PERINEURAL at 12:40

## 2023-07-25 RX ADMIN — ROCURONIUM BROMIDE 40 MG: 10 INJECTION, SOLUTION INTRAVENOUS at 14:04

## 2023-07-25 RX ADMIN — Medication 5 MG: at 14:30

## 2023-07-25 RX ADMIN — MIDAZOLAM HYDROCHLORIDE 2 MG: 1 INJECTION, SOLUTION INTRAMUSCULAR; INTRAVENOUS at 12:41

## 2023-07-25 RX ADMIN — ONDANSETRON 4 MG: 2 INJECTION INTRAMUSCULAR; INTRAVENOUS at 15:30

## 2023-07-25 RX ADMIN — LIDOCAINE HYDROCHLORIDE 50 MG: 20 INJECTION, SOLUTION EPIDURAL; INFILTRATION; INTRACAUDAL; PERINEURAL at 14:04

## 2023-07-25 RX ADMIN — FENTANYL CITRATE 100 MCG: 50 INJECTION INTRAMUSCULAR; INTRAVENOUS at 14:04

## 2023-07-25 RX ADMIN — DEXAMETHASONE SODIUM PHOSPHATE 10 MG: 10 INJECTION, SOLUTION INTRAMUSCULAR; INTRAVENOUS at 14:10

## 2023-07-25 RX ADMIN — BUPIVACAINE 10 ML: 13.3 INJECTION, SUSPENSION, LIPOSOMAL INFILTRATION at 12:40

## 2023-07-25 ASSESSMENT — PAIN - FUNCTIONAL ASSESSMENT
PAIN_FUNCTIONAL_ASSESSMENT: 0-10
PAIN_FUNCTIONAL_ASSESSMENT: PREVENTS OR INTERFERES SOME ACTIVE ACTIVITIES AND ADLS

## 2023-07-25 ASSESSMENT — PAIN DESCRIPTION - DESCRIPTORS: DESCRIPTORS: TEARING

## 2023-07-25 NOTE — OP NOTE
Operative Note      Patient: Brooklynn Bronson  YOB: 1960  MRN: [id number]    Date of Procedure: 7/25/2023    Pre-Op Diagnosis Codes:     * Tear of left rotator cuff, unspecified tear extent, unspecified whether traumatic [M75.102]    Post-Op Diagnosis: Same       Procedure(s):  LEFT SHOULDER ARTHROSCOPY ROTATOR CUFF REPAIR    Surgeon(s):  Martita Ba DO    Assistant:   First Assistant: Grabiel Priest  Resident: Melanie Vasquez DO; Sundar Calixto MD    Anesthesia: General    Estimated Blood Loss (mL): 20    Complications: None    Specimens:   * No specimens in log *    Implants:  Implant Name Type Inv. Item Serial No.  Lot No. LRB No. Used Action   ANCHOR SUT L22MM DIA5. 5MM PEEK FULL THRD KNOTLESS EYELET FOR - [id number]  ANCHOR SUT L22MM DIA5. 5MM PEEK FULL THRD KNOTLESS EYELET FOR  [employer] [id number] Left 2 Implanted         Drains: * No LDAs found *    Findings: Per operative note    Detailed Description of Procedure:     Brooklynn Bronson is a 58-year-old female who presented to [facility] surgical department for left shoulder arthroscopy and rotator cuff repair with possible biceps tenotomy versus tenodesis. The patient has had progressively worsening left shoulder pain which has been refractory to conservative care. Her symptoms are greatly affecting her usual activities of daily living and her MRI proven rotator cuff tear is causing significant morbidity and as a result the patient would like surgical repair at this time. Patient was identified preoperatively where consent was obtained, signed, placed on the chart. Her procedure was described and questions were answered. All details of the procedure, as well as risks, benefits and alternatives, including the option of non operative versus operative treatment were discussed.   The patient understands that risks of the surgery include but are not limited to: bleeding, malunion/nonunion, loss of
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
## 2023-07-25 NOTE — BRIEF OP NOTE
Brief Postoperative Note      Patient: Daron Councilman  YOB: 1960  MRN: [id number]    Date of Procedure: 7/25/2023    Pre-Op Diagnosis Codes:  Left rotator cuff tear. [D23.188]    Post-Op Diagnosis: Post-Op Diagnosis Codes:  Left rotator cuff tear. [X52.145]       Procedure(s):  Left shoulder arthroscopy rotator cuff repair. Surgeon(s):  Fatou Lloyd DO    Assistant:  First Assistant: Analisa Ryder  Resident: Fredrick Mckeon DO; Jovanni Olivo MD    Anesthesia: General    Estimated Blood Loss (mL): 20 cc    Fluids: 1200 cc crystalloid    Complications: none    Specimens:   * No specimens in log *    Implants:  Implant Name Type Inv. Item Serial No.  Lot No. LRB No. Used Action   ANCHOR SUT L22MM DIA5. 5MM PEEK FULL THRD KNOTLESS EYELET FOR - [id number]  ANCHOR SUT L22MM DIA5. 5MM PEEK FULL THRD KNOTLESS EYELET FOR  [employer] [id number] Left 2 Implanted         Drains: * No LDAs found *    Findings: See op-note.       Electronically signed by Fredrick Mckeon DO on 7/25/2023 at 3:55 PM

## 2023-07-25 NOTE — ANESTHESIA PRE PROCEDURE
Department of Anesthesiology  Preprocedure Note       Name:  Erin French   Age:  70 y.o.  :  1960                                          MRN:  [id number]         Date:  2023      Surgeon: Elvia Long):  Luciano Qiu DO    Procedure: Procedure(s):  LEFT SHOULDER ARTHROSCOPY ROTATOR CUFF REPAIR POSSIBLE BICEPS TENODESIS    Medications prior to admission:   Prior to Admission medications    Medication Sig Start Date End Date Taking? Authorizing Provider   ALPRAZolam Opal Surprise) 1 MG tablet Take 1 tablet by mouth in the morning and at bedtime for 30 days.  Max Daily Amount: 2 mg 7/16/23 8/15/23  Jeanine Oral Curet, APRN - CNP   cyclobenzaprine (FLEXERIL) 5 MG tablet TAKE TWO TABLETS BY MOUTH THREE TIMES A DAY AS NEEDED FOR MUSCLE SPASMS 23   Jeanine Oral Curet, APRN - CNP   buPROPion (WELLBUTRIN XL) 150 MG extended release tablet Take 1 tablet by mouth every morning 23  Jeanine Oral Curet, APRN - CNP   metoprolol tartrate (LOPRESSOR) 25 MG tablet Take 1 tablet by mouth 2 times daily 23   Beauty Hammed, APRN - CNP   vitamin D (ERGOCALCIFEROL) 1.25 MG (67026 UT) CAPS capsule Take 1 capsule by mouth once a week 23   Jeanine Oral Curet, APRN - CNP   alendronate (FOSAMAX) 70 MG tablet Take 1 tablet by mouth every 7 days for 12 days 23  Jeanine Oral Curet, APRN - CNP   PARoxetine (PAXIL) 40 MG tablet TAKE ONE TABLET BY MOUTH EVERY MORNING 23   Jeanine Oral Curet, APRN - CNP   meloxicam (MOBIC) 15 MG tablet Take 1 tablet by mouth daily  Patient not taking: Reported on 2023 4/3/23 9/30/23  Jeanine Oral Curet, APRN - CNP   Caltrate 600+D Plus Minerals (CALTRATE) 600-800 MG-UNIT TABS tablet Take 1 tablet by mouth 2 times daily 21   Jeanine Oral Curet, APRN - CNP   Handicap Placard MISC by Does not apply route 20   Jeanine Oral Curet, APRN - CNP   Multiple Vitamins-Minerals (THERAPEUTIC MULTIVITAMIN-MINERALS) tablet Take 1 tablet by mouth daily    Historical Provider, MD

## 2023-07-25 NOTE — ANESTHESIA POSTPROCEDURE EVALUATION
Department of Anesthesiology  Postprocedure Note    Patient: Elkin Sanchez  MRN: [id number]  YOB: 1960  Date of evaluation: 7/25/2023      Procedure Summary     Date: 07/25/23 Room / Location: [address] - INPATIENT    Anesthesia Start: 7273 Anesthesia Stop: 0345    Procedure: LEFT SHOULDER ARTHROSCOPY ROTATOR CUFF REPAIR (Left: Shoulder) Diagnosis:       Tear of left rotator cuff, unspecified tear extent, unspecified whether traumatic      (Tear of left rotator cuff, unspecified tear extent, unspecified whether traumatic [M75.102])    Surgeons: Nadege Reed DO Responsible Provider: Kate Hilton MD    Anesthesia Type: general, regional ASA Status: 3          Anesthesia Type: No value filed.     Victor Hugo Phase I: Victor Hugo Score: 10    Victor Hugo Phase II: Victor Hugo Score: 10      Anesthesia Post Evaluation    Patient location during evaluation: PACU  Patient participation: complete - patient participated  Level of consciousness: awake  Airway patency: patent  Nausea & Vomiting: no nausea  Complications: no  Cardiovascular status: blood pressure returned to baseline  Respiratory status: acceptable  Hydration status: euvolemic  Comments: Multimodal analgesia pain management as indicated by procedure  Multimodal analgesia pain management approach

## 2023-07-25 NOTE — ANESTHESIA PROCEDURE NOTES
Peripheral Block    Patient location during procedure: pre-op  Reason for block: procedure for pain, post-op pain management and at surgeon's request  Start time: 7/25/2023 12:40 PM  End time: 7/25/2023 12:48 PM  Staffing  Performed: anesthesiologist   Anesthesiologist: John Hawkins MD  Preanesthetic Checklist  Completed: patient identified, IV checked, site marked, risks and benefits discussed, surgical/procedural consents, equipment checked, pre-op evaluation, timeout performed, anesthesia consent given, oxygen available, monitors applied/VS acknowledged, fire risk safety assessment completed and verbalized and blood product R/B/A discussed and consented  Peripheral Block   Patient position: supine  Prep: ChloraPrep  Provider prep: sterile gloves and mask  Patient monitoring: cardiac monitor, continuous pulse ox, continuous capnometry, frequent blood pressure checks, IV access, oxygen and responsive to questions  Block type: Brachial plexus  Interscalene  Laterality: left  Injection technique: single-shot  Guidance: ultrasound guided  Local infiltration: lidocaine  Infiltration strength: 1 %  Local infiltration: lidocaine  Dose: 2 mL    Needle   Needle type: pencil-tip   Needle gauge: 20 G  Needle localization: ultrasound guidance  Assessment   Injection assessment: no paresthesia on injection, local visualized surrounding nerve on ultrasound, no intravascular symptoms and negative aspiration for heme  Paresthesia pain: none  Slow fractionated injection: yes  Hemodynamics: stable  Real-time US image taken/store: yes  Outcomes: uncomplicated    Additional Notes  Preprocedure ready time 1240  Medications Administered  bupivacaine (MARCAINE) PF injection 0.5% - Perineural   10 mL - 7/25/2023 12:40:00 PM  bupivacaine liposome (EXPAREL) injection 1.3% - Perineural   10 mL - 7/25/2023 12:40:00 PM

## 2023-07-25 NOTE — DISCHARGE INSTRUCTIONS
Orthopaedic Instructions:  -Weight bearing status: Non weight bearing with the left arm, Okay to come out of the sling for range of motion of the elbow only. Follow all directions of physical therapist.  -Starting three days after surgery, okay for daily dressing changes until wound/surgical incision site is dry. Dressing changes can be performed with simple Band-aids or gauze pads secured with tape/ace bandages. Once you no longer see drainage from your wounds on your dressings, it is okay to shower. Do not scrub vigorously, just let water run over wound/surgical sites. Additionally, one no longer needs to change dressings daily. It is important that you do not soak the wound/incision site underwater, though. This includes baths, hot tubs, swimming pools, etc.  -Ice (20 minutes on and off 1 hour) to reduce swelling and throbbing pain.  -Should urinate within 8 hours of surgery.  -Call the office or come to Emergency Room if signs of infection appear (hot, swollen, red, draining pus, fever)  -Take medications as prescribed.  -Wean off narcotics (percocet/norco) as soon as possible. Do not take tylenol if still taking narcotics.  -Follow up with Dr. Lord Montelongo in his office 10-14 days after surgery. Call [phone] to schedule/confirm.

## 2023-07-27 ENCOUNTER — TELEPHONE (OUTPATIENT)
Dept: ORTHOPEDIC SURGERY | Age: 63
End: 2023-07-27

## 2023-07-27 NOTE — TELEPHONE ENCOUNTER
Patient called with some confusion on her medication. 2 day post op on shoulder surgery, not clear if she is able to start up on her Meloxicam and if so, is she able to take that along with her pain medication. She has not had any pain medication yet, but will start before her PT tomorrow. She is also asking if she needs to wear her sling every minute of the day. Please return her call to discuss. Thank you.

## 2023-07-28 ENCOUNTER — HOSPITAL ENCOUNTER (OUTPATIENT)
Dept: PHYSICAL THERAPY | Facility: CLINIC | Age: 63
Setting detail: THERAPIES SERIES
Discharge: HOME | End: 2023-07-28
Payer: MEDICARE

## 2023-07-28 PROCEDURE — 97016 VASOPNEUMATIC DEVICE THERAPY: CPT

## 2023-07-28 PROCEDURE — 97110 THERAPEUTIC EXERCISES: CPT

## 2023-07-28 PROCEDURE — 97161 PT EVAL LOW COMPLEX 20 MIN: CPT

## 2023-07-28 NOTE — CONSULTS
Complexity:  History (Personal factors, comorbidities) [] 0 [x] 1-2 [] 3+   Exam (limitations, restrictions) [] 1-2 [] 3 [x] 4+   Clinical presentation (progression) [x] Stable [] Evolving  [] Unstable   Decision Making [x] Low [] Moderate [] High    [x] Low Complexity [] Moderate Complexity [] High Complexity       Treatment Charges: Mins Units   [x] Evaluation       [x]  Low       []  Moderate       []  High 22 1   []  Modalities     [x]  Ther Exercise 20 1   []  Manual Therapy     []  Ther Activities     []  Aquatics     [x]  Vasocompression 10 1   []  Other       TOTAL TREATMENT TIME: 52 min     Time in: 12:15p    Time Out: 1:07p    Electronically signed by: Joan Shields PT        Physician Signature:________________________________Date:__________________  By signing above or cosigning this note, I have reviewed this plan of care and certify a need for medically necessary rehabilitation services.      *PLEASE SIGN ABOVE AND FAX BACK ALL PAGES*

## 2023-07-31 ENCOUNTER — HOSPITAL ENCOUNTER (OUTPATIENT)
Dept: PHYSICAL THERAPY | Facility: CLINIC | Age: 63
Setting detail: THERAPIES SERIES
Discharge: HOME | End: 2023-07-31
Payer: MEDICARE

## 2023-07-31 NOTE — PRE-CERTIFICATION NOTE
[] [facility] ([facility]) - [facility] &  Therapy  [address].    [phone]  F: [phone]   [] [address] [facility]  [address]   [address]  P: [phone]  F: [phone]  [] [address] &  Therapy  [address] [facility]  P: [phone]  F: [phone] [] [facility]  P: [phone]  F: [phone]  [x] [address] [facility]  [address] [facility] [address]   [address]   [place]: [phone]  F: [phone]   [] [address] [facility]  [address] [facility] [address] [address]  [place]: [phone]   F: [phone]     Physical Therapy Cancel/No Show note    Date: 2023  Patient: Lila Cotton  : 1960  MRN: [id number]    Cancels/No Shows to date: 1    For today's appointment patient:    [x]  Cancelled    [] Rescheduled appointment    [] No-show     Reason given by patient:    []  Patient ill    []  Conflicting appointment    [] No transportation      [] Conflict with work    [] No reason given    [] Weather related    [] COVID-19    [x] Other:      Comments: Patient notes she was up all night and also has to run family to the airport.        [] Next appointment was confirmed    Electronically signed by: Kate Gambino PTA

## 2023-08-02 ENCOUNTER — HOSPITAL ENCOUNTER (OUTPATIENT)
Dept: PHYSICAL THERAPY | Facility: CLINIC | Age: 63
Setting detail: THERAPIES SERIES
Discharge: HOME | End: 2023-08-02
Payer: MEDICARE

## 2023-08-02 PROCEDURE — 97110 THERAPEUTIC EXERCISES: CPT

## 2023-08-02 PROCEDURE — 97016 VASOPNEUMATIC DEVICE THERAPY: CPT

## 2023-08-02 NOTE — PRE-CERTIFICATION NOTE
Medicare Cap   [x] Physical Therapy  [] Speech Therapy  [] Occupational therapy  *PT and Speech caps combine      $2230 Limit for PT and Speech combined  $2230 Limit for OT individually  At the beginning of the month where you expect to go over $2230, please add the [address] [facility] modifier      Patient Name: Mago Johnson: 1960    Note:  This is an estimate of charges billed.      Date of [address] [facility] Name # units/ charge $$$ charge Daily Total Charge Ongoing Total $$$    7/28  1 eval, 1 ex, 1 vaso  119.31+22.29+8.99 150.59 150.59    8/2  2ex 1 vaso   52.63 203.22

## 2023-08-02 NOTE — FLOWSHEET NOTE
[x] [facility]  Outpatient Rehabilitation &  Therapy  [address]   [address]   [place]: [phone]  F: [phone]      Physical Therapy Daily Treatment Note    Date:  2023  Patient Name:  Gerson Mccann    :  1960  MRN: [id number]  Physician: Dr. Flowers Ege: Medicare (vs [facility], follow medicare guidelines)  Medical Diagnosis: Status post surgery (Z98.890 [ICD-10-CM]); Traumatic complete tear of left rotator cuff, initial encounter (S46.012A [ICD-10-CM])  Rehab Codes: M62.81, M25.512, M25.612, R20.2, M79.622, R29.3  Onset Date: 23                             Next 's appt: 23    Visit# / total visits: ; Progress note for Medicare patient due at visit 10     Cancels/No Shows:     Subjective:    Pain:  [x] Yes  [] No Location: L UE  Pain Rating: (0-10 scale) 7/10  Pain altered Tx:  [x] No  [] Yes  Action:  Comments: Patient arrived noting shoulder feeling \"achy\" with a pain rating of 7/10. Brace donned upon arrival, notes has been compliant with HEP.      Objective:  Modalities:   Precautions: NWB LUE, no active extension, abduction sling   Exercise    Reps/ Time Weight/ Level Comments             Pulley's  2'    Standing this date   Pendulums  x20 ea   Cw/ccw              Levator Scap Stretch  3x30\"       Upper Trap Stretch  3x20\"       Scapular Retractions  5x3\"       Cervical AROM          Elbow flexion/extension AROM  x10                 Supine          Wand flexion AAROM  x10       Wand ER AAROM  x10                 Table Slides - flexion/scaption  10x10\"                                     Other:      Vasocompression x10 min 40 degrees min compression seated      Specific Instructions for next treatment: progress per protocol (in chart)      Treatment Charges: Mins Units   []  Modalities     [x]  Ther Exercise 30 2   []  Manual Therapy     []  Ther Activities     []  Neuro Re-ed     [x]

## 2023-08-07 ENCOUNTER — HOSPITAL ENCOUNTER (OUTPATIENT)
Dept: PHYSICAL THERAPY | Facility: CLINIC | Age: 63
Setting detail: THERAPIES SERIES
Discharge: HOME | End: 2023-08-07
Payer: MEDICARE

## 2023-08-07 PROCEDURE — 97016 VASOPNEUMATIC DEVICE THERAPY: CPT

## 2023-08-07 PROCEDURE — 97110 THERAPEUTIC EXERCISES: CPT

## 2023-08-07 NOTE — FLOWSHEET NOTE
to ease ADL's  []  []  []      4. ? Strength: Increase MMT to at least 4/5 throughout to ease functional limitations and mobility []  []  []                     Patient goals: total movement      Pt. Education:  [x] Yes  [] No  [x] Reviewed Prior HEP/Ed  Method of Education: [x] Verbal  [x] Demo  [x] Written    Access Code: [email or web address]  URL: [email or web address]  Date: 08/07/2023  Prepared by: Marilee Huston    Exercises  - Standing Isometric Shoulder Internal Rotation at Doorway  - 1 x daily - 7 x weekly - 2 sets - 10 reps - 5 sec hold  - Isometric Shoulder External Rotation at Wall  - 1 x daily - 7 x weekly - 2 sets - 10 reps - 5 sec hold  - Isometric Shoulder Flexion at Wall  - 1 x daily - 7 x weekly - 2 sets - 10 reps - 5 sec hold  - Isometric Shoulder Abduction at Wall  - 1 x daily - 7 x weekly - 2 sets - 10 reps - 5 sec hold    Comprehension of Education:  [x] Verbalizes understanding. [] Demonstrates understanding. [] Needs review. [x] Demonstrates/verbalizes HEP/Ed previously given. Plan: [x] Continue current frequency toward long and short term goals.     [x] Specific Instructions for subsequent treatments: Continue ROM progressions per protocol        Time In: 11:01am              Time Out: 11:55am      Electronically signed by:  Marilee Huston PT

## 2023-08-09 ENCOUNTER — HOSPITAL ENCOUNTER (OUTPATIENT)
Dept: PHYSICAL THERAPY | Facility: CLINIC | Age: 63
Setting detail: THERAPIES SERIES
Discharge: HOME | End: 2023-08-09
Payer: MEDICARE

## 2023-08-09 PROCEDURE — 97016 VASOPNEUMATIC DEVICE THERAPY: CPT

## 2023-08-09 PROCEDURE — 97110 THERAPEUTIC EXERCISES: CPT

## 2023-08-09 NOTE — FLOWSHEET NOTE
[x] [facility]  Outpatient Rehabilitation &  Therapy  [address] [facility] [address]   [place]: [phone]  F: [phone]      Physical Therapy Daily Treatment Note    Date:  2023  Patient Name:  Crispin Oliveira    :  1960  MRN: [id number]  Physician: Dr. Gabriel Salazar: Medicare (vs [facility], follow medicare guidelines)  Medical Diagnosis: Status post surgery (Z98.890 [ICD-10-CM]); Traumatic complete tear of left rotator cuff, initial encounter (S46.012A [ICD-10-CM])  Rehab Codes: M62.81, M25.512, M25.612, R20.2, M79.622, R29.3  Onset Date: 23                             Next 's appt: 8/10/23    Visit# / total visits: ; Progress note for Medicare patient due at visit 10     Cancels/No Shows: 0    Subjective:    Pain:  [x] Yes  [] No Location: L UE  Pain Rating: (0-10 scale)  not rated/10  Pain altered Tx:  [x] No  [] Yes  Action:  Comments: Patient arrived noting increased soreness from last visits progressions.      Objective:  Modalities:   Precautions: NWB LUE, no active extension, abduction sling   Exercise    Reps/ Time Weight/ Level Comments             Pulley's  3'   Scaption    Pendulums  x20 ea   Cw/ccw              Levator Scap Stretch  4x20\"       Upper Trap Stretch  4x20\"       Scapular Retractions  15x5\"       Cervical AROM          Elbow flexion/extension AROM  x15    standing              Supine          Wand flexion AAROM  held       Wand ER AAROM  held       Wand chest press AAROM x10               Table Slides - flexion/scaption  10x10\"       Isometrics   10x10\"   Abduction, flexion, IR, ER                       Other:      Shoulder PROM measurements   Flexion: 140 deg  Abduction: 120 deg  ER at neutral and at 45 degrees scaption: 45 degrees  IR at 45 degrees scaption: 45 degrees    Vasocompression x10 min 40 degrees min compression seated        Specific Instructions for next treatment: progress

## 2023-08-10 ENCOUNTER — OFFICE VISIT (OUTPATIENT)
Dept: ORTHOPEDIC SURGERY | Age: 63
End: 2023-08-10

## 2023-08-10 VITALS — RESPIRATION RATE: 16 BRPM | WEIGHT: 158.2 LBS | OXYGEN SATURATION: 98 % | HEIGHT: 60 IN | BODY MASS INDEX: 31.06 KG/M2

## 2023-08-10 DIAGNOSIS — Z98.890 S/P ARTHROSCOPY OF LEFT SHOULDER: Primary | ICD-10-CM

## 2023-08-10 DIAGNOSIS — Z98.890 STATUS POST ARTHROSCOPY OF LEFT SHOULDER: Primary | ICD-10-CM

## 2023-08-10 PROCEDURE — 99024 POSTOP FOLLOW-UP VISIT: CPT | Performed by: PHYSICIAN ASSISTANT

## 2023-08-10 RX ORDER — OXYCODONE HYDROCHLORIDE AND ACETAMINOPHEN 5; 325 MG/1; MG/1
1 TABLET ORAL EVERY 6 HOURS PRN
Qty: 28 TABLET | Refills: 0 | Status: SHIPPED | OUTPATIENT
Start: 2023-08-10 | End: 2023-08-17

## 2023-08-10 ASSESSMENT — ENCOUNTER SYMPTOMS
DIARRHEA: 0
CHEST TIGHTNESS: 0
ABDOMINAL PAIN: 0
VOMITING: 0
COUGH: 0
SHORTNESS OF BREATH: 0
APNEA: 0
RESPIRATORY NEGATIVE: 1
COLOR CHANGE: 0
CONSTIPATION: 0
NAUSEA: 0
ABDOMINAL DISTENTION: 0

## 2023-08-10 NOTE — PROGRESS NOTES
[facility] SPORTS MEDICINE  [address] [place] [id number]  Dept: [phone]  Dept Fax: [phone]        Post Operative Follow Up    Subjective:     Chief Complaint   Patient presents with    Post-Op Check     Left Shoulder RCR dos 7/25/23     Post Op Surgery:     The patient is here for a follow up after having a left shoulder arthroscopy with rotator cuff repair. Date of surgery was 7/25/2023. Therefore the patient is 16 days postop. The patient has been going to physical therapy. No N/V/SOB/CP. Patient states they are still taking Percocet for pain. She states she usually takes 1 at night and 1 around PT. Overall she is handling the pain okay. Patient presents today in her sling. Patient denies any numbness or tingling. Review of Systems   Constitutional:  Positive for activity change. Negative for appetite change, fatigue and fever. Respiratory: Negative. Negative for apnea, cough, chest tightness and shortness of breath. Cardiovascular: Negative. Negative for chest pain, palpitations and leg swelling. Gastrointestinal:  Negative for abdominal distention, abdominal pain, constipation, diarrhea, nausea and vomiting. Genitourinary:  Negative for difficulty urinating, dysuria and hematuria. Musculoskeletal:  Positive for arthralgias. Negative for gait problem, joint swelling and myalgias. Skin:  Negative for color change and rash. Neurological:  Positive for weakness. Negative for dizziness, numbness and headaches. Psychiatric/Behavioral:  Positive for sleep disturbance. I have reviewed the CC, HPI, ROS, PMH, FHX, Social History, and if not present in this note, I have reviewed in the patient's chart. I agree with the documentation provided by other staff and have reviewed their documentation prior to providing my signature indicating agreement.   Vitals:   Resp 16   Ht 5'

## 2023-08-14 ENCOUNTER — HOSPITAL ENCOUNTER (OUTPATIENT)
Dept: PHYSICAL THERAPY | Facility: CLINIC | Age: 63
Setting detail: THERAPIES SERIES
Discharge: HOME | End: 2023-08-14
Payer: MEDICARE

## 2023-08-14 PROCEDURE — 97016 VASOPNEUMATIC DEVICE THERAPY: CPT

## 2023-08-14 PROCEDURE — 97110 THERAPEUTIC EXERCISES: CPT

## 2023-08-14 NOTE — FLOWSHEET NOTE
[x] [facility]  Outpatient Rehabilitation &  Therapy  [address]   [address]   [place]: [phone]  F: [phone]      Physical Therapy Daily Treatment Note    Date:  2023  Patient Name:  Mandy Keller    :  1960  MRN: [id number]  Physician: Dr. Xavier Pari: Medicare (vs [facility], follow medicare guidelines)  Medical Diagnosis: Status post surgery (Z98.890 [ICD-10-CM]); Traumatic complete tear of left rotator cuff, initial encounter (S46.012A [ICD-10-CM])  Rehab Codes: M62.81, M25.512, M25.612, R20.2, M79.622, R29.3  Onset Date: 23                             Next 's appt: 8/10/23    Visit# / total visits: ; Progress note for Medicare patient due at visit 10     Cancels/No Shows: 0    Subjective:    Pain:  [x] Yes  [] No Location: L UE  Pain Rating: (0-10 scale)  not rated/10  Pain altered Tx:  [x] No  [] Yes  Action:  Comments: Patient arrived noting shoulder is \"achy\" today.      Objective:  Modalities:   Precautions: NWB LUE, no active extension, abduction sling   Exercise    Reps/ Time Weight/ Level Comments             Pulley's  3'   Scaption    Pendulums  x20 ea   Cw/ccw              Levator Scap Stretch  4x20\"       Upper Trap Stretch  4x20\"       Scapular Retractions  15x5\"       Cervical AROM          Elbow flexion/extension AROM  x15   Standing              Supine          Wand flexion AAROM   10x10\"       Wand ER AAROM   10x10\"       Wand chest press AAROM  x10               Table Slides - flexion/scaption   10x10\"    Standing   Isometrics   10x10\"   Abduction, flexion, IR, ER                       Other:      Shoulder PROM measurements   Flexion: 140 deg  Abduction: 120 deg  ER at neutral and at 45 degrees scaption: 45 degrees  IR at 45 degrees scaption: 45 degrees    Vasocompression x10 min 40 degrees min compression seated        Specific Instructions for next treatment: progress per

## 2023-08-16 ENCOUNTER — HOSPITAL ENCOUNTER (OUTPATIENT)
Dept: PHYSICAL THERAPY | Facility: CLINIC | Age: 63
Setting detail: THERAPIES SERIES
Discharge: HOME | End: 2023-08-16
Payer: MEDICARE

## 2023-08-16 PROCEDURE — 97110 THERAPEUTIC EXERCISES: CPT

## 2023-08-16 PROCEDURE — 97016 VASOPNEUMATIC DEVICE THERAPY: CPT

## 2023-08-16 NOTE — FLOWSHEET NOTE
[]  []      4. ? Strength: Increase MMT to at least 4/5 throughout to ease functional limitations and mobility []  []  []                     Patient goals: total movement      Pt. Education:  [x] Yes  [] No  [x] Reviewed Prior HEP/Ed  Method of Education: [x] Verbal  [x] Demo  [x] Written  Reprinted HEP per patient request.     Access Code: [email or web address]  URL: [email or web address]  Date: 08/16/2023  Prepared by: Tanisha Holding    Exercises  - Gentle Levator Scapulae Stretch  - 1 x daily - 7 x weekly - 3 sets - 30 sec hold  - Seated Upper Trapezius Stretch  - 1 x daily - 7 x weekly - 3 sets - 30 sec hold  - Supine Shoulder External Rotation with Dowel  - 2 x daily - 7 x weekly - 1 sets - 10 reps - 10 hold  - Seated Scapular Retraction  - 2 x daily - 7 x weekly - 1 sets - 10 reps - 5 sec hold  - Seated Shoulder Flexion Towel Slide at Table Top  - 2-3 x daily - 7 x weekly - 2 sets - 10 reps  - Seated Shoulder Scaption Slide at Table Top with Forearm in Neutral  - 2-3 x daily - 7 x weekly - 2 sets - 10 reps  - Circular Shoulder Pendulum with Table Support  - 2-3 x daily - 7 x weekly - 3 sets - 10 reps  - Standing Isometric Shoulder Internal Rotation at Doorway  - 1 x daily - 7 x weekly - 1 sets - 10 reps - 10 sec hold  - Standing Isometric Shoulder External Rotation with Doorway  - 1 x daily - 7 x weekly - 1 sets - 10 reps - 10 sec hold  - Isometric Shoulder Abduction at Wall  - 1 x daily - 7 x weekly - 1 sets - 10 reps - 10 sec hold  - Isometric Shoulder Flexion at Wall  - 1 x daily - 7 x weekly - 1 sets - 10 reps - 10 sec hold    Comprehension of Education:  [x] Verbalizes understanding. [] Demonstrates understanding. [] Needs review. [x] Demonstrates/verbalizes HEP/Ed previously given. Plan: [x] Continue current frequency toward long and short term goals.     [x] Specific Instructions for subsequent treatments: Continue ROM progressions per protocol        Time In: 11:00am              Time Out:

## 2023-08-21 ENCOUNTER — HOSPITAL ENCOUNTER (OUTPATIENT)
Dept: PHYSICAL THERAPY | Facility: CLINIC | Age: 63
Setting detail: THERAPIES SERIES
Discharge: HOME | End: 2023-08-21
Payer: MEDICARE

## 2023-08-21 PROCEDURE — 97016 VASOPNEUMATIC DEVICE THERAPY: CPT

## 2023-08-21 PROCEDURE — 97110 THERAPEUTIC EXERCISES: CPT

## 2023-08-21 NOTE — FLOWSHEET NOTE
[x] [facility]  Outpatient Rehabilitation &  Therapy  [address] [facility] [address]   [place]: [phone]  F: [phone]      Physical Therapy Daily Treatment Note    Date:  2023  Patient Name:  Adrianne Jeffrey    :  1960  MRN: [id number]  Physician: Dr. Samira Nichols: Medicare (vs [facility], follow medicare guidelines)  Medical Diagnosis: Status post surgery (Z98.890 [ICD-10-CM]); Traumatic complete tear of left rotator cuff, initial encounter (S46.012A [ICD-10-CM])  Rehab Codes: M62.81, M25.512, M25.612, R20.2, M79.622, R29.3  Onset Date: 23                             Next 's appt: 8/10/23    Visit# / total visits: ; Progress note for Medicare patient due at visit 10     Cancels/No Shows: 0    Subjective:    Pain:  [x] Yes  [] No Location: L UE  Pain Rating: (0-10 scale)  5/10  Pain altered Tx:  [x] No  [] Yes  Action:  Comments: Patient arrives with complaints of left shoulder soreness, improving since onset. Has been getting some cramping to her forearm during the night.     Objective:  Modalities:   Precautions: NWB LUE, no active extension, abduction sling   23 -   Exercise    Reps/ Time Weight/ Level Comments             Jade's  4'   Scaption    Pendulums  x20 ea   Cw/ccw              Levator Scap Stretch  4x20\"       Upper Trap Stretch  4x20\"       Scapular Retractions  15x5\"       Cervical AROM          Elbow flexion/extension AROM  x20   Standing              Supine          Wand flexion AAROM   10x10\"       Wand ER AAROM   10x10\"       Wand chest press AAROM  x10               Table Slides - flexion/scaption   10x10\"      Isometrics   10x10\"   Abduction, flexion, IR, ER                       Other:     Vasocompression x10 min 40 degrees min compression seated        Specific Instructions for next treatment: progress per protocol (in chart)      Treatment Charges: Mins Units   []  Modalities

## 2023-08-23 ENCOUNTER — HOSPITAL ENCOUNTER (OUTPATIENT)
Dept: PHYSICAL THERAPY | Facility: CLINIC | Age: 63
Setting detail: THERAPIES SERIES
Discharge: HOME | End: 2023-08-23
Payer: MEDICARE

## 2023-08-23 PROCEDURE — 97110 THERAPEUTIC EXERCISES: CPT

## 2023-08-23 PROCEDURE — 97016 VASOPNEUMATIC DEVICE THERAPY: CPT

## 2023-08-23 NOTE — FLOWSHEET NOTE
[x] [facility]  Outpatient Rehabilitation &  Therapy  [facility] [address]   [place]: [phone]  F: [phone]      Physical Therapy Daily Treatment Note    Date:  2023  Patient Name:  Judi Zaldivar    :  1960  MRN: [id number]  Physician: Dr. Mitul Borja: Medicare (vs [facility], follow medicare guidelines)  Medical Diagnosis: Status post surgery (Z98.890 [ICD-10-CM]); Traumatic complete tear of left rotator cuff, initial encounter (S46.012A [ICD-10-CM])  Rehab Codes: M62.81, M25.512, M25.612, R20.2, M79.622, R29.3  Onset Date: 23                             Next 's appt: 8/10/23    Visit# / total visits: ; Progress note for Medicare patient due at visit 10     Cancels/No Shows: 0    Subjective:    Pain:  [x] Yes  [] No Location: L UE  Pain Rating: (0-10 scale)  not rated/10  Pain altered Tx:  [x] No  [] Yes  Action:  Comments: Patient arrives stating that she has been feeling pretty good over the last few days.      Objective:  Modalities:   Precautions: NWB LUE, no active extension, abduction sling   23 -   Exercise    Reps/ Time Weight/ Level Comments             Jade's  4'   Scaption    Pendulums  x20 ea  1# DB Cw/ccw              Levator Scap Stretch  HEP       Upper Trap Stretch  HEP       Scapular Retractions  HEP       Cervical AROM          Elbow flexion/extension AROM  x20   Standing              Supine          Wand flexion AAROM   10x10\"       Wand ER AAROM   10x10\"       Wand chest press AAROM  x10               Table Slides - flexion/scaption   10x10\"      Isometrics   10x10\"   Abduction, flexion, IR, ER    Tband Rows  2x10 Orange                Other:     Vasocompression x10 min 36 degrees min compression seated        Specific Instructions for next treatment: progress per protocol (in chart)      Treatment Charges: Mins Units   []  Modalities     [x]  Ther Exercise 40 3   []

## 2023-08-25 DIAGNOSIS — F41.1 GAD (GENERALIZED ANXIETY DISORDER): ICD-10-CM

## 2023-08-25 NOTE — TELEPHONE ENCOUNTER
LOV 7/19/2023     RTO n/a  LRF 7/16/2023          Controlled Substance Monitoring:    Acute and Chronic Pain Monitoring:   RX Monitoring 12/11/2019   Attestation -   Periodic Controlled Substance Monitoring No signs of potential drug abuse or diversion identified.    Chronic Pain > 80 MEDD -

## 2023-08-27 RX ORDER — ALPRAZOLAM 1 MG/1
1 TABLET ORAL 2 TIMES DAILY
Qty: 60 TABLET | Refills: 0 | Status: SHIPPED | OUTPATIENT
Start: 2023-08-27 | End: 2023-09-26

## 2023-08-28 ENCOUNTER — HOSPITAL ENCOUNTER (OUTPATIENT)
Dept: PHYSICAL THERAPY | Facility: CLINIC | Age: 63
Setting detail: THERAPIES SERIES
Discharge: HOME | End: 2023-08-28
Payer: MEDICARE

## 2023-08-28 PROCEDURE — 97110 THERAPEUTIC EXERCISES: CPT

## 2023-08-28 PROCEDURE — 97016 VASOPNEUMATIC DEVICE THERAPY: CPT

## 2023-08-28 NOTE — FLOWSHEET NOTE
Ther Activities     []  Neuro Re-ed     [x]  Vasocompression 10 1   [] Gait     []  Other     Total Treatment time 50 4       Assessment: [x] Progressing toward goals. Continued with strength and ROM program per protocol. Patient noted soreness throughout treatment with only complaint of pain with PROM at end ranges. Stressed importance of gentle pain free ROM and stretches at home. Will continue to advance program within protocol within patient tolerance. [] No change. [] Other:  [x] Patient would continue to benefit from skilled physical therapy services in order to: improve shoulder ROM and strength, decrease post-op edema, to prepare the patient for increased use of her shoulder to ease ADL's. Goals  MET NOT MET ON-  GOING  Details   Date Addressed:            STG: To be met in 10 treatments            1. ? Pain: Decrease L shoulder pain levels to 4/10 with ADLs []  []  []      2. ? ROM: Increase PROM of the L shoulder to at least 140 degrees flex/abd, 50 degrees  limitations throughout to equal bilat to reduce difficulty with ADLs []  []  []      3. AROM to at least 90 degrees flexion and abduction without compensatory motion  []  []  []      4. Independent with Home Exercise Programs []  []  []        []  []  []        []  []  []        []  []  []      Date Addressed:            LTG: To be met in 20 treatments           1. Improve score on assessment tool QuickDASH from 73% impairment to less than 50% impairment  []  []  []      2. Reduce L shoulder pain levels to 1/10 or less with ADLs []  []  []      3. Patient to demonstrate AROM WFL with minimal increase in pain to ease ADL's  []  []  []      4. ? Strength: Increase MMT to at least 4/5 throughout to ease functional limitations and mobility []  []  []                     Patient goals: total movement      Pt.  Education:  [x] Yes  [] No  [x] Reviewed Prior HEP/Ed  Method of Education: [x] Verbal  [x] Demo  [x] Written  Reprinted HEP per

## 2023-08-30 ENCOUNTER — HOSPITAL ENCOUNTER (OUTPATIENT)
Dept: PHYSICAL THERAPY | Facility: CLINIC | Age: 63
Setting detail: THERAPIES SERIES
Discharge: HOME | End: 2023-08-30
Payer: MEDICARE

## 2023-08-30 PROCEDURE — 97110 THERAPEUTIC EXERCISES: CPT

## 2023-08-30 PROCEDURE — 97016 VASOPNEUMATIC DEVICE THERAPY: CPT

## 2023-08-30 NOTE — FLOWSHEET NOTE
[x] [facility]  Outpatient Rehabilitation &  Therapy  [address]   [address]   [place]: [phone]  F: [phone]      Physical Therapy Daily Treatment Note    Date:  2023  Patient Name:  Kristian Pradhan    :  1960  MRN: [id number]  Physician: Dr. Gagandeep Ireland: Medicare (vs [facility], follow medicare guidelines)  Medical Diagnosis: Status post surgery (Z98.890 [ICD-10-CM]); Traumatic complete tear of left rotator cuff, initial encounter (S46.012A [ICD-10-CM])  Rehab Codes: M62.81, M25.512, M25.612, R20.2, M79.622, R29.3  Onset Date: 23                             Next 's appt: 8/10/23    Visit# / total visits: 10/20; Progress note for Medicare patient due at visit 10     Cancels/No Shows: 0    Subjective:    Pain:  [x] Yes  [] No Location: L UE  Pain Rating: (0-10 scale)  not rated/10  Pain altered Tx:  [x] No  [] Yes  Action:  Comments: Patient arrived noting increased soreness in her arm pit.      Objective:  Modalities:   Precautions: NWB LUE, no active extension, abduction sling   23 -   Exercise    Reps/ Time Weight/ Level Comments             Jade's   4'   Scaption    Pendulums   x20 ea  1# DB Cw/ccw              Levator Scap Stretch   HEP       Upper Trap Stretch   HEP       Scapular Retractions   HEP       Cervical AROM          Elbow flexion/extension AROM   x20   Standing              Supine          Wand flexion AAROM   10x10\"       Wand ER AAROM   10x10\"       Wand chest press AAROM  x10               Table Slides - flexion/scaption   10x10\"      Isometrics   10x10\"   Abduction, flexion, IR, ER   Tband Rows   2x10 Orange                Other:     Vasocompression x10 min 36 degrees min compression seated        Specific Instructions for next treatment: progress per protocol (in chart)      Treatment Charges: Mins Units   []  Modalities     [x]  Ther Exercise 35 2   []  Manual Therapy     []

## 2023-09-07 ENCOUNTER — OFFICE VISIT (OUTPATIENT)
Dept: ORTHOPEDIC SURGERY | Age: 63
End: 2023-09-07

## 2023-09-07 VITALS — RESPIRATION RATE: 16 BRPM | BODY MASS INDEX: 30.39 KG/M2 | OXYGEN SATURATION: 98 % | HEIGHT: 60 IN | WEIGHT: 154.8 LBS

## 2023-09-07 DIAGNOSIS — Z98.890 STATUS POST ARTHROSCOPY OF LEFT SHOULDER: Primary | ICD-10-CM

## 2023-09-07 PROCEDURE — 99024 POSTOP FOLLOW-UP VISIT: CPT | Performed by: PHYSICIAN ASSISTANT

## 2023-09-07 ASSESSMENT — ENCOUNTER SYMPTOMS
ABDOMINAL DISTENTION: 0
CHEST TIGHTNESS: 0
ABDOMINAL PAIN: 0
COLOR CHANGE: 0
SHORTNESS OF BREATH: 0
COUGH: 0
NAUSEA: 0
RESPIRATORY NEGATIVE: 1
VOMITING: 0
APNEA: 0
DIARRHEA: 0
CONSTIPATION: 0

## 2023-09-07 NOTE — PROGRESS NOTES
distress. SKIN: Intact without lesions or ulcerations. Incision is healing well with no signs of infection. NEURO: Intact to sensory and motor testing. VASC: Capillary refill is less than 3 seconds. Post Op Exam:    LOCATION: Left shoulder  SITE: Distal neurocirculatory status is intact. EXAM: Sensation is intact to light touch, there is full motor function of the extremity. ROM: 100 degrees of active forward elevation, 120 degrees of passive forward elevation, external  rotation in neutral 20 degrees, external rotation and AB duction 80 degrees, internal rotation to back pocket    Radiology:   No results found. Assessment:     1. Status post arthroscopy of left shoulder      Plan:   Post Op Treatment : Patient had the treatment regimen reviewed today 6 weeks left shoulder arthroscopy with rotator cuff repair. I reviewed the films with the patient. During today's visit, we discussed that the patient is doing really well. She is able to actively reach the top of her head. Now with physical therapy with a low active active assist range of motion. At the end of 3 months we hope for her to have 80 to 90% of her range of motion back. They can also begin some gentle strengthening and follow the rotator cuff protocol. The patient opted to continue physical therapy. She can discontinue her sling. She can follow-up with Dr. Scott Fu DO in 6 weeks. The patient will call the office immediately with any problems that may arise. No orders of the defined types were placed in this encounter.       Orders Placed This Encounter   Procedures    [facility] Physical Therapy [facility]     Referral Priority:   Routine     Referral Type:   Eval and Treat     Referral Reason:   Specialty Services Required     Requested Specialty:   Physical Therapist     Number of Visits Requested:   1         Electronically signed by Toshia Styles PA-C, on 9/7/2023 at 2:50 PM

## 2023-09-08 ENCOUNTER — HOSPITAL ENCOUNTER (OUTPATIENT)
Dept: PHYSICAL THERAPY | Facility: CLINIC | Age: 63
Setting detail: THERAPIES SERIES
Discharge: HOME | End: 2023-09-08
Payer: MEDICARE

## 2023-09-08 ENCOUNTER — NURSE TRIAGE (OUTPATIENT)
Dept: OTHER | Age: 63
End: 2023-09-08

## 2023-09-08 PROCEDURE — 97110 THERAPEUTIC EXERCISES: CPT

## 2023-09-08 PROCEDURE — 97016 VASOPNEUMATIC DEVICE THERAPY: CPT

## 2023-09-08 NOTE — TELEPHONE ENCOUNTER
Patient called stating that her left shoulder is hurting. Pain rated at 9/10. She feels it was overworked at therapy today. Per protocol Home Care was recommended. Reviewed dosage limits of Tylenol. Encouraged Ice or Cold Pack for the first 48 hours. Patient directed to a [facility] if pain continues.

## 2023-09-08 NOTE — TELEPHONE ENCOUNTER
Reason for Disposition   Caused by strained muscle    Answer Assessment - Initial Assessment Questions  1. ONSET: \"When did the pain start? \"      This afternoon    2. LOCATION: \"Where is the pain located? \"      Left shoulder    3. PAIN: \"How bad is the pain? \" (Scale 1-10; or mild, moderate, severe)    - MILD (1-3): doesn't interfere with normal activities    - MODERATE (4-7): interferes with normal activities (e.g., work or school) or awakens from sleep    - SEVERE (8-10): excruciating pain, unable to do any normal activities, unable to move arm at all due to pain      9    4. WORK OR EXERCISE: \"Has there been any recent work or exercise that involved this part of the body? \"      Yes, increased PT today    5. CAUSE: \"What do you think is causing the shoulder pain? \"      *No Answer*  6. OTHER SYMPTOMS: \"Do you have any other symptoms? \" (e.g., neck pain, swelling, rash, fever, numbness, weakness)      *No Answer*  7. PREGNANCY: \"Is there any chance you are pregnant? \" \"When was your last menstrual period? \"      *No Answer*    Protocols used: Shoulder Pain-ADULT-

## 2023-09-08 NOTE — PROGRESS NOTES
from 73% impairment to less than 50% impairment  []  []  []      2. Reduce L shoulder pain levels to 1/10 or less with ADLs []  []  []      3. Patient to demonstrate AROM WFL with minimal increase in pain to ease ADL's  []  []  []      4. ? Strength: Increase MMT to at least 4/5 throughout to ease functional limitations and mobility []  []  []                     Patient goals: total movement      Pt. Education:  [x] Yes  [] No  [x] Reviewed Prior HEP/Ed  Method of Education: [x] Verbal  [x] Demo  [x] Written  Reprinted HEP per patient request.     Access Code: [email or web address]  URL: [email or web address]  Date: 08/16/2023  Prepared by: Donaldo Cross    Exercises  - Gentle Levator Scapulae Stretch  - 1 x daily - 7 x weekly - 3 sets - 30 sec hold  - Seated Upper Trapezius Stretch  - 1 x daily - 7 x weekly - 3 sets - 30 sec hold  - Supine Shoulder External Rotation with Dowel  - 2 x daily - 7 x weekly - 1 sets - 10 reps - 10 hold  - Seated Scapular Retraction  - 2 x daily - 7 x weekly - 1 sets - 10 reps - 5 sec hold  - Seated Shoulder Flexion Towel Slide at Table Top  - 2-3 x daily - 7 x weekly - 2 sets - 10 reps  - Seated Shoulder Scaption Slide at Table Top with Forearm in Neutral  - 2-3 x daily - 7 x weekly - 2 sets - 10 reps  - Circular Shoulder Pendulum with Table Support  - 2-3 x daily - 7 x weekly - 3 sets - 10 reps  - Standing Isometric Shoulder Internal Rotation at Doorway  - 1 x daily - 7 x weekly - 1 sets - 10 reps - 10 sec hold  - Standing Isometric Shoulder External Rotation with Doorway  - 1 x daily - 7 x weekly - 1 sets - 10 reps - 10 sec hold  - Isometric Shoulder Abduction at Wall  - 1 x daily - 7 x weekly - 1 sets - 10 reps - 10 sec hold  - Isometric Shoulder Flexion at Wall  - 1 x daily - 7 x weekly - 1 sets - 10 reps - 10 sec hold    Comprehension of Education:  [x] Verbalizes understanding. [] Demonstrates understanding. [] Needs review.   [x] Demonstrates/verbalizes HEP/Ed previously

## 2023-09-11 ENCOUNTER — HOSPITAL ENCOUNTER (OUTPATIENT)
Dept: PHYSICAL THERAPY | Facility: CLINIC | Age: 63
Setting detail: THERAPIES SERIES
Discharge: HOME | End: 2023-09-11
Payer: MEDICARE

## 2023-09-11 NOTE — PRE-CERTIFICATION NOTE
[] [facility] ([facility]) - [facility] &  Therapy  [address]    [phone]  F: [phone]   [] [address]  [address]   [address]  P: [phone]  F: [phone]  [] [address] &  Therapy  [address]  P: [phone]  F: [phone] [] Onel Doreen  P: [phone]  F: [phone]  [x] [address] [facility]  [address]   [address]   [place]: [phone]  F: [phone]   [] [address] [facility]  [address]  [place]: [phone]   F: [phone]     Physical Therapy Cancel/No Show note    Date: 2023  Patient: Nimisha Ly  : 1960  MRN: [id number]    Cancels/No Shows to date:     For today's appointment patient:    [x]  Cancelled    [] Rescheduled appointment    [] No-show     Reason given by patient:    []  Patient ill    []  Conflicting appointment    [] No transportation      [] Conflict with work    [] No reason given    [] Weather related    [] COVID-19    [x] Other:      Comments: 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          [x] Next appointment was confirmed    Electronically signed by: Debra Blanco PTA

## 2023-09-15 ENCOUNTER — APPOINTMENT (OUTPATIENT)
Dept: PHYSICAL THERAPY | Facility: CLINIC | Age: 63
End: 2023-09-15
Payer: MEDICARE

## 2023-09-18 ENCOUNTER — APPOINTMENT (OUTPATIENT)
Dept: PHYSICAL THERAPY | Facility: CLINIC | Age: 63
End: 2023-09-18
Payer: MEDICARE

## 2023-09-20 ENCOUNTER — APPOINTMENT (OUTPATIENT)
Dept: PHYSICAL THERAPY | Facility: CLINIC | Age: 63
End: 2023-09-20
Payer: MEDICARE

## 2023-09-25 ENCOUNTER — HOSPITAL ENCOUNTER (OUTPATIENT)
Dept: PHYSICAL THERAPY | Facility: CLINIC | Age: 63
Setting detail: THERAPIES SERIES
End: 2023-09-25
Payer: MEDICARE

## 2023-09-26 DIAGNOSIS — F41.1 GAD (GENERALIZED ANXIETY DISORDER): ICD-10-CM

## 2023-09-26 NOTE — TELEPHONE ENCOUNTER
LOV 7/19/2023     RTO n/a  LRF 8/27/2023          Controlled Substance Monitoring:    Acute and Chronic Pain Monitoring:   RX Monitoring Periodic Controlled Substance Monitoring   12/11/2019   1:08 PM No signs of potential drug abuse or diversion identified.

## 2023-09-27 RX ORDER — ALPRAZOLAM 1 MG/1
1 TABLET ORAL 2 TIMES DAILY
Qty: 60 TABLET | Refills: 0 | Status: SHIPPED | OUTPATIENT
Start: 2023-09-27 | End: 2023-10-27

## 2023-09-28 ENCOUNTER — HOSPITAL ENCOUNTER (OUTPATIENT)
Dept: PHYSICAL THERAPY | Facility: CLINIC | Age: 63
Setting detail: THERAPIES SERIES
Discharge: HOME | End: 2023-09-28
Payer: MEDICARE

## 2023-09-28 PROCEDURE — 97016 VASOPNEUMATIC DEVICE THERAPY: CPT

## 2023-09-28 PROCEDURE — 97110 THERAPEUTIC EXERCISES: CPT

## 2023-09-28 NOTE — PROGRESS NOTES
[x] [facility]  Outpatient Rehabilitation &  Therapy  [address]   [address]   [place]: [phone]  F: [phone]      Physical Therapy Daily Treatment Note    Date:  2023  Patient Name:  Erin French    :  1960  MRN: [id number]  Physician: Dr. Hiro Marcys: Medicare (vs [facility], follow medicare guidelines)  Medical Diagnosis: Status post surgery (Z98.890 [ICD-10-CM]); Traumatic complete tear of left rotator cuff, initial encounter (S46.012A [ICD-10-CM])  Rehab Codes: M62.81, M25.512, M25.612, R20.2, M79.622, R29.3  Onset Date: 23                             Next 's appt: 10/19/23  Visit# / total visits: ; Progress note for Medicare patient due at visit 20     Cancels/No Shows: 0    Subjective:    Pain:  [x] Yes  [] No Location: L UE  Pain Rating: (0-10 scale)  5/10  Pain altered Tx:  [x] No  [] Yes  Action:  Comments: Patient arrived noting minor pain in arm pit and anterior shoulder but notes significant improvement in ROM and pain rating since previous visit.      Objective:  Modalities:   Precautions: JELLY HI, progress per protocol   23 -   Exercise    Reps/ Time Weight/ Level Comments             Jade's   4'   Scaption    Pendulums   x20 ea  2# DB Cw/ccw              Levator Scap Stretch   HEP       Upper Trap Stretch   HEP       Scapular Retractions   HEP       Cervical AROM          Biceps Curls  2x10 2# Standing    Wand flexion AAROM  x15   Golf putter    Wand abduction AAROM  x10  Golf putter         Supine          Wand flexion AAROM   10x10\"  2# wand     Wand ER AAROM   10x10\"       Wand chest press AAROM                 Table Slides - flexion/scaption   10x10\"      Isometrics   HEP   Abduction, flexion, IR, ER   Tband Rows   2x10 Green     Tband Extension   2x10 Green Within pain free range   Tband ER  2x10 Green Within pain free range   Tband IR  2x10 Green    Other:   PROM of left

## 2023-10-02 ENCOUNTER — HOSPITAL ENCOUNTER (OUTPATIENT)
Dept: PHYSICAL THERAPY | Facility: CLINIC | Age: 63
Setting detail: THERAPIES SERIES
Discharge: HOME | End: 2023-10-02
Payer: MEDICARE

## 2023-10-02 PROCEDURE — 97016 VASOPNEUMATIC DEVICE THERAPY: CPT

## 2023-10-02 PROCEDURE — 97110 THERAPEUTIC EXERCISES: CPT

## 2023-10-02 NOTE — FLOWSHEET NOTE
[x] [facility]  Outpatient Rehabilitation &  Therapy  [facility]   [place]: [phone]  F: [phone]      Physical Therapy Daily Treatment Note    Date:  10/2/2023  Patient Name:  Darek Bruno    :  1960  MRN: [id number]  Physician: Dr. Geoffrey Henriquezors: Medicare (vs [facility], follow medicare guidelines)  Medical Diagnosis: Status post surgery (Z98.890 [ICD-10-CM]); Traumatic complete tear of left rotator cuff, initial encounter (S46.012A [ICD-10-CM])  Rehab Codes: M62.81, M25.512, M25.612, R20.2, M79.622, R29.3  Onset Date: 23                             Next 's appt: 10/19/23  Visit# / total visits: ; Progress note for Medicare patient due at visit 20     Cancels/No Shows: 0    Subjective:    Pain:  [] Yes  [x] No Location: L UE  Pain Rating: (0-10 scale)  0/10  Pain altered Tx:  [x] No  [] Yes  Action:  Comments: Patient arrived noting occasional \"twinges\" with no other issues noted. No pain upon arrival this date.      Objective:  Modalities:   Precautions: JELLY HI, progress per protocol   10 weeks on 10/3/23  Exercise    Reps/ Time Weight/ Level Comments             Jade's   4'   Scaption    Pendulums   x20 ea  2# DB Cw/ccw              Levator Scap Stretch   HEP       Upper Trap Stretch   HEP       Scapular Retractions   HEP       Cervical AROM          Biceps Curls   2x10  2# Standing    Wand flexion AAROM  x15   Golf putter    Wand abduction AAROM  x10  Golf putter         Supine          Wand flexion AAROM   10x10\"  2# wand     Wand ER AAROM   10x10\"       Wand chest press AAROM                 Table Slides - flexion/scaption   10x10\"      Isometrics   HEP   Abduction, flexion, IR, ER   Tband Rows   2x10 Green     Tband Extension   2x10 Green Within pain free range   Tband ER  2x10 Green Within pain free range   Tband IR  2x10 Green          Standing      Flexion  2x10     Scaption  2x10

## 2023-10-04 ENCOUNTER — HOSPITAL ENCOUNTER (OUTPATIENT)
Dept: PHYSICAL THERAPY | Facility: CLINIC | Age: 63
Setting detail: THERAPIES SERIES
Discharge: HOME | End: 2023-10-04
Payer: MEDICARE

## 2023-10-04 PROCEDURE — 97110 THERAPEUTIC EXERCISES: CPT

## 2023-10-04 NOTE — FLOWSHEET NOTE
[x] [facility]  Outpatient Rehabilitation &  Therapy  [facility]   [place]: [phone]  F: [phone]      Physical Therapy Daily Treatment Note    Date:  10/4/2023  Patient Name:  Jossie Caballero    :  1960  MRN: [id number]  Physician: Dr. Regan Goodson: Medicare (vs [facility], follow medicare guidelines)  Medical Diagnosis: Status post surgery (Z98.890 [ICD-10-CM]); Traumatic complete tear of left rotator cuff, initial encounter (S46.012A [ICD-10-CM])  Rehab Codes: M62.81, M25.512, M25.612, R20.2, M79.622, R29.3  Onset Date: 23                             Next 's appt: 10/19/23    Visit# / total visits: ; Progress note for Medicare patient due at visit 20     Cancels/No Shows: 0    Subjective:    Pain:  [] Yes  [x] No Location: L UE  Pain Rating: (0-10 scale)  0/10  Pain altered Tx:  [x] No  [] Yes  Action:  Comments: Patient arrived noting increased soreness and tightness post last treatment. No pain noted upon arrival this date.      Objective:  Modalities:   Precautions: JELLY HI, progress per protocol   10 weeks on 10/3/23  Exercise    Reps/ Time Weight/ Level Comments             Pulley's   3'/3'     Pendulums   x20 ea  2# DB Cw/ccw              Levator Scap Stretch   HEP       Upper Trap Stretch   HEP       Scapular Retractions   HEP       Cervical AROM          Biceps Curls   2x10  2# Standing    Wand flexion AAROM  x15   Golf putter    Wand abduction AAROM  x10  Golf putter         Supine          Wand flexion AAROM   10x10\"  2# wand     Wand ER AAROM   10x10\"                 Table Slides - flexion/scaption   10x10\"      Tband Rows   2x10 Green     Tband Extension   2x10 Green Within pain free range   Tband ER  2x10 Green Within pain free range   Tband IR  2x10 Green          Standing      Flexion  2x10     Scaption  2x10     Abduction  2x10                 Other:   PROM of left shoulder in

## 2023-10-09 DIAGNOSIS — M25.512 ACUTE PAIN OF LEFT SHOULDER: ICD-10-CM

## 2023-10-10 RX ORDER — CYCLOBENZAPRINE HCL 5 MG
5 TABLET ORAL 3 TIMES DAILY PRN
Qty: 90 TABLET | Refills: 0 | Status: SHIPPED | OUTPATIENT
Start: 2023-10-10

## 2023-10-10 NOTE — TELEPHONE ENCOUNTER
LOV 7/19/2023     RTO nh/a  LRF 7/12/2023          Controlled Substance Monitoring:    Acute and Chronic Pain Monitoring:   RX Monitoring Periodic Controlled Substance Monitoring   12/11/2019   1:08 PM No signs of potential drug abuse or diversion identified.

## 2023-10-12 ENCOUNTER — HOSPITAL ENCOUNTER (OUTPATIENT)
Dept: PHYSICAL THERAPY | Facility: CLINIC | Age: 63
Setting detail: THERAPIES SERIES
Discharge: HOME | End: 2023-10-12
Payer: MEDICARE

## 2023-10-12 NOTE — CARE COORDINATION
[x] [facility]  Outpatient Rehabilitation &  Therapy  [facility]   [place]: [phone]  F: [phone]      Therapy Cancel/No Show note    Date: 10/12/2023  Patient: Graylon Jeans  : 1960  MRN: [id number]    Cancels/No Shows to date: 3/2    For today's appointment patient:    [x]  Cancelled    [] Rescheduled appointment    [] No-show     Reason given by patient:    []  Patient ill    []  Conflicting appointment    [] No transportation      [] Conflict with work    [] No reason given    [] Weather related    [] COVID-19    [x] Other:      Comments: Patient states she has a lot going on with her mom.          [x] Next appointment was confirmed    Electronically signed by: Bright Aguillon PTA

## 2023-10-19 ENCOUNTER — OFFICE VISIT (OUTPATIENT)
Dept: ORTHOPEDIC SURGERY | Age: 63
End: 2023-10-19

## 2023-10-19 VITALS — BODY MASS INDEX: 31.14 KG/M2 | RESPIRATION RATE: 16 BRPM | OXYGEN SATURATION: 98 % | WEIGHT: 158.6 LBS | HEIGHT: 60 IN

## 2023-10-19 DIAGNOSIS — Z98.890 STATUS POST ARTHROSCOPY OF LEFT SHOULDER: Primary | ICD-10-CM

## 2023-10-19 PROCEDURE — 99024 POSTOP FOLLOW-UP VISIT: CPT | Performed by: PHYSICIAN ASSISTANT

## 2023-10-19 ASSESSMENT — ENCOUNTER SYMPTOMS
CHEST TIGHTNESS: 0
RESPIRATORY NEGATIVE: 1
ABDOMINAL DISTENTION: 0
ABDOMINAL PAIN: 0
APNEA: 0
CONSTIPATION: 0
COUGH: 0

## 2023-10-19 NOTE — PROGRESS NOTES
[facility] SPORTS MEDICINE  [address] [place] [id number]  Dept: [phone]  Dept Fax: [phone]        Post Operative Follow Up    Subjective:     Chief Complaint   Patient presents with    Post-Op Check     L Shoulder Arthroscopy Dos 7/25/23     Post Op Surgery:     The patient is here for a follow up after having a left shoulder arthroscopy with rotator cuff repair. Date of surgery was 7/25/23. Therefore the patient is 12 weeks postop. Patient states she feels her shoulder is doing well and has improved since her last visit. She states that she is still going to physical therapy but had to miss a week due to the loss of her . She does report achy pain of the left shoulder that wakes her up at night. She states she has to prop the arm up with a pillow to help make the pain go away. The patient would like to know if she should resume physical therapy. She would also like to know if she is able to travel to [place] to visit her daughter. Review of Systems   Constitutional:  Positive for activity change. Negative for appetite change, fatigue and fever. Respiratory: Negative. Negative for apnea, cough and chest tightness. Cardiovascular: Negative. Negative for chest pain, palpitations and leg swelling. Gastrointestinal:  Negative for abdominal distention, abdominal pain and constipation. Genitourinary: Negative. Negative for difficulty urinating, dysuria and hematuria. Musculoskeletal:  Positive for arthralgias. Negative for gait problem and joint swelling. Neurological: Negative. Negative for dizziness, weakness and headaches. Psychiatric/Behavioral:  Positive for sleep disturbance. I have reviewed the CC, HPI, ROS, PMH, FHX, Social History, and if not present in this note, I have reviewed in the patient's chart.  I agree with the documentation provided by other staff and

## 2023-10-19 NOTE — PROGRESS NOTES
[address] [facility] SPORTS [address] [address]  Dept: [phone]  Dept Fax: [phone]        Post Operative Follow Up    Subjective:     Chief Complaint   Patient presents with    Post-Op Check     L Shoulder      Post Op Surgery:     The patient is here for a follow up after having a ***. Patient states they are *** . Review of Systems    I have reviewed the CC, HPI, ROS, PMH, FHX, Social History, and if not present in this note, I have reviewed in the patient's chart. I agree with the documentation provided by other staff and have reviewed their documentation prior to providing my signature indicating agreement. Vitals: There were no vitals taken for this visit. There is no height or weight on file to calculate BMI. Physical Examination:     Orthopedics:    GENERAL: Alert and oriented X3 in no acute distress. SKIN: Intact without lesions or ulcerations. Incision is {Blank single:44572::\"well healed\",\"healing well\"} with no signs of infection. NEURO: Intact to sensory and motor testing. VASC: Capillary refill is less than 3 seconds. Post Op Exam:    LOCATION:   SITE: Distal neurocirculatory status is intact. EXAM: Sensation is intact to light touch, there is full motor function of the extremity. ROM: *** degrees     Radiology:   No results found. Assessment:   No diagnosis found. Plan:   Post Op Treatment : Patient had the treatment regimen reviewed today ***. I reviewed the films with the patient. During today's visit, ***. The patient then stated that they understand the plan and at this time, the patient has opted *** . Patient should return to the office in {#:04588} {days/wks/mos/yrs:295861} to f/u with me. The patient will call the office immediately with any problems that may arise. No orders of the defined types were placed in this encounter.       No orders of

## 2023-11-02 DIAGNOSIS — F33.0 DEPRESSION, MAJOR, RECURRENT, MILD (HCC): ICD-10-CM

## 2023-11-03 RX ORDER — PAROXETINE HYDROCHLORIDE 40 MG/1
TABLET, FILM COATED ORAL
Qty: 90 TABLET | Refills: 1 | Status: SHIPPED | OUTPATIENT
Start: 2023-11-03 | End: 2024-11-03

## 2023-11-03 NOTE — TELEPHONE ENCOUNTER
LOV 7/19/23  RTO 3 months   LRF 5/1/23    Health Maintenance   Topic Date Due    Shingles vaccine (1 of 2) Never done    Colorectal Cancer Screen  04/26/2023    Pneumococcal 65+ years Vaccine (1 - PCV) Never done    Flu vaccine (1) Never done    Breast cancer screen  09/16/2023    COVID-19 Vaccine (1) 04/14/2025 (Originally 1960)    Depression Monitoring  01/10/2024    GFR test (Diabetes, CKD 3-4, OR last GFR 15-59)  07/19/2024    Cervical cancer screen  10/28/2025    Lipids  05/02/2028    DTaP/Tdap/Td vaccine (2 - Td or Tdap) 07/22/2030    DEXA (modify frequency per FRAX score)  Completed    Hepatitis C screen  Completed    HIV screen  Completed    Hepatitis A vaccine  Aged Out    Hepatitis B vaccine  Aged Out    Hib vaccine  Aged Out    Meningococcal (ACWY) vaccine  Aged Out    Diabetes screen  Discontinued             (applicable per patient's age: Cancer Screenings, Depression Screening, Fall Risk Screening, Immunizations)    LDL Cholesterol (mg/dL)   Date Value   05/02/2023 173 (H)     LDL Calculated (mg/dL)   Date Value   02/09/2022 144 (H)     AST (U/L)   Date Value   05/02/2023 23     ALT (U/L)   Date Value   05/02/2023 21     BUN (mg/dL)   Date Value   07/19/2023 20      (goal A1C is < 7)   (goal LDL is <100) need 30-50% reduction from baseline     BP Readings from Last 3 Encounters:   07/25/23 132/77   07/19/23 122/86   06/20/23 136/86    (goal /80)      All Future Testing planned in [facility]:  Lab Frequency Next Occurrence   ROBERTH DIGITAL SCREEN W OR WO CAD BILATERAL Once 10/28/2022       Next Visit Date:  Future Appointments   Date Time Provider [address]   11/6/2023  1:00 PM Lucio Lopez, PTA [facility]   1/22/2024  1:15 PM Eve Austin PA-C Sports Med [facility]            Patient Active Problem List:     Essential hypertension     Depression, major, recurrent, mild ([address])     Hematuria     Collagenous colitis     Lupus ([address])     SHUN (generalized anxiety

## 2023-11-06 ENCOUNTER — HOSPITAL ENCOUNTER (OUTPATIENT)
Dept: PHYSICAL THERAPY | Facility: CLINIC | Age: 63
Setting detail: THERAPIES SERIES
Discharge: HOME | End: 2023-11-06
Payer: MEDICARE

## 2023-11-06 PROCEDURE — 97110 THERAPEUTIC EXERCISES: CPT

## 2023-11-06 PROCEDURE — 97016 VASOPNEUMATIC DEVICE THERAPY: CPT

## 2023-11-06 NOTE — FLOWSHEET NOTE
[x] [facility]  Outpatient Rehabilitation &  Therapy  One [facility]   [place]: [phone]  F: [phone]      Physical Therapy Daily Treatment Note    Date:  2023  Patient Name:  Adrianne Jeffrey    :  1960  MRN: [id number]  Physician: Dr. Samira Nichols: Medicare (vs [facility], follow medicare guidelines)  Medical Diagnosis: Status post surgery (Z98.890 [ICD-10-CM]); Traumatic complete tear of left rotator cuff, initial encounter (S46.012A [ICD-10-CM])  Rehab Codes: M62.81, M25.512, M25.612, R20.2, M79.622, R29.3  Onset Date: 23                             Next 's appt: 10/19/23    Visit# / total visits: 15/20; Progress note for Medicare patient due at visit 20     Cancels/No Shows: 0    Subjective:    Pain:  [] Yes  [x] No Location: L UE  Pain Rating: (0-10 scale)  0/10  Pain altered Tx:  [x] No  [] Yes  Action:  Comments: Patient arrived almost a month since previous visit. Patient notes soreness in shoulder with no other significant issues to note.      Objective:  Modalities:   Precautions: JELLY HI, progress per protocol   10 weeks on 10/3/23  Exercise    Reps/ Time Weight/ Level Comments             Pulley's   3'/3'           Supine          Wand flexion AAROM   10x10\"  2# wand     Wand ER AAROM   10x10\"                 Punches  2x10     Alphabet  x1           Wall slides  10x10\" Flex/Abd          Tband Rows   2x10 Green     Tband Extension   2x10 Green    Tband ER  2x10 Green    Tband IR  2x10 Green          Standing      Flexion  2x10 1#    Scaption  2x10 1#    Abduction  2x10 1#                Other:   PROM of left shoulder in all ranges     Vasocompression x10 min 36 degrees min compression seated      Specific Instructions for next treatment: progress per protocol (in chart)      Treatment Charges: Mins Units   []  Modalities     [x]  Ther Exercise 40 3   []  Manual Therapy     []  Ther

## 2023-11-06 NOTE — PRE-CERTIFICATION NOTE
Medicare Cap   [x] Physical Therapy  [] Speech Therapy  [] Occupational therapy  *PT and Speech caps combine      $2230 Limit for PT and Speech combined  $2230 Limit for OT individually  At the beginning of the month where you expect to go over $2230, please add the [facility] modifier      Patient Name: Evonne Smith: 1960    Note:  This is an estimate of charges billed.      Date of [facility] Name # units/ charge $$$ charge Daily Total Charge Ongoing Total $$$    7/28 1 eval, 1 ex, 1 vaso  119.31+22.29+8.99 150.59 150.59    8/2 2ex 1 vaso PTA  52.63 203.22   8/7 3ex 1 vaso PT  82.08    8/9 3ex 1 vaso PTA  72.25    8/14 3ex 1 vaso PTA  72.25 429.80   8/16 3ex 1 vaso PT  82.08    8/21 3ex 1 vaso PT  82.08    8/23 3ex 1 vaso PT  82.08 676.04   8/28 3ex 1 vaso PTA  72.25    8/30 2ex 1 vaso PTA  52.63    9/8 2ex 1 vaso PT  82.08    9/28 2ex 1 vaso PT  82.08 965.08   10/2 2ex 1 vaso PTA  52.63    10/4 3ex PTA  64.34    11/6 3ex 1 vaso PTA  72.25 1154.30

## 2023-11-08 DIAGNOSIS — F41.1 GAD (GENERALIZED ANXIETY DISORDER): ICD-10-CM

## 2023-11-08 DIAGNOSIS — M54.2 MUSCLE PAIN, CERVICAL: ICD-10-CM

## 2023-11-08 RX ORDER — ALPRAZOLAM 1 MG/1
1 TABLET ORAL 2 TIMES DAILY
Qty: 60 TABLET | Refills: 0 | Status: SHIPPED | OUTPATIENT
Start: 2023-11-08 | End: 2023-12-08

## 2023-11-08 NOTE — TELEPHONE ENCOUNTER
----- Message from Aguila Valadezbury MA sent at 11/8/2023 12:32 PM EST -----  Subject: Refill Request    QUESTIONS  Name of Medication? ALPRAZolam (XANAX) 1 MG tablet  Patient-reported dosage and instructions? bid prn  How many days do you have left? 3  Preferred Pharmacy? [address]  [id number]  Pharmacy phone number (if available)? [phone]  ---------------------------------------------------------------------------  --------------  CALL BACK INFO  What is the best way for the office to contact you? OK to leave message on   voicemail  Preferred Call Back Phone Number? [phone]  ---------------------------------------------------------------------------  --------------  SCRIPT ANSWERS  Relationship to Patient?  Self
([address])     Hematuria     Collagenous colitis     Lupus (HCC)     SHUN (generalized anxiety disorder)     Chronic pain of both knees     Panic attack due to exceptional stress     Osteoporosis     Vitamin D deficiency     B12 deficiency     Restless leg syndrome     Mixed hyperlipidemia     Psychophysiological insomnia

## 2023-11-09 RX ORDER — MELOXICAM 15 MG/1
15 TABLET ORAL DAILY
Qty: 90 TABLET | Refills: 1 | Status: SHIPPED | OUTPATIENT
Start: 2023-11-09

## 2023-11-09 NOTE — TELEPHONE ENCOUNTER
LOV 7/19/2023  RTO 12/14/2023  LRF 4/3/2023          Controlled Substance Monitoring:    Acute and Chronic Pain Monitoring:   RX Monitoring Periodic Controlled Substance Monitoring   12/11/2019   1:08 PM No signs of potential drug abuse or diversion identified.

## 2023-11-15 ENCOUNTER — HOSPITAL ENCOUNTER (OUTPATIENT)
Dept: PHYSICAL THERAPY | Facility: CLINIC | Age: 63
Setting detail: THERAPIES SERIES
Discharge: HOME | End: 2023-11-15
Payer: MEDICARE

## 2023-11-15 PROCEDURE — 97016 VASOPNEUMATIC DEVICE THERAPY: CPT

## 2023-11-15 PROCEDURE — 97110 THERAPEUTIC EXERCISES: CPT

## 2023-11-15 NOTE — PRE-CERTIFICATION NOTE
Medicare Cap   [x] Physical Therapy  [] Speech Therapy  [] Occupational therapy  *PT and Speech caps combine      $2230 Limit for PT and Speech combined  $2230 Limit for OT individually  At the beginning of the month where you expect to go over $2230, please add the [facility] modifier      Patient Name: Love Sheikh: 1960    Note:  This is an estimate of charges billed.      Date of [facility] Name # units/ charge $$$ charge Daily Total Charge Ongoing Total $$$    7/28 1 eval, 1 ex, 1 vaso  119.31+22.29+8.99 150.59 150.59    8/2 2ex 1 vaso PTA  52.63 203.22   8/7 3ex 1 vaso PT  82.08    8/9 3ex 1 vaso PTA  72.25    8/14 3ex 1 vaso PTA  72.25 429.80   8/16 3ex 1 vaso PT  82.08    8/21 3ex 1 vaso PT  82.08    8/23 3ex 1 vaso PT  82.08 676.04   8/28 3ex 1 vaso PTA  72.25    8/30 2ex 1 vaso PTA  52.63    9/8 2ex 1 vaso PT  82.08    9/28 2ex 1 vaso PT  82.08 965.08   10/2 2ex 1 vaso PTA  52.63    10/4 3ex PTA  64.34    11/6 3ex 1 vaso PTA  72.25 1154.30    2ex 1 vaso PTA  52.63 1206.93

## 2023-11-15 NOTE — FLOWSHEET NOTE
[x] [facility]  Outpatient Rehabilitation &  Therapy  [facility]   [place]: [phone]  F: [phone]      Physical Therapy Daily Treatment Note    Date:  11/15/2023  Patient Name:  Anurag Smith    :  1960  MRN: [id number]  Physician: Dr. Fatmata Vieyra: Medicare (vs [facility], follow medicare guidelines)  Medical Diagnosis: Status post surgery (Z98.890 [ICD-10-CM]); Traumatic complete tear of left rotator cuff, initial encounter (S46.012A [ICD-10-CM])  Rehab Codes: M62.81, M25.512, M25.612, R20.2, M79.622, R29.3  Onset Date: 23                             Next 's appt: 10/19/23    Visit# / total visits: ; Progress note for Medicare patient due at visit 20     Cancels/No Shows: 0    Subjective:    Pain:  [] Yes  [x] No Location: L UE  Pain Rating: (0-10 scale)  0/10  Pain altered Tx:  [x] No  [] Yes  Action:  Comments: Patient arrived noting increased soreness in ricardo anterior shoulders, notes that she feels it's due to scapular retractions.      Objective:  Modalities:   Precautions: JELLY HI, progress per protocol   10 weeks on 10/3/23  Exercise    Reps/ Time Weight/ Level Comments             Pulley's   3'/3'           Supine          Wand flexion AAROM   10x10\"  2# wand     Wand ER AAROM   10x10\"                 Punches  2x10     Alphabet  x1           Wall slides  10x10\" Flex/Scap          Tband Rows   2x10 Green     Tband Extension   2x10 Green    Tband ER  2x10 Green    Tband IR  2x10 Green          Standing      Flexion  2x10 1#    Scaption  2x10 1#    Abduction  2x10 1#                Other:   PROM of left shoulder in all ranges     Vasocompression x10 min 36 degrees min compression seated      Specific Instructions for next treatment: progress per protocol (in chart)      Treatment Charges: Mins Units   []  Modalities     [x]  Ther Exercise 30 2   []  Manual Therapy     []  Ther Activities

## 2023-11-21 ENCOUNTER — HOSPITAL ENCOUNTER (OUTPATIENT)
Dept: PHYSICAL THERAPY | Facility: CLINIC | Age: 63
Setting detail: THERAPIES SERIES
Discharge: HOME | End: 2023-11-21
Payer: MEDICARE

## 2023-11-21 PROCEDURE — 97110 THERAPEUTIC EXERCISES: CPT

## 2023-11-21 NOTE — FLOWSHEET NOTE
[x] [facility]  Outpatient Rehabilitation &  Therapy  [facility]   [place]: [phone]  F: [phone]      Physical Therapy Daily Treatment Note    Date:  2023  Patient Name:  Oriana Grissom    :  1960  MRN: [id number]  Physician: Dr. Jose Shaikh: Medicare (vs [facility], follow medicare guidelines)  Medical Diagnosis: Status post surgery (Z98.890 [ICD-10-CM]); Traumatic complete tear of left rotator cuff, initial encounter (S46.012A [ICD-10-CM])  Rehab Codes: M62.81, M25.512, M25.612, R20.2, M79.622, R29.3  Onset Date: 23                             Next 's appt: 23  Visit# / total visits: ; Progress note for Medicare patient due at visit 20     Cancels/No Shows: 0    Subjective:    Pain:  [] Yes  [x] No Location: L UE  Pain Rating: (0-10 scale)  0/10  Pain altered Tx:  [x] No  [] Yes  Action:  Comments: Patient arrives with low pain complaints. She continues to experience \"clicking and cracking\" at times that is also painful. She voices that she continues to have difficulty donning her bra and washing her hair at times.      Objective:  Modalities:   Precautions: JELLY HI, progress per protocol   10 weeks on 10/3/23  Exercise    Reps/ Time Weight/ Level Comments             Pulley's   2'/2'     Corner stretch  3x30\"           Supine          Wand flexion AAROM    2# wand     Wand ER AAROM                   Punches      Alphabet      Sidelying ER 2x10 1#    Sidelying abduction  x10 0#    Sidelying horiz abd  x10 0#                Wall slides  12x10\" Abduction only           Tband Rows   2x10 Blue      Tband Extension   2x10 Blue     Tband ER  2x10 Blue     Tband IR  2x10 Blue          Standing      Flexion  2x10 1#    Scaption  2x10 1#    Abduction  2x10 1#                Other:   PROM of left shoulder in all ranges   MFR to the left pectoralis minor on the left      Specific Instructions

## 2023-11-27 ENCOUNTER — HOSPITAL ENCOUNTER (OUTPATIENT)
Dept: PHYSICAL THERAPY | Facility: CLINIC | Age: 63
Setting detail: THERAPIES SERIES
Discharge: HOME | End: 2023-11-27
Payer: MEDICARE

## 2023-11-27 PROCEDURE — 97110 THERAPEUTIC EXERCISES: CPT

## 2023-11-27 NOTE — FLOWSHEET NOTE
progressions         Time In: 1:30 pm              Time Out: 2:20 pm      Electronically signed by:  Beatriz Mitchell PTA

## 2023-11-29 ENCOUNTER — HOSPITAL ENCOUNTER (OUTPATIENT)
Dept: PHYSICAL THERAPY | Facility: CLINIC | Age: 63
Setting detail: THERAPIES SERIES
Discharge: HOME | End: 2023-11-29
Payer: MEDICARE

## 2023-11-29 PROCEDURE — 97110 THERAPEUTIC EXERCISES: CPT

## 2023-11-29 NOTE — FLOWSHEET NOTE
[x] [facility]  Outpatient Rehabilitation &  Therapy  [address]   [place]: [phone]  F: [phone]      Physical Therapy Daily Treatment Note    Date:  2023  Patient Name:  Tena Tobar    :  1960  MRN: [id number]  Physician: Dr. Cherylene : Medicare (vs [facility], follow medicare guidelines)  Medical Diagnosis: Status post surgery (Z98.890 [ICD-10-CM]); Traumatic complete tear of left rotator cuff, initial encounter (S46.012A [ICD-10-CM])  Rehab Codes: M62.81, M25.512, M25.612, R20.2, M79.622, R29.3  Onset Date: 23                             Next 's appt: 23    Visit# / total visits: ; Progress note for Medicare patient due at visit 20     Cancels/No Shows: 0    Subjective:    Pain:  [] Yes  [x] No Location: L UE  Pain Rating: (0-10 scale)  0/10  Pain altered Tx:  [x] No  [] Yes  Action:  Comments: Patient arrived noting improvements since last visit.      Objective:  Modalities:   Precautions: valery BETTS per protocol   10 weeks on 10/3/23  Exercise    Reps/ Time Weight/ Level Comments             Pulley's   2'/2'     Corner stretch  3x30\"           Supine          Wand flexion AAROM  x10  2# wand     Wand ER AAROM                   Punches  2x10 1#    Alphabet  x1 1#    Sidelying ER  1#    Sidelying abduction   0#    Sidelying horiz abd   0#                Wall slides  12x10\" Abduction only           Tband Rows   2x10 Blue      Tband Extension   2x10 Blue     Tband ER  2x10 Blue     Tband IR  2x10 Blue          Standing      Flexion  2x10 1#    Scaption  2x10 1#    Abduction  2x10 1#          Wall push ups                  Other:        Specific Instructions for next treatment: progress strengthening, PNF       Treatment Charges: Mins Units   []  Modalities     [x]  Ther Exercise 33 2   []  Manual Therapy     []  Ther Activities     []  Neuro Re-ed     []  Vasocompression

## 2023-12-04 ENCOUNTER — HOSPITAL ENCOUNTER (OUTPATIENT)
Dept: PHYSICAL THERAPY | Facility: CLINIC | Age: 63
Setting detail: THERAPIES SERIES
Discharge: HOME | End: 2023-12-04
Payer: MEDICARE

## 2023-12-04 PROCEDURE — 97016 VASOPNEUMATIC DEVICE THERAPY: CPT

## 2023-12-04 PROCEDURE — 97110 THERAPEUTIC EXERCISES: CPT

## 2023-12-04 NOTE — FLOWSHEET NOTE
[x] [facility]  Outpatient Rehabilitation &  Therapy  [address]   [address]   [place]: [phone]  F: [phone]      Physical Therapy Daily Treatment Note    Date:  2023  Patient Name:  Nixon Starr    :  1960  MRN: [id number]  Physician: Dr. Ortiz Ask: Medicare (vs [facility], follow medicare guidelines)  Medical Diagnosis: Status post surgery (Z98.890 [ICD-10-CM]); Traumatic complete tear of left rotator cuff, initial encounter (S46.012A [ICD-10-CM])  Rehab Codes: M62.81, M25.512, M25.612, R20.2, M79.622, R29.3  Onset Date: 23                             Next 's appt: 23  Visit# / total visits: ; Progress note for Medicare patient due at visit 20     Cancels/No Shows: 0    Subjective:    Pain:  [] Yes  [x] No Location: L UE  Pain Rating: (0-10 scale)  0/10  Pain altered Tx:  [x] No  [] Yes  Action:  Comments: Patient reports that she has had increased pain since Friday when she went from sitting to standing position and used her LUE to support her.      Objective:  Modalities:   Precautions: NWB KOLTON, progress per protocol   10 weeks on 10/3/23  Exercise    Reps/ Time Weight/ Level Comments             Pulley's  3'/3'     Corner stretch  3x30\"           Supine          Wand flexion AAROM  x10  2# wand     Wand ER AAROM                   Punches  2x10 1#    Alphabet  x1 1#    Sidelying ER  1#    Sidelying abduction   0#    Sidelying horiz abd   0#    Prone scapular retraction       Prone scapular depression             Wall slides  5x10\" Abduction only  Held after 5 reps due to pain          Tband Rows   2x10 Blue      Tband Extension   2x10 Blue     Tband ER  2x10 Blue     Tband IR  2x10 Blue          Standing   Removed 1# weight today    Flexion  2x10 0#    Scaption  2x10 0#    Abduction  2x10 0#          Wall push ups x15     Serratus pushes into wall  x10  Heavy cueing required

## 2023-12-06 ENCOUNTER — OFFICE VISIT (OUTPATIENT)
Dept: ORTHOPEDIC SURGERY | Age: 63
End: 2023-12-06

## 2023-12-06 ENCOUNTER — APPOINTMENT (OUTPATIENT)
Dept: PHYSICAL THERAPY | Facility: CLINIC | Age: 63
End: 2023-12-06
Payer: MEDICARE

## 2023-12-06 VITALS — BODY MASS INDEX: 31.41 KG/M2 | OXYGEN SATURATION: 100 % | RESPIRATION RATE: 14 BRPM | HEIGHT: 60 IN | WEIGHT: 160 LBS

## 2023-12-06 DIAGNOSIS — Z98.890 STATUS POST ARTHROSCOPY OF LEFT SHOULDER: ICD-10-CM

## 2023-12-06 DIAGNOSIS — M75.22 BICEPS TENDONITIS ON LEFT: Primary | ICD-10-CM

## 2023-12-06 RX ORDER — BETAMETHASONE SODIUM PHOSPHATE AND BETAMETHASONE ACETATE 3; 3 MG/ML; MG/ML
6 INJECTION, SUSPENSION INTRA-ARTICULAR; INTRALESIONAL; INTRAMUSCULAR; SOFT TISSUE ONCE
Status: DISCONTINUED | OUTPATIENT
Start: 2023-12-06 | End: 2023-12-06

## 2023-12-06 RX ORDER — BETAMETHASONE SODIUM PHOSPHATE AND BETAMETHASONE ACETATE 3; 3 MG/ML; MG/ML
6 INJECTION, SUSPENSION INTRA-ARTICULAR; INTRALESIONAL; INTRAMUSCULAR; SOFT TISSUE ONCE
Status: COMPLETED | OUTPATIENT
Start: 2023-12-06 | End: 2023-12-06

## 2023-12-06 RX ORDER — BUPIVACAINE HYDROCHLORIDE 2.5 MG/ML
2 INJECTION, SOLUTION INFILTRATION; PERINEURAL ONCE
Status: COMPLETED | OUTPATIENT
Start: 2023-12-06 | End: 2023-12-06

## 2023-12-06 RX ADMIN — BETAMETHASONE SODIUM PHOSPHATE AND BETAMETHASONE ACETATE 6 MG: 3; 3 INJECTION, SUSPENSION INTRA-ARTICULAR; INTRALESIONAL; INTRAMUSCULAR; SOFT TISSUE at 15:12

## 2023-12-06 RX ADMIN — BUPIVACAINE HYDROCHLORIDE 5 MG: 2.5 INJECTION, SOLUTION INFILTRATION; PERINEURAL at 15:32

## 2023-12-06 ASSESSMENT — ENCOUNTER SYMPTOMS
VOMITING: 0
CHEST TIGHTNESS: 0
ABDOMINAL PAIN: 0
COUGH: 0
APNEA: 0
DIARRHEA: 0
CONSTIPATION: 0
COLOR CHANGE: 0
SHORTNESS OF BREATH: 0
ABDOMINAL DISTENTION: 0
NAUSEA: 0
RESPIRATORY NEGATIVE: 1

## 2023-12-06 NOTE — PROGRESS NOTES
was cleansed and a Band-Aid was placed. The patient tolerated the procedure without difficulty. Adverse reactions of the injection was discussed with the patient including signs of infection (increasing pain, redness, swelling) and the patient was instructed to call immediately with any of these symptoms. Successful needle placement was achieved and final images were taken and saved in the patient's chart for the permanent record. The images are stored on SD card in the machine until downloaded to the patient's chart. Radiology:   No results found. Assessment:     1. Biceps tendonitis on left    2. Status post arthroscopy of left shoulder      Plan:   Post Op Treatment : Patient had the treatment regimen reviewed today 4-1/2 months status post left shoulder arthroscopy with rotator cuff repair. I reviewed the films with the patient. During today's visit, we discussed that a majority of her pain is in her anterior shoulder. When I reviewed her op report it does show some wearing of her bicep tendon and I think her pain is coming from there. I think if we try a cortisone injection that may settle it down and then she can continue with her physical therapy. The patient then stated that they understand the plan and at this time, the patient has opted a cortisone injection into the Left bicep tendon sheath to help reduce inflammation and pain. The injection site should never get red, hot, or swollen and if it does the patient will contact our office right away. The patient may experience a increase in soreness the first 24-48 hours due to a cortisone flair and can take anti-inflammatories for a short period of time to reduce that soreness. The patient should not submerge the injection site in water for a minimum of 24 hours to avoid infection. This means no lakes, pools, ponds, or hot tubs for 24 hours. If the patient is diabetic the injection may increase their blood sugar for up to one week.  The patient can do

## 2023-12-08 ENCOUNTER — APPOINTMENT (OUTPATIENT)
Dept: PHYSICAL THERAPY | Facility: CLINIC | Age: 63
End: 2023-12-08
Payer: MEDICARE

## 2023-12-08 DIAGNOSIS — F33.0 DEPRESSION, MAJOR, RECURRENT, MILD (HCC): ICD-10-CM

## 2023-12-08 RX ORDER — BUPROPION HYDROCHLORIDE 150 MG/1
150 TABLET ORAL EVERY MORNING
Qty: 90 TABLET | Refills: 1 | Status: SHIPPED | OUTPATIENT
Start: 2023-12-08 | End: 2024-06-05

## 2023-12-08 NOTE — TELEPHONE ENCOUNTER
Pharmacy requesting 90 day supply.      LOV 7/19/23  RTO 3 months; F/U scheduled  [facility] 7/11/23    Health Maintenance   Topic Date Due    Shingles vaccine (1 of 2) Never done    Respiratory Syncytial Virus (RSV) age 61 yrs+ (3 - 1-dose 60+ series) Never done    Colorectal Cancer Screen  04/26/2023    Pneumococcal 65+ years Vaccine (1 - PCV) Never done    Flu vaccine (1) Never done    Breast cancer screen  09/16/2023    COVID-19 Vaccine (1) 04/14/2025 (Originally 1960)    Depression Monitoring  01/10/2024    GFR test (Diabetes, CKD 3-4, OR last GFR 15-59)  07/19/2024    Cervical cancer screen  10/28/2025    Lipids  05/02/2028    DTaP/Tdap/Td vaccine (2 - Td or Tdap) 07/22/2030    DEXA (modify frequency per FRAX score)  Completed    Hepatitis C screen  Completed    HIV screen  Completed    Hepatitis A vaccine  Aged Out    Hepatitis B vaccine  Aged Out    Hib vaccine  Aged Out    Meningococcal (ACWY) vaccine  Aged Out    Diabetes screen  Discontinued             (applicable per patient's age: Cancer Screenings, Depression Screening, Fall Risk Screening, Immunizations)    LDL Cholesterol (mg/dL)   Date Value   05/02/2023 173 (H)     LDL Calculated (mg/dL)   Date Value   02/09/2022 144 (H)     AST (U/L)   Date Value   05/02/2023 23     ALT (U/L)   Date Value   05/02/2023 21     BUN (mg/dL)   Date Value   07/19/2023 20      (goal A1C is < 7)   (goal LDL is <100) need 30-50% reduction from baseline     BP Readings from Last 3 Encounters:   07/25/23 132/77   07/19/23 122/86   06/20/23 136/86    (goal /80)      All Future Testing planned in [facility]:  Lab Frequency Next Occurrence   ROBERTH DIGITAL SCREEN W OR WO CAD BILATERAL Once 10/28/2022       Next Visit Date:  Future Appointments   Date Time Provider [address]   12/14/2023  2:30 PM Anabelle Jade, APRN - CNP [facility]   [facility]            Patient Active Problem List:     Essential

## 2023-12-14 ENCOUNTER — HOSPITAL ENCOUNTER (OUTPATIENT)
Age: 63
Setting detail: SPECIMEN
Discharge: HOME | End: 2023-12-14

## 2023-12-14 DIAGNOSIS — I10 ESSENTIAL HYPERTENSION: Chronic | ICD-10-CM

## 2023-12-14 DIAGNOSIS — R79.89 ABNORMAL CBC: ICD-10-CM

## 2023-12-14 DIAGNOSIS — E53.8 B12 DEFICIENCY: ICD-10-CM

## 2023-12-14 DIAGNOSIS — E55.9 VITAMIN D DEFICIENCY: ICD-10-CM

## 2023-12-14 PROBLEM — M16.11 OSTEOARTHRITIS OF RIGHT HIP: Status: ACTIVE | Noted: 2023-12-08

## 2023-12-14 PROBLEM — Z96.653 HISTORY OF TOTAL KNEE ARTHROPLASTY, BILATERAL: Status: ACTIVE | Noted: 2023-12-08

## 2023-12-14 PROBLEM — Z96.642 HISTORY OF TOTAL HIP ARTHROPLASTY, LEFT: Status: ACTIVE | Noted: 2023-12-08

## 2023-12-14 LAB
BASOPHILS # BLD: 0.06 K/UL (ref 0–0.2)
BASOPHILS NFR BLD: 1 % (ref 0–2)
EOSINOPHIL # BLD: 0.19 K/UL (ref 0–0.44)
EOSINOPHILS RELATIVE PERCENT: 3 % (ref 1–4)
ERYTHROCYTE [DISTWIDTH] IN BLOOD BY AUTOMATED COUNT: 14.4 % (ref 11.8–14.4)
HCT VFR BLD AUTO: 41.4 % (ref 36.3–47.1)
HGB BLD-MCNC: 13.3 G/DL (ref 11.9–15.1)
IMM GRANULOCYTES # BLD AUTO: 0.04 K/UL (ref 0–0.3)
IMM GRANULOCYTES NFR BLD: 1 %
LYMPHOCYTES NFR BLD: 1.34 K/UL (ref 1.1–3.7)
LYMPHOCYTES RELATIVE PERCENT: 23 % (ref 24–43)
MCH RBC QN AUTO: 31.9 PG (ref 25.2–33.5)
MCHC RBC AUTO-ENTMCNC: 32.1 G/DL (ref 28.4–34.8)
MCV RBC AUTO: 99.3 FL (ref 82.6–102.9)
MONOCYTES NFR BLD: 0.54 K/UL (ref 0.1–1.2)
MONOCYTES NFR BLD: 9 % (ref 3–12)
NEUTROPHILS NFR BLD: 63 % (ref 36–65)
NEUTS SEG NFR BLD: 3.59 K/UL (ref 1.5–8.1)
NRBC BLD-RTO: 0 PER 100 WBC
PLATELET # BLD AUTO: 335 K/UL (ref 138–453)
PMV BLD AUTO: 11.9 FL (ref 8.1–13.5)
RBC # BLD AUTO: 4.17 M/UL (ref 3.95–5.11)
WBC OTHER # BLD: 5.8 K/UL (ref 3.5–11.3)

## 2023-12-15 LAB
25(OH)D3 SERPL-MCNC: 45.5 NG/ML
FERRITIN SERPL-MCNC: 60 NG/ML (ref 13–150)
FOLATE SERPL-MCNC: 12.3 NG/ML
IRON SATN MFR SERPL: 21 % (ref 20–55)
IRON SERPL-MCNC: 69 UG/DL (ref 37–145)
PTH-INTACT SERPL-MCNC: 58.7 PG/ML (ref 14–72)
TIBC SERPL-MCNC: 325 UG/DL (ref 250–450)
UNSATURATED IRON BINDING CAPACITY: 256 UG/DL (ref 112–347)
VIT B12 SERPL-MCNC: 357 PG/ML (ref 232–1245)

## 2023-12-22 ENCOUNTER — TELEPHONE (OUTPATIENT)
Dept: FAMILY MEDICINE CLINIC | Age: 63
End: 2023-12-22

## 2023-12-22 NOTE — TELEPHONE ENCOUNTER
Saúl from [facility] is asking if you will follow for home Care. PT, OT and SN. Patient was DC from [facility] on 12/16/23.  Admission 12/6/23 Hip

## 2023-12-26 DIAGNOSIS — F41.1 GAD (GENERALIZED ANXIETY DISORDER): ICD-10-CM

## 2023-12-27 RX ORDER — ALPRAZOLAM 1 MG/1
1 TABLET ORAL 2 TIMES DAILY PRN
Qty: 60 TABLET | Refills: 0 | Status: SHIPPED | OUTPATIENT
Start: 2023-12-27 | End: 2024-01-26

## 2023-12-27 NOTE — TELEPHONE ENCOUNTER
LOV 12/14/2023   RTO 3/14/2024  LRF 11/8/23          Controlled Substance Monitoring:    Acute and Chronic Pain Monitoring:   RX Monitoring Periodic Controlled Substance Monitoring   12/11/2019   1:08 PM No signs of potential drug abuse or diversion identified.

## 2024-01-04 DIAGNOSIS — M25.512 ACUTE PAIN OF LEFT SHOULDER: ICD-10-CM

## 2024-01-04 DIAGNOSIS — F33.0 DEPRESSION, MAJOR, RECURRENT, MILD (HCC): ICD-10-CM

## 2024-01-04 NOTE — TELEPHONE ENCOUNTER
LOV 12/14/23   RTO 3/14/24  LRF 12/8/23          Controlled Substance Monitoring:    Acute and Chronic Pain Monitoring:   RX Monitoring Periodic Controlled Substance Monitoring   12/11/2019   1:08 PM No signs of potential drug abuse or diversion identified.

## 2024-01-04 NOTE — TELEPHONE ENCOUNTER
LOV 12/14/23  RTO 3 months; F/U scheduled  LRF 10/10/23    Health Maintenance   Topic Date Due    Shingles vaccine (1 of 2) Never done    Respiratory Syncytial Virus (RSV) Pregnant or age 60 yrs+ (1 - 1-dose 60+ series) Never done    Colorectal Cancer Screen  04/26/2023    Pneumococcal 65+ years Vaccine (1 - PCV) Never done    Flu vaccine (1) Never done    Breast cancer screen  09/16/2023    Annual Wellness Visit (Medicare)  Never done    Depression Monitoring  01/10/2024    COVID-19 Vaccine (1) 04/14/2025 (Originally 1960)    GFR test (Diabetes, CKD 3-4, OR last GFR 15-59)  07/19/2024    Cervical cancer screen  10/28/2025    Lipids  05/02/2028    DTaP/Tdap/Td vaccine (2 - Td or Tdap) 07/22/2030    DEXA (modify frequency per FRAX score)  Completed    Hepatitis C screen  Completed    HIV screen  Completed    Hepatitis A vaccine  Aged Out    Hepatitis B vaccine  Aged Out    Hib vaccine  Aged Out    Polio vaccine  Aged Out    Meningococcal (ACWY) vaccine  Aged Out    Diabetes screen  Discontinued             (applicable per patient's age: Cancer Screenings, Depression Screening, Fall Risk Screening, Immunizations)    LDL Cholesterol (mg/dL)   Date Value   05/02/2023 173 (H)     LDL Calculated (mg/dL)   Date Value   02/09/2022 144 (H)     AST (U/L)   Date Value   05/02/2023 23     ALT (U/L)   Date Value   05/02/2023 21     BUN (mg/dL)   Date Value   07/19/2023 20      (goal A1C is < 7)   (goal LDL is <100) need 30-50% reduction from baseline     BP Readings from Last 3 Encounters:   12/14/23 112/84   07/25/23 132/77   07/19/23 122/86    (goal /80)      All Future Testing planned in Care[facility]:  Lab Frequency Next Occurrence   XR THORACIC SPINE (2 VIEWS) Once 12/14/2023   XR LUMBAR SPINE (2-3 VIEWS) Once 12/14/2023   ROBERTH DIGITAL SCREEN W OR WO CAD BILATERAL Once 12/14/2023       Next Visit Date:  Future Appointments   Date Time Provider Department Center   1/17/2024  2:30 PM Francisco Rodriguez, DO Sports Med

## 2024-01-05 RX ORDER — CYCLOBENZAPRINE HCL 10 MG
10 TABLET ORAL 3 TIMES DAILY PRN
Qty: 90 TABLET | Refills: 0 | Status: SHIPPED | OUTPATIENT
Start: 2024-01-05

## 2024-01-05 RX ORDER — BUPROPION HYDROCHLORIDE 150 MG/1
150 TABLET ORAL EVERY MORNING
Qty: 90 TABLET | Refills: 1 | Status: SHIPPED | OUTPATIENT
Start: 2024-01-05 | End: 2025-01-04

## 2024-02-14 DIAGNOSIS — F41.1 GAD (GENERALIZED ANXIETY DISORDER): ICD-10-CM

## 2024-02-14 RX ORDER — ALPRAZOLAM 1 MG/1
TABLET ORAL
Qty: 60 TABLET | Refills: 0 | Status: SHIPPED | OUTPATIENT
Start: 2024-02-14 | End: 2024-04-14

## 2024-02-14 NOTE — TELEPHONE ENCOUNTER
LOV 12/14/2023   RTO 03/14/2024  LRF 12/27/2023    Patient has been out of medication for several days.  Jarod was to request refill        Controlled Substance Monitoring:    Acute and Chronic Pain Monitoring:   RX Monitoring Periodic Controlled Substance Monitoring   12/11/2019   1:08 PM No signs of potential drug abuse or diversion identified.

## 2024-02-21 ENCOUNTER — OFFICE VISIT (OUTPATIENT)
Dept: ORTHOPEDIC SURGERY | Age: 64
End: 2024-02-21
Payer: MEDICARE

## 2024-02-21 VITALS — WEIGHT: 165 LBS | OXYGEN SATURATION: 99 % | HEIGHT: 60 IN | RESPIRATION RATE: 17 BRPM | BODY MASS INDEX: 32.39 KG/M2

## 2024-02-21 DIAGNOSIS — M25.512 BILATERAL SHOULDER PAIN, UNSPECIFIED CHRONICITY: ICD-10-CM

## 2024-02-21 DIAGNOSIS — M25.511 BILATERAL SHOULDER PAIN, UNSPECIFIED CHRONICITY: ICD-10-CM

## 2024-02-21 DIAGNOSIS — Z98.890 STATUS POST ARTHROSCOPY OF LEFT SHOULDER: Primary | ICD-10-CM

## 2024-02-21 PROCEDURE — G8417 CALC BMI ABV UP PARAM F/U: HCPCS | Performed by: ORTHOPAEDIC SURGERY

## 2024-02-21 PROCEDURE — 1036F TOBACCO NON-USER: CPT | Performed by: ORTHOPAEDIC SURGERY

## 2024-02-21 PROCEDURE — 1090F PRES/ABSN URINE INCON ASSESS: CPT | Performed by: ORTHOPAEDIC SURGERY

## 2024-02-21 PROCEDURE — 3017F COLORECTAL CA SCREEN DOC REV: CPT | Performed by: ORTHOPAEDIC SURGERY

## 2024-02-21 PROCEDURE — 1123F ACP DISCUSS/DSCN MKR DOCD: CPT | Performed by: ORTHOPAEDIC SURGERY

## 2024-02-21 PROCEDURE — G8427 DOCREV CUR MEDS BY ELIG CLIN: HCPCS | Performed by: ORTHOPAEDIC SURGERY

## 2024-02-21 PROCEDURE — G8399 PT W/DXA RESULTS DOCUMENT: HCPCS | Performed by: ORTHOPAEDIC SURGERY

## 2024-02-21 PROCEDURE — G8484 FLU IMMUNIZE NO ADMIN: HCPCS | Performed by: ORTHOPAEDIC SURGERY

## 2024-02-21 PROCEDURE — 99214 OFFICE O/P EST MOD 30 MIN: CPT | Performed by: ORTHOPAEDIC SURGERY

## 2024-02-21 RX ORDER — METHYLPREDNISOLONE 4 MG/1
TABLET ORAL
Qty: 1 KIT | Refills: 0 | Status: SHIPPED | OUTPATIENT
Start: 2024-02-21

## 2024-02-23 ASSESSMENT — ENCOUNTER SYMPTOMS
EYE DISCHARGE: 0
SHORTNESS OF BREATH: 0
ABDOMINAL PAIN: 0
ROS SKIN COMMENTS: NEGATIVE FOR RASH

## 2024-02-23 NOTE — PROGRESS NOTES
[facility] ORTHOPEDICS AND SPORTS MEDICINE  [address]  [place] [address]  Dept: [phone]  Dept Fax: [phone]        Ambulatory Follow Up      Subjective:   Sudha Aguilera is a 63 y.o. year old female who presents to our office today for routine followup regarding her   1. Status post arthroscopy of left shoulder    2. Bilateral shoulder pain, unspecified chronicity    .    Chief Complaint   Patient presents with    Shoulder Pain     Left shoulder pain- rotator cuff repair DOS 7/25/23       HPI  Sudha Aguilera is a 63-year-old female who presents the office today for recheck of bilateral shoulder pain.  She has had a left rotator cuff repair on 7/25/2023.  She notes that that is improving and the more activity she does her right shoulder now become more painful.  She states she lost her  in September.  She notes her left shoulder pain is 60% overall better.  Left biceps injection helped a little bit from 12/6/2023    Review of Systems   Constitutional:  Positive for activity change. Negative for fever.   HENT:  Negative for dental problem.    Eyes:  Negative for discharge.   Respiratory:  Negative for shortness of breath.    Cardiovascular:  Negative for chest pain.   Gastrointestinal:  Negative for abdominal pain.   Genitourinary: Negative.    Musculoskeletal:  Positive for arthralgias.   Skin:         Negative for rash   Neurological:  Positive for weakness.   Psychiatric/Behavioral:  Negative for confusion.        I have reviewed the CC, HPI, ROS, PMH, FHX, Social History, and if not present in this note, I have reviewed in the patient's chart.   I agree with the documentation provided by other staff and have reviewed their documentation prior to providing my signature indicating agreement.    Objective :   Resp 17   Ht 1.524 m (5')   Wt 74.8 kg (165 lb)   SpO2 99%   BMI 32.22 kg/m²  Body mass

## 2024-03-05 ENCOUNTER — HOSPITAL ENCOUNTER (OUTPATIENT)
Dept: PHYSICAL THERAPY | Facility: CLINIC | Age: 64
Setting detail: THERAPIES SERIES
Discharge: HOME | End: 2024-03-05
Attending: ORTHOPAEDIC SURGERY

## 2024-03-05 NOTE — CARE COORDINATION
[] [facility]  Outpatient Rehabilitation &  Therapy  [address]  [phone]  [phone] [] [facility]  Outpatient Rehabilitation &  Therapy  [address]  P: [phone]  F: [phone] [] [facility]  Outpatient Rehabilitation &  Therapy  [address]  P: [phone]  F: [phone] [] [facility]  Outpatient Rehabilitation &  Therapy  [address]  [phone]  [phone] [x] [facility]  Outpatient Rehabilitation &  Therapy  [address]   P: [phone]  F: [phone]  [] [facility]  Outpatient Rehabilitation &  Therapy  [address]  P: [phone]  F: [phone] [] [facility]  Outpatient Rehabilitation &  Therapy  [address]  [address]  P: [phone]  F: [phone] [] [facility]  Outpatient Rehabilitation &  Therapy  [address]  P: [phone]  F: [phone] [] [facility]  Outpatient Rehabilitation &  Therapy  [address]  P: [phone]  F: [phone]  [] [facility] Outpatient Rehabilitation &  Therapy  [address]  P: [phone]  F: [phone]     Therapy Cancel/No Show note    Date: 3/5/2024  Patient: Sudha CUADRA Serafindonato  : 1960  MRN: [id number]    Cancels/No Shows to date:     For today's appointment patient:    [x]  Cancelled    [] Rescheduled appointment    [] No-show     Reason given by patient:    []  Patient ill    []  Conflicting appointment    [] No transportation      [] Conflict with work    [] No reason given    [] Weather related    [] COVID-19    [x] Other:      Comments: Cx'd her evaluation on 3/5/23 due to patient stating she is busy with her mom who has kidney disease. Plans to call back to reschedule.         [] Next appointment was

## 2024-04-15 ENCOUNTER — OFFICE VISIT (OUTPATIENT)
Dept: FAMILY MEDICINE CLINIC | Age: 64
End: 2024-04-15

## 2024-04-15 ENCOUNTER — HOSPITAL ENCOUNTER (OUTPATIENT)
Dept: PHYSICAL THERAPY | Facility: CLINIC | Age: 64
Setting detail: THERAPIES SERIES
Discharge: HOME | End: 2024-04-15
Attending: ORTHOPAEDIC SURGERY
Payer: MEDICARE

## 2024-04-15 VITALS
WEIGHT: 165.4 LBS | DIASTOLIC BLOOD PRESSURE: 84 MMHG | RESPIRATION RATE: 18 BRPM | SYSTOLIC BLOOD PRESSURE: 124 MMHG | HEART RATE: 85 BPM | BODY MASS INDEX: 32.3 KG/M2 | TEMPERATURE: 96.9 F | OXYGEN SATURATION: 97 %

## 2024-04-15 DIAGNOSIS — F51.04 PSYCHOPHYSIOLOGICAL INSOMNIA: ICD-10-CM

## 2024-04-15 DIAGNOSIS — F33.0 DEPRESSION, MAJOR, RECURRENT, MILD (HCC): Chronic | ICD-10-CM

## 2024-04-15 DIAGNOSIS — M81.0 OSTEOPOROSIS, UNSPECIFIED OSTEOPOROSIS TYPE, UNSPECIFIED PATHOLOGICAL FRACTURE PRESENCE: ICD-10-CM

## 2024-04-15 DIAGNOSIS — E53.8 B12 DEFICIENCY: ICD-10-CM

## 2024-04-15 DIAGNOSIS — Z12.11 SCREENING FOR COLORECTAL CANCER: ICD-10-CM

## 2024-04-15 DIAGNOSIS — E55.9 VITAMIN D DEFICIENCY: ICD-10-CM

## 2024-04-15 DIAGNOSIS — E78.2 MIXED HYPERLIPIDEMIA: ICD-10-CM

## 2024-04-15 DIAGNOSIS — Z12.12 SCREENING FOR COLORECTAL CANCER: ICD-10-CM

## 2024-04-15 DIAGNOSIS — F41.1 GAD (GENERALIZED ANXIETY DISORDER): Primary | ICD-10-CM

## 2024-04-15 DIAGNOSIS — G47.19 EXCESSIVE DAYTIME SLEEPINESS: ICD-10-CM

## 2024-04-15 DIAGNOSIS — Z23 NEED FOR VACCINATION: ICD-10-CM

## 2024-04-15 DIAGNOSIS — Z13.31 SCREENING FOR DEPRESSION: ICD-10-CM

## 2024-04-15 DIAGNOSIS — I10 ESSENTIAL HYPERTENSION: Chronic | ICD-10-CM

## 2024-04-15 PROCEDURE — 97161 PT EVAL LOW COMPLEX 20 MIN: CPT

## 2024-04-15 PROCEDURE — 97110 THERAPEUTIC EXERCISES: CPT

## 2024-04-15 RX ORDER — ERGOCALCIFEROL 1.25 MG/1
50000 CAPSULE ORAL WEEKLY
Qty: 12 CAPSULE | Refills: 3 | Status: SHIPPED | OUTPATIENT
Start: 2024-04-15

## 2024-04-15 RX ORDER — ALENDRONATE SODIUM 70 MG/1
70 TABLET ORAL
Qty: 12 TABLET | Refills: 3 | Status: SHIPPED | OUTPATIENT
Start: 2024-04-15 | End: 2025-03-17

## 2024-04-15 RX ORDER — QUETIAPINE FUMARATE 25 MG/1
TABLET, FILM COATED ORAL
Qty: 60 TABLET | Refills: 2 | Status: SHIPPED | OUTPATIENT
Start: 2024-04-15

## 2024-04-15 RX ORDER — ALPRAZOLAM 1 MG/1
1 TABLET ORAL 2 TIMES DAILY PRN
Qty: 60 TABLET | Refills: 0 | Status: SHIPPED | OUTPATIENT
Start: 2024-04-15 | End: 2024-05-15

## 2024-04-15 ASSESSMENT — PATIENT HEALTH QUESTIONNAIRE - PHQ9
SUM OF ALL RESPONSES TO PHQ9 QUESTIONS 1 & 2: 3
4. FEELING TIRED OR HAVING LITTLE ENERGY: NEARLY EVERY DAY
6. FEELING BAD ABOUT YOURSELF - OR THAT YOU ARE A FAILURE OR HAVE LET YOURSELF OR YOUR FAMILY DOWN: SEVERAL DAYS
10. IF YOU CHECKED OFF ANY PROBLEMS, HOW DIFFICULT HAVE THESE PROBLEMS MADE IT FOR YOU TO DO YOUR WORK, TAKE CARE OF THINGS AT HOME, OR GET ALONG WITH OTHER PEOPLE: SOMEWHAT DIFFICULT
2. FEELING DOWN, DEPRESSED OR HOPELESS: SEVERAL DAYS
7. TROUBLE CONCENTRATING ON THINGS, SUCH AS READING THE NEWSPAPER OR WATCHING TELEVISION: SEVERAL DAYS
SUM OF ALL RESPONSES TO PHQ QUESTIONS 1-9: 10
SUM OF ALL RESPONSES TO PHQ QUESTIONS 1-9: 10
8. MOVING OR SPEAKING SO SLOWLY THAT OTHER PEOPLE COULD HAVE NOTICED. OR THE OPPOSITE, BEING SO FIGETY OR RESTLESS THAT YOU HAVE BEEN MOVING AROUND A LOT MORE THAN USUAL: NOT AT ALL
9. THOUGHTS THAT YOU WOULD BE BETTER OFF DEAD, OR OF HURTING YOURSELF: NOT AT ALL
1. LITTLE INTEREST OR PLEASURE IN DOING THINGS: MORE THAN HALF THE DAYS
3. TROUBLE FALLING OR STAYING ASLEEP: MORE THAN HALF THE DAYS
5. POOR APPETITE OR OVEREATING: NOT AT ALL
SUM OF ALL RESPONSES TO PHQ QUESTIONS 1-9: 10
SUM OF ALL RESPONSES TO PHQ QUESTIONS 1-9: 10

## 2024-04-15 NOTE — PROGRESS NOTES
[facility]   [address] [address]  [phone]    4/15/24     Patient ID  Sudha Aguilera is a 63 y.o. female  Established patient    Chief Complaint  Sudha Aguilera presents today for Hypertension, Depression, Arthritis, and Pain    Have you seen any other physician or provider since your last visit? Yes - Records Obtained Ortho, PT- Scheduled 2-3x weekly   Have you had any other diagnostic tests since your last visit? Yes - Records Obtained  Have you been seen in the emergency room and/or had an admission to a hospital since we last saw you? No     ASSESSMENT/PLAN  1. SHUN (generalized anxiety disorder)  -     ALPRAZolam (XANAX) 1 MG tablet; Take 1 tablet by mouth 2 times daily as needed for Anxiety for up to 30 days. Max Daily Amount: 2 mg, Disp-60 tablet, R-0Normal  -     T4, Free; Future  -     TSH; Future  2. Osteoporosis, unspecified osteoporosis type, unspecified pathological fracture presence  -     alendronate (FOSAMAX) 70 MG tablet; Take 1 tablet by mouth every 7 days, Disp-12 tablet, R-3Normal  3. Vitamin D deficiency  -     vitamin D (ERGOCALCIFEROL) 1.25 MG (86303 UT) CAPS capsule; Take 1 capsule by mouth once a week, Disp-12 capsule, R-3Normal  -     PTH, Intact; Future  -     Vitamin D 25 Hydroxy; Future  -     Phosphorus; Future  4. Essential hypertension  -     CBC; Future  -     Comprehensive Metabolic Panel, Fasting; Future  -     Magnesium; Future  -     PTH, Intact; Future  5. B12 deficiency  -     Vitamin B12 & Folate; Future  6. Mixed hyperlipidemia  -     Lipid Panel; Future  7. Depression, major, recurrent, mild (HCC)  -     QUEtiapine (SEROQUEL) 25 MG tablet; Take 1-2 tabs HS as needed for sleep, Disp-60 tablet, R-2Normal  8. Psychophysiological insomnia  -     QUEtiapine (SEROQUEL) 25 MG tablet; Take 1-2 tabs HS as needed for sleep, Disp-60 tablet, R-2Normal  9. Excessive daytime sleepiness  -     AFL - Mauri Pierson MD,

## 2024-04-15 NOTE — CONSULTS
[x] [facility]  Outpatient Rehabilitation &  Therapy  [address]   P: [phone]  F: [phone]      Physical Therapy Upper Extremity Evaluation    Date:  4/15/2024  Patient: Sudha Aguilera   : 1960  MRN: [id number]  Physician: Dr. Francisco Rodriguez                                               Insurance: Medicare (vs [facility], follow medicare guidelines)  Medical Diagnosis: Status post arthroscopy of left shoulder (Z98.890)  Bilateral shoulder pain, unspecified chronicity (M25.511,M25.512)  Rehab Codes: M62.81, M25.512, M25.612, R20.2, M79.622, R29.3  Onset Date: 24 referral date                             Next 's appt: 23    Subjective:   CC/HPI: Patient is a 62 y/o female presents to PT clinic with complaints of left shoulder pain that has been going on since 2024. She reports that she remembers a specific motion where she was weight bearing on her arm and pivoted. She has some pain with reaching behind her (into horizontal abduction). Has hx of a arthroscopic rotator cuff repair in 2023. Completed formal therapy, but admits that she did not continue with her HEP as her  and her mother have both passed away in the last 6 months. She voices motivation to get back into a routine exercise program.     PMHx: [] Unremarkable [] Diabetes [] HTN  [] Pacemaker   [] MI/Heart Problems [] Cancer [] Arthritis [] Other:              [x] Refer to full medical chart  In EPIC     Past Medical History         Diagnosis Date Comments     Cervicalgia [M54.2]       Contusion of wrist [S60.219A]       Sprain of thoracic region [S23.9XXA]       GERD (gastroesophageal reflux disease) [K21.9]  Takes Tums and Prilosec PRN     Systemic lupus erythematosus (HCC) [M32.9]       Vitiligo [L80]       Depression [F32.A]       Myalgia and myositis [POQ2288]       Anxiety [F41.9]       Palpitations [R00.2]       Viremia [B34.9]       Entrapment of left ulnar

## 2024-04-17 ENCOUNTER — HOSPITAL ENCOUNTER (OUTPATIENT)
Dept: PHYSICAL THERAPY | Facility: CLINIC | Age: 64
Setting detail: THERAPIES SERIES
Discharge: HOME | End: 2024-04-17
Attending: ORTHOPAEDIC SURGERY
Payer: MEDICARE

## 2024-04-17 PROCEDURE — 97110 THERAPEUTIC EXERCISES: CPT

## 2024-04-17 NOTE — FLOWSHEET NOTE
Demonstrates/verbalizes HEP/Ed previously given.     Plan: [x] Continue current frequency toward long and short term goals.    [x] Specific Instructions for subsequent treatments: see above         Time In:3:00p            Time Out: 3:40p      Electronically signed by:  Giana García PT

## 2024-04-23 ENCOUNTER — HOSPITAL ENCOUNTER (OUTPATIENT)
Dept: PHYSICAL THERAPY | Facility: CLINIC | Age: 64
Setting detail: THERAPIES SERIES
Discharge: HOME | End: 2024-04-23
Attending: ORTHOPAEDIC SURGERY
Payer: MEDICARE

## 2024-04-23 PROCEDURE — 97110 THERAPEUTIC EXERCISES: CPT

## 2024-04-23 NOTE — FLOWSHEET NOTE
[x] [facility]  Outpatient Rehabilitation &  Therapy  [address]   P: [phone]  F: [phone]      Physical Therapy Daily Treatment Note    Date:  2024  Patient Name:  Sudha Aguilera    :  1960  MRN: [id number]  Physician: Dr. Francisco Rodriguez                                               Insurance: Medicare (vs [facility], follow medicare guidelines)  Medical Diagnosis: Status post arthroscopy of left shoulder (Z98.890)  Bilateral shoulder pain, unspecified chronicity (M25.511,M25.512)  Rehab Codes: M62.81, M25.512, M25.612, R20.2, M79.622, R29.3  Onset Date: 24 referral date                             Next 's appt: 23  Visit# / total visits: 3      Cancels/No Shows: 0    Subjective:    Pain:  [x] Yes  [] No Location: left shoulder  Pain Rating: (0-10 scale) 3/10  Pain altered Tx:  [x] No  [] Yes  Action:  Comments: Patient reports soreness from picking up something heavy.     Objective:  Modalities:   Precautions: Standard   Exercise    Reps/ Time Weight/ Level Comments              Pulleys   2'/2'  flex/abd              Corner Pectoral Stretch  3x30\"   At 90 only today    Wall slides with towel 10x10\" Flex/abd    Upper Trap Stretch   3x30\" seated     Levator Scap Stretch   3x30\" seated               TBand          Rows   x20 Green     Extension   x20 Green      ER   x20 Green     IR   x20 Green                Ball on wall  x20 Green ball All directions          Mat      Sidelying ER 2x10     Supine ABC's  x1 2#    SA punches  2x10 2#    Prone retractions  x10     Prone depressions 10x10\"           Other:      Specific Instructions for next treatment: Inferior GH glides within abduction or scaption ROM        Treatment Charges: Mins Units   []  Modalities     [x]  Ther Exercise 41 3   []  Manual Therapy     []  Ther Activities     []  Neuro Re-ed     []  Vasocompression     [] Gait     []  Other     Total Billable time 41 3

## 2024-04-25 ENCOUNTER — HOSPITAL ENCOUNTER (OUTPATIENT)
Dept: PHYSICAL THERAPY | Facility: CLINIC | Age: 64
Setting detail: THERAPIES SERIES
Discharge: HOME | End: 2024-04-25
Attending: ORTHOPAEDIC SURGERY
Payer: MEDICARE

## 2024-04-25 NOTE — CARE COORDINATION
[] [facility]  Outpatient Rehabilitation &  Therapy  [address]  [phone]  [phone] [] [facility]  Outpatient Rehabilitation &  Therapy  [address]  P: [phone]  F: [phone] [] [facility]  Outpatient Rehabilitation &  Therapy  [address]  P: [phone]  F: [phone] [] [facility]  Outpatient Rehabilitation &  Therapy  [address]  [phone]  [phone] [] [facility]  Outpatient Rehabilitation &  Therapy  [address]   P: [phone]  F: [phone]  [] [facility]  Outpatient Rehabilitation &  Therapy  [address]  P: [phone]  F: [phone] [] [facility]  Outpatient Rehabilitation &  Therapy  [address]  [address]  P: [phone]  F: [phone] [] [facility]  Outpatient Rehabilitation &  Therapy  [address]  P: [phone]  F: [phone] [] [facility]  Outpatient Rehabilitation &  Therapy  [address]  P: [phone]  F: [phone]  [] [facility] Outpatient Rehabilitation &  Therapy  [address]  P: [phone]  F: [phone]     Therapy Cancel/No Show note    Date: 2024  Patient: Sudha CUADRA Serafindonato  : 1960  MRN: [id number]    Cancels/No Shows to date:     For today's appointment patient:    [x]  Cancelled    [] Rescheduled appointment    [] No-show     Reason given by patient:    []  Patient ill    [x]  Conflicting appointment    [] No transportation      [] Conflict with work    [] No reason given    [] Weather related    [] COVID-19    [] Other:      Comments:        [x] Next appointment was confirmed    Electronically signed by: Danyell Taylor, PTA

## 2024-05-02 ENCOUNTER — HOSPITAL ENCOUNTER (OUTPATIENT)
Dept: PHYSICAL THERAPY | Facility: CLINIC | Age: 64
Setting detail: THERAPIES SERIES
Discharge: HOME | End: 2024-05-02
Attending: ORTHOPAEDIC SURGERY
Payer: MEDICARE

## 2024-05-02 PROCEDURE — 97110 THERAPEUTIC EXERCISES: CPT

## 2024-05-02 NOTE — FLOWSHEET NOTE
Assessment: [x] Progressing toward goals. Decreased resistance strength with UE exercises due to increased pain upon arrival. Will resume all exercises next visit.       [] No change.     [] Other:  [x] Patient would continue to benefit from skilled physical therapy services in order to:  improve non-painful ROM of her left shoulder, improve scapular strength, improve posture, and decrease her pain to ease ADL's including reaching overhead and donning her bra      STG/LTG   Goals  MET NOT MET ON-  GOING  Details   Date Addressed:            STG: To be met in 6 treatments            1. ? Pain: Decrease left shoulder pain levels to 3/10 with ADLs []  []  []      2. ? ROM: Increase left shoulder AROM limitations throughout to WFL without pain to reduce difficulty with ADLs []  []  []      3. Patient to demonstrate improved sitting posture, with minimal to no rounded shoulders  []  []  []      4. ? Strength: Increase UE MMT to 5/5 throughout to ease functional limitations and mobility  []  []  []      5. Independent with Home Exercise Programs []  []  []        []  []  []        []  []  []      Date Addressed:            LTG: To be met in 12 treatments           1. Improve score on assessment tool QuickDASH from 61% impairment to less than 45% impairment  []  []  []      2. Reduce left shoulder pain levels to 0/10 or less with ADL's []  []  []        []  []  []                        Patient goals: range of motion without pain     Pt. Education:  [x] Yes  [] No  [x] Reviewed Prior HEP/Ed  Method of Education: [x] Verbal  [x] Demo  [] Written    Access Code: [email or web address]  URL: [email or web address]  Date: 04/15/2024  Prepared by: Giana García     Exercises  - Standing shoulder flexion wall slides  - 1 x daily - 7 x weekly - 3 sets - 10 reps  - Seated Scapular Retraction  - 1 x daily - 7 x weekly - 3 sets - 10 reps - 5 sec hold  - Standing Backward Shoulder Rolls  - 1 x daily - 7 x weekly - 3 sets - 10

## 2024-05-06 ENCOUNTER — HOSPITAL ENCOUNTER (OUTPATIENT)
Age: 64
Setting detail: SPECIMEN
Discharge: HOME | End: 2024-05-06

## 2024-05-06 DIAGNOSIS — E78.2 MIXED HYPERLIPIDEMIA: ICD-10-CM

## 2024-05-06 DIAGNOSIS — I10 ESSENTIAL HYPERTENSION: Chronic | ICD-10-CM

## 2024-05-06 DIAGNOSIS — E55.9 VITAMIN D DEFICIENCY: ICD-10-CM

## 2024-05-06 DIAGNOSIS — F41.1 GAD (GENERALIZED ANXIETY DISORDER): ICD-10-CM

## 2024-05-06 DIAGNOSIS — E53.8 B12 DEFICIENCY: ICD-10-CM

## 2024-05-06 LAB
25(OH)D3 SERPL-MCNC: 43.3 NG/ML (ref 30–100)
ALBUMIN SERPL-MCNC: 4.3 G/DL (ref 3.5–5.2)
ALBUMIN/GLOB SERPL: 1 {RATIO} (ref 1–2.5)
ALP SERPL-CCNC: 79 U/L (ref 35–104)
ALT SERPL-CCNC: 12 U/L (ref 10–35)
ANION GAP SERPL CALCULATED.3IONS-SCNC: 13 MMOL/L (ref 9–16)
AST SERPL-CCNC: 18 U/L (ref 10–35)
BILIRUB SERPL-MCNC: 0.5 MG/DL (ref 0–1.2)
BUN SERPL-MCNC: 12 MG/DL (ref 8–23)
CALCIUM SERPL-MCNC: 9.2 MG/DL (ref 8.6–10.4)
CHLORIDE SERPL-SCNC: 102 MMOL/L (ref 98–107)
CHOLEST SERPL-MCNC: 232 MG/DL (ref 0–199)
CHOLESTEROL/HDL RATIO: 5
CO2 SERPL-SCNC: 24 MMOL/L (ref 20–31)
CREAT SERPL-MCNC: 1.1 MG/DL (ref 0.5–0.9)
ERYTHROCYTE [DISTWIDTH] IN BLOOD BY AUTOMATED COUNT: 14.6 % (ref 11.8–14.4)
FOLATE SERPL-MCNC: 16.3 NG/ML (ref 4.8–24.2)
GFR, ESTIMATED: 55 ML/MIN/1.73M2
GLUCOSE P FAST SERPL-MCNC: 86 MG/DL (ref 74–99)
HCT VFR BLD AUTO: 41.3 % (ref 36.3–47.1)
HDLC SERPL-MCNC: 46 MG/DL
HGB BLD-MCNC: 12.9 G/DL (ref 11.9–15.1)
LDLC SERPL CALC-MCNC: 161 MG/DL (ref 0–100)
MAGNESIUM SERPL-MCNC: 2.5 MG/DL (ref 1.6–2.4)
MCH RBC QN AUTO: 30.9 PG (ref 25.2–33.5)
MCHC RBC AUTO-ENTMCNC: 31.2 G/DL (ref 28.4–34.8)
MCV RBC AUTO: 99 FL (ref 82.6–102.9)
NRBC BLD-RTO: 0 PER 100 WBC
PHOSPHATE SERPL-MCNC: 3.4 MG/DL (ref 2.5–4.5)
PLATELET # BLD AUTO: 321 K/UL (ref 138–453)
PMV BLD AUTO: 12.2 FL (ref 8.1–13.5)
POTASSIUM SERPL-SCNC: 4.2 MMOL/L (ref 3.7–5.3)
PROT SERPL-MCNC: 7.6 G/DL (ref 6.6–8.7)
PTH-INTACT SERPL-MCNC: 44 PG/ML (ref 15–65)
RBC # BLD AUTO: 4.17 M/UL (ref 3.95–5.11)
SODIUM SERPL-SCNC: 139 MMOL/L (ref 136–145)
T4 FREE SERPL-MCNC: 0.9 NG/DL (ref 0.92–1.68)
TRIGL SERPL-MCNC: 126 MG/DL
TSH SERPL DL<=0.05 MIU/L-ACNC: 2.16 UIU/ML (ref 0.27–4.2)
VIT B12 SERPL-MCNC: 337 PG/ML (ref 232–1245)
VLDLC SERPL CALC-MCNC: 25 MG/DL
WBC OTHER # BLD: 4.3 K/UL (ref 3.5–11.3)

## 2024-05-08 ENCOUNTER — HOSPITAL ENCOUNTER (OUTPATIENT)
Dept: PHYSICAL THERAPY | Facility: CLINIC | Age: 64
Setting detail: THERAPIES SERIES
Discharge: HOME | End: 2024-05-08
Attending: ORTHOPAEDIC SURGERY
Payer: MEDICARE

## 2024-05-08 PROCEDURE — 97110 THERAPEUTIC EXERCISES: CPT

## 2024-05-08 NOTE — FLOWSHEET NOTE
[x] [facility]  Outpatient Rehabilitation &  Therapy  [address]   P: [phone]  F: [phone]      Physical Therapy Daily Treatment Note    Date:  2024  Patient Name:  Sudha Aguilera    :  1960  MRN: [id number]  Physician: Dr. Francisco Rodriguez                                               Insurance: Medicare (vs [facility], follow medicare guidelines)  Medical Diagnosis: Status post arthroscopy of left shoulder (Z98.890)  Bilateral shoulder pain, unspecified chronicity (M25.511,M25.512)  Rehab Codes: M62.81, M25.512, M25.612, R20.2, M79.622, R29.3  Onset Date: 24 referral date                             Next 's appt: 23  Visit# / total visits:       Cancels/No Shows: 0    Subjective:    Pain:  [x] Yes  [] No Location: left shoulder  Pain Rating: (0-10 scale) 0/10  Pain altered Tx:  [x] No  [] Yes  Action:  Comments: Patient arrives stating no pain upon arrival but notices with certain things she does her L shoulder will crack and its painful. Reports her L shoulder cracks when she is pulling something or reaching.      Objective:  Modalities:   Precautions: Standard   Exercise    Reps/ Time Weight/ Level Comments             Pulleys   2'/2'  flex/abd              Corner Pectoral Stretch  3x30\" 90 deg    Wall slides with towel 10x10\" Flex/abd    Upper Trap Stretch   3x30\" seated     Levator Scap Stretch   3x30\" seated               Tband-all bilateral         Rows   x20 Green     Extension   x20 Green     ER   x20 Green     IR   x20 Green               Ball on wall  x20 Green ball All directions    Shoulder abduction  x15 1#    Shoulder scaption  x15 1#    Shoulder flexion  x15 1#          Mat      Sidelying ER 2x10     Supine ABC's  x1 2#    SA punches  2x10 2#          Other:      Specific Instructions for next treatment:        Treatment Charges: Mins Units   []  Modalities     [x]  Ther Exercise 38 3   []  Manual Therapy     []  Ther

## 2024-05-14 ENCOUNTER — HOSPITAL ENCOUNTER (OUTPATIENT)
Dept: PHYSICAL THERAPY | Facility: CLINIC | Age: 64
Setting detail: THERAPIES SERIES
Discharge: HOME | End: 2024-05-14
Attending: ORTHOPAEDIC SURGERY
Payer: MEDICARE

## 2024-05-14 PROCEDURE — 97110 THERAPEUTIC EXERCISES: CPT

## 2024-05-14 NOTE — FLOWSHEET NOTE
[x] [facility]  Outpatient Rehabilitation &  Therapy  [address]   P: [phone]  F: [phone]      Physical Therapy Daily Treatment Note    Date:  2024  Patient Name:  Sudha Aguilera    :  1960  MRN: [id number]  Physician: Dr. Francisco Rodriguez                                               Insurance: Medicare (vs [facility], follow medicare guidelines)  Medical Diagnosis: Status post arthroscopy of left shoulder (Z98.890)  Bilateral shoulder pain, unspecified chronicity (M25.511,M25.512)  Rehab Codes: M62.81, M25.512, M25.612, R20.2, M79.622, R29.3  Onset Date: 24 referral date                             Next 's appt: 23  Visit# / total visits:       Cancels/No Shows:     Subjective:    Pain:  [x] Yes  [] No Location: left shoulder  Pain Rating: (0-10 scale) not rated/10  Pain altered Tx:  [x] No  [] Yes  Action:  Comments: Patient arrived reporting no shoulder pain today, but is feeling pain in her back.     Objective:  Modalities:   Precautions: Standard   Exercise    Reps/ Time Weight/ Level Comments             Pulleys   2'/2'  flex/abd              Corner Pectoral Stretch  3x30\" 90 deg    Wall slides with towel 10x10\" Flex/abd    Upper Trap Stretch   3x30\" seated     Levator Scap Stretch   3x30\" seated               Tband-all bilateral         Rows   x20 Green     Extension   x20 Green     ER   3x10 Green     IR   3x10 Green           Rhythmic Stabilization 3x20\" Red Pball Single arm on the wall    Shoulder abduction   x15 Orange    Shoulder scaption   1#    Shoulder flexion   1#    Wall Push Up+   x15     Serratus Punches  x15 Blue Standing with band around the back                Mat      Sidelying ER      Supine ABC's   2#    SA punches   2#          Other:      Specific Instructions for next treatment: ER strengthening       Treatment Charges: Mins Units   []  Modalities     [x]  Ther Exercise 39 3   []  Manual Therapy     []  Ther

## 2024-05-16 ENCOUNTER — HOSPITAL ENCOUNTER (OUTPATIENT)
Dept: PHYSICAL THERAPY | Facility: CLINIC | Age: 64
Setting detail: THERAPIES SERIES
Discharge: HOME | End: 2024-05-16
Attending: ORTHOPAEDIC SURGERY
Payer: MEDICARE

## 2024-05-16 NOTE — CARE COORDINATION
[x] [facility]  Outpatient Rehabilitation &  Therapy  [address]   P: [phone]  F: [phone]      Therapy Cancel/No Show note    Date: 2024  Patient: Sudha Aguilera  : 1960  MRN: [id number]    Cancels/No Shows to date:     For today's appointment patient:    [x]  Cancelled    [] Rescheduled appointment    [] No-show     Reason given by patient:    [x]  Patient ill- sinus infection     []  Conflicting appointment    [] No transportation      [] Conflict with work    [] No reason given    [] Weather related    [] COVID-19    [] Other:      Comments:        [x] Next appointment was confirmed    Electronically signed by: Danyell Taylor PTA

## 2024-05-22 ENCOUNTER — HOSPITAL ENCOUNTER (OUTPATIENT)
Dept: PHYSICAL THERAPY | Facility: CLINIC | Age: 64
Setting detail: THERAPIES SERIES
Discharge: HOME | End: 2024-05-22
Attending: ORTHOPAEDIC SURGERY
Payer: MEDICARE

## 2024-05-22 NOTE — CARE COORDINATION
[x] [facility]  Outpatient Rehabilitation &  Therapy  [address]   P: [phone]  F: [phone]      Therapy Cancel/No Show note    Date: 2024  Patient: Sudha Aguilera  : 1960  MRN: [id number]    Cancels/No Shows to date:     For today's appointment patient:    [x]  Cancelled    [] Rescheduled appointment    [] No-show     Reason given by patient:    []  Patient ill    []  Conflicting appointment    [] No transportation      [] Conflict with work    [] No reason given    [] Weather related    [] COVID-19    [x] Other:      Comments:  Patient notes she \"mentally wasn't there\".       [x] Next appointment was confirmed    Electronically signed by: ADALID CHAPMAN PTA

## 2024-05-28 LAB — NONINV COLON CA DNA+OCC BLD SCRN STL QL: NEGATIVE

## 2024-06-02 DIAGNOSIS — E78.2 MIXED HYPERLIPIDEMIA: Primary | ICD-10-CM

## 2024-06-02 RX ORDER — ATORVASTATIN CALCIUM 10 MG/1
10 TABLET, FILM COATED ORAL DAILY
Qty: 30 TABLET | Refills: 3 | Status: SHIPPED | OUTPATIENT
Start: 2024-06-02 | End: 2025-06-02

## 2024-06-12 NOTE — TELEPHONE ENCOUNTER
LOV 10/28/22  RTO 2 months; F/U scheduled  LRF 11/15/22  CSM 10/7/21    Health Maintenance   Topic Date Due    Flu vaccine (1) Never done    Shingles vaccine (1 of 2) 04/18/2023 (Originally 6/2/2008)    COVID-19 Vaccine (1) 04/14/2025 (Originally 1960)    Depression Monitoring  02/09/2023    Colorectal Cancer Screen  04/26/2023    Breast cancer screen  09/16/2023    Cervical cancer screen  10/28/2025    Lipids  02/09/2027    DTaP/Tdap/Td vaccine (2 - Td or Tdap) 07/22/2030    Hepatitis C screen  Completed    HIV screen  Completed    Hepatitis A vaccine  Aged Out    Hib vaccine  Aged Out    Meningococcal (ACWY) vaccine  Aged Out    Pneumococcal 0-64 years Vaccine  Aged Out             (applicable per patient's age: Cancer Screenings, Depression Screening, Fall Risk Screening, Immunizations)    LDL Cholesterol (mg/dL)   Date Value   05/24/2021 139 (H)     LDL Calculated (mg/dL)   Date Value   02/09/2022 144 (H)     AST (U/L)   Date Value   02/09/2022 16     ALT (U/L)   Date Value   02/09/2022 19     BUN (mg/dL)   Date Value   02/09/2022 11      (goal A1C is < 7)   (goal LDL is <100) need 30-50% reduction from baseline     BP Readings from Last 3 Encounters:   10/28/22 108/80   08/16/22 112/84   05/16/22 112/88    (goal /80)      All Future Testing planned in [facility]:  Lab Frequency Next Occurrence   XR HIP LEFT (2-3 VIEWS) Once 04/18/2022   VL DUP CAROTID BILATERAL Once 08/16/2022   PAP Smear Once 10/28/2022   ROBERTH DIGITAL SCREEN W OR WO CAD BILATERAL Once 10/28/2022       Next Visit Date:  Future Appointments   Date Time Provider Anna Chu   1/10/2023 12:30 PM Jeanine Maurer, APRN - CNP [id number]            Patient Active Problem List:     Essential hypertension     Depression, major, recurrent, mild (HCC)     Hematuria     Collagenous colitis     Lupus ([facility] [address]     SHUN (generalized anxiety disorder)     Chronic pain of both knees     Panic attack due to exceptional stress     Osteoporosis
ambulatory

## 2024-06-13 DIAGNOSIS — M54.2 MUSCLE PAIN, CERVICAL: ICD-10-CM

## 2024-06-13 DIAGNOSIS — F33.0 DEPRESSION, MAJOR, RECURRENT, MILD (HCC): ICD-10-CM

## 2024-06-13 RX ORDER — PAROXETINE HYDROCHLORIDE 40 MG/1
TABLET, FILM COATED ORAL
Qty: 90 TABLET | Refills: 1 | Status: SHIPPED | OUTPATIENT
Start: 2024-06-13 | End: 2025-06-14

## 2024-06-13 RX ORDER — MELOXICAM 15 MG/1
15 TABLET ORAL DAILY
Qty: 90 TABLET | Refills: 1 | Status: SHIPPED | OUTPATIENT
Start: 2024-06-13

## 2024-06-14 ENCOUNTER — HOSPITAL ENCOUNTER (OUTPATIENT)
Dept: GENERAL RADIOLOGY | Facility: CLINIC | Age: 64
End: 2024-06-14
Payer: MEDICARE

## 2024-06-14 DIAGNOSIS — M25.552 LEFT HIP PAIN: ICD-10-CM

## 2024-06-14 PROCEDURE — 72100 X-RAY EXAM L-S SPINE 2/3 VWS: CPT

## 2024-06-14 PROCEDURE — 72070 X-RAY EXAM THORAC SPINE 2VWS: CPT

## 2024-06-14 NOTE — TELEPHONE ENCOUNTER
LOV 6-20-23   RTO   LRF 6-20-23          Controlled Substance Monitoring:    Acute and Chronic Pain Monitoring:   RX Monitoring Periodic Controlled Substance Monitoring   12/11/2019   1:08 PM No signs of potential drug abuse or diversion identified.

## 2024-07-11 ENCOUNTER — HOSPITAL ENCOUNTER (OUTPATIENT)
Dept: MAMMOGRAPHY | Age: 64
Discharge: HOME | End: 2024-07-13
Payer: MEDICARE

## 2024-07-11 VITALS — HEIGHT: 60 IN | WEIGHT: 160 LBS | BODY MASS INDEX: 31.41 KG/M2

## 2024-07-11 DIAGNOSIS — Z12.31 SCREENING MAMMOGRAM FOR BREAST CANCER: ICD-10-CM

## 2024-07-11 PROCEDURE — 77063 BREAST TOMOSYNTHESIS BI: CPT

## 2024-07-16 ENCOUNTER — HOSPITAL ENCOUNTER (OUTPATIENT)
Age: 64
Setting detail: SPECIMEN
Discharge: HOME | End: 2024-07-16

## 2024-07-16 DIAGNOSIS — Z98.890 STATUS POST ARTHROSCOPY OF LEFT SHOULDER: Primary | ICD-10-CM

## 2024-07-16 LAB
FERRITIN SERPL-MCNC: 44 NG/ML (ref 13–150)
IRON SATN MFR SERPL: 19 % (ref 20–55)
IRON SERPL-MCNC: 59 UG/DL (ref 37–145)
TIBC SERPL-MCNC: 314 UG/DL (ref 250–450)
UNSATURATED IRON BINDING CAPACITY: 255 UG/DL (ref 112–347)

## 2024-07-17 ENCOUNTER — OFFICE VISIT (OUTPATIENT)
Dept: ORTHOPEDIC SURGERY | Age: 64
End: 2024-07-17
Payer: MEDICARE

## 2024-07-17 VITALS — WEIGHT: 162 LBS | OXYGEN SATURATION: 97 % | HEIGHT: 60 IN | RESPIRATION RATE: 17 BRPM | BODY MASS INDEX: 31.8 KG/M2

## 2024-07-17 DIAGNOSIS — S46.012A TRAUMATIC COMPLETE TEAR OF LEFT ROTATOR CUFF, INITIAL ENCOUNTER: ICD-10-CM

## 2024-07-17 DIAGNOSIS — M75.102 ROTATOR CUFF SYNDROME OF LEFT SHOULDER: ICD-10-CM

## 2024-07-17 DIAGNOSIS — Z98.890 STATUS POST ARTHROSCOPY OF LEFT SHOULDER: Primary | ICD-10-CM

## 2024-07-17 DIAGNOSIS — M54.12 CERVICAL RADICULOPATHY: ICD-10-CM

## 2024-07-17 PROCEDURE — 1036F TOBACCO NON-USER: CPT | Performed by: ORTHOPAEDIC SURGERY

## 2024-07-17 PROCEDURE — G8399 PT W/DXA RESULTS DOCUMENT: HCPCS | Performed by: ORTHOPAEDIC SURGERY

## 2024-07-17 PROCEDURE — G8417 CALC BMI ABV UP PARAM F/U: HCPCS | Performed by: ORTHOPAEDIC SURGERY

## 2024-07-17 PROCEDURE — 3017F COLORECTAL CA SCREEN DOC REV: CPT | Performed by: ORTHOPAEDIC SURGERY

## 2024-07-17 PROCEDURE — 1090F PRES/ABSN URINE INCON ASSESS: CPT | Performed by: ORTHOPAEDIC SURGERY

## 2024-07-17 PROCEDURE — G8427 DOCREV CUR MEDS BY ELIG CLIN: HCPCS | Performed by: ORTHOPAEDIC SURGERY

## 2024-07-17 PROCEDURE — 1123F ACP DISCUSS/DSCN MKR DOCD: CPT | Performed by: ORTHOPAEDIC SURGERY

## 2024-07-17 PROCEDURE — 99213 OFFICE O/P EST LOW 20 MIN: CPT | Performed by: ORTHOPAEDIC SURGERY

## 2024-07-17 RX ORDER — GABAPENTIN 300 MG/1
300 CAPSULE ORAL NIGHTLY
COMMUNITY
Start: 2024-06-18

## 2024-07-17 ASSESSMENT — ENCOUNTER SYMPTOMS
EYE DISCHARGE: 0
SHORTNESS OF BREATH: 0
ABDOMINAL PAIN: 0
ROS SKIN COMMENTS: NEGATIVE FOR RASH
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
## 2024-07-18 ENCOUNTER — OFFICE VISIT (OUTPATIENT)
Dept: FAMILY MEDICINE CLINIC | Age: 64
End: 2024-07-18
Payer: MEDICARE

## 2024-07-18 ENCOUNTER — HOSPITAL ENCOUNTER (OUTPATIENT)
Age: 64
Setting detail: SPECIMEN
Discharge: HOME | End: 2024-07-18

## 2024-07-18 VITALS
HEIGHT: 60 IN | DIASTOLIC BLOOD PRESSURE: 82 MMHG | TEMPERATURE: 97.8 F | SYSTOLIC BLOOD PRESSURE: 120 MMHG | OXYGEN SATURATION: 98 % | WEIGHT: 165.2 LBS | BODY MASS INDEX: 32.43 KG/M2

## 2024-07-18 DIAGNOSIS — G25.81 RESTLESS LEG SYNDROME: ICD-10-CM

## 2024-07-18 DIAGNOSIS — G89.29 CHRONIC LEFT SHOULDER PAIN: ICD-10-CM

## 2024-07-18 DIAGNOSIS — F33.0 DEPRESSION, MAJOR, RECURRENT, MILD (HCC): Chronic | ICD-10-CM

## 2024-07-18 DIAGNOSIS — F41.1 GAD (GENERALIZED ANXIETY DISORDER): ICD-10-CM

## 2024-07-18 DIAGNOSIS — Z51.81 ENCOUNTER FOR THERAPEUTIC DRUG LEVEL MONITORING: ICD-10-CM

## 2024-07-18 DIAGNOSIS — Z23 NEED FOR SHINGLES VACCINE: ICD-10-CM

## 2024-07-18 DIAGNOSIS — I10 ESSENTIAL HYPERTENSION: Chronic | ICD-10-CM

## 2024-07-18 DIAGNOSIS — M25.512 CHRONIC LEFT SHOULDER PAIN: ICD-10-CM

## 2024-07-18 DIAGNOSIS — Z13.31 SCREENING FOR DEPRESSION: ICD-10-CM

## 2024-07-18 DIAGNOSIS — M32.9 LUPUS (HCC): ICD-10-CM

## 2024-07-18 DIAGNOSIS — Z00.00 INITIAL MEDICARE ANNUAL WELLNESS VISIT: Primary | ICD-10-CM

## 2024-07-18 DIAGNOSIS — M81.0 OSTEOPOROSIS, UNSPECIFIED OSTEOPOROSIS TYPE, UNSPECIFIED PATHOLOGICAL FRACTURE PRESENCE: ICD-10-CM

## 2024-07-18 LAB
ALBUMIN SERPL-MCNC: 4.3 G/DL (ref 3.5–5.2)
ALBUMIN/GLOB SERPL: 1 {RATIO} (ref 1–2.5)
ALCOHOL URINE: NORMAL
ALP SERPL-CCNC: 77 U/L (ref 35–104)
ALT SERPL-CCNC: 13 U/L (ref 10–35)
AMPHETAMINE SCREEN URINE: NORMAL
AST SERPL-CCNC: 22 U/L (ref 10–35)
BARBITURATE SCREEN URINE: NEGATIVE
BENZODIAZEPINE SCREEN, URINE: NEGATIVE
BILIRUB DIRECT SERPL-MCNC: <0.2 MG/DL (ref 0–0.2)
BILIRUB INDIRECT SERPL-MCNC: NORMAL MG/DL (ref 0–1)
BILIRUB SERPL-MCNC: 0.4 MG/DL (ref 0–1.2)
BUPRENORPHINE URINE: NORMAL
CHOLEST SERPL-MCNC: 161 MG/DL (ref 0–199)
CHOLESTEROL/HDL RATIO: 3
COCAINE METABOLITE SCREEN URINE: NEGATIVE
FENTANYL SCREEN, URINE: NORMAL
GABAPENTIN SCREEN, URINE: NORMAL
GLOBULIN SER CALC-MCNC: 2.9 G/DL
HDLC SERPL-MCNC: 48 MG/DL
LDLC SERPL CALC-MCNC: 95 MG/DL (ref 0–100)
MDMA URINE: NEGATIVE
METHADONE SCREEN, URINE: NEGATIVE
METHAMPHETAMINE, URINE: NEGATIVE
OPIATE SCREEN URINE: NEGATIVE
OXYCODONE SCREEN URINE: NEGATIVE
PHENCYCLIDINE SCREEN URINE: NEGATIVE
PROPOXYPHENE SCREEN, URINE: NORMAL
PROT SERPL-MCNC: 7.2 G/DL (ref 6.6–8.7)
SYNTHETIC CANNABINOIDS(K2) SCREEN, URINE: NORMAL
THC SCREEN, URINE: NEGATIVE
TRAMADOL SCREEN URINE: NORMAL
TRICYCLIC ANTIDEPRESSANTS, UR: NEGATIVE
TRIGL SERPL-MCNC: 91 MG/DL (ref 0–149)
VLDLC SERPL CALC-MCNC: 18 MG/DL

## 2024-07-18 PROCEDURE — G0439 PPPS, SUBSEQ VISIT: HCPCS | Performed by: NURSE PRACTITIONER

## 2024-07-18 PROCEDURE — 3017F COLORECTAL CA SCREEN DOC REV: CPT | Performed by: NURSE PRACTITIONER

## 2024-07-18 PROCEDURE — 90750 HZV VACC RECOMBINANT IM: CPT | Performed by: NURSE PRACTITIONER

## 2024-07-18 PROCEDURE — 3079F DIAST BP 80-89 MM HG: CPT | Performed by: NURSE PRACTITIONER

## 2024-07-18 PROCEDURE — 90471 IMMUNIZATION ADMIN: CPT | Performed by: NURSE PRACTITIONER

## 2024-07-18 PROCEDURE — 1090F PRES/ABSN URINE INCON ASSESS: CPT | Performed by: NURSE PRACTITIONER

## 2024-07-18 PROCEDURE — 1123F ACP DISCUSS/DSCN MKR DOCD: CPT | Performed by: NURSE PRACTITIONER

## 2024-07-18 PROCEDURE — 99214 OFFICE O/P EST MOD 30 MIN: CPT | Performed by: NURSE PRACTITIONER

## 2024-07-18 PROCEDURE — 1036F TOBACCO NON-USER: CPT | Performed by: NURSE PRACTITIONER

## 2024-07-18 PROCEDURE — G8399 PT W/DXA RESULTS DOCUMENT: HCPCS | Performed by: NURSE PRACTITIONER

## 2024-07-18 PROCEDURE — G8427 DOCREV CUR MEDS BY ELIG CLIN: HCPCS | Performed by: NURSE PRACTITIONER

## 2024-07-18 PROCEDURE — 80305 DRUG TEST PRSMV DIR OPT OBS: CPT | Performed by: NURSE PRACTITIONER

## 2024-07-18 PROCEDURE — G8417 CALC BMI ABV UP PARAM F/U: HCPCS | Performed by: NURSE PRACTITIONER

## 2024-07-18 PROCEDURE — 3074F SYST BP LT 130 MM HG: CPT | Performed by: NURSE PRACTITIONER

## 2024-07-18 RX ORDER — ALPRAZOLAM 1 MG/1
1 TABLET ORAL 2 TIMES DAILY PRN
Qty: 60 TABLET | Refills: 0 | Status: SHIPPED | OUTPATIENT
Start: 2024-07-18 | End: 2024-08-17

## 2024-07-18 RX ORDER — CYCLOBENZAPRINE HCL 10 MG
10 TABLET ORAL 3 TIMES DAILY PRN
Qty: 90 TABLET | Refills: 0 | Status: CANCELLED | OUTPATIENT
Start: 2024-07-18

## 2024-07-18 ASSESSMENT — PATIENT HEALTH QUESTIONNAIRE - PHQ9
5. POOR APPETITE OR OVEREATING: NOT AT ALL
6. FEELING BAD ABOUT YOURSELF - OR THAT YOU ARE A FAILURE OR HAVE LET YOURSELF OR YOUR FAMILY DOWN: SEVERAL DAYS
4. FEELING TIRED OR HAVING LITTLE ENERGY: NEARLY EVERY DAY
1. LITTLE INTEREST OR PLEASURE IN DOING THINGS: MORE THAN HALF THE DAYS
SUM OF ALL RESPONSES TO PHQ QUESTIONS 1-9: 9
7. TROUBLE CONCENTRATING ON THINGS, SUCH AS READING THE NEWSPAPER OR WATCHING TELEVISION: NOT AT ALL
2. FEELING DOWN, DEPRESSED OR HOPELESS: SEVERAL DAYS
SUM OF ALL RESPONSES TO PHQ QUESTIONS 1-9: 9
3. TROUBLE FALLING OR STAYING ASLEEP: MORE THAN HALF THE DAYS
SUM OF ALL RESPONSES TO PHQ9 QUESTIONS 1 & 2: 3
8. MOVING OR SPEAKING SO SLOWLY THAT OTHER PEOPLE COULD HAVE NOTICED. OR THE OPPOSITE, BEING SO FIGETY OR RESTLESS THAT YOU HAVE BEEN MOVING AROUND A LOT MORE THAN USUAL: NOT AT ALL
SUM OF ALL RESPONSES TO PHQ QUESTIONS 1-9: 9
9. THOUGHTS THAT YOU WOULD BE BETTER OFF DEAD, OR OF HURTING YOURSELF: NOT AT ALL
10. IF YOU CHECKED OFF ANY PROBLEMS, HOW DIFFICULT HAVE THESE PROBLEMS MADE IT FOR YOU TO DO YOUR WORK, TAKE CARE OF THINGS AT HOME, OR GET ALONG WITH OTHER PEOPLE: SOMEWHAT DIFFICULT
SUM OF ALL RESPONSES TO PHQ QUESTIONS 1-9: 9

## 2024-07-18 ASSESSMENT — LIFESTYLE VARIABLES
HOW MANY STANDARD DRINKS CONTAINING ALCOHOL DO YOU HAVE ON A TYPICAL DAY: 1 OR 2
HOW OFTEN DO YOU HAVE A DRINK CONTAINING ALCOHOL: MONTHLY OR LESS

## 2024-07-18 NOTE — PROGRESS NOTES
Medicare Annual Wellness Visit    Sudha Aguilera is here for Medicare AWV, Anxiety, Hypertension, Lower Back Pain, and Spasms (Onset 2 weeks )    Assessment & Plan   Initial Medicare annual wellness visit  Chronic left shoulder pain  SHUN (generalized anxiety disorder)  -     ALPRAZolam (XANAX) 1 MG tablet; Take 1 tablet by mouth 2 times daily as needed for Anxiety for up to 30 days. Max Daily Amount: 2 mg, Disp-60 tablet, R-0Normal  Lupus (HCC)  Essential hypertension  Osteoporosis, unspecified osteoporosis type, unspecified pathological fracture presence  Restless leg syndrome  Depression, major, recurrent, mild (HCC)  Assessment & Plan:   Well-controlled, continue current medications and continue current treatment plan  Encounter for therapeutic drug level monitoring  -     POCT Rapid Drug Screen  Need for shingles vaccine  -     Zoster, SHINGRIX, (18 yrs +), IM  Screening for depression    Recommendations for Preventive Services Due: see orders and patient instructions/AVS.  Recommended screening schedule for the next 5-10 years is provided to the patient in written form: see Patient Instructions/AVS.     Return in about 3 months (around 10/18/2024).     Subjective   The following acute and/or chronic problems were also addressed today:  See additional OV note.     Patient's complete Health Risk Assessment and screening values have been reviewed and are found in Flowsheets. The following problems were reviewed today and where indicated follow up appointments were made and/or referrals ordered.    Positive Risk Factor Screenings with Interventions:        Depression:  PHQ-2 Score: 3  PHQ-9 Total Score: 9  Total Score Interpretation: 5-9 = mild depression  Interventions:  Patient declines any further evaluation or treatment  Discussed pharmacology changes and            General HRA Questions:  Select all that apply: (!) New or Increased Pain, Loneliness      Interventions - Pain:  Patient comments: 
as Consulting Physician (Internal Medicine)  Francisco Rodriguez,  as Consulting Physician (Orthopedic Surgery)    SUBJECTIVE/OBJECTIVE  History of Present illness / Visit Summary   Patient presents today for follow up   Reviewed health maintenance and any recent labs/imaging      Since last OV has seen sleep medicine   Ordered labs and sleep study   Encouraged to stop Seroquel and Xanax    7/17/2024 ortho -   Assessment & Plan  The patient essentially achieves maximal medical improvement with her shoulder status postarthroscopy and rotator cuff repair.  At this point it would be advantageous to have a functional capacity evaluation to see if there is any chronic limitations felt necessary by a third-party independent examiner and I plan to see the patient back after the functional capacity evaluation is complete.  The patient is going to continue a home exercise program    Review of Systems  Review of Systems   Constitutional:  Negative for activity change, appetite change, chills, fatigue and fever.   HENT:          Routine dental cleanings    Eyes:         Needs to schedule routine eye exam    Respiratory:  Negative for cough, chest tightness and shortness of breath.         Has seen sleep medicine - sleep study ordered    Cardiovascular:  Negative for chest pain, palpitations (improving, less often) and leg swelling.        Has seen cardiology in the past   Gastrointestinal:  Negative for abdominal distention, abdominal pain, blood in stool, constipation and diarrhea.        GERD sxs per diet choices    Genitourinary:  Negative for difficulty urinating and dysuria.   Musculoskeletal:  Positive for arthralgias (left hip, left shoulder) and back pain (upper back - spasms?). Negative for gait problem, joint swelling and myalgias.   Allergic/Immunologic:        Follows with rheumatology - needs to call for follow up    Neurological:  Negative for dizziness, light-headedness and headaches.

## 2024-07-18 NOTE — PATIENT INSTRUCTIONS
you cook. Bake, broil, and steam foods instead of frying them.     Eat a variety of fruit and vegetables every day. Dark green, deep orange, red, or yellow fruits and vegetables are especially good for you. Examples include spinach, carrots, peaches, and berries.     Foods high in fiber can reduce your cholesterol and provide important vitamins and minerals. High-fiber foods include whole-grain cereals and breads, oatmeal, beans, brown rice, citrus fruits, and apples.     Eat lean proteins. Heart-healthy proteins include seafood, lean meats and poultry, eggs, beans, peas, nuts, seeds, and soy products.     Limit drinks and foods with added sugar. These include candy, desserts, and soda pop.   Heart-healthy lifestyle    If your doctor recommends it, get more exercise. For many people, walking is a good choice. Or you may want to swim, bike, or do other activities. Bit by bit, increase the time you're active every day. Try for at least 30 minutes on most days of the week.     Try to quit or cut back on using tobacco and other nicotine products. This includes smoking and vaping. If you need help quitting, talk to your doctor about stop-smoking programs and medicines. These can increase your chances of quitting for good. Quitting is one of the most important things you can do to protect your heart. It is never too late to quit. Try to avoid secondhand smoke too.     Stay at a weight that's healthy for you. Talk to your doctor if you need help losing weight.     Try to get 7 to 9 hours of sleep each night.     Limit alcohol to 2 drinks a day for men and 1 drink a day for women. Too much alcohol can cause health problems.     Manage other health problems such as diabetes, high blood pressure, and high cholesterol. If you think you may have a problem with alcohol or drug use, talk to your doctor.   Medicines    Take your medicines exactly as prescribed. Call your doctor if you think you are having a problem with your

## 2024-08-20 ENCOUNTER — HOSPITAL ENCOUNTER (OUTPATIENT)
Dept: SLEEP CENTER | Age: 64
Discharge: HOME | End: 2024-08-22
Payer: MEDICARE

## 2024-08-20 VITALS — HEIGHT: 60 IN | BODY MASS INDEX: 32.39 KG/M2 | WEIGHT: 165 LBS

## 2024-08-20 PROCEDURE — 95810 POLYSOM 6/> YRS 4/> PARAM: CPT

## 2024-08-26 LAB — STATUS: NORMAL

## 2024-08-29 RX ORDER — ZOLPIDEM TARTRATE 5 MG/1
5 TABLET ORAL NIGHTLY PRN
Qty: 30 TABLET | Refills: 0 | OUTPATIENT
Start: 2024-08-29 | End: 2024-09-12

## 2024-09-25 ENCOUNTER — TELEPHONE (OUTPATIENT)
Dept: FAMILY MEDICINE CLINIC | Age: 64
End: 2024-09-25

## 2024-09-26 ENCOUNTER — APPOINTMENT (OUTPATIENT)
Dept: CT IMAGING | Facility: CLINIC | Age: 64
End: 2024-09-26
Payer: MEDICARE

## 2024-09-26 ENCOUNTER — HOSPITAL ENCOUNTER (EMERGENCY)
Facility: CLINIC | Age: 64
Discharge: HOME | End: 2024-09-27
Attending: SPECIALIST
Payer: MEDICARE

## 2024-09-26 VITALS
DIASTOLIC BLOOD PRESSURE: 92 MMHG | SYSTOLIC BLOOD PRESSURE: 122 MMHG | WEIGHT: 158 LBS | HEART RATE: 85 BPM | TEMPERATURE: 98.3 F | RESPIRATION RATE: 20 BRPM | OXYGEN SATURATION: 98 % | BODY MASS INDEX: 31.02 KG/M2 | HEIGHT: 60 IN

## 2024-09-26 DIAGNOSIS — M25.551 RIGHT HIP PAIN: Primary | ICD-10-CM

## 2024-09-26 DIAGNOSIS — R10.9 ABDOMINAL CRAMPS: ICD-10-CM

## 2024-09-26 DIAGNOSIS — N34.2 URETHRITIS: Primary | ICD-10-CM

## 2024-09-26 LAB
BACTERIA URNS QL MICRO: ABNORMAL
BILIRUB UR QL STRIP: NEGATIVE
CHARACTER UR: ABNORMAL
CLARITY UR: CLEAR
COLOR UR: YELLOW
EPI CELLS #/AREA URNS HPF: ABNORMAL /HPF (ref 0–5)
GLUCOSE UR STRIP-MCNC: NEGATIVE MG/DL
HGB UR QL STRIP.AUTO: ABNORMAL
KETONES UR STRIP-MCNC: NEGATIVE MG/DL
LEUKOCYTE ESTERASE UR QL STRIP: ABNORMAL
NITRITE UR QL STRIP: NEGATIVE
PH UR STRIP: 5 [PH] (ref 5–8)
PROT UR STRIP-MCNC: NEGATIVE MG/DL
RBC #/AREA URNS HPF: ABNORMAL /HPF (ref 0–2)
SP GR UR STRIP: 1.01 (ref 1–1.03)
UROBILINOGEN UR STRIP-ACNC: NORMAL EU/DL (ref 0–1)
WBC #/AREA URNS HPF: ABNORMAL /HPF (ref 0–5)

## 2024-09-26 PROCEDURE — 74176 CT ABD & PELVIS W/O CONTRAST: CPT

## 2024-09-26 PROCEDURE — 81001 URINALYSIS AUTO W/SCOPE: CPT

## 2024-09-26 PROCEDURE — 99284 EMERGENCY DEPT VISIT MOD MDM: CPT

## 2024-09-26 ASSESSMENT — ENCOUNTER SYMPTOMS
COUGH: 0
SORE THROAT: 0
SHORTNESS OF BREATH: 0
ABDOMINAL PAIN: 1
BACK PAIN: 1
NAUSEA: 1

## 2024-09-26 ASSESSMENT — PAIN DESCRIPTION - ORIENTATION: ORIENTATION: RIGHT;LEFT

## 2024-09-26 ASSESSMENT — PAIN - FUNCTIONAL ASSESSMENT: PAIN_FUNCTIONAL_ASSESSMENT: 0-10

## 2024-09-26 ASSESSMENT — PAIN DESCRIPTION - LOCATION: LOCATION: BACK

## 2024-09-27 ENCOUNTER — TELEPHONE (OUTPATIENT)
Dept: FAMILY MEDICINE CLINIC | Age: 64
End: 2024-09-27

## 2024-09-27 PROCEDURE — 6370000000 HC RX 637 (ALT 250 FOR IP): Performed by: SPECIALIST

## 2024-09-27 RX ORDER — CEPHALEXIN 500 MG/1
500 CAPSULE ORAL ONCE
Status: COMPLETED | OUTPATIENT
Start: 2024-09-27 | End: 2024-09-27

## 2024-09-27 RX ORDER — DICYCLOMINE HYDROCHLORIDE 10 MG/1
10 CAPSULE ORAL
Qty: 15 CAPSULE | Refills: 0 | Status: SHIPPED | OUTPATIENT
Start: 2024-09-27

## 2024-09-27 RX ORDER — CEPHALEXIN 500 MG/1
500 CAPSULE ORAL 2 TIMES DAILY
Qty: 10 CAPSULE | Refills: 0 | Status: SHIPPED | OUTPATIENT
Start: 2024-09-27 | End: 2024-10-02

## 2024-09-27 RX ADMIN — CEPHALEXIN 500 MG: 500 CAPSULE ORAL at 00:15

## 2024-09-27 NOTE — ED PROVIDER NOTES
[facility]  [address] [address]  Phone: [phone]      Pt Name: Sudha Aguilera  MRN: [id number]  Birthdate 1960  Date of evaluation: 9/26/2024      CHIEF COMPLAINT       Chief Complaint   Patient presents with    Dysuria     Back pain, cramping, pt thinks she has a UTI.          HISTORY OF PRESENT ILLNESS    Sudha Aguilera is a 64 y.o. female who presents   Chief Complaint   Patient presents with    Dysuria     Back pain, cramping, pt thinks she has a UTI.    .    64-year-old female patient presents to the emergency department for evaluation of intermittent discomfort in the urethra while voiding since last 1 week followed by lower abdominal cramps and bilateral flank pain since yesterday.  She has been having some urinary frequency and noticed slight pink color urine but denies any dysuria or natalie hematuria.  She had some nausea but denies any vomiting and denies any constipation or diarrhea.  She grades her abdominal pain is 5-6 out of 10 in intensity, cramping in nature.  She denies any lightheadedness or dizziness and denies any fever or chills.  She denies any history of kidney stones but she did state one of her children had kidney stones.  She denies any exacerbating or relieving factors and she has not taken any medications for the pain prior to arrival.  She also feels a fullness in bilateral ears.  She denies any sore throat or ear pain.    REVIEW OF SYSTEMS     Review of Systems   Constitutional:  Negative for chills and fever.   HENT:  Negative for congestion and sore throat.    Respiratory:  Negative for cough and shortness of breath.    Cardiovascular:  Negative for chest pain and palpitations.   Gastrointestinal:  Positive for abdominal pain and nausea.   Genitourinary:  Positive for frequency. Negative for dysuria.   Musculoskeletal:  Positive for back pain.   Neurological:  Negative for dizziness and light-headedness.   All other systems reviewed and  pain, Bentyl for uncontrolled abdominal cramps, plenty of oral fluids, follow-up with PCP, return anytime for worsening symptoms or any new symptoms.    I have reviewed the disposition diagnosis with the patient and or their family/guardian. I have answered their questions and given discharge instructions. They voiced understanding of these instructions and did not have any further questions or complaints.    Re-evaluation Notes    Patient is resting comfortably and does not appear to be in any pain or distress prior to discharge.      PROCEDURES:  None    FINAL IMPRESSION      1. Urethritis    2. Abdominal cramps          DISPOSITION/PLAN   DISPOSITION Decision To Discharge 09/27/2024 12:08:42 AM  Condition at Disposition: Data Unavailable      Condition on Disposition    Stable    PATIENT REFERRED TO:  Jeanine Harley, APRN - CNP  [address] [address] [phone]    Call in 2 days  For reevaluation of current symptoms    [facility]  [address] [address] [phone]    If symptoms worsen      DISCHARGE MEDICATIONS:  New Prescriptions    CEPHALEXIN (KEFLEX) 500 MG CAPSULE    Take 1 capsule by mouth 2 times daily for 5 days    DICYCLOMINE (BENTYL) 10 MG CAPSULE    Take 1 capsule by mouth 4 times daily (before meals and nightly)       (Please note that portions of this note were completed with a voice recognition program.  Efforts were made to edit the dictations but occasionally words are mis-transcribed.)    Cam Montoya MD,, MD, F.A.C.E.P.  Attending Emergency Physician       Cam Montoya MD  09/27/24 0019

## 2024-09-27 NOTE — DISCHARGE INSTRUCTIONS
PLEASE RETURN TO THE EMERGENCY DEPARTMENT IMMEDIATELY if your symptoms worsen in anyway or in 8-12 hours if not improved for re-evaluation.  You should immediately return to the ER for symptoms such as increasing pain, bloody stool, fever, a feeling of passing out, light headed, dizziness, chest pain, shortness of breath, persistent nausea and/or vomiting, numbness or weakness to the arms or legs, coolness or color change of the arms or legs.      Take your medication as indicated and prescribed.  If you are given an antibiotic then, make sure you get the prescription filled and take the antibiotics until finished.      Please understand that at this time there is no evidence for a more serious underlying process, but that early in the process of an illness or injury, an emergency department workup can be falsely reassuring.  You should contact your family doctor within the next 24 hours for a follow up appointment    THANK YOU!!!    From [facility]    On behalf of the Emergency Department staff at [facility], I would like to thank you for giving us the opportunity to address your health care needs and concerns.    We hope that during your visit, our service was delivered in a professional and caring manner. Please keep [facility] in mind as we walk with you down the path to your own personal wellness.     Please expect an automated text message or email from us so we can ask a few questions about your health and progress. Based on your answers, a clinician may call you back to offer help and instructions.    Please understand that early in the process of an illness or injury, an emergency department workup can be falsely reassuring.  If you notice any worsening, changing or persistent symptoms please call your family doctor or return to the ER immediately.     Tell us how we did during your visit at [email or web address]   and let us know about your experience

## 2024-09-27 NOTE — ED NOTES
Pt came in ambulatory from home. Pt states she has had bilateral lower back pain and cramping started yesterday. She also states she had some pink tinged urine today. No history of kidney stones but she did state her child has had them. Call light within reach and bed in lowest position.

## 2024-09-30 NOTE — TELEPHONE ENCOUNTER
LM for patient to contact office about ED visit  
LVM for patient.   
Please call patient and schedule an ER follow-up.  It is noted she recently was in the emergency room, as well she is due for her routine 3-month follow-up appointment.  
PAIN

## 2024-10-02 ENCOUNTER — OFFICE VISIT (OUTPATIENT)
Dept: ORTHOPEDIC SURGERY | Age: 64
End: 2024-10-02
Payer: MEDICARE

## 2024-10-02 VITALS — OXYGEN SATURATION: 99 % | BODY MASS INDEX: 31.02 KG/M2 | RESPIRATION RATE: 18 BRPM | HEIGHT: 60 IN | WEIGHT: 158 LBS

## 2024-10-02 DIAGNOSIS — M16.11 ARTHRITIS OF RIGHT HIP: Primary | ICD-10-CM

## 2024-10-02 PROCEDURE — 1036F TOBACCO NON-USER: CPT | Performed by: ORTHOPAEDIC SURGERY

## 2024-10-02 PROCEDURE — G8427 DOCREV CUR MEDS BY ELIG CLIN: HCPCS | Performed by: ORTHOPAEDIC SURGERY

## 2024-10-02 PROCEDURE — G8399 PT W/DXA RESULTS DOCUMENT: HCPCS | Performed by: ORTHOPAEDIC SURGERY

## 2024-10-02 PROCEDURE — 1123F ACP DISCUSS/DSCN MKR DOCD: CPT | Performed by: ORTHOPAEDIC SURGERY

## 2024-10-02 PROCEDURE — 3017F COLORECTAL CA SCREEN DOC REV: CPT | Performed by: ORTHOPAEDIC SURGERY

## 2024-10-02 PROCEDURE — G8417 CALC BMI ABV UP PARAM F/U: HCPCS | Performed by: ORTHOPAEDIC SURGERY

## 2024-10-02 PROCEDURE — 1090F PRES/ABSN URINE INCON ASSESS: CPT | Performed by: ORTHOPAEDIC SURGERY

## 2024-10-02 PROCEDURE — G8484 FLU IMMUNIZE NO ADMIN: HCPCS | Performed by: ORTHOPAEDIC SURGERY

## 2024-10-02 PROCEDURE — 99214 OFFICE O/P EST MOD 30 MIN: CPT | Performed by: ORTHOPAEDIC SURGERY

## 2024-10-02 ASSESSMENT — ENCOUNTER SYMPTOMS
ROS SKIN COMMENTS: NEGATIVE FOR RASH
SHORTNESS OF BREATH: 0
EYE DISCHARGE: 0
ABDOMINAL PAIN: 0

## 2024-10-02 NOTE — PATIENT INSTRUCTIONS
PATIENTIQ:  PatientIQ helps [facility] stay in touch with you to know how you're feeling, and provides education and care instructions to you at various time points.   Your answers help your care team track your progress to provide the best care possible. PatientIQ will contact you pre-op and post-op via email or text with:  Educational Videos and Care Instructions  Questionnaires About How You're Feeling    Your participation provides you valuable education and helps [facility] continue to provide quality care to all patients. Thank you

## 2024-10-03 NOTE — PROGRESS NOTES
[facility] ORTHOPEDICS AND SPORTS MEDICINE  [address]  [facility] [place] [address]  Dept: [phone]  Dept Fax: [phone]        Ambulatory Follow Up      Subjective:   Sudha Aguilera is a 64 y.o. year old female who presents to our office today for routine followup regarding her   1. Arthritis of right hip    .    Chief Complaint   Patient presents with    Hip Pain     Right hip pain-ep/NP       HPI  Sudha Aldridge is a 64-year-old female who presents the office today for for reevaluation.  She continues to have significant right hip pain.  She notes the right hip catches at time and feels like she is going to fall.  The right hip pain is progressively worsened over time despite a home exercise program that she learned with previous physical therapy for a left hip replacement L as well as activity modification and NSAID use.  Her symptoms are greatly affecting her usual activities of daily living.  She has had a previous left total hip arthroplasty is not very happy with the result continues to have some pain from that.  This was done by Dr. Garcia in the past.    Review of Systems   Constitutional:  Positive for activity change. Negative for fever.   HENT:  Negative for dental problem.    Eyes:  Negative for discharge.   Respiratory:  Negative for shortness of breath.    Cardiovascular:  Negative for chest pain.   Gastrointestinal:  Negative for abdominal pain.   Genitourinary: Negative.    Musculoskeletal:  Positive for arthralgias.   Skin:         Negative for rash   Neurological:  Positive for weakness.   Psychiatric/Behavioral:  Negative for confusion.        I have reviewed the CC, HPI, ROS, PMH, FHX, Social History, and if not present in this note, I have reviewed in the patient's chart.   I agree with the documentation provided by other staff and have reviewed their documentation prior to providing my

## 2024-10-11 DIAGNOSIS — F41.1 GAD (GENERALIZED ANXIETY DISORDER): ICD-10-CM

## 2024-10-11 RX ORDER — ALPRAZOLAM 1 MG
1 TABLET ORAL 2 TIMES DAILY PRN
Qty: 30 TABLET | Refills: 0 | Status: SHIPPED | OUTPATIENT
Start: 2024-10-11 | End: 2024-11-12

## 2024-10-11 NOTE — TELEPHONE ENCOUNTER
Sudha Aguilera is calling to request a refill on the following medication(s):    Last Visit Date (If Applicable):  7/18/2024    Next Visit Date:    10/28/2024    Medication Request:  Requested Prescriptions      No prescriptions requested or ordered in this encounter

## 2024-10-28 ENCOUNTER — PREP FOR PROCEDURE (OUTPATIENT)
Dept: ORTHOPEDIC SURGERY | Age: 64
End: 2024-10-28

## 2024-10-28 ENCOUNTER — OFFICE VISIT (OUTPATIENT)
Dept: FAMILY MEDICINE CLINIC | Age: 64
End: 2024-10-28

## 2024-10-28 VITALS
OXYGEN SATURATION: 95 % | HEART RATE: 65 BPM | DIASTOLIC BLOOD PRESSURE: 84 MMHG | BODY MASS INDEX: 31.25 KG/M2 | TEMPERATURE: 97.8 F | SYSTOLIC BLOOD PRESSURE: 112 MMHG | WEIGHT: 160 LBS | RESPIRATION RATE: 16 BRPM

## 2024-10-28 DIAGNOSIS — M25.561 CHRONIC PAIN OF BOTH KNEES: ICD-10-CM

## 2024-10-28 DIAGNOSIS — M81.0 OSTEOPOROSIS, UNSPECIFIED OSTEOPOROSIS TYPE, UNSPECIFIED PATHOLOGICAL FRACTURE PRESENCE: Primary | ICD-10-CM

## 2024-10-28 DIAGNOSIS — G89.29 CHRONIC PAIN OF BOTH KNEES: ICD-10-CM

## 2024-10-28 DIAGNOSIS — Z01.818 PRE-OP TESTING: Primary | ICD-10-CM

## 2024-10-28 DIAGNOSIS — M16.11 PRIMARY OSTEOARTHRITIS OF RIGHT HIP: ICD-10-CM

## 2024-10-28 DIAGNOSIS — M25.562 CHRONIC PAIN OF BOTH KNEES: ICD-10-CM

## 2024-10-28 DIAGNOSIS — M25.552 ACUTE HIP PAIN, LEFT: ICD-10-CM

## 2024-10-28 DIAGNOSIS — M16.11 ARTHRITIS OF RIGHT HIP: ICD-10-CM

## 2024-10-28 DIAGNOSIS — F33.0 DEPRESSION, MAJOR, RECURRENT, MILD (HCC): ICD-10-CM

## 2024-10-28 DIAGNOSIS — F51.04 PSYCHOPHYSIOLOGICAL INSOMNIA: ICD-10-CM

## 2024-10-28 RX ORDER — FERROUS SULFATE 325(65) MG
650 TABLET ORAL NIGHTLY
COMMUNITY

## 2024-10-28 RX ORDER — TRAZODONE HYDROCHLORIDE 50 MG/1
50 TABLET, FILM COATED ORAL NIGHTLY
Qty: 30 TABLET | Refills: 1 | Status: SHIPPED | OUTPATIENT
Start: 2024-10-28 | End: 2025-01-26

## 2024-10-28 RX ORDER — ASCORBIC ACID 500 MG
500 TABLET ORAL NIGHTLY
COMMUNITY

## 2024-10-28 NOTE — PROGRESS NOTES
Mood and Affect: Mood and affect normal.         Speech: Speech normal.         Behavior: Behavior normal. Behavior is cooperative.         Thought Content: Thought content normal. Thought content does not include suicidal ideation. Thought content does not include suicidal plan.         Cognition and Memory: Cognition and memory normal.         Judgment: Judgment normal.           Electronically signed by SLOAN Cote CNP, SLOAN-CNP on 11/4/2024 at 7:40 AM    Please note that this chart was generated using voice recognition Dragon dictation software.  Although every effort was made to ensure the accuracy of this automated transcription, some errors in transcription may have occurred.

## 2024-11-05 ENCOUNTER — HOSPITAL ENCOUNTER (OUTPATIENT)
Dept: PHYSICAL THERAPY | Facility: CLINIC | Age: 64
Setting detail: THERAPIES SERIES
Discharge: HOME | End: 2024-11-05
Payer: MEDICARE

## 2024-11-05 PROCEDURE — 97750 PHYSICAL PERFORMANCE TEST: CPT

## 2024-11-05 NOTE — FLOWSHEET NOTE
Ms. Aguilera  was seen for her first appointment for her Functional Capacity Evaluation today.     Time In:   2:08 pm  Time out:   4:02 pm     Total time  =114 min  Units = 8        She  is scheduled to return at 3  pm on 11- to complete testing and after that final documentation will be completed.     Electronically signed by FUNMILAYO DEGROOT PT on 11/5/2024 at 4:16 PM

## 2024-11-10 DIAGNOSIS — E78.2 MIXED HYPERLIPIDEMIA: ICD-10-CM

## 2024-11-12 ENCOUNTER — HOSPITAL ENCOUNTER (OUTPATIENT)
Dept: PHYSICAL THERAPY | Facility: CLINIC | Age: 64
Setting detail: THERAPIES SERIES
Discharge: HOME | End: 2024-11-12
Payer: MEDICARE

## 2024-11-12 PROCEDURE — 97750 PHYSICAL PERFORMANCE TEST: CPT

## 2024-11-12 RX ORDER — ATORVASTATIN CALCIUM 10 MG/1
10 TABLET, FILM COATED ORAL DAILY
Qty: 30 TABLET | Refills: 5 | Status: SHIPPED | OUTPATIENT
Start: 2024-11-12 | End: 2025-11-12

## 2024-11-12 NOTE — TELEPHONE ENCOUNTER
LOV 10/28/24   RTO 12/3/24  LRF 6/2/24          Controlled Substance Monitoring:    Acute and Chronic Pain Monitoring:   RX Monitoring Periodic Controlled Substance Monitoring   12/11/2019   1:08 PM No signs of potential drug abuse or diversion identified.

## 2024-11-19 ENCOUNTER — TELEPHONE (OUTPATIENT)
Dept: ORTHOPEDIC SURGERY | Age: 64
End: 2024-11-19

## 2024-11-19 NOTE — TELEPHONE ENCOUNTER
Venecia from the law office of [employer] calling to inquire if we received a request for questions for Dr. Rodriguez. She faxed it 11/15/24.  Call back [phone].

## 2024-11-25 DIAGNOSIS — F41.1 GAD (GENERALIZED ANXIETY DISORDER): ICD-10-CM

## 2024-11-25 NOTE — TELEPHONE ENCOUNTER
LOV 10/28/24   RTO 12/3/24  LRF 10/11/24          Controlled Substance Monitoring:    Acute and Chronic Pain Monitoring:   RX Monitoring Periodic Controlled Substance Monitoring   12/11/2019   1:08 PM No signs of potential drug abuse or diversion identified.

## 2024-11-26 RX ORDER — ALPRAZOLAM 1 MG/1
TABLET ORAL
Qty: 30 TABLET | Refills: 0 | Status: SHIPPED | OUTPATIENT
Start: 2024-11-26 | End: 2024-12-26

## 2024-12-03 ENCOUNTER — OFFICE VISIT (OUTPATIENT)
Dept: FAMILY MEDICINE CLINIC | Age: 64
End: 2024-12-03
Payer: MEDICARE

## 2024-12-03 VITALS
WEIGHT: 155.8 LBS | RESPIRATION RATE: 18 BRPM | DIASTOLIC BLOOD PRESSURE: 86 MMHG | TEMPERATURE: 97.3 F | SYSTOLIC BLOOD PRESSURE: 112 MMHG | OXYGEN SATURATION: 98 % | HEART RATE: 64 BPM | BODY MASS INDEX: 30.43 KG/M2

## 2024-12-03 DIAGNOSIS — F41.1 GAD (GENERALIZED ANXIETY DISORDER): Primary | ICD-10-CM

## 2024-12-03 DIAGNOSIS — F33.0 DEPRESSION, MAJOR, RECURRENT, MILD (HCC): Chronic | ICD-10-CM

## 2024-12-03 DIAGNOSIS — I10 ESSENTIAL HYPERTENSION: Chronic | ICD-10-CM

## 2024-12-03 DIAGNOSIS — J30.1 SEASONAL ALLERGIC RHINITIS DUE TO POLLEN: ICD-10-CM

## 2024-12-03 DIAGNOSIS — H69.92 DYSFUNCTION OF LEFT EUSTACHIAN TUBE: ICD-10-CM

## 2024-12-03 DIAGNOSIS — M25.552 ACUTE HIP PAIN, LEFT: ICD-10-CM

## 2024-12-03 DIAGNOSIS — G25.81 RESTLESS LEG SYNDROME: ICD-10-CM

## 2024-12-03 PROCEDURE — G8484 FLU IMMUNIZE NO ADMIN: HCPCS | Performed by: NURSE PRACTITIONER

## 2024-12-03 PROCEDURE — 3079F DIAST BP 80-89 MM HG: CPT | Performed by: NURSE PRACTITIONER

## 2024-12-03 PROCEDURE — 3017F COLORECTAL CA SCREEN DOC REV: CPT | Performed by: NURSE PRACTITIONER

## 2024-12-03 PROCEDURE — 1036F TOBACCO NON-USER: CPT | Performed by: NURSE PRACTITIONER

## 2024-12-03 PROCEDURE — 3074F SYST BP LT 130 MM HG: CPT | Performed by: NURSE PRACTITIONER

## 2024-12-03 PROCEDURE — 99214 OFFICE O/P EST MOD 30 MIN: CPT | Performed by: NURSE PRACTITIONER

## 2024-12-03 PROCEDURE — G8399 PT W/DXA RESULTS DOCUMENT: HCPCS | Performed by: NURSE PRACTITIONER

## 2024-12-03 PROCEDURE — G8427 DOCREV CUR MEDS BY ELIG CLIN: HCPCS | Performed by: NURSE PRACTITIONER

## 2024-12-03 PROCEDURE — 1123F ACP DISCUSS/DSCN MKR DOCD: CPT | Performed by: NURSE PRACTITIONER

## 2024-12-03 PROCEDURE — G8417 CALC BMI ABV UP PARAM F/U: HCPCS | Performed by: NURSE PRACTITIONER

## 2024-12-03 PROCEDURE — 1090F PRES/ABSN URINE INCON ASSESS: CPT | Performed by: NURSE PRACTITIONER

## 2024-12-03 RX ORDER — BUPROPION HYDROCHLORIDE 150 MG/1
150 TABLET ORAL EVERY MORNING
Qty: 30 TABLET | Refills: 3 | Status: SHIPPED | OUTPATIENT
Start: 2024-12-03

## 2024-12-03 RX ORDER — FERROUS SULFATE 325(65) MG
650 TABLET ORAL NIGHTLY
Qty: 180 TABLET | Refills: 1 | Status: SHIPPED | OUTPATIENT
Start: 2024-12-03 | End: 2025-12-03

## 2024-12-03 RX ORDER — ALPRAZOLAM 1 MG/1
1 TABLET ORAL 2 TIMES DAILY PRN
Qty: 60 TABLET | Refills: 0 | Status: SHIPPED | OUTPATIENT
Start: 2024-12-09 | End: 2025-01-08

## 2024-12-03 RX ORDER — LORATADINE 10 MG/1
10 TABLET ORAL DAILY
Qty: 90 TABLET | Refills: 1 | Status: SHIPPED | OUTPATIENT
Start: 2024-12-03 | End: 2025-12-03

## 2024-12-03 ASSESSMENT — ENCOUNTER SYMPTOMS
BLOOD IN STOOL: 0
ABDOMINAL PAIN: 0
DIARRHEA: 0
SHORTNESS OF BREATH: 0
SINUS PRESSURE: 0
BACK PAIN: 1
CHEST TIGHTNESS: 0
EYE DISCHARGE: 1
EYE ITCHING: 1
SORE THROAT: 0
ABDOMINAL DISTENTION: 0
TROUBLE SWALLOWING: 0
CONSTIPATION: 0
COUGH: 0

## 2024-12-03 NOTE — PROGRESS NOTES
[facility]   [address] [place] [address] [facility] [address]  [phone]    12/3/24     Patient ID  Sudha Aguilera is a 64 y.o. female  Established patient    Chief Complaint  Sudha Aguilera presents today for Anxiety, Insomnia, Allergies (Flare up- onset 1 month), and Hypertension    Have you seen any other physician or provider since your last visit? Yes - Records Obtained PT Completed, Ortho- Dr. Rodriguez Surgery Scheduled 12/31/24 RT Hip  Have you had any other diagnostic tests since your last visit? No  Have you been seen in the emergency room and/or had an admission to a hospital since we last saw you? No     ASSESSMENT/PLAN  1. SHUN (generalized anxiety disorder)  -     buPROPion (WELLBUTRIN XL) 150 MG extended release tablet; Take 1 tablet by mouth every morning, Disp-30 tablet, R-3Normal  -     ALPRAZolam (XANAX) 1 MG tablet; Take 1 tablet by mouth 2 times daily as needed for Anxiety for up to 30 days. Max Daily Amount: 2 mg, Disp-60 tablet, R-0Normal  2. Essential hypertension  3. Depression, major, recurrent, mild (HCC)  4. Restless leg syndrome  -     ferrous sulfate (IRON 325) 325 (65 Fe) MG tablet; Take 2 tablets by mouth at bedtime, Disp-180 tablet, R-1Normal  5. Acute hip pain, left  6. Seasonal allergic rhinitis due to pollen  -     loratadine (CLARITIN) 10 MG tablet; Take 1 tablet by mouth daily, Disp-90 tablet, R-1Normal  7. Dysfunction of left eustachian tube  -     loratadine (CLARITIN) 10 MG tablet; Take 1 tablet by mouth daily, Disp-90 tablet, R-1Normal     Meds refilled.  Contract and UDS current  Patient encouraged to take Claritin daily for her sinus symptomology.  Patient also would like to resume Wellbutrin.  Patient's taken in the past and would like to resume for overall mood.  Patient denyin need for referral for therapy at this time.    Patient is scheduled for left hip surgery in December.  Her preadmission testing is scheduled for tomorrow.  Will review

## 2024-12-04 ENCOUNTER — HOSPITAL ENCOUNTER (OUTPATIENT)
Age: 64
Setting detail: SPECIMEN
Discharge: HOME | End: 2024-12-04

## 2024-12-04 ENCOUNTER — HOSPITAL ENCOUNTER (OUTPATIENT)
Age: 64
Discharge: HOME | End: 2024-12-06
Payer: MEDICARE

## 2024-12-04 ENCOUNTER — HOSPITAL ENCOUNTER (OUTPATIENT)
Dept: PREADMISSION TESTING | Age: 64
Discharge: HOME | End: 2024-12-08
Payer: MEDICARE

## 2024-12-04 VITALS
OXYGEN SATURATION: 98 % | SYSTOLIC BLOOD PRESSURE: 142 MMHG | TEMPERATURE: 98 F | HEIGHT: 60 IN | BODY MASS INDEX: 30.47 KG/M2 | WEIGHT: 155.2 LBS | RESPIRATION RATE: 14 BRPM | HEART RATE: 61 BPM | DIASTOLIC BLOOD PRESSURE: 88 MMHG

## 2024-12-04 DIAGNOSIS — Z01.818 PRE-OP TESTING: ICD-10-CM

## 2024-12-04 LAB
ALBUMIN SERPL-MCNC: 4.1 G/DL (ref 3.5–5.2)
ALBUMIN/GLOB SERPL: 1.3 {RATIO} (ref 1–2.5)
ALP SERPL-CCNC: 85 U/L (ref 35–104)
ALT SERPL-CCNC: 14 U/L (ref 10–35)
AMORPH SED URNS QL MICRO: ABNORMAL
ANION GAP SERPL CALCULATED.3IONS-SCNC: 13 MMOL/L (ref 9–16)
AST SERPL-CCNC: 21 U/L (ref 10–35)
BILIRUB SERPL-MCNC: 0.9 MG/DL (ref 0–1.2)
BILIRUB UR QL STRIP: NEGATIVE
BUN SERPL-MCNC: 11 MG/DL (ref 8–23)
CALCIUM SERPL-MCNC: 9.2 MG/DL (ref 8.6–10.4)
CASTS #/AREA URNS LPF: ABNORMAL /LPF (ref 0–2)
CASTS #/AREA URNS LPF: ABNORMAL /LPF (ref 0–2)
CHLORIDE SERPL-SCNC: 104 MMOL/L (ref 98–107)
CLARITY UR: ABNORMAL
CO2 SERPL-SCNC: 24 MMOL/L (ref 20–31)
COLOR UR: ABNORMAL
CREAT SERPL-MCNC: 0.9 MG/DL (ref 0.6–0.9)
CRYSTALS URNS MICRO: ABNORMAL /HPF
CRYSTALS URNS MICRO: ABNORMAL /HPF
EPI CELLS #/AREA URNS HPF: ABNORMAL /HPF (ref 0–5)
ERYTHROCYTE [DISTWIDTH] IN BLOOD BY AUTOMATED COUNT: 14.4 % (ref 11.8–14.4)
GFR, ESTIMATED: 71 ML/MIN/1.73M2
GLUCOSE SERPL-MCNC: 88 MG/DL (ref 74–99)
GLUCOSE UR STRIP-MCNC: NEGATIVE MG/DL
HCT VFR BLD AUTO: 41.7 % (ref 36.3–47.1)
HGB BLD-MCNC: 13.5 G/DL (ref 11.9–15.1)
HGB UR QL STRIP.AUTO: NEGATIVE
KETONES UR STRIP-MCNC: NEGATIVE MG/DL
LEUKOCYTE ESTERASE UR QL STRIP: ABNORMAL
MCH RBC QN AUTO: 32 PG (ref 25.2–33.5)
MCHC RBC AUTO-ENTMCNC: 32.4 G/DL (ref 28.4–34.8)
MCV RBC AUTO: 98.8 FL (ref 82.6–102.9)
MUCOUS THREADS URNS QL MICRO: ABNORMAL
NITRITE UR QL STRIP: NEGATIVE
NRBC BLD-RTO: 0 PER 100 WBC
PH UR STRIP: 5.5 [PH] (ref 5–8)
PLATELET # BLD AUTO: 293 K/UL (ref 138–453)
PMV BLD AUTO: 12.7 FL (ref 8.1–13.5)
POTASSIUM SERPL-SCNC: 3.8 MMOL/L (ref 3.7–5.3)
PROT SERPL-MCNC: 7.3 G/DL (ref 6.6–8.7)
PROT UR STRIP-MCNC: ABNORMAL MG/DL
RBC # BLD AUTO: 4.22 M/UL (ref 3.95–5.11)
RBC #/AREA URNS HPF: ABNORMAL /HPF (ref 0–2)
SODIUM SERPL-SCNC: 141 MMOL/L (ref 136–145)
SP GR UR STRIP: 1.02 (ref 1–1.03)
UROBILINOGEN UR STRIP-ACNC: NORMAL EU/DL (ref 0–1)
WBC #/AREA URNS HPF: ABNORMAL /HPF (ref 0–5)
WBC OTHER # BLD: 7.2 K/UL (ref 3.5–11.3)

## 2024-12-04 PROCEDURE — 93005 ELECTROCARDIOGRAM TRACING: CPT

## 2024-12-04 PROCEDURE — 86850 RBC ANTIBODY SCREEN: CPT

## 2024-12-04 PROCEDURE — 86901 BLOOD TYPING SEROLOGIC RH(D): CPT

## 2024-12-04 PROCEDURE — 83036 HEMOGLOBIN GLYCOSYLATED A1C: CPT

## 2024-12-04 PROCEDURE — 81001 URINALYSIS AUTO W/SCOPE: CPT

## 2024-12-04 PROCEDURE — 85027 COMPLETE CBC AUTOMATED: CPT

## 2024-12-04 PROCEDURE — 36415 COLL VENOUS BLD VENIPUNCTURE: CPT

## 2024-12-04 PROCEDURE — 71046 X-RAY EXAM CHEST 2 VIEWS: CPT

## 2024-12-04 PROCEDURE — 80053 COMPREHEN METABOLIC PANEL: CPT

## 2024-12-04 PROCEDURE — 86900 BLOOD TYPING SEROLOGIC ABO: CPT

## 2024-12-04 RX ORDER — ACETAMINOPHEN 500 MG
1000 TABLET ORAL ONCE
OUTPATIENT
Start: 2024-12-31

## 2024-12-04 RX ORDER — CELECOXIB 200 MG/1
200 CAPSULE ORAL ONCE
OUTPATIENT
Start: 2024-12-31

## 2024-12-04 RX ORDER — CEFAZOLIN SODIUM/WATER 2 G/20 ML
2000 SYRINGE (ML) INTRAVENOUS ONCE
OUTPATIENT
Start: 2024-12-31

## 2024-12-04 RX ORDER — GABAPENTIN 300 MG/1
300 CAPSULE ORAL ONCE
OUTPATIENT
Start: 2024-12-31

## 2024-12-04 ASSESSMENT — PAIN SCALES - GENERAL: PAINLEVEL_OUTOF10: 6

## 2024-12-04 ASSESSMENT — PAIN DESCRIPTION - ORIENTATION: ORIENTATION: LEFT

## 2024-12-04 ASSESSMENT — PAIN DESCRIPTION - LOCATION: LOCATION: HIP

## 2024-12-04 ASSESSMENT — VISUAL ACUITY: OU: 1

## 2024-12-04 NOTE — PRE-PROCEDURE INSTRUCTIONS
On the Day of Your Surgery, Tuesday, 12/31/24, Dr. Rodriguez's office will call and inform you of the arrival time the day of surgery.     Enter [facility] through the Main Entrance, take the lobby elevators to the second floor and check in at the Surgery Registration desk.     Continue to take your home medications as you normally do up to and including the night before surgery with the exception of blood thinning medications.    Blood Thinning Medications:  Please stop prescription blood thinning medications such as Apixaban (Eliquis); Clopidogrel (Plavix); Dabigatran (Pradaxa); Prasugrel (Effient); Rivaroxaban (Xarelto); Ticagrelor (Brilinta); Warfarin (Coumadin) only as directed by your surgeon and/or the prescribing physician    Some common examples of other medications that can thin your blood are: Aspirin, Ibuprofen (Advil, Motrin), Naproxen (Aleve), Meloxicam (Mobic), Celecoxib (Celebrex), Fish Oil, many Herbal Supplements.  These medications should usually be stopped at least 7 days prior to surgery.    Meloxicam, multivitamin, vitamin D.    Tylenol is OK to take for pain the week prior to surgery.    Failure to stop certain medications may interfere with your scheduled surgery.    If you receive instructions from your surgeon regarding what medications to stop prior to surgery, please follow those specific instructions.      Please take the following medication(s) the day of surgery with small sips of water:              Metoprolol, paroxetine, bupropion.    If you are on medicare and there is a possibility that you will be admitted following surgery:   Please bring your prescription medications in their original bottles with pharmacy label on the day of surgery.    Showering Before Surgery:     You can play an important role in your own health by carefully washing before surgery. Shower the night before and the morning of surgery using the instructions below. If you are allergic to Chlorhexidine  you.   For your safety, someone must remain with you for the first 24 hours after your surgery if you receive anesthesia or medication.  If you do not have someone to stay with you, your procedure may be cancelled.  As a patient at [facility] you can expect quality medical and nursing care that is centered on you individual needs.  Our goal is to make your surgical experience as comfortable as possible.    Any questions about preparing for your surgery please call [phone].    ____________________________   ____________________________  Signature (Patient)                                 Signature (Nurse)                     Date

## 2024-12-05 LAB
ABO + RH BLD: NORMAL
ARM BAND NUMBER: NORMAL
BLOOD BANK SAMPLE EXPIRATION: NORMAL
BLOOD GROUP ANTIBODIES SERPL: NEGATIVE
EKG ATRIAL RATE: 61 BPM
EKG P AXIS: 46 DEGREES
EKG P-R INTERVAL: 180 MS
EKG Q-T INTERVAL: 438 MS
EKG QRS DURATION: 78 MS
EKG QTC CALCULATION (BAZETT): 440 MS
EKG R AXIS: -17 DEGREES
EKG T AXIS: 40 DEGREES
EKG VENTRICULAR RATE: 61 BPM
EST. AVERAGE GLUCOSE BLD GHB EST-MCNC: 108 MG/DL
HBA1C MFR BLD: 5.4 % (ref 4–6)

## 2024-12-05 NOTE — PROGRESS NOTES
[facility] Joint Replacement Pre-surgical Assessment    Scheduled Surgery Date: 12/31/2024  Surgery Time: 0730    Surgeon: JASSI  Procedure: right Total Hip    Primary Insurance Coverage MEDICARE PART A&B  Pre-op class attended YES 12/04/2024 AT [facility].    PCP: Jeanine Harley APRN - CNP  Clearance received by PCP: Yes    Anticipated Discharge Plan: home  Agency (if applicable): OPPT    Significant PMH:   Surgical History    Procedure Laterality Date Comment Source   EYE SURGERY Right  to correct blister behind eye    JOINT REPLACEMENT Left 2009 lt knee    JOINT REPLACEMENT Right 02/2016 Knee    MANDIBLE SURGERY       SHOULDER ARTHROSCOPY Left 07/25/2023 LEFT SHOULDER ARTHROSCOPY ROTATOR CUFF REPAIR performed by Francisco Rodriguez DO at [facility] OR    TOTAL HIP ARTHROPLASTY Left 10/25/2016       ED Notes    ED Notes    Medical History    Diagnosis Date Comment Source   Anxiety      Cervicalgia      Colitis      Contusion of wrist      DDD (degenerative disc disease), cervical      Depression      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 Rodriguez    UTI (urinary tract infection)      Viremia      Vitiligo                 Smoking history: the patient is a former smoker who quit smoking on 01/01/2007.    Alcohol history: Current alcohol use: RARELY.    Concerns prior to surgery: PT HAS A FWW.    Electronically signed by: PATIENCE CORNEJO RN on 12/5/2024 at 9:24 AM

## 2024-12-06 DIAGNOSIS — R82.90 ABNORMAL URINALYSIS: Primary | ICD-10-CM

## 2024-12-06 NOTE — RESULT ENCOUNTER NOTE
Please call patient.   Her EKG and her labs I have no concern.  Her urine was quite concentrated and I would like for her to stop into the office today to give a urine sample to send out for urine culture to ensure no infection prior to her scheduled surgery.

## 2024-12-07 LAB
MRSA, DNA, NASAL: NEGATIVE
SPECIMEN DESCRIPTION: NORMAL

## 2024-12-11 ENCOUNTER — HOSPITAL ENCOUNTER (OUTPATIENT)
Age: 64
Setting detail: SPECIMEN
Discharge: HOME | End: 2024-12-11

## 2024-12-11 DIAGNOSIS — R82.90 ABNORMAL URINALYSIS: ICD-10-CM

## 2024-12-12 LAB
MICROORGANISM SPEC CULT: NORMAL
SERVICE CMNT-IMP: NORMAL
SPECIMEN DESCRIPTION: NORMAL

## 2024-12-17 DIAGNOSIS — Z98.890 STATUS POST SURGERY: Primary | ICD-10-CM

## 2024-12-17 DIAGNOSIS — M16.11 ARTHRITIS OF RIGHT HIP: ICD-10-CM

## 2024-12-31 ENCOUNTER — APPOINTMENT (OUTPATIENT)
Dept: GENERAL RADIOLOGY | Age: 64
End: 2024-12-31
Attending: ORTHOPAEDIC SURGERY
Payer: MEDICARE

## 2024-12-31 ENCOUNTER — ANESTHESIA EVENT (OUTPATIENT)
Dept: OPERATING ROOM | Age: 64
End: 2024-12-31
Payer: MEDICARE

## 2024-12-31 ENCOUNTER — HOSPITAL ENCOUNTER (OUTPATIENT)
Age: 64
Setting detail: OUTPATIENT SURGERY
Discharge: HOME | End: 2024-12-31
Attending: ORTHOPAEDIC SURGERY | Admitting: ORTHOPAEDIC SURGERY
Payer: MEDICARE

## 2024-12-31 ENCOUNTER — ANESTHESIA (OUTPATIENT)
Dept: OPERATING ROOM | Age: 64
End: 2024-12-31
Payer: MEDICARE

## 2024-12-31 VITALS
WEIGHT: 155 LBS | RESPIRATION RATE: 19 BRPM | DIASTOLIC BLOOD PRESSURE: 66 MMHG | HEART RATE: 73 BPM | HEIGHT: 60 IN | SYSTOLIC BLOOD PRESSURE: 102 MMHG | TEMPERATURE: 96.8 F | OXYGEN SATURATION: 97 % | BODY MASS INDEX: 30.43 KG/M2

## 2024-12-31 DIAGNOSIS — M16.11 ARTHRITIS OF RIGHT HIP: Primary | ICD-10-CM

## 2024-12-31 DIAGNOSIS — Z96.653 HISTORY OF TOTAL KNEE ARTHROPLASTY, BILATERAL: ICD-10-CM

## 2024-12-31 DIAGNOSIS — Z96.642 HISTORY OF TOTAL HIP ARTHROPLASTY, LEFT: ICD-10-CM

## 2024-12-31 DIAGNOSIS — F51.04 PSYCHOPHYSIOLOGICAL INSOMNIA: ICD-10-CM

## 2024-12-31 PROCEDURE — 2720000010 HC SURG SUPPLY STERILE: Performed by: ORTHOPAEDIC SURGERY

## 2024-12-31 PROCEDURE — 7100000001 HC PACU RECOVERY - ADDTL 15 MIN: Performed by: ORTHOPAEDIC SURGERY

## 2024-12-31 PROCEDURE — 6360000002 HC RX W HCPCS: Performed by: NURSE ANESTHETIST, CERTIFIED REGISTERED

## 2024-12-31 PROCEDURE — 6360000002 HC RX W HCPCS: Performed by: ORTHOPAEDIC SURGERY

## 2024-12-31 PROCEDURE — 73502 X-RAY EXAM HIP UNI 2-3 VIEWS: CPT

## 2024-12-31 PROCEDURE — 2709999900 HC NON-CHARGEABLE SUPPLY: Performed by: ORTHOPAEDIC SURGERY

## 2024-12-31 PROCEDURE — 97530 THERAPEUTIC ACTIVITIES: CPT

## 2024-12-31 PROCEDURE — 97116 GAIT TRAINING THERAPY: CPT

## 2024-12-31 PROCEDURE — C9290 INJ, BUPIVACAINE LIPOSOME: HCPCS | Performed by: ORTHOPAEDIC SURGERY

## 2024-12-31 PROCEDURE — C1776 JOINT DEVICE (IMPLANTABLE): HCPCS | Performed by: ORTHOPAEDIC SURGERY

## 2024-12-31 PROCEDURE — 97166 OT EVAL MOD COMPLEX 45 MIN: CPT

## 2024-12-31 PROCEDURE — 2580000003 HC RX 258: Performed by: NURSE ANESTHETIST, CERTIFIED REGISTERED

## 2024-12-31 PROCEDURE — 3700000000 HC ANESTHESIA ATTENDED CARE: Performed by: ORTHOPAEDIC SURGERY

## 2024-12-31 PROCEDURE — 2580000003 HC RX 258: Performed by: ANESTHESIOLOGY

## 2024-12-31 PROCEDURE — 6370000000 HC RX 637 (ALT 250 FOR IP): Performed by: ANESTHESIOLOGY

## 2024-12-31 PROCEDURE — 6360000002 HC RX W HCPCS: Performed by: ANESTHESIOLOGY

## 2024-12-31 PROCEDURE — 3600000005 HC SURGERY LEVEL 5 BASE: Performed by: ORTHOPAEDIC SURGERY

## 2024-12-31 PROCEDURE — 3600000015 HC SURGERY LEVEL 5 ADDTL 15MIN: Performed by: ORTHOPAEDIC SURGERY

## 2024-12-31 PROCEDURE — 2500000003 HC RX 250 WO HCPCS: Performed by: ORTHOPAEDIC SURGERY

## 2024-12-31 PROCEDURE — 6370000000 HC RX 637 (ALT 250 FOR IP)

## 2024-12-31 PROCEDURE — 97162 PT EVAL MOD COMPLEX 30 MIN: CPT

## 2024-12-31 PROCEDURE — 7100000011 HC PHASE II RECOVERY - ADDTL 15 MIN: Performed by: ORTHOPAEDIC SURGERY

## 2024-12-31 PROCEDURE — 97110 THERAPEUTIC EXERCISES: CPT

## 2024-12-31 PROCEDURE — 97535 SELF CARE MNGMENT TRAINING: CPT

## 2024-12-31 PROCEDURE — 7100000010 HC PHASE II RECOVERY - FIRST 15 MIN: Performed by: ORTHOPAEDIC SURGERY

## 2024-12-31 PROCEDURE — 2500000003 HC RX 250 WO HCPCS: Performed by: NURSE ANESTHETIST, CERTIFIED REGISTERED

## 2024-12-31 PROCEDURE — 3700000001 HC ADD 15 MINUTES (ANESTHESIA): Performed by: ORTHOPAEDIC SURGERY

## 2024-12-31 PROCEDURE — 7100000000 HC PACU RECOVERY - FIRST 15 MIN: Performed by: ORTHOPAEDIC SURGERY

## 2024-12-31 DEVICE — FEMORAL HEAD Ø 28 SIZE S
Type: IMPLANTABLE DEVICE | Site: HIP | Status: FUNCTIONAL
Brand: COCR FEMORAL BALL HEAD

## 2024-12-31 DEVICE — MASTERLOC CEMENTLESS TI COATED LAT STEM # 7
Type: IMPLANTABLE DEVICE | Site: HIP | Status: FUNCTIONAL
Brand: MASTERLOC FEMORAL STEMS

## 2024-12-31 DEVICE — HC PE LINER 28 / DMF
Type: IMPLANTABLE DEVICE | Site: HIP | Status: FUNCTIONAL
Brand: DOUBLE MOBILITY LINER

## 2024-12-31 DEVICE — DOUBLE MOBILITY ACETABULAR SHELL Ø52
Type: IMPLANTABLE DEVICE | Site: HIP | Status: FUNCTIONAL
Brand: MPACT DOUBLE MOBILITY SHELLS

## 2024-12-31 RX ORDER — OXYCODONE AND ACETAMINOPHEN 5; 325 MG/1; MG/1
1-2 TABLET ORAL EVERY 6 HOURS PRN
Qty: 48 TABLET | Refills: 0 | Status: SHIPPED | OUTPATIENT
Start: 2024-12-31 | End: 2025-01-07

## 2024-12-31 RX ORDER — ASCORBIC ACID 500 MG
500 TABLET ORAL NIGHTLY
Status: CANCELLED | OUTPATIENT
Start: 2024-12-31

## 2024-12-31 RX ORDER — ERGOCALCIFEROL 1.25 MG/1
50000 CAPSULE, LIQUID FILLED ORAL WEEKLY
Status: CANCELLED | OUTPATIENT
Start: 2024-12-31

## 2024-12-31 RX ORDER — SODIUM CHLORIDE 0.9 % (FLUSH) 0.9 %
5-40 SYRINGE (ML) INJECTION PRN
Status: DISCONTINUED | OUTPATIENT
Start: 2024-12-31 | End: 2024-12-31 | Stop reason: HOSPADM

## 2024-12-31 RX ORDER — METOCLOPRAMIDE HYDROCHLORIDE 5 MG/ML
10 INJECTION INTRAMUSCULAR; INTRAVENOUS
Status: DISCONTINUED | OUTPATIENT
Start: 2024-12-31 | End: 2024-12-31 | Stop reason: HOSPADM

## 2024-12-31 RX ORDER — ASPIRIN 81 MG/1
81 TABLET, CHEWABLE ORAL 2 TIMES DAILY
Status: CANCELLED | OUTPATIENT
Start: 2025-01-01

## 2024-12-31 RX ORDER — SODIUM CHLORIDE 0.9 % (FLUSH) 0.9 %
5-40 SYRINGE (ML) INJECTION EVERY 12 HOURS SCHEDULED
Status: CANCELLED | OUTPATIENT
Start: 2024-12-31

## 2024-12-31 RX ORDER — SODIUM CHLORIDE 0.9 % (FLUSH) 0.9 %
5-40 SYRINGE (ML) INJECTION EVERY 12 HOURS SCHEDULED
Status: DISCONTINUED | OUTPATIENT
Start: 2024-12-31 | End: 2024-12-31 | Stop reason: HOSPADM

## 2024-12-31 RX ORDER — BUPROPION HYDROCHLORIDE 150 MG/1
150 TABLET ORAL EVERY MORNING
Status: CANCELLED | OUTPATIENT
Start: 2024-12-31

## 2024-12-31 RX ORDER — CAL/D3/MAG11/ZINC/COP/MANG/BOR 600 MG-800
1 TABLET ORAL 2 TIMES DAILY
Status: CANCELLED | OUTPATIENT
Start: 2024-12-31

## 2024-12-31 RX ORDER — ASPIRIN 81 MG/1
81 TABLET ORAL 2 TIMES DAILY
Qty: 70 TABLET | Refills: 0 | Status: SHIPPED | OUTPATIENT
Start: 2024-12-31 | End: 2025-02-04

## 2024-12-31 RX ORDER — FENTANYL CITRATE 50 UG/ML
25 INJECTION, SOLUTION INTRAMUSCULAR; INTRAVENOUS EVERY 5 MIN PRN
Status: DISCONTINUED | OUTPATIENT
Start: 2024-12-31 | End: 2024-12-31 | Stop reason: HOSPADM

## 2024-12-31 RX ORDER — FERROUS SULFATE 325(65) MG
650 TABLET ORAL NIGHTLY
Status: CANCELLED | OUTPATIENT
Start: 2024-12-31

## 2024-12-31 RX ORDER — FENTANYL CITRATE 50 UG/ML
INJECTION, SOLUTION INTRAMUSCULAR; INTRAVENOUS
Status: DISCONTINUED | OUTPATIENT
Start: 2024-12-31 | End: 2024-12-31 | Stop reason: SDUPTHER

## 2024-12-31 RX ORDER — ONDANSETRON 2 MG/ML
INJECTION INTRAMUSCULAR; INTRAVENOUS
Status: DISCONTINUED | OUTPATIENT
Start: 2024-12-31 | End: 2024-12-31 | Stop reason: SDUPTHER

## 2024-12-31 RX ORDER — GABAPENTIN 600 MG/1
300 TABLET ORAL NIGHTLY
Status: CANCELLED | OUTPATIENT
Start: 2024-12-31

## 2024-12-31 RX ORDER — TRANEXAMIC ACID 100 MG/ML
INJECTION, SOLUTION INTRAVENOUS
Status: COMPLETED
Start: 2024-12-31 | End: 2024-12-31

## 2024-12-31 RX ORDER — M-VIT,TX,IRON,MINS/CALC/FOLIC 27MG-0.4MG
1 TABLET ORAL DAILY
Status: CANCELLED | OUTPATIENT
Start: 2024-12-31

## 2024-12-31 RX ORDER — MIDAZOLAM HYDROCHLORIDE 1 MG/ML
INJECTION, SOLUTION INTRAMUSCULAR; INTRAVENOUS
Status: DISCONTINUED | OUTPATIENT
Start: 2024-12-31 | End: 2024-12-31 | Stop reason: SDUPTHER

## 2024-12-31 RX ORDER — KETOROLAC TROMETHAMINE 30 MG/ML
30 INJECTION, SOLUTION INTRAMUSCULAR; INTRAVENOUS EVERY 6 HOURS
Status: CANCELLED | OUTPATIENT
Start: 2024-12-31 | End: 2025-01-02

## 2024-12-31 RX ORDER — SODIUM CHLORIDE 9 MG/ML
INJECTION, SOLUTION INTRAVENOUS PRN
Status: CANCELLED | OUTPATIENT
Start: 2024-12-31

## 2024-12-31 RX ORDER — SODIUM CHLORIDE 9 MG/ML
INJECTION, SOLUTION INTRAVENOUS CONTINUOUS
Status: DISCONTINUED | OUTPATIENT
Start: 2024-12-31 | End: 2024-12-31 | Stop reason: HOSPADM

## 2024-12-31 RX ORDER — SODIUM CHLORIDE 9 MG/ML
INJECTION, SOLUTION INTRAVENOUS CONTINUOUS
Status: CANCELLED | OUTPATIENT
Start: 2024-12-31

## 2024-12-31 RX ORDER — OXYCODONE HYDROCHLORIDE 5 MG/1
10 TABLET ORAL EVERY 4 HOURS PRN
Status: DISCONTINUED | OUTPATIENT
Start: 2024-12-31 | End: 2024-12-31 | Stop reason: HOSPADM

## 2024-12-31 RX ORDER — ACETAMINOPHEN 500 MG
1000 TABLET ORAL EVERY 6 HOURS
Status: CANCELLED | OUTPATIENT
Start: 2024-12-31

## 2024-12-31 RX ORDER — SODIUM CHLORIDE 0.9 % (FLUSH) 0.9 %
5-40 SYRINGE (ML) INJECTION PRN
Status: CANCELLED | OUTPATIENT
Start: 2024-12-31

## 2024-12-31 RX ORDER — GLYCOPYRROLATE 0.2 MG/ML
INJECTION INTRAMUSCULAR; INTRAVENOUS
Status: DISCONTINUED | OUTPATIENT
Start: 2024-12-31 | End: 2024-12-31 | Stop reason: SDUPTHER

## 2024-12-31 RX ORDER — SODIUM CHLORIDE 9 MG/ML
INJECTION, SOLUTION INTRAVENOUS PRN
Status: DISCONTINUED | OUTPATIENT
Start: 2024-12-31 | End: 2024-12-31 | Stop reason: HOSPADM

## 2024-12-31 RX ORDER — OXYCODONE HYDROCHLORIDE 5 MG/1
5 TABLET ORAL EVERY 4 HOURS PRN
Status: DISCONTINUED | OUTPATIENT
Start: 2024-12-31 | End: 2024-12-31 | Stop reason: HOSPADM

## 2024-12-31 RX ORDER — OXYCODONE HYDROCHLORIDE 5 MG/1
5 TABLET ORAL
Status: DISCONTINUED | OUTPATIENT
Start: 2024-12-31 | End: 2024-12-31 | Stop reason: HOSPADM

## 2024-12-31 RX ORDER — HALOPERIDOL 5 MG/ML
1 INJECTION INTRAMUSCULAR
Status: DISCONTINUED | OUTPATIENT
Start: 2024-12-31 | End: 2024-12-31 | Stop reason: HOSPADM

## 2024-12-31 RX ORDER — VANCOMYCIN HYDROCHLORIDE 1 G/20ML
INJECTION, POWDER, LYOPHILIZED, FOR SOLUTION INTRAVENOUS
Status: DISCONTINUED
Start: 2024-12-31 | End: 2024-12-31 | Stop reason: HOSPADM

## 2024-12-31 RX ORDER — CEFAZOLIN SODIUM/WATER 2 G/20 ML
2000 SYRINGE (ML) INTRAVENOUS EVERY 8 HOURS
Status: CANCELLED | OUTPATIENT
Start: 2024-12-31 | End: 2025-01-01

## 2024-12-31 RX ORDER — VANCOMYCIN HYDROCHLORIDE 1 G/20ML
INJECTION, POWDER, LYOPHILIZED, FOR SOLUTION INTRAVENOUS PRN
Status: DISCONTINUED | OUTPATIENT
Start: 2024-12-31 | End: 2024-12-31 | Stop reason: ALTCHOICE

## 2024-12-31 RX ORDER — ACETAMINOPHEN 325 MG/1
650 TABLET ORAL
Status: CANCELLED | OUTPATIENT
Start: 2024-12-31

## 2024-12-31 RX ORDER — ACETAMINOPHEN 325 MG/1
TABLET ORAL
Status: COMPLETED
Start: 2024-12-31 | End: 2024-12-31

## 2024-12-31 RX ORDER — METOPROLOL TARTRATE 25 MG/1
25 TABLET, FILM COATED ORAL 2 TIMES DAILY
Status: CANCELLED | OUTPATIENT
Start: 2024-12-31

## 2024-12-31 RX ORDER — CEFAZOLIN SODIUM/WATER 2 G/20 ML
2000 SYRINGE (ML) INTRAVENOUS ONCE
Status: COMPLETED | OUTPATIENT
Start: 2024-12-31 | End: 2024-12-31

## 2024-12-31 RX ORDER — ALENDRONATE SODIUM 70 MG/1
70 TABLET ORAL
Status: CANCELLED | OUTPATIENT
Start: 2024-12-31

## 2024-12-31 RX ORDER — NALOXONE HYDROCHLORIDE 0.4 MG/ML
INJECTION, SOLUTION INTRAMUSCULAR; INTRAVENOUS; SUBCUTANEOUS PRN
Status: DISCONTINUED | OUTPATIENT
Start: 2024-12-31 | End: 2024-12-31 | Stop reason: HOSPADM

## 2024-12-31 RX ORDER — DEXAMETHASONE SODIUM PHOSPHATE 10 MG/ML
INJECTION, SOLUTION INTRAMUSCULAR; INTRAVENOUS
Status: DISCONTINUED | OUTPATIENT
Start: 2024-12-31 | End: 2024-12-31 | Stop reason: SDUPTHER

## 2024-12-31 RX ORDER — MAGNESIUM HYDROXIDE 1200 MG/15ML
LIQUID ORAL CONTINUOUS PRN
Status: COMPLETED | OUTPATIENT
Start: 2024-12-31 | End: 2024-12-31

## 2024-12-31 RX ORDER — LIDOCAINE HYDROCHLORIDE 10 MG/ML
1 INJECTION, SOLUTION EPIDURAL; INFILTRATION; INTRACAUDAL; PERINEURAL
Status: DISCONTINUED | OUTPATIENT
Start: 2025-01-01 | End: 2024-12-31 | Stop reason: HOSPADM

## 2024-12-31 RX ORDER — SODIUM CHLORIDE, SODIUM LACTATE, POTASSIUM CHLORIDE, CALCIUM CHLORIDE 600; 310; 30; 20 MG/100ML; MG/100ML; MG/100ML; MG/100ML
INJECTION, SOLUTION INTRAVENOUS CONTINUOUS
Status: DISCONTINUED | OUTPATIENT
Start: 2024-12-31 | End: 2024-12-31 | Stop reason: HOSPADM

## 2024-12-31 RX ORDER — ROCURONIUM BROMIDE 10 MG/ML
INJECTION, SOLUTION INTRAVENOUS
Status: DISCONTINUED | OUTPATIENT
Start: 2024-12-31 | End: 2024-12-31 | Stop reason: SDUPTHER

## 2024-12-31 RX ORDER — CELECOXIB 200 MG/1
200 CAPSULE ORAL ONCE
Status: COMPLETED | OUTPATIENT
Start: 2024-12-31 | End: 2024-12-31

## 2024-12-31 RX ORDER — MAGNESIUM OXIDE/MAG AA CHELATE 300 MG
2 CAPSULE ORAL DAILY
COMMUNITY

## 2024-12-31 RX ORDER — TRANEXAMIC ACID 100 MG/ML
INJECTION, SOLUTION INTRAVENOUS
Status: DISCONTINUED | OUTPATIENT
Start: 2024-12-31 | End: 2024-12-31 | Stop reason: SDUPTHER

## 2024-12-31 RX ORDER — MAGNESIUM OXIDE/MAG AA CHELATE 300 MG
2 CAPSULE ORAL DAILY
Status: CANCELLED | OUTPATIENT
Start: 2024-12-31

## 2024-12-31 RX ORDER — SODIUM CHLORIDE 9 MG/ML
INJECTION, SOLUTION INTRAVENOUS
Status: DISCONTINUED | OUTPATIENT
Start: 2024-12-31 | End: 2024-12-31 | Stop reason: SDUPTHER

## 2024-12-31 RX ORDER — MELOXICAM 7.5 MG/1
15 TABLET ORAL DAILY
Status: CANCELLED | OUTPATIENT
Start: 2024-12-31

## 2024-12-31 RX ORDER — PROPOFOL 10 MG/ML
INJECTION, EMULSION INTRAVENOUS
Status: DISCONTINUED | OUTPATIENT
Start: 2024-12-31 | End: 2024-12-31 | Stop reason: SDUPTHER

## 2024-12-31 RX ORDER — DICYCLOMINE HYDROCHLORIDE 10 MG/1
10 CAPSULE ORAL
Status: CANCELLED | OUTPATIENT
Start: 2024-12-31

## 2024-12-31 RX ORDER — HYDROMORPHONE HYDROCHLORIDE 1 MG/ML
0.5 INJECTION, SOLUTION INTRAMUSCULAR; INTRAVENOUS; SUBCUTANEOUS EVERY 5 MIN PRN
Status: DISCONTINUED | OUTPATIENT
Start: 2024-12-31 | End: 2024-12-31 | Stop reason: HOSPADM

## 2024-12-31 RX ORDER — CETIRIZINE HYDROCHLORIDE 10 MG/1
10 TABLET ORAL DAILY
Status: CANCELLED | OUTPATIENT
Start: 2024-12-31

## 2024-12-31 RX ORDER — LIDOCAINE HYDROCHLORIDE 20 MG/ML
INJECTION, SOLUTION EPIDURAL; INFILTRATION; INTRACAUDAL; PERINEURAL
Status: DISCONTINUED | OUTPATIENT
Start: 2024-12-31 | End: 2024-12-31 | Stop reason: SDUPTHER

## 2024-12-31 RX ORDER — ACETAMINOPHEN 325 MG/1
650 TABLET ORAL ONCE
Status: COMPLETED | OUTPATIENT
Start: 2024-12-31 | End: 2024-12-31

## 2024-12-31 RX ORDER — PAROXETINE 20 MG/1
40 TABLET, FILM COATED ORAL EVERY MORNING
Status: CANCELLED | OUTPATIENT
Start: 2024-12-31

## 2024-12-31 RX ORDER — GABAPENTIN 300 MG/1
300 CAPSULE ORAL ONCE
Status: COMPLETED | OUTPATIENT
Start: 2024-12-31 | End: 2024-12-31

## 2024-12-31 RX ORDER — ACETAMINOPHEN 500 MG
1000 TABLET ORAL ONCE
Status: COMPLETED | OUTPATIENT
Start: 2024-12-31 | End: 2024-12-31

## 2024-12-31 RX ORDER — DOCUSATE SODIUM 100 MG/1
100 CAPSULE, LIQUID FILLED ORAL 2 TIMES DAILY PRN
Qty: 60 CAPSULE | Refills: 0 | Status: SHIPPED | OUTPATIENT
Start: 2024-12-31

## 2024-12-31 RX ORDER — ALPRAZOLAM 1 MG/1
1 TABLET ORAL 2 TIMES DAILY PRN
Status: CANCELLED | OUTPATIENT
Start: 2024-12-31

## 2024-12-31 RX ORDER — ATORVASTATIN CALCIUM 10 MG/1
10 TABLET, FILM COATED ORAL DAILY
Status: CANCELLED | OUTPATIENT
Start: 2024-12-31

## 2024-12-31 RX ADMIN — TRANEXAMIC ACID 1000 MG: 100 INJECTION, SOLUTION INTRAVENOUS at 08:32

## 2024-12-31 RX ADMIN — SODIUM CHLORIDE: 9 INJECTION, SOLUTION INTRAVENOUS at 08:06

## 2024-12-31 RX ADMIN — GABAPENTIN 300 MG: 300 CAPSULE ORAL at 06:45

## 2024-12-31 RX ADMIN — SODIUM CHLORIDE: 9 INJECTION, SOLUTION INTRAVENOUS at 07:24

## 2024-12-31 RX ADMIN — PROPOFOL 50 MG: 10 INJECTION, EMULSION INTRAVENOUS at 07:55

## 2024-12-31 RX ADMIN — ACETAMINOPHEN 650 MG: 325 TABLET ORAL at 12:31

## 2024-12-31 RX ADMIN — HYDROMORPHONE HYDROCHLORIDE 0.5 MG: 1 INJECTION, SOLUTION INTRAMUSCULAR; INTRAVENOUS; SUBCUTANEOUS at 09:27

## 2024-12-31 RX ADMIN — ONDANSETRON 4 MG: 2 INJECTION, SOLUTION INTRAMUSCULAR; INTRAVENOUS at 08:55

## 2024-12-31 RX ADMIN — TRANEXAMIC ACID 1000 MG: 100 INJECTION, SOLUTION INTRAVENOUS at 07:42

## 2024-12-31 RX ADMIN — DEXAMETHASONE SODIUM PHOSPHATE 10 MG: 10 INJECTION INTRAMUSCULAR; INTRAVENOUS at 07:38

## 2024-12-31 RX ADMIN — Medication 2000 MG: at 07:39

## 2024-12-31 RX ADMIN — HYDROMORPHONE HYDROCHLORIDE 0.5 MG: 1 INJECTION, SOLUTION INTRAMUSCULAR; INTRAVENOUS; SUBCUTANEOUS at 09:37

## 2024-12-31 RX ADMIN — FENTANYL CITRATE 50 MCG: 50 INJECTION INTRAMUSCULAR; INTRAVENOUS at 07:51

## 2024-12-31 RX ADMIN — ACETAMINOPHEN 1000 MG: 500 TABLET ORAL at 06:45

## 2024-12-31 RX ADMIN — FENTANYL CITRATE 100 MCG: 50 INJECTION INTRAMUSCULAR; INTRAVENOUS at 07:29

## 2024-12-31 RX ADMIN — HYDROMORPHONE HYDROCHLORIDE 0.5 MG: 1 INJECTION, SOLUTION INTRAMUSCULAR; INTRAVENOUS; SUBCUTANEOUS at 10:03

## 2024-12-31 RX ADMIN — SUGAMMADEX 200 MG: 100 INJECTION, SOLUTION INTRAVENOUS at 09:13

## 2024-12-31 RX ADMIN — SODIUM CHLORIDE: 9 INJECTION, SOLUTION INTRAVENOUS at 06:44

## 2024-12-31 RX ADMIN — LIDOCAINE HYDROCHLORIDE 60 MG: 20 INJECTION, SOLUTION EPIDURAL; INFILTRATION; INTRACAUDAL; PERINEURAL at 07:29

## 2024-12-31 RX ADMIN — ROCURONIUM BROMIDE 50 MG: 10 INJECTION, SOLUTION INTRAVENOUS at 07:29

## 2024-12-31 RX ADMIN — PROPOFOL 150 MG: 10 INJECTION, EMULSION INTRAVENOUS at 07:29

## 2024-12-31 RX ADMIN — GLYCOPYRROLATE 0.3 MG: 0.2 INJECTION INTRAMUSCULAR; INTRAVENOUS at 08:55

## 2024-12-31 RX ADMIN — CELECOXIB 200 MG: 200 CAPSULE ORAL at 06:45

## 2024-12-31 RX ADMIN — FENTANYL CITRATE 50 MCG: 50 INJECTION INTRAMUSCULAR; INTRAVENOUS at 08:26

## 2024-12-31 RX ADMIN — MIDAZOLAM 2 MG: 1 INJECTION INTRAMUSCULAR; INTRAVENOUS at 07:24

## 2024-12-31 ASSESSMENT — PAIN - FUNCTIONAL ASSESSMENT
PAIN_FUNCTIONAL_ASSESSMENT: 0-10
PAIN_FUNCTIONAL_ASSESSMENT: 0-10

## 2024-12-31 ASSESSMENT — PAIN DESCRIPTION - DESCRIPTORS
DESCRIPTORS: ACHING
DESCRIPTORS: ACHING
DESCRIPTORS: ACHING;SORE
DESCRIPTORS: ACHING;SORE

## 2024-12-31 ASSESSMENT — PAIN DESCRIPTION - ORIENTATION
ORIENTATION: RIGHT
ORIENTATION: RIGHT

## 2024-12-31 ASSESSMENT — PAIN DESCRIPTION - LOCATION
LOCATION: HIP
LOCATION: HIP

## 2024-12-31 ASSESSMENT — PAIN SCALES - GENERAL
PAINLEVEL_OUTOF10: 8
PAINLEVEL_OUTOF10: 6
PAINLEVEL_OUTOF10: 7
PAINLEVEL_OUTOF10: 3

## 2024-12-31 NOTE — PROGRESS NOTES
Physical Therapy  Facility/Department: [facility] OR  Physical Therapy Initial Assessment    Name: Sudha Aguilera  : 1960  MRN: [id number]  Date of Service: 2024    Per H&P:64 y.o. female who arrived for her scheduled RIGHT TOTAL HIP ARTHROPLASTY ANTERIOR APPROACH by Franicsco Rodriguez DO for Arthritis of right hip. The patient denies new health changes, fever, chills, wheezing, cough, increased SOB, chest pain, open sores or wounds. No DM  Last Mobic and vitamins week ago.    Date of Procedure: 2024  Pre-Op Diagnosis Codes:      * Arthritis of right hip [M16.11]  Post-Op Diagnosis: Right hip severe degenerative joint disease  Procedure(s):  RIGHT TOTAL HIP ARTHROPLASTY ANTERIOR APPROACH  Surgeon(s):  Francisco Rodriguez DO  Assistant:  Resident: Bernard Huggins DO  Anesthesia: General  Estimated Blood Loss (mL): 400  Complications: None       RN reports patient is medically stable for therapy treatment this date.    Chart reviewed prior to treatment and patient is agreeable for therapy.  All lines intact and patient positioned comfortably at end of treatment.  All patient needs addressed prior to ending therapy session.     Discharge Recommendations:  Patient would benefit from continued therapy after discharge   PT Equipment Recommendations  Equipment Needed: Yes  Mobility Devices: Walker  Walker: Rolling (jr walker)      Patient Diagnosis(es): The primary encounter diagnosis was Arthritis of right hip. Diagnoses of History of total knee arthroplasty, bilateral, Psychophysiological insomnia, and History of total hip arthroplasty, left were also pertinent to this visit.  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

## 2024-12-31 NOTE — PROGRESS NOTES
Occupational Therapy  Facility/Department: [facility] OR  Occupational Therapy Initial Assessment    Name: Sudha Aguilera  : 1960  MRN: [id number]  Date of Service: 2024    RN reports patient is medically stable for therapy treatment this date.  Chart reviewed prior to treatment and patient is agreeable for therapy.  All lines intact and patient positioned comfortably at end of treatment.  All patient needs addressed prior to ending therapy session.       Discharge Recommendations:  Home with assist PRN  OT Equipment Recommendations  Equipment Needed: No (Pt owns equipment)       Pt is s/p R FARHAN, anterior approach, on 2024 - Dr. Rodriguez.      Patient Diagnosis(es): The primary encounter diagnosis was Arthritis of right hip. Diagnoses of History of total knee arthroplasty, bilateral, Psychophysiological insomnia, and History of total hip arthroplasty, left were also pertinent to this visit.  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  Past Surgical History:  has a past surgical history that includes joint replacement (Left, ); Mandible surgery; joint replacement (Right, 2016); Total hip arthroplasty (Left, 10/25/2016); Shoulder arthroscopy (Left, 2023); Eye surgery (Right); and Total hip arthroplasty (Right, 2024).           Assessment  Performance deficits / Impairments: Decreased functional mobility ;Decreased ADL status;Decreased safe awareness;Decreased balance;Decreased endurance;Decreased high-level IADLs;Decreased sensation  Assessment: Pt presents

## 2024-12-31 NOTE — PLAN OF CARE
PROTOCOLS  NURSING IMPLEMENTED    TOTAL JOINT DVT/PE  VENOUS THROMBOEMBOLISM PROPHYLAXIS  (Nursing Automatically Implement)    Sudha Aguilera  [id number]  [unfilled]  12/31/24    YES DVT RISK FACTOR SCORE YES MAJOR BLEEDING RISK FACTORS SCORE     [x] 50 years old or greater (1)   [] Hx. Easy Bleeding (1)      [] Heart failure (2)   [] NSAID Use in Last 5 Days (2)      [] Varicose veins - Hx. (1)   [] Gastrointestinal or Genitourinary bleeding in Last 14 Days (2)      [] Myocardial Infarction - Hx. (1)         [] Cancer - Hx. (2)         [] Atrial fibrillation - Hx. (1)         [] Ischemic Stroke - Hx. (1)         [] Diabetes Mellitus - Hx. (1)         [] Previous DVT/PE - Hx. (2)         [] Hormone Replacement Therapy (1)         [] Obesity (1)         [] Paralysis (1)         [] Pregnancy (1)         [] Smoking (1)                   [] Thromophilia (1)   []   Mild to Moderate Bleeding (2)      [x] Total Hip Arthroplasty (1)   [] Active Bleeding (4)      [] Family history of PE or DVT? (4) (Consider the following labs to test for presence of inhibitor deficiency state:) Factor V Leiden, Prothrombin Gene Mutation, Protein S Deficiency, Protien C Deficiency, Antithrombin Deficiency   [] Malignant Hypertension (2)        [] Thrombocytopenia 20k to 100k (2)        [] Thrombocytopenia less than 20k (4)        [] Bleeding Diathesis (4)        [] \"Bloody Stick\" Epidural or Spinal (2)     TOTAL DVT SCORE 2  TOTAL BLEEDING SCORE      [x] CLASS A   Standard Risk DVT (0-3) 2   [x] CLASS X Standard Risk Bleeding (0-4)      [] CLASS B Elevated Risk DVT (greater than 3)    [] CLASS Y High Risk Bleeding (greater than 4)     FINAL MATRIX (e.g. AY)       *If allergic to ASA use Warfarin  *BY patient consider no treatment  **Consider venous filter with high risk PE  **If on Coumadin pre-op, then restart night of surgery      [x]  DVT Prophylaxis: Class AX, AY    Ecotrin 81 mg by mouth BID starting day of surgery for 6 weeks for

## 2024-12-31 NOTE — PROGRESS NOTES
Physical Therapy    DATE: 2024    NAME: Sudha Aguilera  MRN: [id number]   : 1960    Patient not seen this date for Physical Therapy due to:      [x] Cancel by RN - not recovered from anesthesia yet; BP low.           SHANNAN BARRAZA, PT

## 2024-12-31 NOTE — CARE COORDINATION
DC Planning    Spoke with Ayanna in surgery-relays pt does NOT have a walker for home, PT recs alexandra walker, sent walker order and face to face to [facility]. Pt family will need to pick walker up from at [address] [place] [address] [phone] .

## 2024-12-31 NOTE — PROGRESS NOTES
Sudha CUADRA Serafindonato was evaluated today and a DME order was entered for a wheeled walker because she requires this to successfully complete daily living tasks of ambulating.  A wheeled walker is necessary due to the patient's unsteady gait, upper body weakness, and inability to  an ambulation device; and she can ambulate only by pushing a walker instead of a lesser assistive device such as a cane, crutch, or standard walker.  The need for this equipment was discussed with the patient and she understands and is in agreement.

## 2024-12-31 NOTE — PROGRESS NOTES
Orthopedic Coordinator Note    Patient s/p right total Hip replacement on 12/31/2024 WITH DR. KEENE.    The following appointments are currently scheduled:    Post-op with surgeon 01/14/2025 AT 1045 IN [place] WITH CHELSEY GHOSH.    Physical Therapy OPPT [place] 01/03/2025 AT 1145.    PT HAS A FWW.    DVT Prophylaxis: 81MG ASA BID X 6 WEEKS POSTOP.    PT CAME TO JOINT CLASS   12/04/2024.    PT IS A SDD.    Any questions please contact Patience Cornejo RN, MSN  [phone]      Electronically signed by: PATIENCE CORNEJO RN on 12/31/2024 at 8:48 AM

## 2024-12-31 NOTE — DISCHARGE INSTRUCTIONS
pus, fever)  -Take medications as prescribed.  -Wean off narcotics (percocet/norco) as soon as possible. Do not take tylenol if still taking narcotics.  -Follow up with Dr. Rodriguez in his office 10-14 days after surgery. Call [phone] with any questions/concerns.

## 2024-12-31 NOTE — ANESTHESIA POSTPROCEDURE EVALUATION
Department of Anesthesiology  Postprocedure Note    Patient: Sudha Aguilera  MRN: [id number]  YOB: 1960  Date of evaluation: 12/31/2024    Procedure Summary       Date: 12/31/24 Room / Location: [address]    Anesthesia Start: 0724 Anesthesia Stop: 0924    Procedure: RIGHT TOTAL HIP ARTHROPLASTY ANTERIOR APPROACH (Right: Hip) Diagnosis:       Arthritis of right hip      (Arthritis of right hip [M16.11])    Surgeons: Francisco Rodriguez DO Responsible Provider: Maisha Champagne MD    Anesthesia Type: General ASA Status: 3            Anesthesia Type: General    Victor Hugo Phase I: Victor Hugo Score: 9    Victor Hugo Phase II:      Anesthesia Post Evaluation    Patient location during evaluation: PACU  Patient participation: complete - patient participated  Level of consciousness: awake  Airway patency: patent  Nausea & Vomiting: no nausea  Cardiovascular status: blood pressure returned to baseline  Respiratory status: acceptable  Hydration status: euvolemic  Comments: Multimodal analgesia pain management as indicated by procedure  Multimodal analgesia pain management approach  Pain management: adequate    No notable events documented.

## 2024-12-31 NOTE — H&P
Heart sounds: Normal heart sounds, S1 normal and S2 normal. No murmur heard.  Pulmonary:      Effort: Pulmonary effort is normal. No accessory muscle usage, prolonged expiration or respiratory distress.      Breath sounds: Normal breath sounds and air entry. No wheezing, rhonchi or rales.   Musculoskeletal:      Cervical back: No torticollis. No pain with movement. Normal range of motion.      Left hip: Tenderness present. Decreased range of motion.      Right lower leg: No edema.      Left lower leg: No edema.   Lymphadenopathy:      Cervical: No cervical adenopathy.   Skin:     General: Skin is warm and dry.      Coloration: Skin is not ashen, cyanotic, jaundiced or pale.   Neurological:      General: No focal deficit present.      Mental Status: She is alert and oriented to person, place, and time.      Motor: Motor function is intact.      Gait: Gait is intact.   Psychiatric:         Attention and Perception: Attention and perception normal.         Mood and Affect: Mood and affect normal.         Speech: Speech normal.         Behavior: Behavior normal. Behavior is cooperative.         Thought Content: Thought content normal. Thought content does not include suicidal ideation. Thought content does not include suicidal plan.         Cognition and Memory: Cognition and memory normal.         Judgment: Judgment normal.       Electronically signed by SLOAN Cote - CNP, SLOAN-CNP on 12/4/2024 at 12:18 PM    Please note that this chart was generated using voice recognition Dragon dictation software.  Although every effort was made to ensure the accuracy of this automated transcription, some errors in transcription may have occurred.

## 2024-12-31 NOTE — PLAN OF CARE
Face-to-Face    Patient seen and examined.  Discussed the need for medical equipment to assist with their recovery during the post-operative period.  Based on the patient's current physical condition and post-operative restrictions, we have determined that they will need: dominique Huggins DO, DO  Orthopedic Surgery Resident, PGY-4  [place], [place]  4:01 PM 12/31/2024

## 2024-12-31 NOTE — CARE COORDINATION
DC Planning    Face to face faxed  for walker faxed to [facility] spoke with Pardeep [place] face to face needs addended per Medicare policy. PS to ortho Dr. Huggins. Pt will borrow a walker until can get alexandra walker from [facility].

## 2024-12-31 NOTE — BRIEF OP NOTE
Brief Postoperative Note      Patient: Sudha Aguilera  YOB: 1960  MRN: [id number]    Date of Procedure: 12/31/2024    Pre-Op Diagnosis Codes:      * Arthritis of right hip [M16.11]    Post-Op Diagnosis: Right hip severe degenerative joint disease       Procedure(s):  RIGHT TOTAL HIP ARTHROPLASTY ANTERIOR APPROACH    Surgeon(s):  Francisco Rodriguez DO    Assistant:  Resident: Bernard Huggins DO    Anesthesia: General    Estimated Blood Loss (mL): 400    Complications: None    Specimens:   * No specimens in log *    Implants:  Implant Name Type Inv. Item Serial No.  Lot No. LRB No. Used Action   SHELL ACET OD52MM LNR DMF DBL MOBILITY MPACT - [id number]  SHELL ACET OD52MM LNR DMF DBL MOBILITY MPACT  [employer]- [id number] Right 1 Implanted   STEM FEM SZ 7 LAT HIP MECTAGRIP MASTERLOC - [id number]  STEM FEM SZ 7 LAT HIP MECTAGRIP MASTERLOC  [employer]-WD [id number] Right 1 Implanted   LINER ACET SZ DMF OD52MM ID28MM BLK HIP HIGHCROSS DBL - [id number]  LINER ACET SZ DMF OD52MM ID28MM BLK HIP HIGHCROSS DBL  [employer]-WD [id number] Right 1 Implanted   HEAD FEM SM [id number] HIP CO CHROM AMISTEM - [id number]  HEAD FEM SM [id number] HIP CO CHROM AMISTEM  [employer]-WD [id number] Right 1 Implanted         Drains: * No LDAs found *    Findings:  Infection Present At Time Of Surgery (PATOS) (choose all levels that have infection present):  No infection present  Other Findings: Diffuse right hip severe degenerative joint disease    Electronically signed by Francisco Rodriguez DO on 12/31/2024 at 8:49 AM

## 2024-12-31 NOTE — OP NOTE
A femoral neck  cut was then made approximately 1.5-2.0 cm proximal to the lesser  trochanter, and the femoral head was removed from the acetabulum.   Labrum and pulvinar were removed from the acetabulum and then sequential  reaming of the acetabulum was made to the appropriate size.  The  appropriately sized acetabular cup was then placed and impacted at an  appropriate theta angle and version until fully seated.  It was noted be  quite stable, and supplemental screw fixation was not felt to be  necessary.  A monoblock dual mobility acetabular shell was utilized.      Attention was then drawn to exposure of the proximal femur, which was  done by release of the pubofemoral and ischiofemoral ligaments,  retractor placed against the medial proximal femur, external rotation,  extension, and adduction, bringing the proximal femur into the incision.  Once this was done, sequential box osteotome, canal finding reamer, and  broaching of the femoral canal was made until the appropriately sized  broach was felt to be in place.  This was left in place, and trial head  and necks were placed.  Hip was reduced, put through range of motion,  was found to be stable throughout the arc of range of motion.   Superimposed fluoroscopic images showed restoration of leg length and  offset, and the hip was stable throughout the arc of range of motion.   The hip was then gently dislocated.  Trials were removed.  Implantable  components were placed and impacted until fully seated.  Hip was once  again reduced, put through range of motion, was found to be stable with  restoration of leg length and offset on superimposed fluoroscopic  images.    A mixture of 0.3 mg of epinephrine 1-1000, 10 mg of morphine, 15 mg ketorolac, 50 mg ketamine, 150 mg bupivacaine PF 0.5%, 60 mL normal saline, 20 mL Exparel was injected in the deep capsular tissue the subcutaneous tissue, the periosteum.     The hip was thoroughly irrigated with 1/2 liter of

## 2024-12-31 NOTE — PROGRESS NOTES
1428  Discussing plans for home, Patient states she does NOT have a FWW for home. Called and spoke with Mila RN from case management. PS message sent to Dr. Huggins for walker. Mila states once she receives order and Face to Face from Dr. Huggins she will set up walker for patient. Patients sister in law Veronica verbalizes understanding need to  walker from [facility] once order is placed.

## 2024-12-31 NOTE — ANESTHESIA PRE PROCEDURE
01:05 PM    BILITOT 0.9 12/04/2024 01:05 PM    ALKPHOS 85 12/04/2024 01:05 PM    AST 21 12/04/2024 01:05 PM    ALT 14 12/04/2024 01:05 PM       POC Tests: No results for input(s): \"POCGLU\", \"POCNA\", \"POCK\", \"POCCL\", \"POCBUN\", \"POCHEMO\", \"POCHCT\" in the last 72 hours.    Coags: No results found for: \"PROTIME\", \"INR\", \"APTT\"    HCG (If Applicable): No results found for: \"PREGTESTUR\", \"PREGSERUM\", \"HCG\", \"HCGQUANT\"     ABGs: No results found for: \"PHART\", \"PO2ART\", \"NRK9WAC\", \"KBK1ZXZ\", \"BEART\", \"M5TOUZGE\"     Type & Screen (If Applicable):  No results found for: \"LABABO\"    Drug/Infectious Status (If Applicable):  Lab Results   Component Value Date/Time    HEPCAB NONREACTIVE 06/17/2016 11:30 AM       COVID-19 Screening (If Applicable): No results found for: \"COVID19\"        Anesthesia Evaluation     no history of anesthetic complications:   Airway: Mallampati: II          Dental:          Pulmonary:normal exam  breath sounds clear to auscultation                             Cardiovascular:    (+) hypertension:, hyperlipidemia    (-)  angina      Rhythm: regular                   ROS comment: 1/23  CONCLUSIONS     Summary  Left ventricle is normal in size and wall thickness.  Global left ventricular systolic function is normal with an estimated  ejection fraction of 60 - 65 % .  No obvious wall motion abnormality seen.  Grade I (mild) left ventricular diastolic dysfunction.  Mild mitral regurgitation.  Mild tricuspid regurgitation.  No pericardial effusion is seen.        Neuro/Psych:   (+) neuromuscular disease:, psychiatric history:            GI/Hepatic/Renal:   (+) GERD:          Endo/Other:    (+) : arthritis:..                  ROS comment: Lupus   Abdominal:             Vascular:          Other Findings:             Anesthesia Plan      general     ASA 3     (Glide  )  Induction: intravenous.    MIPS: Postoperative opioids intended and Prophylactic antiemetics administered.  Anesthetic plan and risks discussed

## 2025-01-02 ENCOUNTER — CASE MANAGEMENT (OUTPATIENT)
Dept: CASE MANAGEMENT | Age: 65
End: 2025-01-02

## 2025-01-02 ENCOUNTER — TELEPHONE (OUTPATIENT)
Dept: ORTHOPEDIC SURGERY | Age: 65
End: 2025-01-02

## 2025-01-02 DIAGNOSIS — Z96.641 S/P TOTAL RIGHT HIP ARTHROPLASTY: Primary | ICD-10-CM

## 2025-01-02 NOTE — TELEPHONE ENCOUNTER
Right FARHAN DOS 12/31/24    Fell out of sisters car on the date of surgery. States she's sore.    Rescue squad was called however her daughter in law and son in law helped her up off the ground and got her inside. The rescue squad did gave her a look over and evaluation. She was walking around in the kitchen upon their arrival.    She was able to walk around immediately after, she stated that she felt really jolted. She also stated that she is a  bit too sore to start pt tomorrow. So they have delayed her PT.    Her left arm was hurting, its not hurting anymore, denies hitting her head. She states that her posterior is a  bit sore as she landed on her bottom.    She hears a noise in her ear that she never had before she stated which she stated may be from the after effects of the anesthesia.     Gave her advise to always be elevating above heart level. Get up to walk for 10 minutes out of every hour and wear her compression sleeves. And she should be up ambulating with her walker everywhere she goes.

## 2025-01-02 NOTE — PROGRESS NOTES
Sudha Aguilera was evaluated today and a DME order was entered for a wheeled walker because she requires this to successfully complete daily living tasks of ambulating TO THE BATHROOM, LIVING ROOM, KITCHEN AND BEDROOM. .  A wheeled walker is necessary due to the patient's unsteady gait, upper body weakness, and inability to  an ambulation device; and she can ambulate only by pushing a walker instead of a lesser assistive device such as a cane, crutch, or standard walker. WALKING MOBILITY DEFICIENT WILL BE RESOLVED WITH A FRONT WHEELED WALKER. The need for this equipment was discussed with the patient and she understands and is in agreement.

## 2025-01-02 NOTE — TELEPHONE ENCOUNTER
Pt states she fell 12/31 Tuesday getting out of her sister's vehicle. She called 911 and does not believe she popped anything out of socket, but she's sore, especially tailbone pain. She says she is postponing PT until 1/6 Monday, and that they advised her to notify orthopedic office that this had occurred.

## 2025-01-02 NOTE — PROGRESS NOTES
Faced over signed F2F note for JR FWW to [facility] at [phone] to complete order from 12/31/2024.

## 2025-01-03 ENCOUNTER — OFFICE VISIT (OUTPATIENT)
Dept: ORTHOPEDIC SURGERY | Age: 65
End: 2025-01-03

## 2025-01-03 VITALS — HEIGHT: 60 IN | BODY MASS INDEX: 30.43 KG/M2 | RESPIRATION RATE: 15 BRPM | WEIGHT: 155 LBS | OXYGEN SATURATION: 98 %

## 2025-01-03 DIAGNOSIS — Z96.641 STATUS POST RIGHT HIP REPLACEMENT: Primary | ICD-10-CM

## 2025-01-03 PROCEDURE — 99024 POSTOP FOLLOW-UP VISIT: CPT

## 2025-01-03 ASSESSMENT — ENCOUNTER SYMPTOMS
NAUSEA: 0
COLOR CHANGE: 0
VOMITING: 0

## 2025-01-03 NOTE — PROGRESS NOTES
[facility] ORTHOPEDICS AND SPORTS MEDICINE  [address]  [place] [facility] [place] [address]  Dept: [phone]  Dept Fax: [phone]        Postoperative follow-up note    Subjective:   Sudha Aguilera is a 64 y.o. year old female who presents to our office today for postoperative followup regarding her   1. Status post right hip replacement    .    Chief Complaint   Patient presents with    Hip Pain     Right hip pain- FARHAN DOS 12/31/24         History of Present Illness  The patient is a 64-year-old female who presents for evaluation of status post right hip replacement. She is accompanied by her sister.    She underwent a right hip replacement on 12/31/2024, performed by Dr. Rodriguez. On the day of the surgery, she experienced a fall while exiting her vehicle, resulting in tailbone pain. She was supposed to start physical therapy today but was apprehensive about her ability to support herself on the opposite side. She has scheduled an appointment for physical therapy on Monday. She reports no systemic symptoms such as fevers, chills, nausea, vomiting, chest pain, or shortness of breath. Her current pain management regimen includes Percocet 5mg every 5 hours, meloxicam once a day, and Tylenol.  She has been applying ice to the affected area hourly and has been using compression stockings, although she finds them uncomfortable due to their tendency to roll down. She has been elevating her leg as part of her recovery process. She is currently on a twice-daily aspirin regimen.    She reports tailbone pain when sitting, which she has not attempted to alleviate with ice application. She has been using a square cushion with multiple holes for comfort.    Additionally, she reports a new onset of tinnitus in her left ear, which she first noticed upon returning home post-surgery. The tinnitus is more pronounced when she is in a supine

## 2025-01-03 NOTE — PATIENT INSTRUCTIONS
PATIENTIQ:  PatientIQ helps [facility] stay in touch with you to know how you're feeling, and provides education and care instructions to you at various time points.   Your answers help your care team track your progress to provide the best care possible. PatientIQ will contact you pre-op and post-op via email or text with:  Educational Videos and Care Instructions  Questionnaires About How You're Feeling    Your participation provides you valuable education and helps [facility] continue to provide quality care to all patients. Thank you

## 2025-01-03 NOTE — CARE COORDINATION
Transitional Planning:  Received call on 1/3/25 at 1:15 pm from Pardeep at  [phone].  He states they cannot accept the order for the walker because the signing physician is not [place] certified.  They will need a new order faxed to [phone].  Called patient's PCP to see if they could write the order.  They states patient's PCP left the practice over a year ago and cannot write the order.      Spoke with patient who has a borrowed walker and can get by until Monday.  Left  with Dipti Echevarria for follow up on Monday.

## 2025-01-06 DIAGNOSIS — Z96.641 S/P TOTAL RIGHT HIP ARTHROPLASTY: Primary | ICD-10-CM

## 2025-01-06 DIAGNOSIS — M16.11 OSTEOARTHRITIS OF RIGHT HIP, UNSPECIFIED OSTEOARTHRITIS TYPE: Primary | ICD-10-CM

## 2025-01-07 ENCOUNTER — OFFICE VISIT (OUTPATIENT)
Dept: ORTHOPEDIC SURGERY | Age: 65
End: 2025-01-07

## 2025-01-07 ENCOUNTER — APPOINTMENT (OUTPATIENT)
Dept: GENERAL RADIOLOGY | Age: 65
DRG: 467 | End: 2025-01-07
Payer: MEDICARE

## 2025-01-07 ENCOUNTER — HOSPITAL ENCOUNTER (INPATIENT)
Age: 65
LOS: 5 days | Discharge: NURSING, REHAB OR LONG-TERM CARE | DRG: 467 | End: 2025-01-12
Attending: EMERGENCY MEDICINE | Admitting: SURGERY
Payer: MEDICARE

## 2025-01-07 ENCOUNTER — ANESTHESIA EVENT (OUTPATIENT)
Dept: OPERATING ROOM | Age: 65
End: 2025-01-07
Payer: MEDICARE

## 2025-01-07 ENCOUNTER — HOSPITAL ENCOUNTER (OUTPATIENT)
Dept: PHYSICAL THERAPY | Facility: CLINIC | Age: 65
Setting detail: THERAPIES SERIES
Discharge: HOME | End: 2025-01-07
Attending: ORTHOPAEDIC SURGERY

## 2025-01-07 VITALS — BODY MASS INDEX: 31.22 KG/M2 | OXYGEN SATURATION: 97 % | HEIGHT: 60 IN | WEIGHT: 159 LBS | RESPIRATION RATE: 18 BRPM

## 2025-01-07 DIAGNOSIS — F41.1 GAD (GENERALIZED ANXIETY DISORDER): ICD-10-CM

## 2025-01-07 DIAGNOSIS — M97.8XXA PERIPROSTHETIC FRACTURE OF FEMUR FOLLOWING TOTAL REPLACEMENT OF HIP, INITIAL ENCOUNTER: Primary | ICD-10-CM

## 2025-01-07 DIAGNOSIS — Z96.649 PERIPROSTHETIC FRACTURE OF FEMUR FOLLOWING TOTAL REPLACEMENT OF HIP, INITIAL ENCOUNTER: Primary | ICD-10-CM

## 2025-01-07 DIAGNOSIS — M97.8XXA PERIPROSTHETIC FRACTURE AROUND INTERNAL PROSTHETIC HIP JOINT, INITIAL ENCOUNTER: Primary | ICD-10-CM

## 2025-01-07 DIAGNOSIS — Z96.641 STATUS POST TOTAL REPLACEMENT OF RIGHT HIP: ICD-10-CM

## 2025-01-07 DIAGNOSIS — Z96.649 PERIPROSTHETIC FRACTURE AROUND INTERNAL PROSTHETIC HIP JOINT, INITIAL ENCOUNTER: Primary | ICD-10-CM

## 2025-01-07 LAB
25(OH)D3 SERPL-MCNC: 28.6 NG/ML (ref 30–100)
ALBUMIN SERPL-MCNC: 3.1 G/DL (ref 3.5–5.2)
AMPHET UR QL SCN: NEGATIVE
ANION GAP SERPL CALCULATED.3IONS-SCNC: 12 MMOL/L (ref 9–16)
BACTERIA URNS QL MICRO: ABNORMAL
BARBITURATES UR QL SCN: NEGATIVE
BENZODIAZ UR QL: NEGATIVE
BILIRUB UR QL STRIP: NEGATIVE
BODY TEMPERATURE: 37
BUN SERPL-MCNC: 18 MG/DL (ref 8–23)
CANNABINOIDS UR QL SCN: NEGATIVE
CASTS #/AREA URNS LPF: ABNORMAL /LPF (ref 0–8)
CHLORIDE SERPL-SCNC: 107 MMOL/L (ref 98–107)
CLARITY UR: ABNORMAL
CO2 SERPL-SCNC: 25 MMOL/L (ref 20–31)
COCAINE UR QL SCN: NEGATIVE
COHGB MFR BLD: 0.8 % (ref 0–5)
COLOR UR: YELLOW
CREAT SERPL-MCNC: 0.8 MG/DL (ref 0.6–0.9)
EPI CELLS #/AREA URNS HPF: ABNORMAL /HPF (ref 0–5)
ERYTHROCYTE [DISTWIDTH] IN BLOOD BY AUTOMATED COUNT: 15.9 % (ref 11.8–14.4)
EST. AVERAGE GLUCOSE BLD GHB EST-MCNC: 105 MG/DL
ETHANOL PERCENT: <0.01 %
ETHANOLAMINE SERPL-MCNC: <10 MG/DL (ref 0–0.08)
FENTANYL UR QL: POSITIVE
FIO2 ON VENT: ABNORMAL %
FOLATE SERPL-MCNC: 7.6 NG/ML (ref 4.8–24.2)
GFR, ESTIMATED: 81 ML/MIN/1.73M2
GLUCOSE SERPL-MCNC: 102 MG/DL (ref 74–99)
GLUCOSE UR STRIP-MCNC: NEGATIVE MG/DL
HBA1C MFR BLD: 5.3 % (ref 4–6)
HCG SERPL QL: POSITIVE
HCO3 VENOUS: 27 MMOL/L (ref 24–30)
HCT VFR BLD AUTO: 23.8 % (ref 36.3–47.1)
HGB BLD-MCNC: 7.9 G/DL (ref 11.9–15.1)
HGB UR QL STRIP.AUTO: NEGATIVE
INR PPP: 1.1
KETONES UR STRIP-MCNC: NEGATIVE MG/DL
LEUKOCYTE ESTERASE UR QL STRIP: ABNORMAL
MCH RBC QN AUTO: 32.5 PG (ref 25.2–33.5)
MCHC RBC AUTO-ENTMCNC: 33.2 G/DL (ref 28.4–34.8)
MCV RBC AUTO: 97.9 FL (ref 82.6–102.9)
METHADONE UR QL: NEGATIVE
NITRITE UR QL STRIP: NEGATIVE
NRBC BLD-RTO: 0 PER 100 WBC
O2 SAT, VEN: 65.6 % (ref 60–85)
OPIATES UR QL SCN: NEGATIVE
OXYCODONE UR QL SCN: POSITIVE
PARTIAL THROMBOPLASTIN TIME: 25.4 SEC (ref 23–36.5)
PCO2 VENOUS: 40.6 MM HG (ref 39–55)
PCP UR QL SCN: NEGATIVE
PH UR STRIP: 6 [PH] (ref 5–8)
PH VENOUS: 7.44 (ref 7.32–7.42)
PLATELET # BLD AUTO: 359 K/UL (ref 138–453)
PMV BLD AUTO: 10.8 FL (ref 8.1–13.5)
PO2 VENOUS: 32.9 MM HG (ref 30–50)
POSITIVE BASE EXCESS, VEN: 2.7 MMOL/L (ref 0–2)
POTASSIUM SERPL-SCNC: 3 MMOL/L (ref 3.7–5.3)
PROT UR STRIP-MCNC: NEGATIVE MG/DL
PROTHROMBIN TIME: 13.7 SEC (ref 11.7–14.9)
RBC # BLD AUTO: 2.43 M/UL (ref 3.95–5.11)
RBC #/AREA URNS HPF: ABNORMAL /HPF (ref 0–4)
SODIUM SERPL-SCNC: 144 MMOL/L (ref 136–145)
SP GR UR STRIP: 1.01 (ref 1–1.03)
TEST INFORMATION: ABNORMAL
TSH SERPL DL<=0.05 MIU/L-ACNC: 1.33 UIU/ML (ref 0.27–4.2)
UROBILINOGEN UR STRIP-ACNC: NORMAL EU/DL (ref 0–1)
VIT B12 SERPL-MCNC: 396 PG/ML (ref 232–1245)
WBC #/AREA URNS HPF: ABNORMAL /HPF (ref 0–5)
WBC OTHER # BLD: 8.3 K/UL (ref 3.5–11.3)

## 2025-01-07 PROCEDURE — 86850 RBC ANTIBODY SCREEN: CPT

## 2025-01-07 PROCEDURE — 99222 1ST HOSP IP/OBS MODERATE 55: CPT | Performed by: ORTHOPAEDIC SURGERY

## 2025-01-07 PROCEDURE — 86923 COMPATIBILITY TEST ELECTRIC: CPT

## 2025-01-07 PROCEDURE — 6370000000 HC RX 637 (ALT 250 FOR IP)

## 2025-01-07 PROCEDURE — 85027 COMPLETE CBC AUTOMATED: CPT

## 2025-01-07 PROCEDURE — 99285 EMERGENCY DEPT VISIT HI MDM: CPT

## 2025-01-07 PROCEDURE — 84520 ASSAY OF UREA NITROGEN: CPT

## 2025-01-07 PROCEDURE — 96374 THER/PROPH/DIAG INJ IV PUSH: CPT

## 2025-01-07 PROCEDURE — 80051 ELECTROLYTE PANEL: CPT

## 2025-01-07 PROCEDURE — 80307 DRUG TEST PRSMV CHEM ANLYZR: CPT

## 2025-01-07 PROCEDURE — 82565 ASSAY OF CREATININE: CPT

## 2025-01-07 PROCEDURE — 2060000000 HC ICU INTERMEDIATE R&B

## 2025-01-07 PROCEDURE — 99024 POSTOP FOLLOW-UP VISIT: CPT | Performed by: PHYSICIAN ASSISTANT

## 2025-01-07 PROCEDURE — 82746 ASSAY OF FOLIC ACID SERUM: CPT

## 2025-01-07 PROCEDURE — 71045 X-RAY EXAM CHEST 1 VIEW: CPT

## 2025-01-07 PROCEDURE — 84443 ASSAY THYROID STIM HORMONE: CPT

## 2025-01-07 PROCEDURE — 85730 THROMBOPLASTIN TIME PARTIAL: CPT

## 2025-01-07 PROCEDURE — 2500000003 HC RX 250 WO HCPCS

## 2025-01-07 PROCEDURE — 82607 VITAMIN B-12: CPT

## 2025-01-07 PROCEDURE — 81001 URINALYSIS AUTO W/SCOPE: CPT

## 2025-01-07 PROCEDURE — 99222 1ST HOSP IP/OBS MODERATE 55: CPT | Performed by: SURGERY

## 2025-01-07 PROCEDURE — 93005 ELECTROCARDIOGRAM TRACING: CPT

## 2025-01-07 PROCEDURE — 86901 BLOOD TYPING SEROLOGIC RH(D): CPT

## 2025-01-07 PROCEDURE — 82040 ASSAY OF SERUM ALBUMIN: CPT

## 2025-01-07 PROCEDURE — 82306 VITAMIN D 25 HYDROXY: CPT

## 2025-01-07 PROCEDURE — 2580000003 HC RX 258

## 2025-01-07 PROCEDURE — 82805 BLOOD GASES W/O2 SATURATION: CPT

## 2025-01-07 PROCEDURE — 82947 ASSAY GLUCOSE BLOOD QUANT: CPT

## 2025-01-07 PROCEDURE — G0480 DRUG TEST DEF 1-7 CLASSES: HCPCS

## 2025-01-07 PROCEDURE — 86900 BLOOD TYPING SEROLOGIC ABO: CPT

## 2025-01-07 PROCEDURE — 83036 HEMOGLOBIN GLYCOSYLATED A1C: CPT

## 2025-01-07 PROCEDURE — 84703 CHORIONIC GONADOTROPIN ASSAY: CPT

## 2025-01-07 PROCEDURE — 6360000002 HC RX W HCPCS

## 2025-01-07 PROCEDURE — 85610 PROTHROMBIN TIME: CPT

## 2025-01-07 PROCEDURE — 73552 X-RAY EXAM OF FEMUR 2/>: CPT

## 2025-01-07 RX ORDER — ONDANSETRON 4 MG/1
4 TABLET, ORALLY DISINTEGRATING ORAL EVERY 8 HOURS PRN
Status: DISCONTINUED | OUTPATIENT
Start: 2025-01-07 | End: 2025-01-12 | Stop reason: HOSPADM

## 2025-01-07 RX ORDER — OXYCODONE HYDROCHLORIDE 5 MG/1
5 TABLET ORAL EVERY 6 HOURS PRN
Status: DISCONTINUED | OUTPATIENT
Start: 2025-01-07 | End: 2025-01-07

## 2025-01-07 RX ORDER — SODIUM CHLORIDE 0.9 % (FLUSH) 0.9 %
5-40 SYRINGE (ML) INJECTION PRN
Status: DISCONTINUED | OUTPATIENT
Start: 2025-01-07 | End: 2025-01-12 | Stop reason: HOSPADM

## 2025-01-07 RX ORDER — ASPIRIN 81 MG/1
81 TABLET ORAL 2 TIMES DAILY
Status: DISCONTINUED | OUTPATIENT
Start: 2025-01-07 | End: 2025-01-08

## 2025-01-07 RX ORDER — ONDANSETRON 2 MG/ML
4 INJECTION INTRAMUSCULAR; INTRAVENOUS EVERY 6 HOURS PRN
Status: DISCONTINUED | OUTPATIENT
Start: 2025-01-07 | End: 2025-01-12 | Stop reason: HOSPADM

## 2025-01-07 RX ORDER — METOPROLOL TARTRATE 25 MG/1
25 TABLET, FILM COATED ORAL DAILY
Status: DISCONTINUED | OUTPATIENT
Start: 2025-01-08 | End: 2025-01-12 | Stop reason: HOSPADM

## 2025-01-07 RX ORDER — OXYCODONE HYDROCHLORIDE 5 MG/1
5 TABLET ORAL EVERY 6 HOURS PRN
Status: DISCONTINUED | OUTPATIENT
Start: 2025-01-07 | End: 2025-01-09

## 2025-01-07 RX ORDER — ALPRAZOLAM 1 MG/1
1 TABLET ORAL 2 TIMES DAILY PRN
Status: DISCONTINUED | OUTPATIENT
Start: 2025-01-07 | End: 2025-01-12 | Stop reason: HOSPADM

## 2025-01-07 RX ORDER — NICOTINE 21 MG/24HR
1 PATCH, TRANSDERMAL 24 HOURS TRANSDERMAL DAILY
Status: DISCONTINUED | OUTPATIENT
Start: 2025-01-08 | End: 2025-01-12 | Stop reason: HOSPADM

## 2025-01-07 RX ORDER — ERGOCALCIFEROL 1.25 MG/1
50000 CAPSULE, LIQUID FILLED ORAL WEEKLY
Status: DISCONTINUED | OUTPATIENT
Start: 2025-01-07 | End: 2025-01-12 | Stop reason: HOSPADM

## 2025-01-07 RX ORDER — DICYCLOMINE HYDROCHLORIDE 10 MG/1
10 CAPSULE ORAL
Status: DISCONTINUED | OUTPATIENT
Start: 2025-01-07 | End: 2025-01-12 | Stop reason: HOSPADM

## 2025-01-07 RX ORDER — ATORVASTATIN CALCIUM 10 MG/1
10 TABLET, FILM COATED ORAL DAILY
Status: DISCONTINUED | OUTPATIENT
Start: 2025-01-08 | End: 2025-01-12 | Stop reason: HOSPADM

## 2025-01-07 RX ORDER — BUPROPION HYDROCHLORIDE 150 MG/1
150 TABLET ORAL EVERY MORNING
Status: DISCONTINUED | OUTPATIENT
Start: 2025-01-08 | End: 2025-01-12 | Stop reason: HOSPADM

## 2025-01-07 RX ORDER — CALCIUM CARBONATE/VITAMIN D3 600 MG-10
1 TABLET ORAL 2 TIMES DAILY
Status: DISCONTINUED | OUTPATIENT
Start: 2025-01-07 | End: 2025-01-12 | Stop reason: HOSPADM

## 2025-01-07 RX ORDER — GABAPENTIN 300 MG/1
300 CAPSULE ORAL 3 TIMES DAILY
Status: DISCONTINUED | OUTPATIENT
Start: 2025-01-07 | End: 2025-01-09

## 2025-01-07 RX ORDER — FERROUS SULFATE 325(65) MG
650 TABLET, DELAYED RELEASE (ENTERIC COATED) ORAL NIGHTLY
Status: DISCONTINUED | OUTPATIENT
Start: 2025-01-07 | End: 2025-01-12 | Stop reason: HOSPADM

## 2025-01-07 RX ORDER — SODIUM CHLORIDE 9 MG/ML
INJECTION, SOLUTION INTRAVENOUS PRN
Status: DISCONTINUED | OUTPATIENT
Start: 2025-01-07 | End: 2025-01-12 | Stop reason: HOSPADM

## 2025-01-07 RX ORDER — M-VIT,TX,IRON,MINS/CALC/FOLIC 27MG-0.4MG
1 TABLET ORAL DAILY
Status: DISCONTINUED | OUTPATIENT
Start: 2025-01-08 | End: 2025-01-12 | Stop reason: HOSPADM

## 2025-01-07 RX ORDER — POTASSIUM CHLORIDE 1500 MG/1
40 TABLET, EXTENDED RELEASE ORAL ONCE
Status: COMPLETED | OUTPATIENT
Start: 2025-01-07 | End: 2025-01-07

## 2025-01-07 RX ORDER — SODIUM CHLORIDE 0.9 % (FLUSH) 0.9 %
5-40 SYRINGE (ML) INJECTION EVERY 12 HOURS SCHEDULED
Status: DISCONTINUED | OUTPATIENT
Start: 2025-01-07 | End: 2025-01-12 | Stop reason: HOSPADM

## 2025-01-07 RX ORDER — PAROXETINE 20 MG/1
40 TABLET, FILM COATED ORAL EVERY MORNING
Status: DISCONTINUED | OUTPATIENT
Start: 2025-01-08 | End: 2025-01-12 | Stop reason: HOSPADM

## 2025-01-07 RX ORDER — ACETAMINOPHEN 500 MG
1000 TABLET ORAL EVERY 8 HOURS SCHEDULED
Status: DISCONTINUED | OUTPATIENT
Start: 2025-01-07 | End: 2025-01-12 | Stop reason: HOSPADM

## 2025-01-07 RX ORDER — SODIUM CHLORIDE, SODIUM LACTATE, POTASSIUM CHLORIDE, CALCIUM CHLORIDE 600; 310; 30; 20 MG/100ML; MG/100ML; MG/100ML; MG/100ML
INJECTION, SOLUTION INTRAVENOUS CONTINUOUS
Status: DISCONTINUED | OUTPATIENT
Start: 2025-01-07 | End: 2025-01-09

## 2025-01-07 RX ORDER — FENTANYL CITRATE 50 UG/ML
25 INJECTION, SOLUTION INTRAMUSCULAR; INTRAVENOUS ONCE
Status: COMPLETED | OUTPATIENT
Start: 2025-01-07 | End: 2025-01-07

## 2025-01-07 RX ORDER — DOCUSATE SODIUM 100 MG/1
100 CAPSULE, LIQUID FILLED ORAL 2 TIMES DAILY PRN
Status: DISCONTINUED | OUTPATIENT
Start: 2025-01-07 | End: 2025-01-12 | Stop reason: HOSPADM

## 2025-01-07 RX ORDER — METHOCARBAMOL 750 MG/1
750 TABLET, FILM COATED ORAL 4 TIMES DAILY
Status: DISCONTINUED | OUTPATIENT
Start: 2025-01-07 | End: 2025-01-09

## 2025-01-07 RX ORDER — POLYETHYLENE GLYCOL 3350 17 G/17G
17 POWDER, FOR SOLUTION ORAL DAILY
Status: DISCONTINUED | OUTPATIENT
Start: 2025-01-07 | End: 2025-01-12 | Stop reason: HOSPADM

## 2025-01-07 RX ORDER — ASCORBIC ACID 500 MG
500 TABLET ORAL NIGHTLY
Status: DISCONTINUED | OUTPATIENT
Start: 2025-01-07 | End: 2025-01-12 | Stop reason: HOSPADM

## 2025-01-07 RX ADMIN — FERROUS SULFATE TAB EC 325 MG (65 MG FE EQUIVALENT) 650 MG: 325 (65 FE) TABLET DELAYED RESPONSE at 23:32

## 2025-01-07 RX ADMIN — SODIUM CHLORIDE, PRESERVATIVE FREE 10 ML: 5 INJECTION INTRAVENOUS at 21:27

## 2025-01-07 RX ADMIN — METHOCARBAMOL 750 MG: 750 TABLET ORAL at 21:24

## 2025-01-07 RX ADMIN — FENTANYL CITRATE 25 MCG: 50 INJECTION, SOLUTION INTRAMUSCULAR; INTRAVENOUS at 18:12

## 2025-01-07 RX ADMIN — ACETAMINOPHEN 1000 MG: 500 TABLET, FILM COATED ORAL at 23:32

## 2025-01-07 RX ADMIN — SODIUM CHLORIDE, POTASSIUM CHLORIDE, SODIUM LACTATE AND CALCIUM CHLORIDE: 600; 310; 30; 20 INJECTION, SOLUTION INTRAVENOUS at 21:26

## 2025-01-07 RX ADMIN — POLYETHYLENE GLYCOL 3350 17 G: 17 POWDER, FOR SOLUTION ORAL at 21:25

## 2025-01-07 RX ADMIN — ERGOCALCIFEROL 50000 UNITS: 1.25 CAPSULE ORAL at 23:33

## 2025-01-07 RX ADMIN — GABAPENTIN 300 MG: 300 CAPSULE ORAL at 21:25

## 2025-01-07 RX ADMIN — Medication 1 TABLET: at 23:33

## 2025-01-07 RX ADMIN — OXYCODONE HYDROCHLORIDE AND ACETAMINOPHEN 500 MG: 500 TABLET ORAL at 23:33

## 2025-01-07 RX ADMIN — DICYCLOMINE HYDROCHLORIDE 10 MG: 10 CAPSULE ORAL at 23:34

## 2025-01-07 RX ADMIN — POTASSIUM CHLORIDE 40 MEQ: 1500 TABLET, EXTENDED RELEASE ORAL at 23:31

## 2025-01-07 ASSESSMENT — PAIN SCALES - GENERAL
PAINLEVEL_OUTOF10: 9
PAINLEVEL_OUTOF10: 10
PAINLEVEL_OUTOF10: 7

## 2025-01-07 ASSESSMENT — ENCOUNTER SYMPTOMS
CHEST TIGHTNESS: 0
ABDOMINAL PAIN: 0
CONSTIPATION: 0
NAUSEA: 0
SHORTNESS OF BREATH: 0
GASTROINTESTINAL NEGATIVE: 1
RESPIRATORY NEGATIVE: 1
VOMITING: 0
COLOR CHANGE: 0
APNEA: 0
ABDOMINAL DISTENTION: 0
DIARRHEA: 0
COUGH: 0

## 2025-01-07 ASSESSMENT — PAIN DESCRIPTION - ORIENTATION
ORIENTATION: RIGHT

## 2025-01-07 ASSESSMENT — PAIN DESCRIPTION - DESCRIPTORS
DESCRIPTORS: ACHING
DESCRIPTORS: ACHING;DISCOMFORT;STABBING;THROBBING
DESCRIPTORS: ACHING;DISCOMFORT;THROBBING
DESCRIPTORS: THROBBING

## 2025-01-07 ASSESSMENT — LIFESTYLE VARIABLES
HOW OFTEN DO YOU HAVE A DRINK CONTAINING ALCOHOL: NEVER
HOW MANY STANDARD DRINKS CONTAINING ALCOHOL DO YOU HAVE ON A TYPICAL DAY: PATIENT DOES NOT DRINK

## 2025-01-07 ASSESSMENT — PAIN DESCRIPTION - LOCATION
LOCATION: HIP
LOCATION: LEG
LOCATION: HIP;KNEE
LOCATION: LEG
LOCATION: LEG

## 2025-01-07 NOTE — PROGRESS NOTES
[facility] ORTHOPEDICS AND SPORTS MEDICINE  [address]  [place] [address]  Dept: [phone]  Dept Fax: [phone]        Post Operative Follow Up    Subjective:     Chief Complaint   Patient presents with    Hip Pain     Right hip pain- FARHAN DOS 12/31/24     Post Op Surgery:     History of Present Illness  The patient is here today for a check of her right total hip arthroplasty.  Date of surgery was 12/31/2024.  Therefore the patient is 1 week postop.    She has had 2 falls since she was seen on Friday by ELIZABETH Turner. She is taking Percocet and aspirin, going to outpatient physical therapy, and using her walker. She reports experiencing 3 falls post-surgery, with the most recent incident occurring 3 days ago, resulting in significant groin discomfort. The first fall happened while she was exiting her sister-in-law's SUV, which is notably high off the ground this happened on the day of surgery when she was getting out of the car from the hospital to going to her home. The second fall occurred when she lost her balance while attempting to sit on the toilet. She has only hit the ground once during these incidents. She also mentions that her leg appears to be approximately 2 inches shorter than the other, causing it to swing outwards. She does not attribute these falls to dizziness or excessive pain medication. She has a support system at home, including her neighbor, daughter, and sister-in-law, who assist her as needed.  Patient has been taking her Percocet and aspirin.  She presents today with a walker.    She was seen in the office by Yue Yi PA-C on 1/3/2025 to get an x-ray after she had the fall the day of her surgery.  X-rays were taken at that appointment.  She has not started physical therapy yet and her first appointment was supposed to be today.    Supplemental Information  She has a history of rotator cuff surgery

## 2025-01-07 NOTE — PATIENT INSTRUCTIONS
PATIENTIQ:  PatientIQ helps [facility] stay in touch with you to know how you're feeling, and provides education and care instructions to you at various time points.   Your answers help your care team track your progress to provide the best care possible. PatientIQ will contact you pre-op and post-op via email or text with:  Educational Videos and Care Instructions  Questionnaires About How You're Feeling    Your participation provides you valuable education and helps [facility] continue to provide quality care to all patients. Thank you

## 2025-01-07 NOTE — FLOWSHEET NOTE
[] [facility]  Outpatient Rehabilitation &  Therapy  [address]  [phone]  [phone] [] [facility]  Outpatient Rehabilitation &  Therapy  [address]  P: [phone]  F: [phone] [] [facility]  Outpatient Rehabilitation &  Therapy  [address]  P: [phone]  F: [phone] [] [facility]  Outpatient Rehabilitation &  Therapy  [address]  [phone]  [phone] [x] [facility]  Outpatient Rehabilitation &  Therapy  [address]   P: [phone]  F: [phone]  [] [facility]  Outpatient Rehabilitation &  Therapy  [address]  P: [phone]  F: [phone] [] [facility]  Outpatient Rehabilitation &  Therapy  [address]  P: [phone]  F: [phone] [] [facility]  Outpatient Rehabilitation &  Therapy  [address]  P: [phone]  F: [phone] [] [facility]  Outpatient Rehabilitation &  Therapy  [address]  P: [phone]  F: [phone]  [] [facility] Outpatient Rehabilitation &  Therapy  [address]  P: [phone]  F: [phone]     Therapy Cancel/No Show note    Date: 2025  Patient: Sudha CUADRA Serafindonato  : 1960  MRN: [id number]    Cancels/No Shows to date: 0    For today's appointment patient:    [x]  Cancelled    [] Rescheduled appointment    [] No-show     Reason given by patient:    []  Patient ill    []  Conflicting appointment    [] No transportation      [] Conflict with work    [] No reason given    [] Weather related    [] COVID-19    [] Other:      Comments:  Bekah from Dr Velez office came over said that the patient was CX. She has fallen 4X's since surgery and has a broken femur      [] Next appointment was confirmed    Electronically signed by:

## 2025-01-08 ENCOUNTER — ANESTHESIA (OUTPATIENT)
Dept: OPERATING ROOM | Age: 65
End: 2025-01-08
Payer: MEDICARE

## 2025-01-08 ENCOUNTER — APPOINTMENT (OUTPATIENT)
Dept: GENERAL RADIOLOGY | Age: 65
DRG: 467 | End: 2025-01-08
Payer: MEDICARE

## 2025-01-08 LAB
ALBUMIN SERPL-MCNC: 3.1 G/DL (ref 3.5–5.2)
ALBUMIN/GLOB SERPL: 1.2 {RATIO} (ref 1–2.5)
ALP SERPL-CCNC: 106 U/L (ref 35–104)
ALT SERPL-CCNC: 18 U/L (ref 10–35)
ANION GAP SERPL CALCULATED.3IONS-SCNC: 12 MMOL/L (ref 9–16)
AST SERPL-CCNC: 34 U/L (ref 10–35)
BASOPHILS # BLD: 0 K/UL (ref 0–0.2)
BASOPHILS NFR BLD: 0 % (ref 0–2)
BILIRUB SERPL-MCNC: 0.5 MG/DL (ref 0–1.2)
BUN SERPL-MCNC: 15 MG/DL (ref 8–23)
CA-I BLD-SCNC: 1.14 MMOL/L (ref 1.13–1.33)
CALCIUM SERPL-MCNC: 8 MG/DL (ref 8.6–10.4)
CHLORIDE SERPL-SCNC: 109 MMOL/L (ref 98–107)
CO2 SERPL-SCNC: 19 MMOL/L (ref 20–31)
CREAT SERPL-MCNC: 0.8 MG/DL (ref 0.6–0.9)
EKG ATRIAL RATE: 75 BPM
EKG P AXIS: 41 DEGREES
EKG P-R INTERVAL: 152 MS
EKG Q-T INTERVAL: 418 MS
EKG QRS DURATION: 72 MS
EKG QTC CALCULATION (BAZETT): 466 MS
EKG R AXIS: 69 DEGREES
EKG T AXIS: 53 DEGREES
EKG VENTRICULAR RATE: 75 BPM
EOSINOPHIL # BLD: 0 K/UL (ref 0–0.4)
EOSINOPHILS RELATIVE PERCENT: 0 % (ref 1–4)
ERYTHROCYTE [DISTWIDTH] IN BLOOD BY AUTOMATED COUNT: 18.1 % (ref 11.8–14.4)
GFR, ESTIMATED: 81 ML/MIN/1.73M2
GLUCOSE SERPL-MCNC: 157 MG/DL (ref 74–99)
HCT VFR BLD AUTO: 33.3 % (ref 36.3–47.1)
HGB BLD-MCNC: 9.7 G/DL (ref 11.9–15.1)
IMM GRANULOCYTES # BLD AUTO: 0 K/UL (ref 0–0.3)
IMM GRANULOCYTES NFR BLD: 0 %
LYMPHOCYTES NFR BLD: 0.31 K/UL (ref 1–4.8)
LYMPHOCYTES RELATIVE PERCENT: 2 % (ref 24–44)
MAGNESIUM SERPL-MCNC: 2.1 MG/DL (ref 1.6–2.4)
MCH RBC QN AUTO: 31.9 PG (ref 25.2–33.5)
MCHC RBC AUTO-ENTMCNC: 29.1 G/DL (ref 28.4–34.8)
MCV RBC AUTO: 109.5 FL (ref 82.6–102.9)
MONOCYTES NFR BLD: 0.79 K/UL (ref 0.1–0.8)
MONOCYTES NFR BLD: 5 % (ref 1–7)
MORPHOLOGY: ABNORMAL
MORPHOLOGY: ABNORMAL
NEUTROPHILS NFR BLD: 93 % (ref 36–66)
NEUTS SEG NFR BLD: 14.6 K/UL (ref 1.8–7.7)
NRBC BLD-RTO: 0 PER 100 WBC
PHOSPHATE SERPL-MCNC: 2.6 MG/DL (ref 2.5–4.5)
PLATELET # BLD AUTO: 375 K/UL (ref 138–453)
PMV BLD AUTO: 10.8 FL (ref 8.1–13.5)
POTASSIUM SERPL-SCNC: 3.1 MMOL/L (ref 3.7–5.3)
POTASSIUM SERPL-SCNC: 3.6 MMOL/L (ref 3.7–5.3)
POTASSIUM SERPL-SCNC: 4.9 MMOL/L (ref 3.7–5.3)
PROT SERPL-MCNC: 5.7 G/DL (ref 6.6–8.7)
RBC # BLD AUTO: 3.04 M/UL (ref 3.95–5.11)
SODIUM SERPL-SCNC: 140 MMOL/L (ref 136–145)
WBC OTHER # BLD: 15.7 K/UL (ref 3.5–11.3)

## 2025-01-08 PROCEDURE — 6370000000 HC RX 637 (ALT 250 FOR IP)

## 2025-01-08 PROCEDURE — 84100 ASSAY OF PHOSPHORUS: CPT

## 2025-01-08 PROCEDURE — 2580000003 HC RX 258

## 2025-01-08 PROCEDURE — 6360000002 HC RX W HCPCS

## 2025-01-08 PROCEDURE — 6360000002 HC RX W HCPCS: Performed by: STUDENT IN AN ORGANIZED HEALTH CARE EDUCATION/TRAINING PROGRAM

## 2025-01-08 PROCEDURE — 73502 X-RAY EXAM HIP UNI 2-3 VIEWS: CPT

## 2025-01-08 PROCEDURE — 2709999900 HC NON-CHARGEABLE SUPPLY: Performed by: ORTHOPAEDIC SURGERY

## 2025-01-08 PROCEDURE — 82330 ASSAY OF CALCIUM: CPT

## 2025-01-08 PROCEDURE — C1776 JOINT DEVICE (IMPLANTABLE): HCPCS | Performed by: ORTHOPAEDIC SURGERY

## 2025-01-08 PROCEDURE — 2500000003 HC RX 250 WO HCPCS

## 2025-01-08 PROCEDURE — 7100000000 HC PACU RECOVERY - FIRST 15 MIN: Performed by: ORTHOPAEDIC SURGERY

## 2025-01-08 PROCEDURE — 3600000014 HC SURGERY LEVEL 4 ADDTL 15MIN: Performed by: ORTHOPAEDIC SURGERY

## 2025-01-08 PROCEDURE — 1200000000 HC SEMI PRIVATE

## 2025-01-08 PROCEDURE — C1713 ANCHOR/SCREW BN/BN,TIS/BN: HCPCS | Performed by: ORTHOPAEDIC SURGERY

## 2025-01-08 PROCEDURE — 83735 ASSAY OF MAGNESIUM: CPT

## 2025-01-08 PROCEDURE — 0SR904Z REPLACEMENT OF RIGHT HIP JOINT WITH CERAMIC ON POLYETHYLENE SYNTHETIC SUBSTITUTE, OPEN APPROACH: ICD-10-PCS | Performed by: ORTHOPAEDIC SURGERY

## 2025-01-08 PROCEDURE — 0SP90JZ REMOVAL OF SYNTHETIC SUBSTITUTE FROM RIGHT HIP JOINT, OPEN APPROACH: ICD-10-PCS | Performed by: ORTHOPAEDIC SURGERY

## 2025-01-08 PROCEDURE — P9016 RBC LEUKOCYTES REDUCED: HCPCS

## 2025-01-08 PROCEDURE — 99231 SBSQ HOSP IP/OBS SF/LOW 25: CPT | Performed by: SURGERY

## 2025-01-08 PROCEDURE — 6360000002 HC RX W HCPCS: Performed by: ANESTHESIOLOGY

## 2025-01-08 PROCEDURE — 6360000002 HC RX W HCPCS: Performed by: ORTHOPAEDIC SURGERY

## 2025-01-08 PROCEDURE — 27138 REVISE HIP JOINT REPLACEMENT: CPT | Performed by: ORTHOPAEDIC SURGERY

## 2025-01-08 PROCEDURE — 3700000000 HC ANESTHESIA ATTENDED CARE: Performed by: ORTHOPAEDIC SURGERY

## 2025-01-08 PROCEDURE — 73552 X-RAY EXAM OF FEMUR 2/>: CPT

## 2025-01-08 PROCEDURE — 36415 COLL VENOUS BLD VENIPUNCTURE: CPT

## 2025-01-08 PROCEDURE — 80053 COMPREHEN METABOLIC PANEL: CPT

## 2025-01-08 PROCEDURE — 84132 ASSAY OF SERUM POTASSIUM: CPT

## 2025-01-08 PROCEDURE — 0QS604Z REPOSITION RIGHT UPPER FEMUR WITH INTERNAL FIXATION DEVICE, OPEN APPROACH: ICD-10-PCS | Performed by: ORTHOPAEDIC SURGERY

## 2025-01-08 PROCEDURE — 3600000004 HC SURGERY LEVEL 4 BASE: Performed by: ORTHOPAEDIC SURGERY

## 2025-01-08 PROCEDURE — 3700000001 HC ADD 15 MINUTES (ANESTHESIA): Performed by: ORTHOPAEDIC SURGERY

## 2025-01-08 PROCEDURE — 85025 COMPLETE CBC W/AUTO DIFF WBC: CPT

## 2025-01-08 PROCEDURE — 27507 TREATMENT OF THIGH FRACTURE: CPT | Performed by: ORTHOPAEDIC SURGERY

## 2025-01-08 PROCEDURE — 7100000001 HC PACU RECOVERY - ADDTL 15 MIN: Performed by: ORTHOPAEDIC SURGERY

## 2025-01-08 PROCEDURE — 93010 ELECTROCARDIOGRAM REPORT: CPT | Performed by: INTERNAL MEDICINE

## 2025-01-08 PROCEDURE — 2500000003 HC RX 250 WO HCPCS: Performed by: ORTHOPAEDIC SURGERY

## 2025-01-08 DEVICE — HC PE LINER 28 / DMF
Type: IMPLANTABLE DEVICE | Site: FEMUR | Status: FUNCTIONAL
Brand: DOUBLE MOBILITY LINER

## 2025-01-08 DEVICE — SLEEVE FEM -6MM OFFSET HIP TYP 1 TAPR FOR CERAMIC BIOLOX: Type: IMPLANTABLE DEVICE | Site: FEMUR | Status: FUNCTIONAL

## 2025-01-08 DEVICE — IMPLANTABLE DEVICE: Type: IMPLANTABLE DEVICE | Site: FEMUR | Status: FUNCTIONAL

## 2025-01-08 DEVICE — IMPLANTABLE DEVICE
Type: IMPLANTABLE DEVICE | Site: FEMUR | Status: FUNCTIONAL
Brand: ARCOS MODULAR REVISION HIP SYSTEM

## 2025-01-08 DEVICE — IMPLANTABLE DEVICE
Type: IMPLANTABLE DEVICE | Site: FEMUR | Status: FUNCTIONAL
Brand: CABLE-READY®

## 2025-01-08 DEVICE — HEAD FEM DIA28MM HIP BIOLOX DELT OPT FOR G7 ACET SYS: Type: IMPLANTABLE DEVICE | Site: FEMUR | Status: FUNCTIONAL

## 2025-01-08 RX ORDER — DROPERIDOL 2.5 MG/ML
0.62 INJECTION, SOLUTION INTRAMUSCULAR; INTRAVENOUS
Status: DISCONTINUED | OUTPATIENT
Start: 2025-01-08 | End: 2025-01-08 | Stop reason: HOSPADM

## 2025-01-08 RX ORDER — VANCOMYCIN HYDROCHLORIDE 1 G/20ML
INJECTION, POWDER, LYOPHILIZED, FOR SOLUTION INTRAVENOUS PRN
Status: DISCONTINUED | OUTPATIENT
Start: 2025-01-08 | End: 2025-01-08 | Stop reason: ALTCHOICE

## 2025-01-08 RX ORDER — POTASSIUM CHLORIDE 1500 MG/1
40 TABLET, EXTENDED RELEASE ORAL ONCE
Status: COMPLETED | OUTPATIENT
Start: 2025-01-08 | End: 2025-01-08

## 2025-01-08 RX ORDER — SODIUM CHLORIDE 9 MG/ML
INJECTION, SOLUTION INTRAVENOUS PRN
Status: DISCONTINUED | OUTPATIENT
Start: 2025-01-08 | End: 2025-01-12 | Stop reason: HOSPADM

## 2025-01-08 RX ORDER — FENTANYL CITRATE 50 UG/ML
100 INJECTION, SOLUTION INTRAMUSCULAR; INTRAVENOUS ONCE
Status: DISCONTINUED | OUTPATIENT
Start: 2025-01-08 | End: 2025-01-08

## 2025-01-08 RX ORDER — GLYCOPYRROLATE 1 MG/5 ML
SYRINGE (ML) INTRAVENOUS
Status: DISCONTINUED | OUTPATIENT
Start: 2025-01-08 | End: 2025-01-08 | Stop reason: SDUPTHER

## 2025-01-08 RX ORDER — MAGNESIUM HYDROXIDE 1200 MG/15ML
LIQUID ORAL CONTINUOUS PRN
Status: COMPLETED | OUTPATIENT
Start: 2025-01-08 | End: 2025-01-08

## 2025-01-08 RX ORDER — TOBRAMYCIN 1.2 G/30ML
INJECTION, POWDER, LYOPHILIZED, FOR SOLUTION INTRAVENOUS PRN
Status: DISCONTINUED | OUTPATIENT
Start: 2025-01-08 | End: 2025-01-08 | Stop reason: ALTCHOICE

## 2025-01-08 RX ORDER — NALOXONE HYDROCHLORIDE 0.4 MG/ML
INJECTION, SOLUTION INTRAMUSCULAR; INTRAVENOUS; SUBCUTANEOUS PRN
Status: DISCONTINUED | OUTPATIENT
Start: 2025-01-08 | End: 2025-01-08 | Stop reason: HOSPADM

## 2025-01-08 RX ORDER — METOCLOPRAMIDE HYDROCHLORIDE 5 MG/ML
10 INJECTION INTRAMUSCULAR; INTRAVENOUS
Status: DISCONTINUED | OUTPATIENT
Start: 2025-01-08 | End: 2025-01-08 | Stop reason: HOSPADM

## 2025-01-08 RX ORDER — SODIUM CHLORIDE 0.9 % (FLUSH) 0.9 %
5-40 SYRINGE (ML) INJECTION EVERY 12 HOURS SCHEDULED
Status: DISCONTINUED | OUTPATIENT
Start: 2025-01-08 | End: 2025-01-08 | Stop reason: HOSPADM

## 2025-01-08 RX ORDER — MIDAZOLAM HYDROCHLORIDE 2 MG/2ML
2 INJECTION, SOLUTION INTRAMUSCULAR; INTRAVENOUS ONCE
Status: DISCONTINUED | OUTPATIENT
Start: 2025-01-08 | End: 2025-01-08

## 2025-01-08 RX ORDER — SODIUM CHLORIDE 0.9 % (FLUSH) 0.9 %
5-40 SYRINGE (ML) INJECTION PRN
Status: DISCONTINUED | OUTPATIENT
Start: 2025-01-08 | End: 2025-01-08 | Stop reason: HOSPADM

## 2025-01-08 RX ORDER — FENTANYL CITRATE 50 UG/ML
25 INJECTION, SOLUTION INTRAMUSCULAR; INTRAVENOUS ONCE
Status: COMPLETED | OUTPATIENT
Start: 2025-01-08 | End: 2025-01-08

## 2025-01-08 RX ORDER — FENTANYL CITRATE 50 UG/ML
INJECTION, SOLUTION INTRAMUSCULAR; INTRAVENOUS
Status: DISCONTINUED | OUTPATIENT
Start: 2025-01-08 | End: 2025-01-08 | Stop reason: SDUPTHER

## 2025-01-08 RX ORDER — ONDANSETRON 2 MG/ML
INJECTION INTRAMUSCULAR; INTRAVENOUS
Status: DISCONTINUED | OUTPATIENT
Start: 2025-01-08 | End: 2025-01-08 | Stop reason: SDUPTHER

## 2025-01-08 RX ORDER — DIPHENHYDRAMINE HYDROCHLORIDE 50 MG/ML
12.5 INJECTION INTRAMUSCULAR; INTRAVENOUS
Status: DISCONTINUED | OUTPATIENT
Start: 2025-01-08 | End: 2025-01-08 | Stop reason: HOSPADM

## 2025-01-08 RX ORDER — MEPERIDINE HYDROCHLORIDE 50 MG/ML
12.5 INJECTION INTRAMUSCULAR; INTRAVENOUS; SUBCUTANEOUS EVERY 5 MIN PRN
Status: DISCONTINUED | OUTPATIENT
Start: 2025-01-08 | End: 2025-01-08 | Stop reason: HOSPADM

## 2025-01-08 RX ORDER — SODIUM CHLORIDE 9 MG/ML
INJECTION, SOLUTION INTRAVENOUS PRN
Status: DISCONTINUED | OUTPATIENT
Start: 2025-01-08 | End: 2025-01-08 | Stop reason: HOSPADM

## 2025-01-08 RX ORDER — DEXAMETHASONE SODIUM PHOSPHATE 10 MG/ML
INJECTION, SOLUTION INTRAMUSCULAR; INTRAVENOUS
Status: DISCONTINUED | OUTPATIENT
Start: 2025-01-08 | End: 2025-01-08 | Stop reason: SDUPTHER

## 2025-01-08 RX ORDER — HYDRALAZINE HYDROCHLORIDE 20 MG/ML
10 INJECTION INTRAMUSCULAR; INTRAVENOUS
Status: DISCONTINUED | OUTPATIENT
Start: 2025-01-08 | End: 2025-01-08 | Stop reason: HOSPADM

## 2025-01-08 RX ORDER — PROPOFOL 10 MG/ML
INJECTION, EMULSION INTRAVENOUS
Status: DISCONTINUED | OUTPATIENT
Start: 2025-01-08 | End: 2025-01-08 | Stop reason: SDUPTHER

## 2025-01-08 RX ORDER — LIDOCAINE HYDROCHLORIDE 10 MG/ML
INJECTION, SOLUTION EPIDURAL; INFILTRATION; INTRACAUDAL; PERINEURAL
Status: DISCONTINUED | OUTPATIENT
Start: 2025-01-08 | End: 2025-01-08 | Stop reason: SDUPTHER

## 2025-01-08 RX ORDER — TRANEXAMIC ACID 10 MG/ML
INJECTION, SOLUTION INTRAVENOUS
Status: DISCONTINUED | OUTPATIENT
Start: 2025-01-08 | End: 2025-01-08 | Stop reason: SDUPTHER

## 2025-01-08 RX ORDER — ENOXAPARIN SODIUM 100 MG/ML
40 INJECTION SUBCUTANEOUS 2 TIMES DAILY
Status: DISCONTINUED | OUTPATIENT
Start: 2025-01-09 | End: 2025-01-12 | Stop reason: HOSPADM

## 2025-01-08 RX ORDER — SODIUM CHLORIDE 9 MG/ML
INJECTION, SOLUTION INTRAVENOUS
Status: DISCONTINUED | OUTPATIENT
Start: 2025-01-08 | End: 2025-01-08 | Stop reason: SDUPTHER

## 2025-01-08 RX ORDER — FENTANYL CITRATE 50 UG/ML
50 INJECTION, SOLUTION INTRAMUSCULAR; INTRAVENOUS ONCE
Status: COMPLETED | OUTPATIENT
Start: 2025-01-08 | End: 2025-01-08

## 2025-01-08 RX ORDER — ROCURONIUM BROMIDE 10 MG/ML
INJECTION, SOLUTION INTRAVENOUS
Status: DISCONTINUED | OUTPATIENT
Start: 2025-01-08 | End: 2025-01-08 | Stop reason: SDUPTHER

## 2025-01-08 RX ADMIN — LIDOCAINE HYDROCHLORIDE 50 MG: 10 INJECTION, SOLUTION EPIDURAL; INFILTRATION; INTRACAUDAL; PERINEURAL at 08:40

## 2025-01-08 RX ADMIN — DICYCLOMINE HYDROCHLORIDE 10 MG: 10 CAPSULE ORAL at 21:42

## 2025-01-08 RX ADMIN — OXYCODONE 5 MG: 5 TABLET ORAL at 21:42

## 2025-01-08 RX ADMIN — HYDROMORPHONE HYDROCHLORIDE 0.5 MG: 1 INJECTION, SOLUTION INTRAMUSCULAR; INTRAVENOUS; SUBCUTANEOUS at 13:15

## 2025-01-08 RX ADMIN — METHOCARBAMOL 750 MG: 750 TABLET ORAL at 20:15

## 2025-01-08 RX ADMIN — PHENYLEPHRINE HYDROCHLORIDE 100 MCG: 10 INJECTION INTRAVENOUS at 10:34

## 2025-01-08 RX ADMIN — FENTANYL CITRATE 100 MCG: 50 INJECTION, SOLUTION INTRAMUSCULAR; INTRAVENOUS at 08:40

## 2025-01-08 RX ADMIN — HYDROMORPHONE HYDROCHLORIDE 0.25 MG: 1 INJECTION, SOLUTION INTRAMUSCULAR; INTRAVENOUS; SUBCUTANEOUS at 09:42

## 2025-01-08 RX ADMIN — FENTANYL CITRATE 50 MCG: 50 INJECTION, SOLUTION INTRAMUSCULAR; INTRAVENOUS at 12:56

## 2025-01-08 RX ADMIN — Medication 2000 MG: at 09:03

## 2025-01-08 RX ADMIN — ROCURONIUM BROMIDE 10 MG: 10 INJECTION, SOLUTION INTRAVENOUS at 09:53

## 2025-01-08 RX ADMIN — Medication 1 TABLET: at 21:42

## 2025-01-08 RX ADMIN — OXYCODONE HYDROCHLORIDE AND ACETAMINOPHEN 500 MG: 500 TABLET ORAL at 20:15

## 2025-01-08 RX ADMIN — ROCURONIUM BROMIDE 20 MG: 10 INJECTION, SOLUTION INTRAVENOUS at 09:18

## 2025-01-08 RX ADMIN — ONDANSETRON 4 MG: 2 INJECTION INTRAMUSCULAR; INTRAVENOUS at 11:32

## 2025-01-08 RX ADMIN — SUGAMMADEX 200 MG: 100 INJECTION, SOLUTION INTRAVENOUS at 12:43

## 2025-01-08 RX ADMIN — METHOCARBAMOL 750 MG: 750 TABLET ORAL at 16:13

## 2025-01-08 RX ADMIN — ROCURONIUM BROMIDE 10 MG: 10 INJECTION, SOLUTION INTRAVENOUS at 11:16

## 2025-01-08 RX ADMIN — PHENYLEPHRINE HYDROCHLORIDE 50 MCG: 10 INJECTION INTRAVENOUS at 09:51

## 2025-01-08 RX ADMIN — OXYCODONE 5 MG: 5 TABLET ORAL at 14:46

## 2025-01-08 RX ADMIN — HYDROMORPHONE HYDROCHLORIDE 0.5 MG: 1 INJECTION, SOLUTION INTRAMUSCULAR; INTRAVENOUS; SUBCUTANEOUS at 09:31

## 2025-01-08 RX ADMIN — HYDROMORPHONE HYDROCHLORIDE 0.25 MG: 1 INJECTION, SOLUTION INTRAMUSCULAR; INTRAVENOUS; SUBCUTANEOUS at 12:39

## 2025-01-08 RX ADMIN — PHENYLEPHRINE HYDROCHLORIDE 100 MCG: 10 INJECTION INTRAVENOUS at 11:03

## 2025-01-08 RX ADMIN — TRANEXAMIC ACID 1000 MG: 10 INJECTION, SOLUTION INTRAVENOUS at 11:36

## 2025-01-08 RX ADMIN — CEFAZOLIN 2000 MG: 10 INJECTION, POWDER, FOR SOLUTION INTRAVENOUS at 16:19

## 2025-01-08 RX ADMIN — DEXAMETHASONE SODIUM PHOSPHATE 10 MG: 10 INJECTION INTRAMUSCULAR; INTRAVENOUS at 09:01

## 2025-01-08 RX ADMIN — OXYCODONE 5 MG: 5 TABLET ORAL at 02:35

## 2025-01-08 RX ADMIN — HYDROMORPHONE HYDROCHLORIDE 0.25 MG: 1 INJECTION, SOLUTION INTRAMUSCULAR; INTRAVENOUS; SUBCUTANEOUS at 11:53

## 2025-01-08 RX ADMIN — GABAPENTIN 300 MG: 300 CAPSULE ORAL at 20:14

## 2025-01-08 RX ADMIN — HYDROMORPHONE HYDROCHLORIDE 0.25 MG: 1 INJECTION, SOLUTION INTRAMUSCULAR; INTRAVENOUS; SUBCUTANEOUS at 12:33

## 2025-01-08 RX ADMIN — PROPOFOL 20 MG: 10 INJECTION, EMULSION INTRAVENOUS at 10:13

## 2025-01-08 RX ADMIN — SODIUM CHLORIDE, POTASSIUM CHLORIDE, SODIUM LACTATE AND CALCIUM CHLORIDE: 600; 310; 30; 20 INJECTION, SOLUTION INTRAVENOUS at 22:07

## 2025-01-08 RX ADMIN — POTASSIUM CHLORIDE 40 MEQ: 1500 TABLET, EXTENDED RELEASE ORAL at 02:35

## 2025-01-08 RX ADMIN — HYDROMORPHONE HYDROCHLORIDE 0.5 MG: 1 INJECTION, SOLUTION INTRAMUSCULAR; INTRAVENOUS; SUBCUTANEOUS at 13:42

## 2025-01-08 RX ADMIN — HYDROMORPHONE HYDROCHLORIDE 0.5 MG: 1 INJECTION, SOLUTION INTRAMUSCULAR; INTRAVENOUS; SUBCUTANEOUS at 13:32

## 2025-01-08 RX ADMIN — FENTANYL CITRATE 50 MCG: 50 INJECTION, SOLUTION INTRAMUSCULAR; INTRAVENOUS at 17:48

## 2025-01-08 RX ADMIN — PROPOFOL 150 MG: 10 INJECTION, EMULSION INTRAVENOUS at 08:40

## 2025-01-08 RX ADMIN — ROCURONIUM BROMIDE 10 MG: 10 INJECTION, SOLUTION INTRAVENOUS at 09:27

## 2025-01-08 RX ADMIN — PHENYLEPHRINE HYDROCHLORIDE 100 MCG: 10 INJECTION INTRAVENOUS at 09:20

## 2025-01-08 RX ADMIN — PROPOFOL 25 MG: 10 INJECTION, EMULSION INTRAVENOUS at 09:42

## 2025-01-08 RX ADMIN — FENTANYL CITRATE 25 MCG: 50 INJECTION, SOLUTION INTRAMUSCULAR; INTRAVENOUS at 12:50

## 2025-01-08 RX ADMIN — Medication 0.2 MG: at 09:03

## 2025-01-08 RX ADMIN — ACETAMINOPHEN 1000 MG: 500 TABLET, FILM COATED ORAL at 20:15

## 2025-01-08 RX ADMIN — ROCURONIUM BROMIDE 10 MG: 10 INJECTION, SOLUTION INTRAVENOUS at 10:20

## 2025-01-08 RX ADMIN — DICYCLOMINE HYDROCHLORIDE 10 MG: 10 CAPSULE ORAL at 16:15

## 2025-01-08 RX ADMIN — PHENYLEPHRINE HYDROCHLORIDE 100 MCG: 10 INJECTION INTRAVENOUS at 11:07

## 2025-01-08 RX ADMIN — SODIUM CHLORIDE, POTASSIUM CHLORIDE, SODIUM LACTATE AND CALCIUM CHLORIDE: 600; 310; 30; 20 INJECTION, SOLUTION INTRAVENOUS at 06:54

## 2025-01-08 RX ADMIN — PHENYLEPHRINE HYDROCHLORIDE 100 MCG: 10 INJECTION INTRAVENOUS at 11:13

## 2025-01-08 RX ADMIN — PHENYLEPHRINE HYDROCHLORIDE 50 MCG: 10 INJECTION INTRAVENOUS at 09:49

## 2025-01-08 RX ADMIN — ACETAMINOPHEN 1000 MG: 500 TABLET, FILM COATED ORAL at 06:39

## 2025-01-08 RX ADMIN — SODIUM CHLORIDE, POTASSIUM CHLORIDE, SODIUM LACTATE AND CALCIUM CHLORIDE: 600; 310; 30; 20 INJECTION, SOLUTION INTRAVENOUS at 12:36

## 2025-01-08 RX ADMIN — SODIUM CHLORIDE: 9 INJECTION, SOLUTION INTRAVENOUS at 08:46

## 2025-01-08 RX ADMIN — PHENYLEPHRINE HYDROCHLORIDE 50 MCG: 10 INJECTION INTRAVENOUS at 10:31

## 2025-01-08 RX ADMIN — FERROUS SULFATE TAB EC 325 MG (65 MG FE EQUIVALENT) 650 MG: 325 (65 FE) TABLET DELAYED RESPONSE at 20:15

## 2025-01-08 RX ADMIN — TRANEXAMIC ACID 1000 MG: 10 INJECTION, SOLUTION INTRAVENOUS at 08:58

## 2025-01-08 RX ADMIN — PHENYLEPHRINE HYDROCHLORIDE 100 MCG: 10 INJECTION INTRAVENOUS at 11:36

## 2025-01-08 RX ADMIN — FENTANYL CITRATE 25 MCG: 50 INJECTION, SOLUTION INTRAMUSCULAR; INTRAVENOUS at 12:47

## 2025-01-08 RX ADMIN — SODIUM CHLORIDE, PRESERVATIVE FREE 10 ML: 5 INJECTION INTRAVENOUS at 20:17

## 2025-01-08 RX ADMIN — ROCURONIUM BROMIDE 10 MG: 10 INJECTION, SOLUTION INTRAVENOUS at 10:46

## 2025-01-08 RX ADMIN — PHENYLEPHRINE HYDROCHLORIDE 100 MCG: 10 INJECTION INTRAVENOUS at 08:50

## 2025-01-08 RX ADMIN — HYDROMORPHONE HYDROCHLORIDE 0.25 MG: 1 INJECTION, SOLUTION INTRAMUSCULAR; INTRAVENOUS; SUBCUTANEOUS at 10:43

## 2025-01-08 RX ADMIN — POTASSIUM CHLORIDE 40 MEQ: 1500 TABLET, EXTENDED RELEASE ORAL at 06:39

## 2025-01-08 RX ADMIN — FENTANYL CITRATE 25 MCG: 50 INJECTION, SOLUTION INTRAMUSCULAR; INTRAVENOUS at 20:15

## 2025-01-08 RX ADMIN — PHENYLEPHRINE HYDROCHLORIDE 100 MCG: 10 INJECTION INTRAVENOUS at 08:43

## 2025-01-08 RX ADMIN — HYDROMORPHONE HYDROCHLORIDE 0.5 MG: 1 INJECTION, SOLUTION INTRAMUSCULAR; INTRAVENOUS; SUBCUTANEOUS at 13:55

## 2025-01-08 RX ADMIN — PHENYLEPHRINE HYDROCHLORIDE 100 MCG: 10 INJECTION INTRAVENOUS at 10:49

## 2025-01-08 RX ADMIN — PHENYLEPHRINE HYDROCHLORIDE 100 MCG: 10 INJECTION INTRAVENOUS at 09:58

## 2025-01-08 RX ADMIN — ROCURONIUM BROMIDE 50 MG: 10 INJECTION, SOLUTION INTRAVENOUS at 08:40

## 2025-01-08 RX ADMIN — HYDROMORPHONE HYDROCHLORIDE 0.25 MG: 1 INJECTION, SOLUTION INTRAMUSCULAR; INTRAVENOUS; SUBCUTANEOUS at 12:44

## 2025-01-08 ASSESSMENT — PAIN DESCRIPTION - ORIENTATION
ORIENTATION: RIGHT

## 2025-01-08 ASSESSMENT — PAIN DESCRIPTION - DESCRIPTORS
DESCRIPTORS: PRESSURE
DESCRIPTORS: PINS AND NEEDLES;ACHING;DISCOMFORT
DESCRIPTORS: PRESSURE
DESCRIPTORS: ACHING;DISCOMFORT
DESCRIPTORS: ACHING;PRESSURE
DESCRIPTORS: ACHING;PRESSURE

## 2025-01-08 ASSESSMENT — PAIN SCALES - GENERAL
PAINLEVEL_OUTOF10: 8
PAINLEVEL_OUTOF10: 4
PAINLEVEL_OUTOF10: 6
PAINLEVEL_OUTOF10: 8
PAINLEVEL_OUTOF10: 6
PAINLEVEL_OUTOF10: 9
PAINLEVEL_OUTOF10: 10
PAINLEVEL_OUTOF10: 8
PAINLEVEL_OUTOF10: 8
PAINLEVEL_OUTOF10: 5
PAINLEVEL_OUTOF10: 6

## 2025-01-08 ASSESSMENT — PAIN SCALES - WONG BAKER
WONGBAKER_NUMERICALRESPONSE: NO HURT
WONGBAKER_NUMERICALRESPONSE: HURTS A LITTLE BIT

## 2025-01-08 ASSESSMENT — PAIN DESCRIPTION - LOCATION
LOCATION: HIP
LOCATION: LEG
LOCATION: HIP

## 2025-01-08 ASSESSMENT — PAIN - FUNCTIONAL ASSESSMENT
PAIN_FUNCTIONAL_ASSESSMENT: PREVENTS OR INTERFERES SOME ACTIVE ACTIVITIES AND ADLS
PAIN_FUNCTIONAL_ASSESSMENT: 0-10

## 2025-01-08 NOTE — CONSULTS
of the right hip demonstrating periprosthetic femur fracture with subsidence of femoral component.     Assessment: 64 y.o. female who FF , being seen for:    -Right total hip periprosthetic femur fracture    Plan:    - Plan for OR with Dr. Eller on 1/8/2025 for revision total hip arthroplasty   . Please maintain and keep clean and dry.  - NWB  to the RLE  - Diet: Please keep NPO 1/8 at 0015 in anticipation for OR   - Abx: Ancef OCTOR  - DVT ppx: Please hold in anticipation for OR 1/8. Okay to start chemical anticoagulation POD#1   - Consent in chart/surgical site marked  - F/u VitD level  - Pain control and medical management per primary   - Multimodal pain control ordered   - Ice extremity for pain and swelling  -Please contact Ortho on call with any questions    Jack Zimmerman DO  Orthopedic Surgery Resident, PGY-1  [place], [place]    PGY-2 Addendum    Patient seen and examined. Agree with subjective and objective portions from Dr. Zimmerman.     The patient is a 64 y.o. female with right periprosthetic proximal femur fracture.      Plan:  - Plan for OR with Dr. Eller on 1/8/2025 for revision total hip arthroplasty   . Please maintain and keep clean and dry.  - NWB  to the RLE  - Diet: Please keep NPO 1/8 at 0015 in anticipation for OR   - Abx: Ancef OCTOR  - DVT ppx: Please hold in anticipation for OR 1/8. Okay to start chemical anticoagulation POD#1   - Consent in chart/surgical site marked  - F/u VitD level  - Pain control and medical management per primary   - Multimodal pain control ordered   - Ice extremity for pain and swelling  -Please contact Ortho on call with any questions  _________________________________  Tay Almendarez MD  Orthopedic Surgery, PGY-2  [place], [place]

## 2025-01-08 NOTE — PLAN OF CARE
Problem: Pain  Goal: Verbalizes/displays adequate comfort level or baseline comfort level  Outcome: Progressing     Problem: Discharge Planning  Goal: Discharge to home or other facility with appropriate resources  Outcome: Progressing  Flowsheets (Taken 1/7/2025 2230)  Discharge to home or other facility with appropriate resources:   Identify barriers to discharge with patient and caregiver   Arrange for needed discharge resources and transportation as appropriate   Identify discharge learning needs (meds, wound care, etc)     Problem: Safety - Adult  Goal: Free from fall injury  Outcome: Progressing     Problem: ABCDS Injury Assessment  Goal: Absence of physical injury  Outcome: Progressing     Problem: Musculoskeletal - Adult  Goal: Return mobility to safest level of function  Outcome: Progressing  Goal: Maintain proper alignment of affected body part  Outcome: Progressing  Goal: Return ADL status to a safe level of function  Outcome: Progressing     Problem: Metabolic/Fluid and Electrolytes - Adult  Goal: Electrolytes maintained within normal limits  Outcome: Progressing

## 2025-01-08 NOTE — DISCHARGE INSTRUCTIONS
Orthopaedic Instructions:  -Weight bearing status: Weight bearing as tolerated with the right leg  -Do not remove Optifoam bandage (the large sealed \"Band-aid\"-like dressing) until your follow-up date in clinic. It is okay to shower with the Optifoam bandage. Should it fall off, replace with band-aids or gauze & tape until dry. It is still okay to shower if it falls off, but avoid baths and underwater submersion.  -Always look for signs of compartment syndrome: pain out of proportion to the injury, pain not controlled with pain medication, numbness in digits, changing of color of digits (paleness). If these signs occur return to ED immediately for reassessment.  -Ice (20 minutes on and off 1 hour) and elevate above the level of the heart to reduce swelling and throbbing pain.  -Call the office or come to Emergency Room if signs of infection appear (hot, swollen, red, draining pus, fever)  -Take medications as prescribed.  -Wean off narcotics (percocet/norco) as soon as possible. Do not take tylenol if still taking narcotics.  -Follow up with Dr. Eller in his office on 1/23/2025 at 8:15 AM. Call [phone] to confirm/ in the event that you should have to cancel/reschedule.

## 2025-01-08 NOTE — CARE COORDINATION
Met with pt to complete an SBIRT.  Pt is alert and oriented.  She states that she had a fall.  She states that she doesn't drink or use drugs and states that she has been depressed but takes medications.  She states that she is well taken care of from her doctor with her mental health.  She reports no depression symptoms in the last 2 weeks.          Alcohol Screening and Brief Intervention        No results for input(s): \"ALC\" in the last 72 hours.    Alcohol Pre-screening     (WOMEN ONLY) How many times in the past year have you had 4 or more drinks in a day?: None       Drug Pre-Screening        Drug Screening DAST       Mood Pre-Screening (PHQ-2)  During the past 2 weeks, have you been bothered by, feeling down, depressed or hopeless?  No        I have interviewed Sudha Aguilera, [id number] regarding  Her alcohol consumption/drug use and risk for excessive use. Screenings were negative.  Patient  N/A intervention at this time.   Deferred []    Completed on: 1/8/2025   BEE ROBERTO

## 2025-01-08 NOTE — CARE COORDINATION
Case Management Assessment  Initial Evaluation    Date/Time of Evaluation: 1/8/2025 5:25 PM  Assessment Completed by: Jameel Luther    If patient is discharged prior to next notation, then this note serves as note for discharge by case management.    Patient Name: Sudha Aguilera                   YOB: 1960  Diagnosis: Periprosthetic fracture around internal prosthetic hip joint, initial encounter [M97.8XXA, Z96.649]                   Date / Time: 1/7/2025  4:34 PM    Patient Admission Status: Inpatient   Readmission Risk (Low < 19, Mod (19-27), High > 27): Readmission Risk Score: 14.5    Current PCP: Jeanine Harley APRN - CNP  PCP verified by CM? Yes    Chart Reviewed: Yes      History Provided by: Patient  Patient Orientation: Alert and Oriented    Patient Cognition: Alert    Hospitalization in the last 30 days (Readmission):  No    If yes, Readmission Assessment in CM Navigator will be completed.    Advance Directives:      Code Status: Full Code   Patient's Primary Decision Maker is: Legal Next of Kin      Discharge Planning:    Patient lives with: Alone Type of Home: House  Primary Care Giver: Self  Patient Support Systems include: Family Members   Current Financial resources: Medicare  Current community resources: None  Current services prior to admission: Durable Medical Equipment            Current DME: Walker            Type of Home Care services:  None    ADLS  Prior functional level: Independent in ADLs/IADLs  Current functional level: Independent in ADLs/IADLs    PT AM-PAC:   /24  OT AM-PAC:   /24    Family can provide assistance at DC: No  Would you like Case Management to discuss the discharge plan with any other family members/significant others, and if so, who? No  Plans to Return to Present Housing: Yes  Other Identified Issues/Barriers to RETURNING to current housing: Impaired mobility, lives alone.  Potential Assistance needed at discharge: N/A            Potential DME:

## 2025-01-08 NOTE — ANESTHESIA PRE PROCEDURE
Department of Anesthesiology  Preprocedure Note       Name:  Sudha Aguilera   Age:  64 y.o.  :  1960                                          MRN:  [id number]         Date:  2025      Surgeon: Surgeon(s):  Kristian Eller DO    Procedure: Procedure(s):  **ADD ON - BLOCK TIME**HIP TOTAL ARTHROPLASTY REVISION**LATERAL, FLAT RUDDY W/PEG BOARD, C-ARM, CATHLEEN KEILA REVISION TOTAL HIP SET AND CATHLEEN CERCLAGE CABLES REP- NOTIFIED**    Medications prior to admission:   Prior to Admission medications    Medication Sig Start Date End Date Taking? Authorizing Provider   nicotine polacrilex (NICORETTE) 2 MG gum Take 1 each by mouth as needed for Smoking cessation   Yes Provider, MD Imelda   aspirin 81 MG EC tablet Take 1 tablet by mouth in the morning and at bedtime 24 Yes Bernard Huggins DO   ALPRAZolam (XANAX) 1 MG tablet Take 1 tablet by mouth 2 times daily as needed for Anxiety for up to 30 days. Max Daily Amount: 2 mg 24 Yes Jeanine Harley APRN - CNP   ferrous sulfate (IRON 325) 325 (65 Fe) MG tablet Take 2 tablets by mouth at bedtime 12/3/24 12/3/25 Yes Jeanine Harley APRN - CNP   atorvastatin (LIPITOR) 10 MG tablet TAKE 1 TABLET BY MOUTH DAILY 24 Yes Jeanine Harley APRN - LAURA   metoprolol tartrate (LOPRESSOR) 25 MG tablet Take 1 tablet by mouth 2 times daily  Patient taking differently: Take 1 tablet by mouth daily 24 Yes Lynn Harrison APRN - CNP   PARoxetine (PAXIL) 40 MG tablet TAKE ONE TABLET BY MOUTH EVERY MORNING 24 Yes Jeanine Harley APRN - CNP   meloxicam (MOBIC) 15 MG tablet TAKE 1 TABLET BY MOUTH DAILY 24  Yes Jeanine Harley APRN - CNP   alendronate (FOSAMAX) 70 MG tablet Take 1 tablet by mouth every 7 days 4/15/24 3/17/25 Yes Harley, Jeanine J, APRN - CNP   Magnesium 300 MG CAPS Take 2 tablets by mouth daily  Patient not taking: Reported on 2025    Provider, MD Imelda   docusate sodium

## 2025-01-08 NOTE — ED NOTES
Dr Bardales, surgical resident, bedside  
Pt bed linens changed d/t purewick malfunction.  Pt placed on new purewick and brief   
Pt placed on purewick  
Pt presents to ED with c/o a femur fracture.  Pt reports having her right hip replaced on 12/31/24, and sustained 2 falls following the surgery.  Pt reports she was seen today at the doctors and an x-ray was completed, reveling a femur fracture.  Patient alert and oriented x4, talking in complete sentences. Respirations even and unlabored. Call light in reach, all needs met at this time.    
Report given to ADRIAN Lee. All questions answered.  
Report received from   Ju CAMPBELL    Ortho bedside  
Sudha Stubbs status post total hip replacement 12/31/2024, fell and has femur fracture coming by private vehicle from Dr. Rodriguez's office, contact orthopedics  
The following labs were labeled with appropriate pt sticker and tubed to lab:     [] Blue     [] Lavender   [] on ice  [] Green/yellow  [] Green/black [] on ice  [] Grey  [] on ice  [] Yellow  [] Red  [] Pink  [] Type/ Screen  [] ABG  [] VBG    [] COVID-19 swab    [] Rapid  [] PCR  [] Flu swab  [] Peds Viral Panel     [x] Urine Sample  [] Fecal Sample  [] Pelvic Cultures  [] Blood Cultures  [] X 2  [] STREP Cultures  [] Wound Cultures   
The following labs were labeled with appropriate pt sticker and tubed to lab:     [x] Blue     [x] Lavender   [] on ice  [x] Green/yellow  [x] Green/black [] on ice  [] Quesada  [] on ice  [x] Yellow  [] Red  [x] Pink  [] Type/ Screen  [] ABG  [] VBG    [] COVID-19 swab    [] Rapid  [] PCR  [] Flu swab  [] Peds Viral Panel     [] Urine Sample  [] Fecal Sample  [] Pelvic Cultures  [] Blood Cultures  [] X 2  [] STREP Cultures  [] Wound Cultures   
Ticket to ride printed.   Tech to transport pt to the floor  
Xray bedside  
Colon Cancer Maternal Grandfather     Colon Cancer Paternal Grandfather     Breast Cancer Maternal Aunt 50        50+       ALLERGIES     Bactrim [sulfamethoxazole-trimethoprim]    CURRENT MEDICATIONS       Previous Medications    ALENDRONATE (FOSAMAX) 70 MG TABLET    Take 1 tablet by mouth every 7 days    ALPRAZOLAM (XANAX) 1 MG TABLET    Take 1 tablet by mouth 2 times daily as needed for Anxiety for up to 30 days. Max Daily Amount: 2 mg    ASPIRIN 81 MG EC TABLET    Take 1 tablet by mouth in the morning and at bedtime    ATORVASTATIN (LIPITOR) 10 MG TABLET    TAKE 1 TABLET BY MOUTH DAILY    BUPROPION (WELLBUTRIN XL) 150 MG EXTENDED RELEASE TABLET    Take 1 tablet by mouth every morning    CALTRATE 600+D PLUS MINERALS (CALTRATE) 600-800 MG-UNIT TABS TABLET    Take 1 tablet by mouth 2 times daily    DICYCLOMINE (BENTYL) 10 MG CAPSULE    Take 1 capsule by mouth 4 times daily (before meals and nightly)    DOCUSATE SODIUM (COLACE) 100 MG CAPSULE    Take 1 capsule by mouth 2 times daily as needed for Constipation    FERROUS SULFATE (IRON 325) 325 (65 FE) MG TABLET    Take 2 tablets by mouth at bedtime    HANDICAP PLACARD MISC    by Does not apply route    HANDICAP PLACARD MISC    by Does not apply route Expires: 10/28/2029    LORATADINE (CLARITIN) 10 MG TABLET    Take 1 tablet by mouth daily    MAGNESIUM 300 MG CAPS    Take 2 tablets by mouth daily    MELOXICAM (MOBIC) 15 MG TABLET    TAKE 1 TABLET BY MOUTH DAILY    METOPROLOL TARTRATE (LOPRESSOR) 25 MG TABLET    Take 1 tablet by mouth 2 times daily    MULTIPLE VITAMINS-MINERALS (THERAPEUTIC MULTIVITAMIN-MINERALS) TABLET    Take 1 tablet by mouth daily    NICOTINE POLACRILEX (NICORETTE) 2 MG GUM    Take 1 each by mouth as needed for Smoking cessation    OXYCODONE-ACETAMINOPHEN (PERCOCET) 5-325 MG PER TABLET    Take 1-2 tablets by mouth every 6 hours as needed for Pain for up to 7 days. Max Daily Amount: 8 tablets    PAROXETINE (PAXIL) 40 MG TABLET    TAKE ONE TABLET BY
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
## 2025-01-08 NOTE — H&P
Pulses: Normal pulses.      Heart sounds: Normal heart sounds.   Pulmonary:      Effort: Pulmonary effort is normal.      Breath sounds: Normal breath sounds.   Abdominal:      General: Bowel sounds are normal.      Palpations: Abdomen is soft.   Musculoskeletal:      Cervical back: Normal, normal range of motion and neck supple. No tenderness.      Thoracic back: Normal.      Lumbar back: Normal.      Right hip: Bony tenderness present. Decreased range of motion.      Left hip: Normal.      Right upper leg: Bony tenderness present.      Left upper leg: Normal.      Right knee: Normal.      Left knee: Normal.      Comments: No CTLS midline tenderness, with no bony step-offs. There are no contusions/abrasions seen to the anterior or posterior trunk. Patient is able to move all limbs symmetrically, distal pulses 2+ throughout    Skin:     General: Skin is warm.      Capillary Refill: Capillary refill takes less than 2 seconds.      Comments: No abrasions or wounds seen   Neurological:      General: No focal deficit present.      Mental Status: She is alert and oriented to person, place, and time. Mental status is at baseline.   Psychiatric:         Mood and Affect: Mood normal.        FOCUSED ABDOMINAL SONOGRAM FOR TRAUMA (FAST): A good  quality examination was performed and representative images were obtained.    No free fluid in the abdomen        RADIOLOGY  XR FEMUR RIGHT (MIN 2 VIEWS)   Final Result   Acute nondisplaced periprosthetic fracture along the right proximal medial   femur.         XR CHEST PORTABLE    (Results Pending)     LABS  Labs Reviewed   TRAUMA PANEL - Abnormal; Notable for the following components:       Result Value    RBC 2.43 (*)     Hemoglobin 7.9 (*)     Hematocrit 23.8 (*)     RDW 15.9 (*)     pH, Giacomo 7.441 (*)     Positive Base Excess, Giacomo 2.7 (*)     All other components within normal limits   VITAMIN B12 & FOLATE   HEMOGLOBIN A1C   TSH REFLEX TO FT4   ALBUMIN   URINALYSIS   URINE DRUG

## 2025-01-08 NOTE — BRIEF OP NOTE
Brief Postoperative Note      Patient: Sudha Aguilera  YOB: 1960  MRN: [id number]    Date of Procedure: 1/8/2025    Pre-Op Diagnosis:   1) [place] B2 periprosthetic femur fracture    Post-Op Diagnosis:   1) [place] B2 periprosthetic femur fracture       Procedure(s):  1) Right revision total hip arthroplasty  2) Right femur open reduction internal fixation    Surgeon(s):  Kristian Eller DO    Assistant:  First Assistant: Laura Rodriguez RN    Anesthesia: General    Estimated Blood Loss (mL): 550mL    IVF: 2L crystalloid, 1uPRBC    Complications: None    Specimens:   * No specimens in log *    Implants:  Cathleen Biomet Nereida 14 x 150 mm stem, size A high offset 70 mm body, 28 mm head with dual mobility liner  Cathleen 1.8 mm cables  Cathleen trochanteric plate  Implant Name Type Inv. Item Serial No.  Lot No. LRB No. Used Action   CABLE ORTH L91CM DIA1.8MM CO CHROM SMOOTH CBL-READY [id number]] CATHLEEN [employer]OMET TRAUMA] - [id number]  CABLE ORTH L91CM DIA1.8MM CO CHROM SMOOTH CBL-READY [id number]] CATHLEEN BIOMET TRAUMA]  CATHLEEN [employer]OMET TRAUMA- [id number] Right 1 Implanted   CABLE ORTH L91CM DIA1.8MM CO CHROM SMOOTH CBL-READY [id number]] CATHLEEN BIOMET TRAUMA] - [id number]  CABLE ORTH L91CM DIA1.8MM CO CHROM SMOOTH CBL-READY [id number]] CATHLEEN BIOMET TRAUMA]  CATHLEEN BIOMET TRAUMA- [id number] Right 1 Implanted   CABLE ORTH L91CM DIA1.8MM CO CHROM SMOOTH CBL-READY [id number]] CATHLEEN BIOMET TRAUMA] - [id number]  CABLE ORTH L91CM DIA1.8MM CO CHROM SMOOTH CBL-READY [id number]] CATHLEEN BIOMET TRAUMA]  CATHLEEN [employer]OMET TRAUMA- [id number] Right 1 Implanted   STEM FEM L150MM [id number] DST HIP TI SPLINED TAPR MOD REV SYS - [id number]  STEM FEM L150MM [id number] DST HIP TI SPLINED TAPR MOD REV SYS  CATHLEEN [employer]OMET [employer]- [id number] Right 1 Implanted   LINER ACET SZ DMF OD52MM ID28MM BLK HIP HIGHCROSS DBL - [id number]  LINER ACET SZ DMF OD52MM ID28MM BLK HIP HIGHCROSS DBL  [facility]ACTA [facility] [id number]

## 2025-01-08 NOTE — ANESTHESIA POSTPROCEDURE EVALUATION
Department of Anesthesiology  Postprocedure Note    Patient: Sudha Aguilera  MRN: [id number]  YOB: 1960  Date of evaluation: 1/8/2025    Procedure Summary       Date: 01/08/25 Room / Location: [address]    Anesthesia Start: 0836 Anesthesia Stop: 1253    Procedure: HIP TOTAL ARTHROPLASTY REVISION (Right: Hip) Diagnosis:       Periprosthetic fracture of proximal end of femur      (Periprosthetic fracture of proximal end of femur [M97.8XXA, Z96.649])    Surgeons: Kristian Eller DO Responsible Provider: Ruddy Anderson MD    Anesthesia Type: general ASA Status: 2            Anesthesia Type: No value filed.    Victor Hugo Phase I: Victor Hugo Score: 9    Victor Hugo Phase II:      Anesthesia Post Evaluation    Patient location during evaluation: bedside  Patient participation: complete - patient participated  Level of consciousness: awake and alert  Airway patency: patent  Nausea & Vomiting: no nausea and no vomiting  Cardiovascular status: hemodynamically stable  Respiratory status: acceptable  Hydration status: stable  Comments: /78   Pulse 77   Temp 98.1 °F (36.7 °C) (Temporal)   Resp 15   Ht 1.524 m (5')   Wt 78.5 kg (173 lb)   SpO2 96%   BMI 33.79 kg/m²     Pain management: adequate    No notable events documented.

## 2025-01-09 LAB
ABO/RH: NORMAL
ANION GAP SERPL CALCULATED.3IONS-SCNC: 11 MMOL/L (ref 9–16)
ANTIBODY SCREEN: NEGATIVE
ARM BAND NUMBER: NORMAL
BASOPHILS # BLD: <0.03 K/UL (ref 0–0.2)
BASOPHILS NFR BLD: 0 % (ref 0–2)
BLOOD BANK BLOOD PRODUCT EXPIRATION DATE: NORMAL
BLOOD BANK DISPENSE STATUS: NORMAL
BLOOD BANK DISPENSE STATUS: NORMAL
BLOOD BANK ISBT PRODUCT BLOOD TYPE: 6200
BLOOD BANK PRODUCT CODE: NORMAL
BLOOD BANK SAMPLE EXPIRATION: NORMAL
BLOOD BANK UNIT TYPE AND RH: NORMAL
BPU ID: NORMAL
BPU ID: NORMAL
BUN SERPL-MCNC: 13 MG/DL (ref 8–23)
CALCIUM SERPL-MCNC: 8.4 MG/DL (ref 8.6–10.4)
CHLORIDE SERPL-SCNC: 107 MMOL/L (ref 98–107)
CO2 SERPL-SCNC: 23 MMOL/L (ref 20–31)
COMPONENT: NORMAL
COMPONENT: NORMAL
CREAT SERPL-MCNC: 0.9 MG/DL (ref 0.6–0.9)
CROSSMATCH RESULT: NORMAL
CROSSMATCH RESULT: NORMAL
EOSINOPHIL # BLD: <0.03 K/UL (ref 0–0.44)
EOSINOPHILS RELATIVE PERCENT: 0 % (ref 1–4)
ERYTHROCYTE [DISTWIDTH] IN BLOOD BY AUTOMATED COUNT: 17.5 % (ref 11.8–14.4)
GFR, ESTIMATED: 71 ML/MIN/1.73M2
GLUCOSE SERPL-MCNC: 103 MG/DL (ref 74–99)
HCT VFR BLD AUTO: 24.6 % (ref 36.3–47.1)
HGB BLD-MCNC: 8.2 G/DL (ref 11.9–15.1)
IMM GRANULOCYTES # BLD AUTO: 0.07 K/UL (ref 0–0.3)
IMM GRANULOCYTES NFR BLD: 1 %
LYMPHOCYTES NFR BLD: 1.34 K/UL (ref 1.1–3.7)
LYMPHOCYTES RELATIVE PERCENT: 12 % (ref 24–43)
MCH RBC QN AUTO: 32 PG (ref 25.2–33.5)
MCHC RBC AUTO-ENTMCNC: 33.3 G/DL (ref 28.4–34.8)
MCV RBC AUTO: 96.1 FL (ref 82.6–102.9)
MONOCYTES NFR BLD: 1.15 K/UL (ref 0.1–1.2)
MONOCYTES NFR BLD: 11 % (ref 3–12)
NEUTROPHILS NFR BLD: 77 % (ref 36–65)
NEUTS SEG NFR BLD: 8.4 K/UL (ref 1.5–8.1)
NRBC BLD-RTO: 0.2 PER 100 WBC
PLATELET # BLD AUTO: 279 K/UL (ref 138–453)
PMV BLD AUTO: 10.9 FL (ref 8.1–13.5)
POTASSIUM SERPL-SCNC: 4.6 MMOL/L (ref 3.7–5.3)
RBC # BLD AUTO: 2.56 M/UL (ref 3.95–5.11)
RBC # BLD: ABNORMAL 10*6/UL
SODIUM SERPL-SCNC: 141 MMOL/L (ref 136–145)
TRANSFUSION STATUS: NORMAL
TRANSFUSION STATUS: NORMAL
UNIT DIVISION: 0
UNIT DIVISION: 0
UNIT ISSUE DATE/TIME: NORMAL
WBC OTHER # BLD: 11 K/UL (ref 3.5–11.3)

## 2025-01-09 PROCEDURE — 97116 GAIT TRAINING THERAPY: CPT

## 2025-01-09 PROCEDURE — 6370000000 HC RX 637 (ALT 250 FOR IP)

## 2025-01-09 PROCEDURE — 97530 THERAPEUTIC ACTIVITIES: CPT

## 2025-01-09 PROCEDURE — 1200000000 HC SEMI PRIVATE

## 2025-01-09 PROCEDURE — 85025 COMPLETE CBC W/AUTO DIFF WBC: CPT

## 2025-01-09 PROCEDURE — 2500000003 HC RX 250 WO HCPCS

## 2025-01-09 PROCEDURE — 97535 SELF CARE MNGMENT TRAINING: CPT

## 2025-01-09 PROCEDURE — 6360000002 HC RX W HCPCS

## 2025-01-09 PROCEDURE — 6370000000 HC RX 637 (ALT 250 FOR IP): Performed by: STUDENT IN AN ORGANIZED HEALTH CARE EDUCATION/TRAINING PROGRAM

## 2025-01-09 PROCEDURE — 96127 BRIEF EMOTIONAL/BEHAV ASSMT: CPT | Performed by: SOCIAL WORKER

## 2025-01-09 PROCEDURE — 80048 BASIC METABOLIC PNL TOTAL CA: CPT

## 2025-01-09 PROCEDURE — 97162 PT EVAL MOD COMPLEX 30 MIN: CPT

## 2025-01-09 PROCEDURE — 99231 SBSQ HOSP IP/OBS SF/LOW 25: CPT | Performed by: SURGERY

## 2025-01-09 PROCEDURE — 36415 COLL VENOUS BLD VENIPUNCTURE: CPT

## 2025-01-09 PROCEDURE — 97166 OT EVAL MOD COMPLEX 45 MIN: CPT

## 2025-01-09 RX ORDER — METHOCARBAMOL 750 MG/1
750 TABLET, FILM COATED ORAL EVERY 6 HOURS
Qty: 40 TABLET | Refills: 0
Start: 2025-01-09 | End: 2025-01-19

## 2025-01-09 RX ORDER — GABAPENTIN 300 MG/1
300 CAPSULE ORAL EVERY 8 HOURS
Qty: 21 CAPSULE | Refills: 0
Start: 2025-01-09 | End: 2025-01-16

## 2025-01-09 RX ORDER — OXYCODONE HYDROCHLORIDE 5 MG/1
10 TABLET ORAL EVERY 4 HOURS PRN
Status: DISCONTINUED | OUTPATIENT
Start: 2025-01-09 | End: 2025-01-12 | Stop reason: HOSPADM

## 2025-01-09 RX ORDER — OXYCODONE HYDROCHLORIDE 5 MG/1
5 TABLET ORAL EVERY 4 HOURS PRN
Status: DISCONTINUED | OUTPATIENT
Start: 2025-01-09 | End: 2025-01-12 | Stop reason: HOSPADM

## 2025-01-09 RX ORDER — OXYCODONE HYDROCHLORIDE 5 MG/1
5 TABLET ORAL EVERY 6 HOURS PRN
Qty: 18 TABLET | Refills: 0
Start: 2025-01-09 | End: 2025-01-16

## 2025-01-09 RX ORDER — FENTANYL CITRATE 50 UG/ML
50 INJECTION, SOLUTION INTRAMUSCULAR; INTRAVENOUS ONCE
Status: COMPLETED | OUTPATIENT
Start: 2025-01-09 | End: 2025-01-09

## 2025-01-09 RX ORDER — OXYCODONE HYDROCHLORIDE 5 MG/1
5 TABLET ORAL EVERY 4 HOURS PRN
Status: DISCONTINUED | OUTPATIENT
Start: 2025-01-09 | End: 2025-01-09

## 2025-01-09 RX ORDER — GABAPENTIN 300 MG/1
300 CAPSULE ORAL EVERY 8 HOURS
Status: DISCONTINUED | OUTPATIENT
Start: 2025-01-09 | End: 2025-01-12 | Stop reason: HOSPADM

## 2025-01-09 RX ORDER — METHOCARBAMOL 750 MG/1
750 TABLET, FILM COATED ORAL EVERY 6 HOURS
Status: DISCONTINUED | OUTPATIENT
Start: 2025-01-09 | End: 2025-01-12 | Stop reason: HOSPADM

## 2025-01-09 RX ADMIN — METOPROLOL TARTRATE 25 MG: 25 TABLET, FILM COATED ORAL at 08:04

## 2025-01-09 RX ADMIN — DICYCLOMINE HYDROCHLORIDE 10 MG: 10 CAPSULE ORAL at 08:04

## 2025-01-09 RX ADMIN — FERROUS SULFATE TAB EC 325 MG (65 MG FE EQUIVALENT) 650 MG: 325 (65 FE) TABLET DELAYED RESPONSE at 19:40

## 2025-01-09 RX ADMIN — OXYCODONE 5 MG: 5 TABLET ORAL at 19:39

## 2025-01-09 RX ADMIN — DICYCLOMINE HYDROCHLORIDE 10 MG: 10 CAPSULE ORAL at 11:09

## 2025-01-09 RX ADMIN — METHOCARBAMOL 750 MG: 750 TABLET ORAL at 12:57

## 2025-01-09 RX ADMIN — GABAPENTIN 300 MG: 300 CAPSULE ORAL at 08:04

## 2025-01-09 RX ADMIN — OXYCODONE 5 MG: 5 TABLET ORAL at 02:33

## 2025-01-09 RX ADMIN — FENTANYL CITRATE 50 MCG: 50 INJECTION, SOLUTION INTRAMUSCULAR; INTRAVENOUS at 00:27

## 2025-01-09 RX ADMIN — ACETAMINOPHEN 1000 MG: 500 TABLET, FILM COATED ORAL at 06:20

## 2025-01-09 RX ADMIN — OXYCODONE HYDROCHLORIDE AND ACETAMINOPHEN 500 MG: 500 TABLET ORAL at 19:39

## 2025-01-09 RX ADMIN — Medication 1 TABLET: at 21:20

## 2025-01-09 RX ADMIN — ENOXAPARIN SODIUM 40 MG: 100 INJECTION SUBCUTANEOUS at 08:04

## 2025-01-09 RX ADMIN — ACETAMINOPHEN 1000 MG: 500 TABLET, FILM COATED ORAL at 12:57

## 2025-01-09 RX ADMIN — OXYCODONE 10 MG: 5 TABLET ORAL at 10:16

## 2025-01-09 RX ADMIN — GABAPENTIN 300 MG: 300 CAPSULE ORAL at 23:16

## 2025-01-09 RX ADMIN — CEFAZOLIN 2000 MG: 10 INJECTION, POWDER, FOR SOLUTION INTRAVENOUS at 00:27

## 2025-01-09 RX ADMIN — POLYETHYLENE GLYCOL 3350 17 G: 17 POWDER, FOR SOLUTION ORAL at 08:04

## 2025-01-09 RX ADMIN — GABAPENTIN 300 MG: 300 CAPSULE ORAL at 15:59

## 2025-01-09 RX ADMIN — OXYCODONE 5 MG: 5 TABLET ORAL at 06:21

## 2025-01-09 RX ADMIN — ENOXAPARIN SODIUM 40 MG: 100 INJECTION SUBCUTANEOUS at 19:39

## 2025-01-09 RX ADMIN — BUPROPION HYDROCHLORIDE 150 MG: 150 TABLET, EXTENDED RELEASE ORAL at 08:04

## 2025-01-09 RX ADMIN — PAROXETINE HYDROCHLORIDE 40 MG: 20 TABLET, FILM COATED ORAL at 08:04

## 2025-01-09 RX ADMIN — ACETAMINOPHEN 1000 MG: 500 TABLET, FILM COATED ORAL at 21:20

## 2025-01-09 RX ADMIN — METHOCARBAMOL 750 MG: 750 TABLET ORAL at 19:51

## 2025-01-09 RX ADMIN — DICYCLOMINE HYDROCHLORIDE 10 MG: 10 CAPSULE ORAL at 21:20

## 2025-01-09 RX ADMIN — Medication 1 TABLET: at 08:04

## 2025-01-09 RX ADMIN — SODIUM CHLORIDE, PRESERVATIVE FREE 10 ML: 5 INJECTION INTRAVENOUS at 19:40

## 2025-01-09 RX ADMIN — OXYCODONE 5 MG: 5 TABLET ORAL at 14:18

## 2025-01-09 RX ADMIN — ATORVASTATIN CALCIUM 10 MG: 10 TABLET, FILM COATED ORAL at 08:04

## 2025-01-09 RX ADMIN — SODIUM CHLORIDE, PRESERVATIVE FREE 10 ML: 5 INJECTION INTRAVENOUS at 08:05

## 2025-01-09 RX ADMIN — METHOCARBAMOL 750 MG: 750 TABLET ORAL at 08:04

## 2025-01-09 ASSESSMENT — PAIN SCALES - GENERAL
PAINLEVEL_OUTOF10: 7
PAINLEVEL_OUTOF10: 8
PAINLEVEL_OUTOF10: 3
PAINLEVEL_OUTOF10: 7
PAINLEVEL_OUTOF10: 8
PAINLEVEL_OUTOF10: 8
PAINLEVEL_OUTOF10: 5

## 2025-01-09 ASSESSMENT — PAIN DESCRIPTION - LOCATION
LOCATION: HIP;LEG
LOCATION: LEG;HIP;KNEE
LOCATION: HIP
LOCATION: HIP;LEG;KNEE

## 2025-01-09 ASSESSMENT — PAIN DESCRIPTION - ORIENTATION
ORIENTATION: RIGHT

## 2025-01-09 ASSESSMENT — PAIN SCALES - WONG BAKER
WONGBAKER_NUMERICALRESPONSE: HURTS A LITTLE BIT

## 2025-01-09 NOTE — PLAN OF CARE
Problem: Pain  Goal: Verbalizes/displays adequate comfort level or baseline comfort level  Outcome: Progressing     Problem: Discharge Planning  Goal: Discharge to home or other facility with appropriate resources  Outcome: Progressing     Problem: Safety - Adult  Goal: Free from fall injury  Outcome: Progressing     Problem: ABCDS Injury Assessment  Goal: Absence of physical injury  Outcome: Progressing     Problem: Musculoskeletal - Adult  Goal: Return mobility to safest level of function  Outcome: Progressing  Goal: Maintain proper alignment of affected body part  Outcome: Progressing  Goal: Return ADL status to a safe level of function  Outcome: Progressing     Problem: Metabolic/Fluid and Electrolytes - Adult  Goal: Electrolytes maintained within normal limits  Outcome: Progressing     Problem: Skin/Tissue Integrity  Goal: Absence of new skin breakdown  Description: 1.  Monitor for areas of redness and/or skin breakdown  2.  Assess vascular access sites hourly  3.  Every 4-6 hours minimum:  Change oxygen saturation probe site  4.  Every 4-6 hours:  If on nasal continuous positive airway pressure, respiratory therapy assess nares and determine need for appliance change or resting period.  Outcome: Progressing

## 2025-01-09 NOTE — OP NOTE
Operative Note      Patient: Sudha Aguilera  YOB: 1960  MRN: [id number]    Date of Procedure: 1/8/2025    Pre-Op Diagnosis:   1) [place] B2 periprosthetic femur fracture    Post-Op Diagnosis:   1) [place] B2 periprosthetic femur fracture       Procedure(s):  1) Right revision total hip arthroplasty  2) Right femur open reduction internal fixation    Surgeon(s):  Kristian Eller DO    Assistant:  First Assistant: Laura Rodriguez RN    Anesthesia: General    Estimated Blood Loss (mL): 550mL    IVF: 2L crystalloid, 1uPRBC    Complications: None    Specimens:   * No specimens in log *    Implants:  Filiberto Biomet Nereida 14 x 150 mm stem, size A high offset 70 mm body, 28 mm head with dual mobility liner  Filiberto 1.8 mm cables  Filiberto trochanteric plate    Indications:  This is a 64 y.o. female who sustained a right [place] B2 periprosthetic proximal femur fracture. We discussed the nature of the injury as well as the indications for surgical intervention as well as the associated risks, benefits, and alternatives of the procedure. The patient stated clear understanding, was willing to proceed, provided written informed consent, and had her operative site marked. The patient received appropriate preoperative clearance/risk stratification from the primary and/or consulting services.    Procedure:  The patient was transported to the operating room and general anesthesia was administered by the anesthesia providers without complication. The patient was then transferred to a radiolucent flattop table in a lateral position with a peg board and axillary role. The right lower extremity was isolated, scrubbed with Hibiclens followed by alcohol, and prepped and draped in the usual sterile fashion.  A timeout was performed that included all involved parties and correctly identified the patient, planned procedure, and operative site.  The patient received weight based antibiotics in compliance with their

## 2025-01-09 NOTE — CARE COORDINATION
Trauma Coordinator Rounding Note  Daily check in:  I met with Sudha Aguilera  at bedside to review their plan of care. Pt awake in bed on my arrival. Awaiting PT/OT eval. I allowed opportunity for Sudha Aguilera to ask questions regarding their injuries, expected length of stay and disposition plan. All questions answered to verbal satisfaction.   [x]Wounds  [x]PT/OT/speech  [x]Incentive spirometer  [x]Diet  [x]Activity  [x]Preferred pharmacy (Meds to beds)  [x] PCP Confirmed  [x] Insurance Confirmed    DC Expectation:  Patient expects to be discharged to Acute Rehabilitation Unit (ARU)  Bedside Nurse:  I spoke with the patient's assigned nurse to review the plan of care for today and provide an opportunity to ask questions or relay any concerns to the Trauma service.  No needs currently.  Discharge Info:  I confirmed follow up information was placed in the discharge instructions for Orthopedics.   Discharge instructions reviewed and updated in patient's chart.   :  I provided a clinical update to the unit  and confirmed the disposition plan. Current barriers to discharge include: Pending Insurance authorization,   , facility acceptance, and bed availability. Pm&R consult placed.   Electronically signed by Sudha Perez RN on 1/9/2025 at 1:01 PM

## 2025-01-09 NOTE — CARE COORDINATION
Received voicemail from Breanna from [facility]. She will follow for therapy evals. [facility] PT messaged [facility] requesting eval to be completed    1608 voicemail left for admissions at [facility] to notify that PT eval is available and also that patient has a discharge order

## 2025-01-10 LAB
ANION GAP SERPL CALCULATED.3IONS-SCNC: 10 MMOL/L (ref 9–16)
BASOPHILS # BLD: 0.03 K/UL (ref 0–0.2)
BASOPHILS NFR BLD: 0 % (ref 0–2)
BUN SERPL-MCNC: 18 MG/DL (ref 8–23)
CALCIUM SERPL-MCNC: 8.8 MG/DL (ref 8.6–10.4)
CHLORIDE SERPL-SCNC: 106 MMOL/L (ref 98–107)
CO2 SERPL-SCNC: 25 MMOL/L (ref 20–31)
CREAT SERPL-MCNC: 0.8 MG/DL (ref 0.6–0.9)
EOSINOPHIL # BLD: 0.24 K/UL (ref 0–0.44)
EOSINOPHILS RELATIVE PERCENT: 2 % (ref 1–4)
ERYTHROCYTE [DISTWIDTH] IN BLOOD BY AUTOMATED COUNT: 17.7 % (ref 11.8–14.4)
GFR, ESTIMATED: 81 ML/MIN/1.73M2
GLUCOSE SERPL-MCNC: 107 MG/DL (ref 74–99)
HCT VFR BLD AUTO: 24.4 % (ref 36.3–47.1)
HGB BLD-MCNC: 7.5 G/DL (ref 11.9–15.1)
IMM GRANULOCYTES # BLD AUTO: 0.08 K/UL (ref 0–0.3)
IMM GRANULOCYTES NFR BLD: 1 %
LYMPHOCYTES NFR BLD: 2.09 K/UL (ref 1.1–3.7)
LYMPHOCYTES RELATIVE PERCENT: 19 % (ref 24–43)
MCH RBC QN AUTO: 31.9 PG (ref 25.2–33.5)
MCHC RBC AUTO-ENTMCNC: 30.7 G/DL (ref 28.4–34.8)
MCV RBC AUTO: 103.8 FL (ref 82.6–102.9)
MONOCYTES NFR BLD: 0.95 K/UL (ref 0.1–1.2)
MONOCYTES NFR BLD: 8 % (ref 3–12)
NEUTROPHILS NFR BLD: 70 % (ref 36–65)
NEUTS SEG NFR BLD: 7.87 K/UL (ref 1.5–8.1)
NRBC BLD-RTO: 0 PER 100 WBC
PLATELET # BLD AUTO: 315 K/UL (ref 138–453)
PMV BLD AUTO: 10.9 FL (ref 8.1–13.5)
POTASSIUM SERPL-SCNC: 3.9 MMOL/L (ref 3.7–5.3)
RBC # BLD AUTO: 2.35 M/UL (ref 3.95–5.11)
RBC # BLD: ABNORMAL 10*6/UL
RBC # BLD: ABNORMAL 10*6/UL
SODIUM SERPL-SCNC: 141 MMOL/L (ref 136–145)
WBC OTHER # BLD: 11.3 K/UL (ref 3.5–11.3)

## 2025-01-10 PROCEDURE — 85025 COMPLETE CBC W/AUTO DIFF WBC: CPT

## 2025-01-10 PROCEDURE — 97530 THERAPEUTIC ACTIVITIES: CPT

## 2025-01-10 PROCEDURE — 6370000000 HC RX 637 (ALT 250 FOR IP)

## 2025-01-10 PROCEDURE — 99223 1ST HOSP IP/OBS HIGH 75: CPT | Performed by: PHYSICAL MEDICINE & REHABILITATION

## 2025-01-10 PROCEDURE — 99231 SBSQ HOSP IP/OBS SF/LOW 25: CPT | Performed by: SURGERY

## 2025-01-10 PROCEDURE — 6370000000 HC RX 637 (ALT 250 FOR IP): Performed by: STUDENT IN AN ORGANIZED HEALTH CARE EDUCATION/TRAINING PROGRAM

## 2025-01-10 PROCEDURE — 36415 COLL VENOUS BLD VENIPUNCTURE: CPT

## 2025-01-10 PROCEDURE — 6360000002 HC RX W HCPCS

## 2025-01-10 PROCEDURE — 97116 GAIT TRAINING THERAPY: CPT

## 2025-01-10 PROCEDURE — 97535 SELF CARE MNGMENT TRAINING: CPT

## 2025-01-10 PROCEDURE — 80048 BASIC METABOLIC PNL TOTAL CA: CPT

## 2025-01-10 PROCEDURE — 97110 THERAPEUTIC EXERCISES: CPT

## 2025-01-10 PROCEDURE — 2500000003 HC RX 250 WO HCPCS

## 2025-01-10 PROCEDURE — 1200000000 HC SEMI PRIVATE

## 2025-01-10 RX ADMIN — METHOCARBAMOL 750 MG: 750 TABLET ORAL at 08:18

## 2025-01-10 RX ADMIN — Medication 1 TABLET: at 08:19

## 2025-01-10 RX ADMIN — ATORVASTATIN CALCIUM 10 MG: 10 TABLET, FILM COATED ORAL at 08:18

## 2025-01-10 RX ADMIN — ACETAMINOPHEN 1000 MG: 500 TABLET, FILM COATED ORAL at 21:29

## 2025-01-10 RX ADMIN — Medication 1 TABLET: at 08:18

## 2025-01-10 RX ADMIN — GABAPENTIN 300 MG: 300 CAPSULE ORAL at 15:20

## 2025-01-10 RX ADMIN — ENOXAPARIN SODIUM 40 MG: 100 INJECTION SUBCUTANEOUS at 08:20

## 2025-01-10 RX ADMIN — GABAPENTIN 300 MG: 300 CAPSULE ORAL at 08:18

## 2025-01-10 RX ADMIN — GABAPENTIN 300 MG: 300 CAPSULE ORAL at 23:09

## 2025-01-10 RX ADMIN — OXYCODONE 10 MG: 5 TABLET ORAL at 15:19

## 2025-01-10 RX ADMIN — METHOCARBAMOL 750 MG: 750 TABLET ORAL at 13:35

## 2025-01-10 RX ADMIN — OXYCODONE 10 MG: 5 TABLET ORAL at 00:33

## 2025-01-10 RX ADMIN — ACETAMINOPHEN 1000 MG: 500 TABLET, FILM COATED ORAL at 06:23

## 2025-01-10 RX ADMIN — DICYCLOMINE HYDROCHLORIDE 10 MG: 10 CAPSULE ORAL at 13:35

## 2025-01-10 RX ADMIN — OXYCODONE 5 MG: 5 TABLET ORAL at 21:29

## 2025-01-10 RX ADMIN — PAROXETINE HYDROCHLORIDE 40 MG: 20 TABLET, FILM COATED ORAL at 08:19

## 2025-01-10 RX ADMIN — METOPROLOL TARTRATE 25 MG: 25 TABLET, FILM COATED ORAL at 08:18

## 2025-01-10 RX ADMIN — SODIUM CHLORIDE, PRESERVATIVE FREE 10 ML: 5 INJECTION INTRAVENOUS at 08:39

## 2025-01-10 RX ADMIN — FERROUS SULFATE TAB EC 325 MG (65 MG FE EQUIVALENT) 650 MG: 325 (65 FE) TABLET DELAYED RESPONSE at 19:36

## 2025-01-10 RX ADMIN — SODIUM CHLORIDE, PRESERVATIVE FREE 10 ML: 5 INJECTION INTRAVENOUS at 19:36

## 2025-01-10 RX ADMIN — DICYCLOMINE HYDROCHLORIDE 10 MG: 10 CAPSULE ORAL at 08:18

## 2025-01-10 RX ADMIN — METHOCARBAMOL 750 MG: 750 TABLET ORAL at 00:33

## 2025-01-10 RX ADMIN — METHOCARBAMOL 750 MG: 750 TABLET ORAL at 18:40

## 2025-01-10 RX ADMIN — OXYCODONE 10 MG: 5 TABLET ORAL at 08:19

## 2025-01-10 RX ADMIN — ENOXAPARIN SODIUM 40 MG: 100 INJECTION SUBCUTANEOUS at 19:36

## 2025-01-10 RX ADMIN — DICYCLOMINE HYDROCHLORIDE 10 MG: 10 CAPSULE ORAL at 19:35

## 2025-01-10 RX ADMIN — OXYCODONE HYDROCHLORIDE AND ACETAMINOPHEN 500 MG: 500 TABLET ORAL at 19:36

## 2025-01-10 RX ADMIN — Medication 1 TABLET: at 19:36

## 2025-01-10 RX ADMIN — DICYCLOMINE HYDROCHLORIDE 10 MG: 10 CAPSULE ORAL at 18:41

## 2025-01-10 RX ADMIN — ACETAMINOPHEN 1000 MG: 500 TABLET, FILM COATED ORAL at 13:35

## 2025-01-10 RX ADMIN — BUPROPION HYDROCHLORIDE 150 MG: 150 TABLET, EXTENDED RELEASE ORAL at 08:18

## 2025-01-10 RX ADMIN — POLYETHYLENE GLYCOL 3350 17 G: 17 POWDER, FOR SOLUTION ORAL at 08:20

## 2025-01-10 ASSESSMENT — PAIN DESCRIPTION - ORIENTATION
ORIENTATION: RIGHT

## 2025-01-10 ASSESSMENT — PAIN DESCRIPTION - LOCATION
LOCATION: HIP
LOCATION: LEG
LOCATION: HIP
LOCATION: LEG;HIP

## 2025-01-10 ASSESSMENT — PAIN SCALES - GENERAL
PAINLEVEL_OUTOF10: 8
PAINLEVEL_OUTOF10: 5
PAINLEVEL_OUTOF10: 3
PAINLEVEL_OUTOF10: 9
PAINLEVEL_OUTOF10: 8
PAINLEVEL_OUTOF10: 8
PAINLEVEL_OUTOF10: 9
PAINLEVEL_OUTOF10: 6
PAINLEVEL_OUTOF10: 5

## 2025-01-10 ASSESSMENT — PAIN SCALES - WONG BAKER: WONGBAKER_NUMERICALRESPONSE: NO HURT

## 2025-01-10 ASSESSMENT — PAIN DESCRIPTION - DESCRIPTORS
DESCRIPTORS: DISCOMFORT;SORE
DESCRIPTORS: DISCOMFORT;SORE
DESCRIPTORS: DISCOMFORT;SORE;THROBBING
DESCRIPTORS: DISCOMFORT;SORE

## 2025-01-10 NOTE — CARE COORDINATION
Trauma Coordinator Rounding Note  Daily check in:  I met with Sudha Aguilera  at bedside to review their plan of care. Pt was awake in bed. Reports she had been up to chair for lunch and just got back into bed. I allowed opportunity for Sudha KHALIF Aguilera to ask questions regarding their injuries, expected length of stay and disposition plan. Updated on plan for drain to decrease in drainage and for orthopedic to pull drain before she is discharged. All questions answered to verbal satisfaction.   [x]Wounds  [x]PT/OT/speech  [x]Incentive spirometer  [x]Diet  [x]Activity  Bedside Nurse:  I attempted to speak with the patient's assigned nurse to review the plan of care for today and provide an opportunity to ask questions or relay any concerns to the Trauma service, but they were unavailable at this time.     :  I reviewed case managements notes. Current barriers to discharge include: not medically stable for discharge due to Wound Care needs, drain removal before dc.   Electronically signed by Sudha Perez RN on 1/10/2025 at 1:44 PM

## 2025-01-10 NOTE — CONSULTS
Transportation     Lack of Transportation (Medical): No     Lack of Transportation (Non-Medical): No   Physical Activity: Sufficiently Active (7/18/2024)    Exercise Vital Sign     Days of Exercise per Week: 4 days     Minutes of Exercise per Session: 120 min   Housing Stability: Low Risk  (1/7/2025)    Housing Stability Vital Sign     Unable to Pay for Housing in the Last Year: No     Number of Times Moved in the Last Year: 0     Homeless in the Last Year: No         Family History:       Problem Relation Age of Onset    Diabetes Mother     Arthritis Mother     Hypertension Mother     Crohn's Disease Father     Other Father     Ovarian Cancer Sister         complications of PVD    Stomach Cancer Maternal Grandmother     Colon Cancer Maternal Grandfather     Colon Cancer Paternal Grandfather     Breast Cancer Maternal Aunt 50        50+       Diagnostics:    CBC:   Recent Labs     01/08/25  1730 01/09/25  0605 01/10/25  0430   WBC 15.7* 11.0 11.3   RBC 3.04* 2.56* 2.35*   HGB 9.7* 8.2* 7.5*   HCT 33.3* 24.6* 24.4*   .5* 96.1 103.8*   RDW 18.1* 17.5* 17.7*    279 315     BMP:    Recent Labs     01/08/25 1730 01/09/25  0605 01/10/25  0430   GLUCOSE 157* 103* 107*   BUN 15 13 18   CREATININE 0.8 0.9 0.8   CALCIUM 8.0* 8.4* 8.8    141 141   K 4.9 4.6 3.9   * 107 106   CO2 19* 23 25   ANIONGAP 12 11 10   LABGLOM 81 71 81     HbA1c:   Lab Results   Component Value Date    LABA1C 5.3 01/07/2025     BNP: No results for input(s): \"BNP\" in the last 72 hours.  PT/INR:   Recent Labs     01/07/25 2005   PROTIME 13.7   INR 1.1     APTT:   Recent Labs     01/07/25 2005   APTT 25.4     CARDIAC ENZYMES: No results for input(s): \"CKMB\", \"CKMBINDEX\", \"TROPONINT\", \"TROPHS\", \"TROPII\" in the last 72 hours.    Invalid input(s): \"CKTOTAL;3\"   FASTING LIPID PANEL:  Lab Results   Component Value Date    CHOL 232 (H) 05/06/2024    HDL 48 07/18/2024    TRIG 126 05/06/2024     LIVER PROFILE:   Recent Labs

## 2025-01-10 NOTE — CARE COORDINATION
Received call from Yuliay from [facility]. They do not have a bed available    1417 spoke to Suzan from [facility]. They are able to accept. If patient is ready for discharge over the weekend, [facility] [phone] is on call. Patient updated

## 2025-01-10 NOTE — PLAN OF CARE
Problem: Pain  Goal: Verbalizes/displays adequate comfort level or baseline comfort level  Outcome: Progressing     Problem: Discharge Planning  Goal: Discharge to home or other facility with appropriate resources  Outcome: Progressing     Problem: Safety - Adult  Goal: Free from fall injury  Outcome: Progressing     Problem: ABCDS Injury Assessment  Goal: Absence of physical injury  Outcome: Progressing     Problem: Musculoskeletal - Adult  Goal: Return mobility to safest level of function  Outcome: Progressing  Goal: Maintain proper alignment of affected body part  Outcome: Progressing  Goal: Return ADL status to a safe level of function  Outcome: Progressing     Problem: Metabolic/Fluid and Electrolytes - Adult  Goal: Electrolytes maintained within normal limits  Outcome: Progressing     Problem: Skin/Tissue Integrity  Goal: Absence of new skin breakdown  Description: 1.  Monitor for areas of redness and/or skin breakdown  2.  Assess vascular access sites hourly  3.  Every 4-6 hours minimum:  Change oxygen saturation probe site  4.  Every 4-6 hours:  If on nasal continuous positive airway pressure, respiratory therapy assess nares and determine need for appliance change or resting period.  Outcome: Progressing     Problem: Physical Therapy - Adult  Goal: By Discharge: Performs mobility at highest level of function for planned discharge setting.  See evaluation for individualized goals.  1/9/2025 1607 by Donna Garcia, PT  Outcome: Progressing

## 2025-01-10 NOTE — CARE COORDINATION
Discussed with patient to chose a new ARU, [facility] has no beds available this week or into next week. Patient's choice is [facility], referral sent.

## 2025-01-11 LAB
ANION GAP SERPL CALCULATED.3IONS-SCNC: 12 MMOL/L (ref 9–16)
BACTERIA URNS QL MICRO: NORMAL
BASOPHILS # BLD: <0.03 K/UL (ref 0–0.2)
BASOPHILS NFR BLD: 0 % (ref 0–2)
BILIRUB UR QL STRIP: NEGATIVE
BUN SERPL-MCNC: 15 MG/DL (ref 8–23)
CALCIUM SERPL-MCNC: 8.7 MG/DL (ref 8.6–10.4)
CASTS #/AREA URNS LPF: NORMAL /LPF (ref 0–8)
CHLORIDE SERPL-SCNC: 105 MMOL/L (ref 98–107)
CLARITY UR: CLEAR
CO2 SERPL-SCNC: 24 MMOL/L (ref 20–31)
COLOR UR: YELLOW
CREAT SERPL-MCNC: 0.9 MG/DL (ref 0.6–0.9)
DATE, STOOL #1: NORMAL
EOSINOPHIL # BLD: 0.14 K/UL (ref 0–0.44)
EOSINOPHILS RELATIVE PERCENT: 1 % (ref 1–4)
EPI CELLS #/AREA URNS HPF: NORMAL /HPF (ref 0–5)
ERYTHROCYTE [DISTWIDTH] IN BLOOD BY AUTOMATED COUNT: 17.9 % (ref 11.8–14.4)
GFR, ESTIMATED: 71 ML/MIN/1.73M2
GLUCOSE SERPL-MCNC: 116 MG/DL (ref 74–99)
GLUCOSE UR STRIP-MCNC: NEGATIVE MG/DL
HCT VFR BLD AUTO: 24.8 % (ref 36.3–47.1)
HCT VFR BLD AUTO: 24.8 % (ref 36.3–47.1)
HEMOCCULT SP1 STL QL: NEGATIVE
HGB BLD-MCNC: 7.5 G/DL (ref 11.9–15.1)
HGB BLD-MCNC: 7.6 G/DL (ref 11.9–15.1)
HGB UR QL STRIP.AUTO: NEGATIVE
IMM GRANULOCYTES # BLD AUTO: 0.12 K/UL (ref 0–0.3)
IMM GRANULOCYTES NFR BLD: 1 %
KETONES UR STRIP-MCNC: NEGATIVE MG/DL
LEUKOCYTE ESTERASE UR QL STRIP: ABNORMAL
LYMPHOCYTES NFR BLD: 1.2 K/UL (ref 1.1–3.7)
LYMPHOCYTES RELATIVE PERCENT: 9 % (ref 24–43)
MCH RBC QN AUTO: 31.7 PG (ref 25.2–33.5)
MCHC RBC AUTO-ENTMCNC: 30.6 G/DL (ref 28.4–34.8)
MCV RBC AUTO: 103.3 FL (ref 82.6–102.9)
MONOCYTES NFR BLD: 0.74 K/UL (ref 0.1–1.2)
MONOCYTES NFR BLD: 6 % (ref 3–12)
NEUTROPHILS NFR BLD: 83 % (ref 36–65)
NEUTS SEG NFR BLD: 10.5 K/UL (ref 1.5–8.1)
NITRITE UR QL STRIP: NEGATIVE
NRBC BLD-RTO: 0 PER 100 WBC
PH UR STRIP: 6.5 [PH] (ref 5–8)
PLATELET # BLD AUTO: 322 K/UL (ref 138–453)
PMV BLD AUTO: 10.8 FL (ref 8.1–13.5)
POTASSIUM SERPL-SCNC: 3.9 MMOL/L (ref 3.7–5.3)
PROT UR STRIP-MCNC: NEGATIVE MG/DL
RBC # BLD AUTO: 2.4 M/UL (ref 3.95–5.11)
RBC # BLD: ABNORMAL 10*6/UL
RBC # BLD: ABNORMAL 10*6/UL
RBC #/AREA URNS HPF: NORMAL /HPF (ref 0–4)
SODIUM SERPL-SCNC: 141 MMOL/L (ref 136–145)
SP GR UR STRIP: 1.01 (ref 1–1.03)
TIME, STOOL #1: 1700
UROBILINOGEN UR STRIP-ACNC: NORMAL EU/DL (ref 0–1)
WBC #/AREA URNS HPF: NORMAL /HPF (ref 0–5)
WBC OTHER # BLD: 12.7 K/UL (ref 3.5–11.3)

## 2025-01-11 PROCEDURE — 99231 SBSQ HOSP IP/OBS SF/LOW 25: CPT | Performed by: SURGERY

## 2025-01-11 PROCEDURE — 82270 OCCULT BLOOD FECES: CPT

## 2025-01-11 PROCEDURE — 81001 URINALYSIS AUTO W/SCOPE: CPT

## 2025-01-11 PROCEDURE — 85014 HEMATOCRIT: CPT

## 2025-01-11 PROCEDURE — 97116 GAIT TRAINING THERAPY: CPT

## 2025-01-11 PROCEDURE — 6370000000 HC RX 637 (ALT 250 FOR IP): Performed by: STUDENT IN AN ORGANIZED HEALTH CARE EDUCATION/TRAINING PROGRAM

## 2025-01-11 PROCEDURE — 85018 HEMOGLOBIN: CPT

## 2025-01-11 PROCEDURE — 6370000000 HC RX 637 (ALT 250 FOR IP)

## 2025-01-11 PROCEDURE — 97110 THERAPEUTIC EXERCISES: CPT

## 2025-01-11 PROCEDURE — 2060000000 HC ICU INTERMEDIATE R&B

## 2025-01-11 PROCEDURE — 97530 THERAPEUTIC ACTIVITIES: CPT

## 2025-01-11 PROCEDURE — 85025 COMPLETE CBC W/AUTO DIFF WBC: CPT

## 2025-01-11 PROCEDURE — 97535 SELF CARE MNGMENT TRAINING: CPT

## 2025-01-11 PROCEDURE — 94761 N-INVAS EAR/PLS OXIMETRY MLT: CPT

## 2025-01-11 PROCEDURE — 36415 COLL VENOUS BLD VENIPUNCTURE: CPT

## 2025-01-11 PROCEDURE — 2580000003 HC RX 258

## 2025-01-11 PROCEDURE — 2700000000 HC OXYGEN THERAPY PER DAY

## 2025-01-11 PROCEDURE — 2500000003 HC RX 250 WO HCPCS

## 2025-01-11 PROCEDURE — 6360000002 HC RX W HCPCS

## 2025-01-11 PROCEDURE — 80048 BASIC METABOLIC PNL TOTAL CA: CPT

## 2025-01-11 RX ORDER — NITROFURANTOIN 25; 75 MG/1; MG/1
100 CAPSULE ORAL EVERY 12 HOURS SCHEDULED
Status: DISCONTINUED | OUTPATIENT
Start: 2025-01-11 | End: 2025-01-11

## 2025-01-11 RX ORDER — NITROFURANTOIN 25; 75 MG/1; MG/1
100 CAPSULE ORAL EVERY 12 HOURS SCHEDULED
Qty: 20 CAPSULE | Refills: 0 | Status: SHIPPED | OUTPATIENT
Start: 2025-01-11 | End: 2025-01-11

## 2025-01-11 RX ORDER — SODIUM CHLORIDE, SODIUM LACTATE, POTASSIUM CHLORIDE, AND CALCIUM CHLORIDE .6; .31; .03; .02 G/100ML; G/100ML; G/100ML; G/100ML
1000 INJECTION, SOLUTION INTRAVENOUS ONCE
Status: COMPLETED | OUTPATIENT
Start: 2025-01-11 | End: 2025-01-11

## 2025-01-11 RX ORDER — 0.9 % SODIUM CHLORIDE 0.9 %
500 INTRAVENOUS SOLUTION INTRAVENOUS ONCE
Status: DISCONTINUED | OUTPATIENT
Start: 2025-01-11 | End: 2025-01-11

## 2025-01-11 RX ORDER — NITROFURANTOIN 25; 75 MG/1; MG/1
100 CAPSULE ORAL EVERY 12 HOURS SCHEDULED
Qty: 10 CAPSULE | Refills: 0 | Status: SHIPPED | OUTPATIENT
Start: 2025-01-11 | End: 2025-01-16

## 2025-01-11 RX ORDER — SODIUM CHLORIDE, SODIUM LACTATE, POTASSIUM CHLORIDE, AND CALCIUM CHLORIDE .6; .31; .03; .02 G/100ML; G/100ML; G/100ML; G/100ML
500 INJECTION, SOLUTION INTRAVENOUS ONCE
Status: DISCONTINUED | OUTPATIENT
Start: 2025-01-11 | End: 2025-01-11

## 2025-01-11 RX ORDER — NITROFURANTOIN 25; 75 MG/1; MG/1
100 CAPSULE ORAL EVERY 12 HOURS SCHEDULED
Status: DISCONTINUED | OUTPATIENT
Start: 2025-01-11 | End: 2025-01-12 | Stop reason: HOSPADM

## 2025-01-11 RX ADMIN — OXYCODONE 10 MG: 5 TABLET ORAL at 01:57

## 2025-01-11 RX ADMIN — OXYCODONE 10 MG: 5 TABLET ORAL at 06:54

## 2025-01-11 RX ADMIN — ACETAMINOPHEN 1000 MG: 500 TABLET, FILM COATED ORAL at 16:59

## 2025-01-11 RX ADMIN — SODIUM CHLORIDE, PRESERVATIVE FREE 10 ML: 5 INJECTION INTRAVENOUS at 20:51

## 2025-01-11 RX ADMIN — ATORVASTATIN CALCIUM 10 MG: 10 TABLET, FILM COATED ORAL at 09:37

## 2025-01-11 RX ADMIN — NITROFURANTOIN MONOHYDRATE/MACROCRYSTALS 100 MG: 75; 25 CAPSULE ORAL at 10:03

## 2025-01-11 RX ADMIN — DICYCLOMINE HYDROCHLORIDE 10 MG: 10 CAPSULE ORAL at 21:17

## 2025-01-11 RX ADMIN — NITROFURANTOIN MONOHYDRATE/MACROCRYSTALS 100 MG: 75; 25 CAPSULE ORAL at 20:50

## 2025-01-11 RX ADMIN — SODIUM CHLORIDE, POTASSIUM CHLORIDE, SODIUM LACTATE AND CALCIUM CHLORIDE 1000 ML: 600; 310; 30; 20 INJECTION, SOLUTION INTRAVENOUS at 10:28

## 2025-01-11 RX ADMIN — BUPROPION HYDROCHLORIDE 150 MG: 150 TABLET, EXTENDED RELEASE ORAL at 09:37

## 2025-01-11 RX ADMIN — PAROXETINE HYDROCHLORIDE 40 MG: 20 TABLET, FILM COATED ORAL at 09:37

## 2025-01-11 RX ADMIN — DICYCLOMINE HYDROCHLORIDE 10 MG: 10 CAPSULE ORAL at 17:30

## 2025-01-11 RX ADMIN — ENOXAPARIN SODIUM 40 MG: 100 INJECTION SUBCUTANEOUS at 20:50

## 2025-01-11 RX ADMIN — OXYCODONE HYDROCHLORIDE AND ACETAMINOPHEN 500 MG: 500 TABLET ORAL at 20:50

## 2025-01-11 RX ADMIN — GABAPENTIN 300 MG: 300 CAPSULE ORAL at 17:30

## 2025-01-11 RX ADMIN — SODIUM CHLORIDE, PRESERVATIVE FREE 10 ML: 5 INJECTION INTRAVENOUS at 10:48

## 2025-01-11 RX ADMIN — DICYCLOMINE HYDROCHLORIDE 10 MG: 10 CAPSULE ORAL at 09:37

## 2025-01-11 RX ADMIN — OXYCODONE 5 MG: 5 TABLET ORAL at 23:07

## 2025-01-11 RX ADMIN — Medication 1 TABLET: at 09:37

## 2025-01-11 RX ADMIN — METHOCARBAMOL 750 MG: 750 TABLET ORAL at 12:14

## 2025-01-11 RX ADMIN — ACETAMINOPHEN 1000 MG: 500 TABLET, FILM COATED ORAL at 06:54

## 2025-01-11 RX ADMIN — FERROUS SULFATE TAB EC 325 MG (65 MG FE EQUIVALENT) 650 MG: 325 (65 FE) TABLET DELAYED RESPONSE at 20:50

## 2025-01-11 RX ADMIN — ACETAMINOPHEN 1000 MG: 500 TABLET, FILM COATED ORAL at 20:49

## 2025-01-11 RX ADMIN — Medication 1 TABLET: at 21:07

## 2025-01-11 RX ADMIN — METHOCARBAMOL 750 MG: 750 TABLET ORAL at 01:57

## 2025-01-11 RX ADMIN — GABAPENTIN 300 MG: 300 CAPSULE ORAL at 23:08

## 2025-01-11 RX ADMIN — ENOXAPARIN SODIUM 40 MG: 100 INJECTION SUBCUTANEOUS at 09:37

## 2025-01-11 ASSESSMENT — PAIN DESCRIPTION - ORIENTATION
ORIENTATION: RIGHT

## 2025-01-11 ASSESSMENT — PAIN SCALES - GENERAL
PAINLEVEL_OUTOF10: 6
PAINLEVEL_OUTOF10: 7
PAINLEVEL_OUTOF10: 8
PAINLEVEL_OUTOF10: 7
PAINLEVEL_OUTOF10: 8
PAINLEVEL_OUTOF10: 3

## 2025-01-11 ASSESSMENT — PAIN DESCRIPTION - DESCRIPTORS
DESCRIPTORS: DISCOMFORT;SORE
DESCRIPTORS: DISCOMFORT;SORE
DESCRIPTORS: ACHING
DESCRIPTORS: ACHING

## 2025-01-11 ASSESSMENT — PAIN DESCRIPTION - LOCATION
LOCATION: HIP
LOCATION: LEG
LOCATION: LEG;HIP
LOCATION: HIP
LOCATION: HIP

## 2025-01-11 NOTE — DISCHARGE INSTR - COC
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    Hematuria     Collagenous colitis K52.831    Lupus XCK2546    SHUN (generalized anxiety disorder) F41.1    Chronic pain of both knees M25.561, M25.562, G89.29    Panic attack due to exceptional stress F41.0, F43.0    Osteoporosis M81.0    Vitamin D deficiency E55.9    B12 deficiency E53.8    Restless leg syndrome G25.81    Mixed hyperlipidemia E78.2    Psychophysiological insomnia F51.04    History of total hip arthroplasty, left Z96.642    History of total knee arthroplasty, bilateral Z96.653    Osteoarthritis of right hip M16.11    Arthritis of right hip M16.11    Periprosthetic fracture around internal prosthetic hip joint, initial encounter M97.8XXA, Z96.649       Isolation/Infection:   Isolation            Contact          Patient Infection Status       Infection Onset Added Last Indicated Last Indicated By Review Planned Expiration Resolved Resolved By    MDRO (multi-drug resistant organism)  03/18/19 03/18/19 Brisa Barron, RN        E. Coli - urine 3/2019              Nurse Assessment:  Last Vital Signs: /69   Pulse 85   Temp 98.3 °F (36.8 °C) (Oral)   Resp 24   Ht 1.524 m (5')   Wt 78.5 kg (173 lb)   SpO2 97%   BMI 33.79 kg/m²     Last documented pain score (0-10 scale): Pain Level: 8  Last Weight:   Wt Readings from Last 1 Encounters:   01/08/25 78.5 kg (173 lb)     Mental Status:  oriented, alert, coherent, logical, thought processes intact, and able to concentrate and follow conversation    IV Access:  - None    Nursing Mobility/ADLs:  Walking   Assisted  Transfer  Assisted  Bathing  Assisted  Dressing  Assisted  Toileting  Assisted  Feeding  Independent  Med Admin  Independent  Med Delivery   whole    Wound Care Documentation and Therapy:  Incision 07/25/23 Shoulder Left

## 2025-01-11 NOTE — CARE COORDINATION
TRansitional planning    CM called [facility] [phone] with [facility] per request of resident to see if  ARU has a bed for patient today. Left VM requesting call back.     1305 VM from [facility] [phone] with [facility]. Sheis checking with her DON to see if they can accept one more patient today. She will call CM back shortly    1505 Spoke to patient about plan for discharge. Plan is for [facility]. She is agreeable to discharge today    1510 Spoke to [facility] with [facility] and they can accept today. Need KENYA completed and faxed to [phone]. Report [phone]. No scripts needed. She can send a transport van at 5:30. CM told her she is on 3LNC.     1629 Patient RN relates d/c on hold d/t low BP. Ps from Dr Holbrook says patient is staying for today. CM asked for d/c order to be removed.    1631 CM called [facility] and told her patient discharge on hold. She does not have w/c transportation available tomorrow. Will call with updates tomorrow.    1639 Spoke to patient and told her d/c on hold for today.

## 2025-01-11 NOTE — PLAN OF CARE
Patient:  Sudha Aguilera  YOB: 1960     64 y.o. female    Orthopedic post-operative instructions    Impression:  Sudha Aguilera is a 64 y.o. female is being seen for:     -Right [place] B2 periprosthetic femur fracture     DOS: 1/8/2025 with Dr. Eller  -Right revision total hip arthroplasty  -Right femur open reduction internal fixation     Plan  - No further plans for orthopedic intervention at this time  -  Hemovac drain removed from right hip  - Optifoam on RLE. Okay to reinforce/maintain by nursing if necessary. Please notify Ortho if saturated.  - Abduction pillow on at all times.  -Precautions to avoid hip dislocation:  -Keep your knees apart. Don't cross your legs.  -Sleep with a pillow between your legs for the first 3 days  -Step up first with your unaffected leg. Then bring the affected leg up to the same step. Bring your crutches or cane up.  -Don't lift your knee higher than your hip.  -Don't lean forward while you sit down or stand up, and don't bend past 90 degrees (like the angle in a letter \"L\"). This means you can't try to  something off the floor or bend down to tie your shoes.  -Don't sit on low chairs, beds, or toilets. You may want to use a raised toilet seat for a while. Sit in chairs with arms.  - WBAT  to the RLE  - Pain control and medical management per primary: Trauma  - DVT ppx: Lovenox 40 mg twice daily  - Ice to control pain and edema  - Diet: Adult diet  - Encourage Incentive Spirometry use  - PT/OT will evaluate today   - Okay to discharge home from orthopedic perspective once medically stable  - F/u with Dr. Eller on 1/23/2025  - Please page Ortho on call with any questions      Jack Zimmerman,   Orthopedic Surgery, PGY-1  [place], [place]

## 2025-01-11 NOTE — PLAN OF CARE
Problem: Pain  Goal: Verbalizes/displays adequate comfort level or baseline comfort level  Outcome: Progressing     Problem: Discharge Planning  Goal: Discharge to home or other facility with appropriate resources  Outcome: Progressing  Flowsheets (Taken 1/10/2025 0800 by Zahira Jones RN)  Discharge to home or other facility with appropriate resources:   Identify barriers to discharge with patient and caregiver   Arrange for needed discharge resources and transportation as appropriate   Identify discharge learning needs (meds, wound care, etc)     Problem: Safety - Adult  Goal: Free from fall injury  Outcome: Progressing  Flowsheets (Taken 1/10/2025 0800 by Zahira Jones RN)  Free From Fall Injury: Instruct family/caregiver on patient safety     Problem: ABCDS Injury Assessment  Goal: Absence of physical injury  Outcome: Progressing  Flowsheets (Taken 1/10/2025 0800 by Zahira Jones RN)  Absence of Physical Injury: Implement safety measures based on patient assessment     Problem: Musculoskeletal - Adult  Goal: Return mobility to safest level of function  Outcome: Progressing  Flowsheets (Taken 1/10/2025 0800 by Zahira Jones RN)  Return Mobility to Safest Level of Function:   Assess patient stability and activity tolerance for standing, transferring and ambulating with or without assistive devices   Assist with transfers and ambulation using safe patient handling equipment as needed   Ensure adequate protection for wounds/incisions during mobilization   Obtain physical therapy/occupational therapy consults as needed   Apply continuous passive motion per provider or physical therapy orders to increase flexion toward goal   Instruct patient/family in ordered activity level  Goal: Maintain proper alignment of affected body part  Outcome: Progressing  Flowsheets (Taken 1/10/2025 0800 by Zahira Jones RN)  Maintain proper alignment of affected body part:   Support and protect limb and body alignment

## 2025-01-12 VITALS
HEIGHT: 60 IN | WEIGHT: 173 LBS | RESPIRATION RATE: 25 BRPM | SYSTOLIC BLOOD PRESSURE: 119 MMHG | BODY MASS INDEX: 33.96 KG/M2 | HEART RATE: 87 BPM | TEMPERATURE: 98 F | DIASTOLIC BLOOD PRESSURE: 72 MMHG | OXYGEN SATURATION: 94 %

## 2025-01-12 LAB
ANION GAP SERPL CALCULATED.3IONS-SCNC: 12 MMOL/L (ref 9–16)
BASOPHILS # BLD: <0.03 K/UL (ref 0–0.2)
BASOPHILS NFR BLD: 0 % (ref 0–2)
BUN SERPL-MCNC: 12 MG/DL (ref 8–23)
CALCIUM SERPL-MCNC: 8.6 MG/DL (ref 8.6–10.4)
CHLORIDE SERPL-SCNC: 105 MMOL/L (ref 98–107)
CO2 SERPL-SCNC: 24 MMOL/L (ref 20–31)
CREAT SERPL-MCNC: 0.9 MG/DL (ref 0.6–0.9)
EOSINOPHIL # BLD: 0.4 K/UL (ref 0–0.44)
EOSINOPHILS RELATIVE PERCENT: 5 % (ref 1–4)
ERYTHROCYTE [DISTWIDTH] IN BLOOD BY AUTOMATED COUNT: 18.5 % (ref 11.8–14.4)
GFR, ESTIMATED: 71 ML/MIN/1.73M2
GLUCOSE SERPL-MCNC: 113 MG/DL (ref 74–99)
HCT VFR BLD AUTO: 24.9 % (ref 36.3–47.1)
HGB BLD-MCNC: 7.7 G/DL (ref 11.9–15.1)
IMM GRANULOCYTES # BLD AUTO: 0.09 K/UL (ref 0–0.3)
IMM GRANULOCYTES NFR BLD: 1 %
LYMPHOCYTES NFR BLD: 1.14 K/UL (ref 1.1–3.7)
LYMPHOCYTES RELATIVE PERCENT: 13 % (ref 24–43)
MAGNESIUM SERPL-MCNC: 2.3 MG/DL (ref 1.6–2.4)
MCH RBC QN AUTO: 32.1 PG (ref 25.2–33.5)
MCHC RBC AUTO-ENTMCNC: 30.9 G/DL (ref 28.4–34.8)
MCV RBC AUTO: 103.8 FL (ref 82.6–102.9)
MONOCYTES NFR BLD: 0.44 K/UL (ref 0.1–1.2)
MONOCYTES NFR BLD: 5 % (ref 3–12)
NEUTROPHILS NFR BLD: 76 % (ref 36–65)
NEUTS SEG NFR BLD: 6.65 K/UL (ref 1.5–8.1)
NRBC BLD-RTO: 0.2 PER 100 WBC
PLATELET # BLD AUTO: 322 K/UL (ref 138–453)
PMV BLD AUTO: 10.6 FL (ref 8.1–13.5)
POTASSIUM SERPL-SCNC: 3.4 MMOL/L (ref 3.7–5.3)
RBC # BLD AUTO: 2.4 M/UL (ref 3.95–5.11)
RBC # BLD: ABNORMAL 10*6/UL
RBC # BLD: ABNORMAL 10*6/UL
SODIUM SERPL-SCNC: 141 MMOL/L (ref 136–145)
WBC OTHER # BLD: 8.7 K/UL (ref 3.5–11.3)

## 2025-01-12 PROCEDURE — 2500000003 HC RX 250 WO HCPCS

## 2025-01-12 PROCEDURE — 6370000000 HC RX 637 (ALT 250 FOR IP)

## 2025-01-12 PROCEDURE — 99231 SBSQ HOSP IP/OBS SF/LOW 25: CPT | Performed by: SURGERY

## 2025-01-12 PROCEDURE — 94761 N-INVAS EAR/PLS OXIMETRY MLT: CPT

## 2025-01-12 PROCEDURE — 36415 COLL VENOUS BLD VENIPUNCTURE: CPT

## 2025-01-12 PROCEDURE — 83735 ASSAY OF MAGNESIUM: CPT

## 2025-01-12 PROCEDURE — 6370000000 HC RX 637 (ALT 250 FOR IP): Performed by: STUDENT IN AN ORGANIZED HEALTH CARE EDUCATION/TRAINING PROGRAM

## 2025-01-12 PROCEDURE — 6360000002 HC RX W HCPCS

## 2025-01-12 PROCEDURE — 80048 BASIC METABOLIC PNL TOTAL CA: CPT

## 2025-01-12 PROCEDURE — 85025 COMPLETE CBC W/AUTO DIFF WBC: CPT

## 2025-01-12 RX ORDER — POTASSIUM CHLORIDE 1500 MG/1
40 TABLET, EXTENDED RELEASE ORAL ONCE
Status: COMPLETED | OUTPATIENT
Start: 2025-01-12 | End: 2025-01-12

## 2025-01-12 RX ADMIN — METHOCARBAMOL 750 MG: 750 TABLET ORAL at 08:21

## 2025-01-12 RX ADMIN — NITROFURANTOIN MONOHYDRATE/MACROCRYSTALS 100 MG: 75; 25 CAPSULE ORAL at 08:16

## 2025-01-12 RX ADMIN — POTASSIUM CHLORIDE 40 MEQ: 1500 TABLET, EXTENDED RELEASE ORAL at 10:55

## 2025-01-12 RX ADMIN — METHOCARBAMOL 750 MG: 750 TABLET ORAL at 00:54

## 2025-01-12 RX ADMIN — ATORVASTATIN CALCIUM 10 MG: 10 TABLET, FILM COATED ORAL at 08:16

## 2025-01-12 RX ADMIN — SODIUM CHLORIDE, PRESERVATIVE FREE 10 ML: 5 INJECTION INTRAVENOUS at 08:17

## 2025-01-12 RX ADMIN — Medication 1 TABLET: at 08:16

## 2025-01-12 RX ADMIN — PAROXETINE HYDROCHLORIDE 40 MG: 20 TABLET, FILM COATED ORAL at 08:16

## 2025-01-12 RX ADMIN — ACETAMINOPHEN 1000 MG: 500 TABLET, FILM COATED ORAL at 06:00

## 2025-01-12 RX ADMIN — ENOXAPARIN SODIUM 40 MG: 100 INJECTION SUBCUTANEOUS at 08:16

## 2025-01-12 RX ADMIN — BUPROPION HYDROCHLORIDE 150 MG: 150 TABLET, EXTENDED RELEASE ORAL at 08:16

## 2025-01-12 RX ADMIN — DICYCLOMINE HYDROCHLORIDE 10 MG: 10 CAPSULE ORAL at 10:56

## 2025-01-12 RX ADMIN — DICYCLOMINE HYDROCHLORIDE 10 MG: 10 CAPSULE ORAL at 08:16

## 2025-01-12 RX ADMIN — GABAPENTIN 300 MG: 300 CAPSULE ORAL at 08:16

## 2025-01-12 ASSESSMENT — PAIN DESCRIPTION - LOCATION
LOCATION: LEG
LOCATION: HIP
LOCATION: LEG

## 2025-01-12 ASSESSMENT — PAIN DESCRIPTION - ORIENTATION
ORIENTATION: RIGHT

## 2025-01-12 ASSESSMENT — PAIN DESCRIPTION - DESCRIPTORS
DESCRIPTORS: ACHING
DESCRIPTORS: DISCOMFORT;SORE
DESCRIPTORS: ACHING

## 2025-01-12 ASSESSMENT — PAIN SCALES - GENERAL
PAINLEVEL_OUTOF10: 7
PAINLEVEL_OUTOF10: 6
PAINLEVEL_OUTOF10: 5

## 2025-01-12 NOTE — PLAN OF CARE
Problem: Pain  Goal: Verbalizes/displays adequate comfort level or baseline comfort level  Outcome: Progressing     Problem: Discharge Planning  Goal: Discharge to home or other facility with appropriate resources  Outcome: Progressing     Problem: Safety - Adult  Goal: Free from fall injury  Outcome: Progressing  Flowsheets (Taken 1/11/2025 1808 by Zahira Jones, RN)  Free From Fall Injury: Instruct family/caregiver on patient safety     Problem: ABCDS Injury Assessment  Goal: Absence of physical injury  Outcome: Progressing  Flowsheets (Taken 1/11/2025 1808 by Zahira Jones RN)  Absence of Physical Injury: Implement safety measures based on patient assessment     Problem: Musculoskeletal - Adult  Goal: Return mobility to safest level of function  Outcome: Progressing  Goal: Maintain proper alignment of affected body part  Outcome: Progressing  Goal: Return ADL status to a safe level of function  Outcome: Progressing     Problem: Metabolic/Fluid and Electrolytes - Adult  Goal: Electrolytes maintained within normal limits  Outcome: Progressing     Problem: Skin/Tissue Integrity  Goal: Absence of new skin breakdown  Description: 1.  Monitor for areas of redness and/or skin breakdown  2.  Assess vascular access sites hourly  3.  Every 4-6 hours minimum:  Change oxygen saturation probe site  4.  Every 4-6 hours:  If on nasal continuous positive airway pressure, respiratory therapy assess nares and determine need for appliance change or resting period.  Outcome: Progressing

## 2025-01-12 NOTE — DISCHARGE SUMMARY
[id number]      Phone: [phone]   nitrofurantoin (macrocrystal-monohydrate) 100 MG capsule       Information about where to get these medications is not yet available    Ask your nurse or doctor about these medications  gabapentin 300 MG capsule  methocarbamol 750 MG tablet  oxyCODONE 5 MG immediate release tablet       Diet: ADULT DIET; Regular  ADULT ORAL NUTRITION SUPPLEMENT; Breakfast, Lunch, Dinner; Standard High Calorie/High Protein Oral Supplement diet as tolerated  Activity: As instructed WEIGHT BEARING STATUS: WBAT   Wound Care: Daily and as needed.    DISPOSITION: Acute Rehab    Follow-up:  [address] [address] [phone]  Follow up  As needed        SIGNED:  Candie Willoughby DO   1/12/2025, 2:27 PM  Time Spent for discharge: 35 minutes

## 2025-01-12 NOTE — CARE COORDINATION
Transitional planning    Spoke to patient and told her she has a discharge order. She is agreeable to go to [facility]. CM will update with transportation time.     6582 CM called [facility] [phone] and told her patient has a discharge order. CM will call with transport time. Report  [phone], fax [phone]    1032 faxed AVS/KENYA to [phone] 1039 [facility] not have w/c transport today. She will call [facility] about transportation for today. If they are not available plan for 9am tomorrow [facility]     1107 VM from [place] with [facility] that [facility] will be here at 1pm today to transport patient to [facility].    1109 Called [facility] and told her  is 1pm today. She received fax.     1111 Updated patient and her RN at bedside that  time is 1pm. RN given report # . Patient will notify her family.

## 2025-01-12 NOTE — PLAN OF CARE
Patient discharged in stable condition. IV and telemetry removed at discharge. Patient left with all belongings. Patient transported to private ambulance to new facility via wheelchair.       Problem: Pain  Goal: Verbalizes/displays adequate comfort level or baseline comfort level  Outcome: Completed     Problem: Discharge Planning  Goal: Discharge to home or other facility with appropriate resources  Outcome: Completed  Flowsheets (Taken 1/12/2025 0800)  Discharge to home or other facility with appropriate resources:   Identify barriers to discharge with patient and caregiver   Arrange for needed discharge resources and transportation as appropriate   Identify discharge learning needs (meds, wound care, etc)     Problem: Safety - Adult  Goal: Free from fall injury  Outcome: Completed  Flowsheets (Taken 1/12/2025 1142)  Free From Fall Injury: Instruct family/caregiver on patient safety     Problem: ABCDS Injury Assessment  Goal: Absence of physical injury  Outcome: Completed  Flowsheets (Taken 1/12/2025 1142)  Absence of Physical Injury: Implement safety measures based on patient assessment     Problem: Musculoskeletal - Adult  Goal: Return mobility to safest level of function  Outcome: Completed  Goal: Maintain proper alignment of affected body part  Outcome: Completed  Goal: Return ADL status to a safe level of function  Outcome: Completed     Problem: Metabolic/Fluid and Electrolytes - Adult  Goal: Electrolytes maintained within normal limits  Outcome: Completed     Problem: Skin/Tissue Integrity  Goal: Absence of new skin breakdown  Description: 1.  Monitor for areas of redness and/or skin breakdown  2.  Assess vascular access sites hourly  3.  Every 4-6 hours minimum:  Change oxygen saturation probe site  4.  Every 4-6 hours:  If on nasal continuous positive airway pressure, respiratory therapy assess nares and determine need for appliance change or resting period.  Outcome: Completed

## 2025-01-13 NOTE — PROGRESS NOTES
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  Past Surgical History:  has a past surgical history that includes joint replacement (Left, ); Mandible surgery; joint replacement (Right, 2016); Total hip arthroplasty (Left, 10/25/2016); Shoulder arthroscopy (Left, 2023); Eye surgery (Right); Total hip arthroplasty (Right, 2024); Total hip arthroplasty (Right, 2024); Total hip arthroplasty; Revision total hip arthroplasty (Right, 2025); and Revision total hip arthroplasty (Right, 2025).    Discharge Recommendations  Discharge Recommendations: Patient would benefit from continued therapy after discharge  Assessment  Performance deficits / Impairments: Decreased functional mobility ;Decreased ADL status;Decreased endurance;Decreased balance;Decreased high-level IADLs  Prognosis: Good  Activity Tolerance  Activity Tolerance: Patient Tolerated treatment well  Safety Devices  Type of Devices: Call light within reach;Gait belt;Nurse notified;Left in bed  Restraints  Restraints Initially in Place: No    AM-PAC  AM-PAC Daily Activity - Inpatient   How much help is needed for putting on and taking off regular lower body clothing?: A Lot  How much help is needed for bathing (which 
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  Past Surgical History:  has a past surgical history that includes joint replacement (Left, ); Mandible surgery; joint replacement (Right, 2016); Total hip arthroplasty (Left, 10/25/2016); Shoulder arthroscopy (Left, 2023); Eye surgery (Right); Total hip arthroplasty (Right, 2024); Total hip arthroplasty (Right, 2024); Total hip arthroplasty; Revision total hip arthroplasty (Right, 2025); and Revision total hip arthroplasty (Right, 2025).    Discharge Recommendations  Discharge Recommendations: Patient would benefit from continued therapy after discharge Further therapy recommended at discharge.The patient should be able to tolerate at least 3 hours of therapy per day over 5 days or 15 hours over 7 days.   This patient may benefit from a Physical Medicine and Rehab consult.      Assessment  Performance deficits / Impairments: Decreased functional mobility ;Decreased ADL status;Decreased endurance;Decreased balance;Decreased high-level IADLs  Prognosis: Good  Activity Tolerance  Activity Tolerance: Patient Tolerated treatment well  Safety Devices  Type of Devices: Call light within reach;Gait belt;Nurse notified;Left in 
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  Past Surgical History:  has a past surgical history that includes joint replacement (Left, ); Mandible surgery; joint replacement (Right, 2016); Total hip arthroplasty (Left, 10/25/2016); Shoulder arthroscopy (Left, 2023); Eye surgery (Right); Total hip arthroplasty (Right, 2024); Total hip arthroplasty (Right, 2024); Total hip arthroplasty; Revision total hip arthroplasty (Right, 2025); and Revision total hip arthroplasty (Right, 2025).    Discharge Recommendations  Discharge Recommendations: Therapy recommended at discharge  PT Equipment Recommendations  Equipment Needed: No    Assessment  Body Structures, Functions, Activity Limitations Requiring Skilled Therapeutic Intervention: Decreased functional mobility ;Decreased strength;Decreased safe awareness;Decreased endurance;Decreased balance  Assessment: Pt able to ambulate 15 ft +20ft  + 5ft  Henna with RW with seated rest breaks between ambulating. pt demonstrates step to gait pattern.  Pt Henna for transfers and bed mobility. Pt is unsafe to return to previous living situation due to limited mobility and strength. Pt will benefit from continued therapy to improve deficits and progress function.  Therapy Prognosis: Good  Activity Tolerance  Activity 
RCRI 0 points - Class 1  NSQIP done            Paula Summers MD  PGY-1  Trauma Surgery Service  [facility]  01/07/25 8:13 PM     
[facility] ([facility]) Inpatient Discharge Summary  Bailey HenriquezILIR  1/13/2025        Sudha Aguilera  1960  [id number]    Date & Time of Discharge: 1/12/2025  1:49 PM     Is consult a Victim of Crime?: No    Services provided:  Psychotherapy Individual, Screenings: ITSS and PHQ-4, and Informational Packet Provided    Screen Results (If any):      ITSS:  Date Screen Completed: 1/9/25  Patient's score= 0 for PTSD risk. ( >= 2 is positive for PTSD risk. )  Patient's score= 2 for depression risk.  ( >= 2 is positive for Depression risk )    PHQ-4:   Date Screen Completed:  1/9/25  Patient's Score (Questions 1&2):  4  >= 3 suggests anxiety  Patient's Score (Questions 3&4):  0  >= 3 suggests depression         Discharge Recommendations:  Follow up with [facility] or other new mental health provider -If symptoms worsen          The therapist may be reached through the [facility] offices at [phone].   
Writer refer pt to on call trauma Dr. Phoenix Cat  for pain 8 out of 10. Fentanyl 25mg iv given as ordered.   @ 2210 pt is asking about the timing of her oxycodone to be change because every 6 hours is long because the pain is constant if they can make it every 4 hours. Dr. Dallas acknowledged    @0017 patient complaining pain again - referred back to Dr. Dallas and ordered fentanyl 50mg iv.     @0209- patient is asking again about the pain pill- she is still in pain. - informed Dr. Dallas and said she will see pt.     @0215- seen by Dr. Phoenix Cat and assessed post op site. And said she will change the oxycodone to q4h.     @0230- oxycodone changed to every 4hrs and given.  
drain in right hip. Please measure and record output every shift. Okay to reinforce/maintain by nursing if necessary. Please notify Ortho if saturated or malfunctioning.  - Optifoam on RLE. Okay to reinforce/maintain by nursing if necessary. Please notify Ortho if saturated.  - Abduction pillow on at all times.  -Precautions to avoid hip dislocation:  -Keep your knees apart. Don't cross your legs.  -Sleep with a pillow between your legs for the first 3 days  -Step up first with your unaffected leg. Then bring the affected leg up to the same step. Bring your crutches or cane up.  -Don't lift your knee higher than your hip.  -Don't lean forward while you sit down or stand up, and don't bend past 90 degrees (like the angle in a letter \"L\"). This means you can't try to  something off the floor or bend down to tie your shoes.  -Don't sit on low chairs, beds, or toilets. You may want to use a raised toilet seat for a while. Sit in chairs with arms.  - WBAT  to the RLE  - Pain control and medical management per primary: Trauma  - DVT ppx: Lovenox 40 mg twice daily  - Ice to control pain and edema  - Diet: Adult diet  - Encourage Incentive Spirometry use  - PT/OT will evaluate today   - Okay to discharge home from orthopedic perspective pending removal of Hemovac drain by orthopedics, and once medically stable  - F/u with Dr. Eller on 1/23/2025  - Please page Ortho on call with any questions      Stephan Koch,   Orthopedic Surgery, PGY-1  [place], [place]               
cooperative throughout session.       Objective  Orientation  Overall Orientation Status: Within Functional Limits  Orientation Level: Oriented X4  Cognition  Overall Cognitive Status: WFL    Mobility   Bed mobility  Supine to Sit: Minimal assistance  Sit to Supine: Unable to assess (Pt retired to seated in chair at end of session.)  Scooting: Contact guard assistance  Bed Mobility Comments: HOB elevated, increased time/effort required with Henna needed for RLE progression.    Transfers  Sit to Stand: Contact guard assistance  Stand to Sit: Contact guard assistance  Comment: Transfers performed x1 from EOB with use of RW, good hand placement throughout.    Ambulation  Surface: Level tile  Device: Rolling Walker  Assistance: Contact guard assistance  Quality of Gait: step-to gait pattern, fair stability, slow gait speed  Gait Deviations: Slow Dona;Decreased step length;Decreased step height  Distance: 50ft x2  Comments: overall fair stability, no true LOB. required cueing to maintain KWADWO within RW.  More Ambulation?: No    Stairs/Curb  Stairs?: No    Balance   Balance  Posture: Good  Sitting - Static: Good  Sitting - Dynamic: Good  Standing - Static: Fair;+  Standing - Dynamic: Fair  Comments: standing balance assessed with RW, sitting balance assessed sitting EOB.    Exercise  PT Exercises  Exercise Treatment:   LE exercises: heel slides, quad sets, ankle pumps. Reps: x10    Patient Education  Patient Education  Education Provided: Role of Therapy;Plan of Care;Home Exercise Program;Precautions;Transfer Training;Mobility Training  Education Provided Comments: gait, precautions, transfers, safety with RW.  Education Method: Verbal;Demonstration  Barriers to Learning: None  Education Outcome: Verbalized understanding;Demonstrated understanding    Plan  Physical Therapy Plan  General Plan: 6-7 times per week  Current Treatment Recommendations: Strengthening, Balance training, Functional mobility training, Transfer 
return home       Education  Patient Education  Education Given To: Patient  Education Provided: Role of Therapy;Plan of Care  Education Method: Verbal;Demonstration  Barriers to Learning: None  Education Outcome: Verbalized understanding;Demonstrated understanding      Therapy Time   Individual Concurrent Group Co-treatment   Time In 1415         Time Out 1458         Minutes 43         Timed Code Treatment Minutes: 8 Minutes       Donna Garcia, PT         
Functional mobility training, Endurance training, Safety education & training, Patient/Caregiver education & training, Equipment evaluation, education, & procurement, Self-Care / ADL, Strengthening, Home management training    Minutes  OT Individual Minutes  Time In: 1415  Time Out: 1458  Minutes: 43  Time Code Minutes   Timed Code Treatment Minutes: 23 Minutes    Electronically signed by PETE Lezama/L on 1/9/25 at 3:47 PM EST

## 2025-01-23 ENCOUNTER — OFFICE VISIT (OUTPATIENT)
Dept: ORTHOPEDIC SURGERY | Age: 65
End: 2025-01-23

## 2025-01-23 VITALS — HEIGHT: 60 IN | WEIGHT: 173 LBS | BODY MASS INDEX: 33.96 KG/M2

## 2025-01-23 DIAGNOSIS — Z96.641 S/P TOTAL RIGHT HIP ARTHROPLASTY: Primary | ICD-10-CM

## 2025-01-23 PROCEDURE — 99024 POSTOP FOLLOW-UP VISIT: CPT | Performed by: ORTHOPAEDIC SURGERY

## 2025-01-23 NOTE — PROGRESS NOTES
[facility]  [address]  [place] [address]  Dept: [phone]  Dept Fax: [phone]        Orthopaedic Trauma Clinic Follow Up      Subjective:   Date of Surgery: 1/8/2025  Right revision total hip arthroplasty  Right femur open reduction internal fixation    Sudha Aguilera is a 64 y.o. year old female who presents to the clinic today 2 weeks and 1 day status post right revision total hip arthroplasty and right femur open reduction internal fixation.  Today the patient states that they are doing very well.  They have not had any complications with their surgical incision, and are very happy with her progress so far.  The patient has been in a rehab facility since discharge from the hospital and has been doing well with the physical therapy there.  The patient has been able to walk over 300 feet with a rolling walker at a time.  The patient is to discharge from the rehab facility on 1/27/2025 and would like a referral for home physical therapy due to transportation issues to continue with her progression.  The patient denies any additional concerns at this time.      Review of Systems  Gen: no fever, chills, malaise  CV: no chest pain or palpitations  Resp: no cough or shortness of breath  GI: no nausea, vomiting, diarrhea, or constipation  Neuro: no numbness, tingling, or weakness  Msk: Minimal right hip pain  10 remaining systems reviewed and negative    Objective :   There were no vitals filed for this visit.Body mass index is 33.79 kg/m².  General: No acute distress, resting comfortably in the clinic  Neuro: alert. oriented  Eyes: Extra-ocular muscles intact  Pulm: Respirations unlabored and regular.  Skin: warm, well perfused  Psych:   Patient has good fund of knowledge and displays understanging of exam, diagnosis, and plan.  MSK:    RLE:  Surgical incision to the right hip extending distally into the thigh

## 2025-02-03 ENCOUNTER — OFFICE VISIT (OUTPATIENT)
Dept: FAMILY MEDICINE CLINIC | Age: 65
End: 2025-02-03

## 2025-02-03 VITALS
HEIGHT: 60 IN | WEIGHT: 154 LBS | OXYGEN SATURATION: 99 % | TEMPERATURE: 98.1 F | HEART RATE: 72 BPM | SYSTOLIC BLOOD PRESSURE: 130 MMHG | DIASTOLIC BLOOD PRESSURE: 82 MMHG | BODY MASS INDEX: 30.23 KG/M2

## 2025-02-03 DIAGNOSIS — M32.9 SYSTEMIC LUPUS ERYTHEMATOSUS, UNSPECIFIED SLE TYPE, UNSPECIFIED ORGAN INVOLVEMENT STATUS (HCC): ICD-10-CM

## 2025-02-03 DIAGNOSIS — F33.0 DEPRESSION, MAJOR, RECURRENT, MILD (HCC): ICD-10-CM

## 2025-02-03 DIAGNOSIS — Z09 HOSPITAL DISCHARGE FOLLOW-UP: Primary | ICD-10-CM

## 2025-02-03 RX ORDER — PAROXETINE 40 MG/1
40 TABLET, FILM COATED ORAL EVERY MORNING
Qty: 90 TABLET | Refills: 1 | Status: SHIPPED | OUTPATIENT
Start: 2025-02-03 | End: 2026-02-04

## 2025-02-03 NOTE — PROGRESS NOTES
No pneumothoraces.  Bony structures appear intact.      No acute abnormality.      XR FEMUR RIGHT (MIN 2 VIEWS)     Result Date: 1/7/2025  EXAMINATION: 2 XRAY VIEWS OF THE RIGHT FEMUR 1/7/2025 7:04 pm COMPARISON: Radiographs earlier today and on 12/31/2024 HISTORY: Acute pain status post trauma. FINDINGS: Normal alignment of right hip hemiarthroplasty.  Acute nondisplaced periprosthetic fracture is seen medially along the femoral stem.  The distal femur is intact.  Normal alignment of total knee arthroplasty.  No lipohemarthrosis or significant knee joint effusion seen.      Acute nondisplaced periprosthetic fracture along the right proximal medial femur.  Patient Active Problem List   Diagnosis    Essential hypertension    Depression, major, recurrent, mild (HCC)    Hematuria    Collagenous colitis    Systemic lupus erythematosus, unspecified SLE type, unspecified organ involvement status ([facility])    SHUN (generalized anxiety disorder)    Chronic pain of both knees    Panic attack due to exceptional stress    Osteoporosis    Vitamin D deficiency    B12 deficiency    Restless leg syndrome    Mixed hyperlipidemia    Psychophysiological insomnia    History of total hip arthroplasty, left    History of total knee arthroplasty, bilateral    Osteoarthritis of right hip    Arthritis of right hip    Periprosthetic fracture around internal prosthetic hip joint, initial encounter       Medications listed as ordered at the time of discharge from hospital     Medication List            Accurate as of February 3, 2025 10:33 AM. If you have any questions, ask your nurse or doctor.                CHANGE how you take these medications      aspirin 81 MG EC tablet  Take 1 tablet by mouth in the morning and at bedtime  What changed: when to take this     metoprolol tartrate 25 MG tablet  Commonly known as: LOPRESSOR  Take 1 tablet by mouth 2 times daily  What changed: when to take this            CONTINUE taking these medications

## 2025-02-26 ENCOUNTER — PATIENT MESSAGE (OUTPATIENT)
Dept: FAMILY MEDICINE CLINIC | Age: 65
End: 2025-02-26

## 2025-02-26 NOTE — TELEPHONE ENCOUNTER
Patient called back today and has rescheduled her appointment for 5-5-25.  She is staying out of town with her daughter through April.

## 2025-03-04 ENCOUNTER — OFFICE VISIT (OUTPATIENT)
Dept: ORTHOPEDIC SURGERY | Age: 65
End: 2025-03-04

## 2025-03-04 VITALS — WEIGHT: 154 LBS | HEIGHT: 60 IN | BODY MASS INDEX: 30.23 KG/M2

## 2025-03-04 DIAGNOSIS — Z96.649 PERIPROSTHETIC FRACTURE OF FEMUR FOLLOWING TOTAL REPLACEMENT OF HIP, INITIAL ENCOUNTER: ICD-10-CM

## 2025-03-04 DIAGNOSIS — Z96.641 S/P TOTAL RIGHT HIP ARTHROPLASTY: Primary | ICD-10-CM

## 2025-03-04 DIAGNOSIS — M97.8XXA PERIPROSTHETIC FRACTURE OF FEMUR FOLLOWING TOTAL REPLACEMENT OF HIP, INITIAL ENCOUNTER: ICD-10-CM

## 2025-03-04 PROCEDURE — 99024 POSTOP FOLLOW-UP VISIT: CPT | Performed by: PHYSICIAN ASSISTANT

## 2025-03-04 RX ORDER — HYDROCODONE BITARTRATE AND ACETAMINOPHEN 5; 325 MG/1; MG/1
1 TABLET ORAL EVERY 4 HOURS PRN
Qty: 18 TABLET | Refills: 0 | Status: SHIPPED | OUTPATIENT
Start: 2025-03-04 | End: 2025-03-07

## 2025-03-11 DIAGNOSIS — M54.2 MUSCLE PAIN, CERVICAL: ICD-10-CM

## 2025-03-11 RX ORDER — MELOXICAM 15 MG/1
15 TABLET ORAL DAILY
Qty: 90 TABLET | Refills: 0 | Status: SHIPPED | OUTPATIENT
Start: 2025-03-11

## 2025-03-11 NOTE — TELEPHONE ENCOUNTER
LOV 2/3/25   RTO 5/5/25  LRF 6/13/24          Controlled Substance Monitoring:    Acute and Chronic Pain Monitoring:   RX Monitoring Periodic Controlled Substance Monitoring   12/11/2019   1:08 PM No signs of potential drug abuse or diversion identified.

## 2025-04-30 ENCOUNTER — HOSPITAL ENCOUNTER (OUTPATIENT)
Dept: PHYSICAL THERAPY | Facility: CLINIC | Age: 65
Setting detail: THERAPIES SERIES
Discharge: HOME | End: 2025-04-30
Attending: ORTHOPAEDIC SURGERY
Payer: MEDICARE

## 2025-04-30 PROCEDURE — 97110 THERAPEUTIC EXERCISES: CPT

## 2025-04-30 PROCEDURE — 97161 PT EVAL LOW COMPLEX 20 MIN: CPT

## 2025-04-30 NOTE — CONSULTS
[] [facility]  Outpatient Rehabilitation &  Therapy  [address]  [phone]  F: [phone] [] [facility]  Outpatient Rehabilitation &  Therapy  [address]   [address]  P: [phone]  F: [phone] [] [facility]  Outpatient Rehabilitation &  Therapy  [address]  P: [phone]  F: [phone] [] [facility]  Outpatient Rehabilitation &  Therapy  [address]  P: [phone]  F: [phone] [x] [facility]  Outpatient Rehabilitation &  Therapy  [address]   [address]   P: [phone]  F: [phone]      Physical Therapy Lower Extremity Evaluation    Date:  2025  Patient: Sudha Aguilera   : 1960  MRN: [id number]  Physician: Dr. Eller       Insurance: Medicare (secondary MMO, vs BMN)  Medical Diagnosis: S/P total right hip arthroplasty (Z96.641)     Rehab Codes: M62.81 , R26.89 , M25.551, M25.651 , M79.651  Onset date: 25    Next 's appt.: --    Subjective:   CC/HPI: Patient is a 63 y/o female presenting with right sided hip pain and decreased mobility. She has hx of a left total hip replacement revision as well as femur ORIF on 25. She had her initial FARHAN completed on 25 but experienced complications due to a fall when she returned home, causing the re-injury. After the revision, she was in a [facility] for 2 [facility]. She then went to stay with her daughter in [place] on 3/28/25. This is the patient's first visit of therapy since returning to [place].   She denies falls since the revision/ORIF surgery.     PMHx: [] Unremarkable [] Diabetes [] HTN  [] Pacemaker   [] MI/Heart Problems [] Cancer [] Arthritis [] Other:              [x] Refer to full medical chart  In EPIC     Past Medical History         Diagnosis Date Comments     Cervicalgia [M54.2]       Contusion of wrist [S60.219A]       Sprain of thoracic region [S23.9XXA]

## 2025-05-02 ENCOUNTER — HOSPITAL ENCOUNTER (OUTPATIENT)
Dept: PHYSICAL THERAPY | Facility: CLINIC | Age: 65
Setting detail: THERAPIES SERIES
Discharge: HOME | End: 2025-05-02
Attending: ORTHOPAEDIC SURGERY
Payer: MEDICARE

## 2025-05-02 PROCEDURE — 97110 THERAPEUTIC EXERCISES: CPT

## 2025-05-02 NOTE — FLOWSHEET NOTE
Supine Bridge  - 1 x daily - 7 x weekly - 3 sets - 10 reps  - Hooklying Clamshell with Resistance  - 1 x daily - 7 x weekly - 3 sets - 10 reps  - Modified Kaveh Stretch  - 1 x daily - 7 x weekly - 2 sets - 1 min  hold  - Prone Gluteal Sets  - 1 x daily - 7 x weekly - 2 sets - 10 reps - 5-10 sec hold  - Stride Stance Weight Shift  - 1 x daily - 7 x weekly - 3 sets - 10 reps  - Side to Side Weight Shift with Unilateral Counter Support  - 1 x daily - 7 x weekly - 3 sets - 10 reps  Comprehension of Education:  [x] Verbalizes understanding.  [] Demonstrates understanding.  [x] Needs review.  [x] Demonstrates/verbalizes HEP/Ed previously given.     Plan: [x] Continue current frequency toward long and short term goals.    [x] Specific Instructions for subsequent treatments: hip strengthening, start with OKC (AAROM --> AROM)       Time In:11:00            Time Out: 11:45    Electronically signed by:  Brisa Wilson PTA

## 2025-05-07 ENCOUNTER — HOSPITAL ENCOUNTER (OUTPATIENT)
Dept: PHYSICAL THERAPY | Facility: CLINIC | Age: 65
Setting detail: THERAPIES SERIES
Discharge: HOME | End: 2025-05-07
Attending: ORTHOPAEDIC SURGERY
Payer: MEDICARE

## 2025-05-07 PROCEDURE — 97110 THERAPEUTIC EXERCISES: CPT

## 2025-05-07 NOTE — FLOWSHEET NOTE
Stretch  - 1 x daily - 7 x weekly - 2 sets - 1 min  hold  - Prone Gluteal Sets  - 1 x daily - 7 x weekly - 2 sets - 10 reps - 5-10 sec hold  - Stride Stance Weight Shift  - 1 x daily - 7 x weekly - 3 sets - 10 reps  - Side to Side Weight Shift with Unilateral Counter Support  - 1 x daily - 7 x weekly - 3 sets - 10 reps  Comprehension of Education:  [x] Verbalizes understanding.  [] Demonstrates understanding.  [x] Needs review.  [x] Demonstrates/verbalizes HEP/Ed previously given.     Plan: [x] Continue current frequency toward long and short term goals.    [x] Specific Instructions for subsequent treatments: hip strengthening, start with OKC (AAROM --> AROM)       Time In: 2:00 pm              Time Out: 2:50 pm    Electronically signed by:  ADALID CHAPMAN PTA

## 2025-05-09 ENCOUNTER — HOSPITAL ENCOUNTER (OUTPATIENT)
Dept: PHYSICAL THERAPY | Facility: CLINIC | Age: 65
Setting detail: THERAPIES SERIES
Discharge: HOME | End: 2025-05-09
Attending: ORTHOPAEDIC SURGERY
Payer: MEDICARE

## 2025-05-09 PROCEDURE — 97110 THERAPEUTIC EXERCISES: CPT

## 2025-05-09 NOTE — FLOWSHEET NOTE
[x] [facility]  Outpatient Rehabilitation &  Therapy  [address]   P: [phone]  F: [phone]      Physical Therapy Daily Treatment Note    Date:  2025  Patient Name:  Sudha Aguilera    :  1960  MRN: [id number]  Physician: Dr. Eller                                                Insurance: Medicare (secondary MMO, vs BMN)  Medical Diagnosis: S/P total right hip arthroplasty (Z96.641)                       Rehab Codes: M62.81 , R26.89 , M25.551, M25.651 , M79.651  Onset date: 25                             Next 's appt.: --    Visit# / total visits: 3/16; Progress note for Medicare patient due at visit 10     Cancels/No Shows: 0/0    Subjective:    Pain:  [x] Yes  [] No Location: right hip into quad  Pain Rating: (0-10 scale) 2/10  Pain altered Tx:  [x] No  [] Yes  Action:  Comments: Patient arrived with soreness noted and mild pain.       Objective:  Modalities:   Precautions [] No  [x] Yes: s/p right FARHAN revision and femur ORIF    Exercises:  Exercise       Reps/ Time Weight/ Level Comments             NuStep   10'                 Calf Stretch   30\"x3       Hamstring Stretch   30\"x3             Supine hip flexor stretch  1'       Clamshells supine  x20 Lime HEP   Bridges  x20   HEP            4-way hip BLE  10x ea  Auglaize     Total Gym Squats   2x10  L20     Total Gym Heel Raises  2x10  L20     Step ups  x10  4\"                  Other:       Treatment Charges: Mins Units   []  Modalities       [x]  Ther Exercise 30 2   []  Neuromuscular Re-ed     []  Gait Training     []  Manual Therapy     []  Ther Activities     []  Aquatics     []  Vasocompression     []  Cervical Traction     []  Other     Total Billable time 30 2        Assessment: [x] Progressing toward goals. Continued program with fatigue noted at end of session. Cues given to shift weight onto RLE more often with gait and during 4-way hip with improvement post. Will continue with flexibility

## 2025-05-13 ENCOUNTER — HOSPITAL ENCOUNTER (OUTPATIENT)
Dept: PHYSICAL THERAPY | Facility: CLINIC | Age: 65
Setting detail: THERAPIES SERIES
Discharge: HOME | End: 2025-05-13
Attending: ORTHOPAEDIC SURGERY
Payer: MEDICARE

## 2025-05-13 PROCEDURE — 97110 THERAPEUTIC EXERCISES: CPT

## 2025-05-13 NOTE — FLOWSHEET NOTE
Exercises  - Seated Long Arc Quad  - 1 x daily - 7 x weekly - 3 sets - 10 reps - 3-5 sec hold  - Supine Bridge  - 1 x daily - 7 x weekly - 3 sets - 10 reps  - Hooklying Clamshell with Resistance  - 1 x daily - 7 x weekly - 3 sets - 10 reps  - Modified Kaveh Stretch  - 1 x daily - 7 x weekly - 2 sets - 1 min  hold  - Prone Gluteal Sets  - 1 x daily - 7 x weekly - 2 sets - 10 reps - 5-10 sec hold  - Stride Stance Weight Shift  - 1 x daily - 7 x weekly - 3 sets - 10 reps  - Side to Side Weight Shift with Unilateral Counter Support  - 1 x daily - 7 x weekly - 3 sets - 10 reps    Comprehension of Education:  [x] Verbalizes understanding.  [] Demonstrates understanding.  [x] Needs review.  [x] Demonstrates/verbalizes HEP/Ed previously given.     Plan: [x] Continue current frequency toward long and short term goals.    [x] Specific Instructions for subsequent treatments: hip strengthening, start with OKC (AAROM --> AROM)       Time In: 1:00pm              Time Out: 1:50pm      Electronically signed by:  Giana García, PT

## 2025-05-15 ENCOUNTER — HOSPITAL ENCOUNTER (OUTPATIENT)
Dept: PHYSICAL THERAPY | Facility: CLINIC | Age: 65
Setting detail: THERAPIES SERIES
Discharge: HOME | End: 2025-05-15
Attending: ORTHOPAEDIC SURGERY
Payer: MEDICARE

## 2025-05-15 NOTE — FLOWSHEET NOTE
[] [facility]  Outpatient Rehabilitation &  Therapy  [address]  [phone]  [phone] [] [facility]  Outpatient Rehabilitation &  Therapy  [address]  P: [phone]  F: [phone] [] [facility]  Outpatient Rehabilitation &  Therapy  [address]  P: [phone]  F: [phone] [] [facility]  Outpatient Rehabilitation &  Therapy  [address]  [phone]  [phone] [x] [facility]  Outpatient Rehabilitation &  Therapy  [address]   P: [phone]  F: [phone]  [] [facility]  Outpatient Rehabilitation &  Therapy  [address]  P: [phone]  F: [phone] [] [facility]  Outpatient Rehabilitation &  Therapy  [address]  P: [phone]  F: [phone] [] [facility]  Outpatient Rehabilitation &  Therapy  [address]  P: [phone]  F: [phone] [] [facility]  Outpatient Rehabilitation &  Therapy  [address]  P: [phone]  F: [phone]  [] [facility] Outpatient Rehabilitation &  Therapy  [address]  P: [phone]  F: [phone]     Therapy Cancel/No Show note    Date: 5/15/2025  Patient: Sudha CUADRA Serafindonato  : 1960  MRN: [id number]    Cancels/No Shows to date: 3/1    For today's appointment patient:    [x]  Cancelled    [] Rescheduled appointment    [] No-show     Reason given by patient:    [x]  Patient ill    []  Conflicting appointment    [] No transportation      [] Conflict with work    [] No reason given    [] Weather related    [] COVID-19    [] Other:      Comments:        [x] Next appointment was confirmed    Electronically signed by: Laura Brian

## 2025-05-22 ENCOUNTER — HOSPITAL ENCOUNTER (OUTPATIENT)
Dept: PHYSICAL THERAPY | Facility: CLINIC | Age: 65
Setting detail: THERAPIES SERIES
Discharge: HOME | End: 2025-05-22
Attending: ORTHOPAEDIC SURGERY
Payer: MEDICARE

## 2025-05-22 PROCEDURE — 97110 THERAPEUTIC EXERCISES: CPT

## 2025-05-22 NOTE — FLOWSHEET NOTE
[x] [facility]  Outpatient Rehabilitation &  Therapy  [address]   P: [phone]  F: [phone]      Physical Therapy Daily Treatment Note    Date:  2025  Patient Name:  Sudha Aguilera    :  1960  MRN: [id number]  Physician: Dr. Eller                                                Insurance: Medicare (secondary MMO, vs BMN)  Medical Diagnosis: S/P total right hip arthroplasty (Z96.641)                       Rehab Codes: M62.81 , R26.89 , M25.551, M25.651 , M79.651  Onset date: 25                             Next 's appt.: --    Visit# / total visits: ; Progress note for Medicare patient due at visit 10     Cancels/No Shows: 0/0    Subjective:    Pain:  [x] Yes  [] No Location: right hip into quad  Pain Rating: (0-10 scale) not rated/10  Pain altered Tx:  [x] No  [] Yes  Action:  Comments: Patient arrived noting increased soreness post last visit.    Objective:  Modalities:   Precautions [] No  [x] Yes: s/p right FARHAN revision and femur ORIF    Exercises:  Exercise       Reps/ Time Weight/ Level Comments             NuStep   10'  L3               Calf Stretch   30\"x3       Hamstring Stretch   30\"x3             Supine hip flexor stretch  HEP       Clamshells supine  x20 Blue HEP   Supine hip abduction  x20 Orange  Resistance around ankles    Bridges  x20   HEP   Posterior pelvic tilts with heel walkouts   x10      SLR- small range  2x10           4-way hip BLE  10x ea  [place]    Total Gym Squats   2x10  L20    Total Gym Heel Raises  2x10  L20     Step ups  2x10  4\"    Squats in // bars       Static lunges       Heel raises         Other:       Treatment Charges: Mins Units   []  Modalities       [x]  Ther Exercise 40 3   []  Neuromuscular Re-ed     []  Gait Training     []  Manual Therapy     []  Ther Activities     []  Aquatics     []  Vasocompression     []  Cervical Traction     []  Other     Total Billable time 40 3        Assessment: [x]

## 2025-05-27 ENCOUNTER — HOSPITAL ENCOUNTER (OUTPATIENT)
Dept: PHYSICAL THERAPY | Facility: CLINIC | Age: 65
Setting detail: THERAPIES SERIES
Discharge: HOME | End: 2025-05-27
Attending: ORTHOPAEDIC SURGERY
Payer: MEDICARE

## 2025-05-27 PROCEDURE — 97110 THERAPEUTIC EXERCISES: CPT

## 2025-05-27 NOTE — FLOWSHEET NOTE
[x] [facility]  Outpatient Rehabilitation &  Therapy  [address]   P: [phone]  F: [phone]      Physical Therapy Daily Treatment Note    Date:  2025  Patient Name:  Sudha Aguilera    :  1960  MRN: [id number]  Physician: Dr. Eller                                                Insurance: Medicare (secondary MMO, vs BMN)  Medical Diagnosis: S/P total right hip arthroplasty (Z96.641)                       Rehab Codes: M62.81 , R26.89 , M25.551, M25.651 , M79.651  Onset date: 25                             Next 's appt.: --    Visit# / total visits: ; Progress note for Medicare patient due at visit 10     Cancels/No Shows: 0/0    Subjective:    Pain:  [x] Yes  [] No Location: right hip into quad  Pain Rating: (0-10 scale) not rated/10  Pain altered Tx:  [x] No  [] Yes  Action:  Comments: Patient arrived stating \"I think I have a kink in my hip.\" Tightness reported in R glute.     Objective:  Modalities:   Precautions [] No  [x] Yes: s/p right FARHAN revision and femur ORIF    Exercises:  Exercise       Reps/ Time Weight/ Level Comments             NuStep   10'  L3               Calf Stretch   30\"x3       Hamstring Stretch   30\"x3             Hip ER stretch  3x30\"     Supine hip flexor stretch  1'ea   HEP   Clamshells supine  x20 Blue HEP   Supine hip abduction  x20 Orange  Resistance around ankles    Bridges  x20   HEP   Posterior pelvic tilts with heel walkouts   x10      SLR- small range  2x10           4-way hip BLE  10x ea  [place]    Total Gym Squats   2x10  L20    Total Gym Heel Raises  2x10  L20     Step ups  2x10  4\"    Hip dips  next                        Treatment Charges: Mins Units   []  Modalities       [x]  Ther Exercise 40 3   []  Neuromuscular Re-ed     []  Gait Training     []  Manual Therapy     []  Ther Activities     []  Aquatics     []  Vasocompression     []  Cervical Traction     []  Other     Total Billable time 40 3

## 2025-05-28 DIAGNOSIS — Z98.890 S/P ARTHROSCOPY OF LEFT SHOULDER: Primary | ICD-10-CM

## 2025-05-28 DIAGNOSIS — M25.512 LEFT SHOULDER PAIN, UNSPECIFIED CHRONICITY: ICD-10-CM

## 2025-05-29 ENCOUNTER — TELEPHONE (OUTPATIENT)
Dept: ORTHOPEDIC SURGERY | Age: 65
End: 2025-05-29

## 2025-05-29 ENCOUNTER — HOSPITAL ENCOUNTER (OUTPATIENT)
Dept: PHYSICAL THERAPY | Facility: CLINIC | Age: 65
Setting detail: THERAPIES SERIES
Discharge: HOME | End: 2025-05-29
Attending: ORTHOPAEDIC SURGERY
Payer: MEDICARE

## 2025-05-29 PROCEDURE — 97110 THERAPEUTIC EXERCISES: CPT

## 2025-05-29 NOTE — FLOWSHEET NOTE
[x] [facility]  Outpatient Rehabilitation &  Therapy  [address]   P: [phone]  F: [phone]      Physical Therapy Daily Treatment Note    Date:  2025  Patient Name:  Sudha Aguilera    :  1960  MRN: [id number]  Physician: Dr. Eller                                                Insurance: Medicare (secondary MMO, vs BMN)  Medical Diagnosis: S/P total right hip arthroplasty (Z96.641)                       Rehab Codes: M62.81 , R26.89 , M25.551, M25.651 , M79.651  Onset date: 25                             Next 's appt.: --    Visit# / total visits: ; Progress note for Medicare patient due at visit 10     Cancels/No Shows: 0/0    Subjective:    Pain:  [x] Yes  [] No Location: right hip into quad  Pain Rating: (0-10 scale) not rated/10  Pain altered Tx:  [x] No  [] Yes  Action:  Comments: Patient arrived noting still having hip issues and notes issues with R knee cap.    Objective:  Modalities:   Precautions [] No  [x] Yes: s/p right FARHAN revision and femur ORIF    Exercises:  Exercise       Reps/ Time Weight/ Level Comments             NuStep   10'  L3               Calf Stretch   30\"x3       Hamstring Stretch   30\"x3             Hip ER stretch   3x30\"     Supine hip flexor stretch   1'ea   HEP   Clamshells supine   x20 Blue HEP   Supine hip abduction   x20 Orange  Resistance around ankles    Bridges   x20   HEP   Posterior pelvic tilts with heel walkouts    x10      SLR- small range   2x10           4-way hip BLE  10x ea  [place]    Total Gym Squats   2x10  L20    Total Gym Heel Raises  2x10  L20     Step ups  2x10  4\"    Hip dips  next                        Treatment Charges: Mins Units   []  Modalities       [x]  Ther Exercise 40 3   []  Neuromuscular Re-ed     []  Gait Training     []  Manual Therapy     []  Ther Activities     []  Aquatics     []  Vasocompression     []  Cervical Traction     []  Other     Total Billable time 40 3

## 2025-05-29 NOTE — TELEPHONE ENCOUNTER
Spoke with dental office and informed them patient does not need predental medication. Letter faxed to number provided.

## 2025-05-29 NOTE — TELEPHONE ENCOUNTER
Pt has an abscessed tooth that needs to be address by her dentist. They told her she would need clearance from  since she had her hip replaced this January. Pt was able to provide offices name and contact information, [facility] phone#: [phone]

## 2025-05-30 ENCOUNTER — OFFICE VISIT (OUTPATIENT)
Dept: ORTHOPEDIC SURGERY | Age: 65
End: 2025-05-30

## 2025-05-30 VITALS — BODY MASS INDEX: 30.12 KG/M2 | WEIGHT: 153.4 LBS | OXYGEN SATURATION: 98 % | HEIGHT: 60 IN | RESPIRATION RATE: 16 BRPM

## 2025-05-30 DIAGNOSIS — M75.22 BICEPS TENDONITIS ON LEFT: Primary | ICD-10-CM

## 2025-05-30 DIAGNOSIS — Z98.890 STATUS POST ARTHROSCOPY OF LEFT SHOULDER: ICD-10-CM

## 2025-05-30 RX ORDER — METHYLPREDNISOLONE ACETATE 40 MG/ML
40 INJECTION, SUSPENSION INTRA-ARTICULAR; INTRALESIONAL; INTRAMUSCULAR; SOFT TISSUE ONCE
Status: COMPLETED | OUTPATIENT
Start: 2025-05-30 | End: 2025-05-30

## 2025-05-30 RX ORDER — BUPIVACAINE HYDROCHLORIDE 2.5 MG/ML
2 INJECTION, SOLUTION INFILTRATION; PERINEURAL ONCE
Status: COMPLETED | OUTPATIENT
Start: 2025-05-30 | End: 2025-05-30

## 2025-05-30 RX ADMIN — METHYLPREDNISOLONE ACETATE 40 MG: 40 INJECTION, SUSPENSION INTRA-ARTICULAR; INTRALESIONAL; INTRAMUSCULAR; SOFT TISSUE at 11:30

## 2025-05-30 RX ADMIN — BUPIVACAINE HYDROCHLORIDE 5 MG: 2.5 INJECTION, SOLUTION INFILTRATION; PERINEURAL at 11:30

## 2025-05-30 ASSESSMENT — ENCOUNTER SYMPTOMS
VOMITING: 0
COLOR CHANGE: 0
COUGH: 0
SHORTNESS OF BREATH: 0

## 2025-05-30 NOTE — PATIENT INSTRUCTIONS
INJECTION CARE    The injection site should never get red, hot, or swollen and if it does the patient will contact our office right away. With a cortisone steroid injection, the patient may experience a increase in soreness the first 24-48 hours due to a cortisone flair and can take anti-inflammatories for a short period of time to reduce that soreness. With a visco-supplemental (gel)  injection, on rare occasion the patient may develop swelling and pain if having a reaction to the medication. If this happens, try an anti-inflammatory medication and ice as needed. If pain and swelling continues, call the office for further instructions. The patient should not submerge the injection site in water for a minimum of 24 hours to avoid infection. This means no lakes, pools, ponds, or hot tubs for 24 hours. If the patient is diabetic the injection may increase their blood sugar for up to one week. The patient can do this cortisone injection once every 4 months as needed and visco-supplemental (gel) injections every 6 months.

## 2025-05-30 NOTE — PROGRESS NOTES
[facility] ORTHOPEDICS AND SPORTS MEDICINE  [address]  [place] [address]  Dept: [phone]  Dept Fax: [phone]        Ambulatory Follow Up      Subjective:   Sudha Aguilera is a 64 y.o. year old female who presents to our office today for routine followup regarding her   1. Biceps tendonitis on left    2. Status post arthroscopy of left shoulder        Chief Complaint   Patient presents with    Shoulder Pain     Left Shoulder        Date of Surgery: 7/25/2023 - Left shoulder    History of Present Illness  The patient is a 64-year-old female here today for follow-up regarding left shoulder pain.    She reports experiencing intermittent pain in her left shoulder, particularly during certain movements such as reaching into a pocket. This discomfort has been present for an extended period, even prior to her rotator cuff repair surgery on 07/25/2023 with Dr. Francisco Rodriguez DO. Despite undergoing physical therapy post-surgery, she continues to experience this pain. She is currently not engaged in any recent physical therapy for her shoulder but is undergoing therapy for her hip and femur s/p R FARHAN revision on 1/8/2025 with Dr Eller on 1/8/2025.      Review of Systems   Constitutional:  Negative for activity change and fever.   HENT:  Negative for sneezing.    Respiratory:  Negative for cough and shortness of breath.    Cardiovascular:  Negative for chest pain.   Gastrointestinal:  Negative for vomiting.   Musculoskeletal:  Positive for arthralgias (left shoulder). Negative for joint swelling and myalgias.   Skin:  Negative for color change.   Neurological:  Negative for weakness and numbness.   Psychiatric/Behavioral:  Negative for sleep disturbance.          Objective :   Resp 16   Ht 1.524 m (5')   Wt 69.6 kg (153 lb 6.4 oz)   SpO2 98%   BMI 29.96 kg/m²  Body mass index is 29.96 kg/m².  General: Sudha CUADRA

## 2025-06-05 ENCOUNTER — HOSPITAL ENCOUNTER (OUTPATIENT)
Dept: PHYSICAL THERAPY | Facility: CLINIC | Age: 65
Setting detail: THERAPIES SERIES
Discharge: HOME | End: 2025-06-05
Attending: ORTHOPAEDIC SURGERY
Payer: MEDICARE

## 2025-06-05 PROCEDURE — 97110 THERAPEUTIC EXERCISES: CPT

## 2025-06-05 NOTE — FLOWSHEET NOTE
[x] [facility]  Outpatient Rehabilitation &  Therapy  [address]   P: [phone]  F: [phone]      Physical Therapy Daily Treatment Note    Date:  2025  Patient Name:  Sudha Agiulera    :  1960  MRN: [id number]  Physician: Dr. Eller                                                Insurance: Medicare (secondary MMO, vs BMN)  Medical Diagnosis: S/P total right hip arthroplasty (Z96.641)                       Rehab Codes: M62.81 , R26.89 , M25.551, M25.651 , M79.651  Onset date: 25                             Next 's appt.: --    Visit# / total visits: ; Progress note for Medicare patient due at visit 10     Cancels/No Shows: 0/0    Subjective:    Pain:  [x] Yes  [] No Location: right hip into quad  Pain Rating: (0-10 scale) not rated/10  Pain altered Tx:  [x] No  [] Yes  Action:  Comments: Patient arrived noting minimal symptoms this date.    Objective:  Modalities:   Precautions [] No  [x] Yes: s/p right FARHAN revision and femur ORIF    Exercises:  Exercise       Reps/ Time Weight/ Level Comments             NuStep   10'  L3               Calf Stretch   30\"x3       Hamstring Stretch   30\"x3             Hip ER stretch   3x30\"     Supine hip flexor stretch   1'ea   HEP   Clamshells supine   x20 Blue HEP   Supine hip abduction   x20 Orange  Resistance around ankles    Bridges   x20   HEP   Posterior pelvic tilts with heel walkouts    x10      SLR- small range   2x10           4-way hip BLE  10x ea  [place]    Total Gym Squats   2x10  L20    Total Gym Heel Raises  2x10  L20     Step ups  2x10  4\"    Step downs  2x10  4\"    Hip dips  2x10  4\"    Mini Lunges  2x10                  Treatment Charges: Mins Units   []  Modalities       [x]  Ther Exercise 40 3   []  Neuromuscular Re-ed     []  Gait Training     []  Manual Therapy     []  Ther Activities     []  Aquatics     []  Vasocompression     []  Cervical Traction     []  Other     Total Billable time 40 3

## 2025-06-11 ENCOUNTER — HOSPITAL ENCOUNTER (OUTPATIENT)
Dept: PHYSICAL THERAPY | Facility: CLINIC | Age: 65
Setting detail: THERAPIES SERIES
Discharge: HOME | End: 2025-06-11
Attending: ORTHOPAEDIC SURGERY
Payer: MEDICARE

## 2025-06-11 PROCEDURE — 97110 THERAPEUTIC EXERCISES: CPT

## 2025-06-11 NOTE — PROGRESS NOTES
[x] [facility]  Outpatient Rehabilitation &  Therapy  [address]   P: [phone]  F: [phone]      Physical Therapy Daily Treatment and Progress Note    Date:  2025  Patient Name:  Sudha Aguilera    :  1960  MRN: [id number]  Physician: Dr. Eller                                                Insurance: Medicare (secondary MMO, vs BMN)  Medical Diagnosis: S/P total right hip arthroplasty (Z96.641)                       Rehab Codes: M62.81 , R26.89 , M25.551, M25.651 , M79.651  Onset date: 25                             Next 's appt.: --  Visit# / total visits: 10/16; Progress note for Medicare patient due at visit 16     Cancels/No Shows: 0/0    Subjective:    Pain:  [x] Yes  [] No Location: right hip into quad     Pain Rating: (0-10 scale) not rated/10  Pain altered Tx:  [x] No  [] Yes  Action:  Comments: Patient arrived noting minimal symptoms this date. She has been trying to focus on her gait, though still uses a cane.     Objective:  Modalities:   Precautions [] No  [x] Yes: s/p right FARHAN revision and femur ORIF    Exercises:   Exercise       Reps/ Time Weight/ Level Comments             NuStep   10'  L3               Calf Stretch   30\"x3       Hamstring Stretch   30\"x3       Hip flexor stretch- lunge at step   30\"x3           Hip ER stretch   HEP     Supine hip flexor stretch   NT    HEP  Standing this date   Sidelying Clamshells  x10 A HEP   Sidelying hip abduction   x10 A    Bridges   x20   HEP   Posterior pelvic tilts with march   x10     Posterior pelvic tilts with heel walkouts          SLR- small range   2x10           4-way hip BLE  10x ea  [place]    Squats   2x10     Heel Raises  2x10       Step ups  2x10  6\"    Step downs  2x10  4\"    Hip dips  2x10  4\"    Mini Lunges               Specific Instruction for next session: Fill out HOOS    Treatment Charges: Mins Units   []  Modalities       [x]  Ther Exercise 40 3   []  Neuromuscular Re-ed

## 2025-06-16 ENCOUNTER — HOSPITAL ENCOUNTER (OUTPATIENT)
Dept: PHYSICAL THERAPY | Facility: CLINIC | Age: 65
Setting detail: THERAPIES SERIES
End: 2025-06-16
Attending: ORTHOPAEDIC SURGERY
Payer: MEDICARE

## 2025-06-17 ENCOUNTER — OFFICE VISIT (OUTPATIENT)
Dept: ORTHOPEDIC SURGERY | Age: 65
End: 2025-06-17
Payer: MEDICARE

## 2025-06-17 VITALS — HEIGHT: 60 IN | BODY MASS INDEX: 30.04 KG/M2 | WEIGHT: 153 LBS

## 2025-06-17 DIAGNOSIS — M25.551 PAIN OF RIGHT HIP: Primary | ICD-10-CM

## 2025-06-17 PROCEDURE — 1126F AMNT PAIN NOTED NONE PRSNT: CPT | Performed by: ORTHOPAEDIC SURGERY

## 2025-06-17 PROCEDURE — 1159F MED LIST DOCD IN RCRD: CPT | Performed by: ORTHOPAEDIC SURGERY

## 2025-06-17 PROCEDURE — 1090F PRES/ABSN URINE INCON ASSESS: CPT | Performed by: ORTHOPAEDIC SURGERY

## 2025-06-17 PROCEDURE — G8417 CALC BMI ABV UP PARAM F/U: HCPCS | Performed by: ORTHOPAEDIC SURGERY

## 2025-06-17 PROCEDURE — 99213 OFFICE O/P EST LOW 20 MIN: CPT | Performed by: ORTHOPAEDIC SURGERY

## 2025-06-17 PROCEDURE — G8427 DOCREV CUR MEDS BY ELIG CLIN: HCPCS | Performed by: ORTHOPAEDIC SURGERY

## 2025-06-17 PROCEDURE — 1036F TOBACCO NON-USER: CPT | Performed by: ORTHOPAEDIC SURGERY

## 2025-06-17 PROCEDURE — 3017F COLORECTAL CA SCREEN DOC REV: CPT | Performed by: ORTHOPAEDIC SURGERY

## 2025-06-17 PROCEDURE — 1123F ACP DISCUSS/DSCN MKR DOCD: CPT | Performed by: ORTHOPAEDIC SURGERY

## 2025-06-17 PROCEDURE — G8399 PT W/DXA RESULTS DOCUMENT: HCPCS | Performed by: ORTHOPAEDIC SURGERY

## 2025-06-17 NOTE — PROGRESS NOTES
[facility]  [address]  [place] [address]  Dept Phone: [phone]  Dept Fax: [phone]      Orthopaedic Trauma Clinic Follow Up      Subjective:   Date of Surgery: 1/8/25    Sudha Aguilera is a 65 y.o. year old female who presents to the clinic today for routine follow up a little over 5 months status post revision right total hip arthroplasty and ORIF of femur due to periprosthetic fracture.  Patient continues to do well overall.  She is regularly doing physical therapy and home exercises.  Denies any interval trauma.  Not having any significant pain.  Only residual symptoms are related to abductor weakness which she is addressing with therapy.  Has last scheduled physical therapy visit tomorrow.  Is requesting an order to continue therapy as she is still steadily making progress and still has some remaining deficits.    Review of Systems  Gen: no fever, chills, malaise  CV: no chest pain or palpitations  Resp: no cough or shortness of breath  GI: no nausea, vomiting, diarrhea, or constipation  Neuro: no seizures, vertigo, or headache  Msk: No right hip pain  10 remaining systems reviewed and negative    Objective :   There were no vitals filed for this visit.Body mass index is 29.88 kg/m².  General: No acute distress, resting comfortably in the clinic  Neuro: alert. oriented  Eyes: Extra-ocular muscles intact  Pulm: Respirations unlabored and regular.  Skin: warm, well perfused  Psych:   Patient has good fund of knowledge and displays understanding of exam, diagnosis, and plan.  MSK: RLE: Posterior lateral hip incision well-healed with mature scar formation and no sign of complications.  Residual Trendelenburg gait. compartments soft. Limb is warm and well perfused. Motor and sensory function grossly intact.     Radiology:  Imaging studies from today were independently reviewed and read as listed below. Any relevant images obtained prior to today's visit were also

## 2025-07-07 ENCOUNTER — HOSPITAL ENCOUNTER (OUTPATIENT)
Dept: PHYSICAL THERAPY | Facility: CLINIC | Age: 65
Setting detail: THERAPIES SERIES
Discharge: HOME | End: 2025-07-07
Attending: ORTHOPAEDIC SURGERY
Payer: MEDICARE

## 2025-07-07 PROCEDURE — 97110 THERAPEUTIC EXERCISES: CPT

## 2025-07-07 NOTE — FLOWSHEET NOTE
[x] [facility]  Outpatient Rehabilitation &  Therapy  [address]   P: [phone]  F: [phone]      Physical Therapy Daily Treatment    Date:  2025  Patient Name:  Sudha Aguilera    :  1960  MRN: [id number]  Physician: Dr. Eller                                                Insurance: Medicare (secondary MMO, vs BMN)  Medical Diagnosis: S/P total right hip arthroplasty (Z96.641)                       Rehab Codes: M62.81 , R26.89 , M25.551, M25.651 , M79.651  Onset date: 25                             Next 's appt.: --  Visit# / total visits: ; Progress note for Medicare patient due at visit 16     Cancels/No Shows: 0/0    Subjective:    Pain:  [x] Yes  [] No Location: right hip into quad     Pain Rating: (0-10 scale) not rated/10  Pain altered Tx:  [x] No  [] Yes  Action:  Comments: Patient arrived noting minimal soreness but still frustrated with gait deviation. Patient states that surgeon notes there is a slight leg length discrepancy.     Objective:  Modalities:   Precautions [] No  [x] Yes: s/p right FARHAN revision and femur ORIF    Exercises:   Exercise       Reps/ Time Weight/ Level Comments             NuStep   10'  L3               Calf Stretch   30\"x3       Hamstring Stretch   30\"x3       Hip flexor stretch- lunge at step   30\"x3           Hip ER stretch   HEP     Sidelying Clamshells  x20 A HEP   Sidelying hip abduction   x10 A    Bridges   x20   HEP   Posterior pelvic tilts with march   x10     Posterior pelvic tilts with heel walkouts          SLR- small range   2x10           4-way hip BLE  10x ea  [place]    Squats  2x10     Heel Raises  2x10       Step ups  2x10  6\"    Step downs  2x10  4\"    Hip dips  2x10  4\"    Mini Lunges               Specific Instruction for next session:     Treatment Charges: Mins Units   []  Modalities       [x]  Ther Exercise 40 3   []  Neuromuscular Re-ed     []  Gait Training     []  Manual Therapy     []

## 2025-07-15 ENCOUNTER — HOSPITAL ENCOUNTER (OUTPATIENT)
Dept: PHYSICAL THERAPY | Facility: CLINIC | Age: 65
Setting detail: THERAPIES SERIES
Discharge: HOME | End: 2025-07-15
Attending: ORTHOPAEDIC SURGERY
Payer: MEDICARE

## 2025-07-15 PROCEDURE — 97110 THERAPEUTIC EXERCISES: CPT

## 2025-07-15 NOTE — FLOWSHEET NOTE
[x] [facility]  Outpatient Rehabilitation &  Therapy  [address]   P: [phone]  F: [phone]      Physical Therapy Daily Treatment    Date:  7/15/2025  Patient Name:  Sudha Aguilera    :  1960  MRN: [id number]  Physician: Dr. Eller                                                Insurance: Medicare (secondary MMO, vs BMN)  Medical Diagnosis: S/P total right hip arthroplasty (Z96.641)                       Rehab Codes: M62.81 , R26.89 , M25.551, M25.651 , M79.651  Onset date: 25                             Next 's appt.: --  Visit# / total visits: ; Progress note for Medicare patient due at visit 16     Cancels/No Shows: 0/0    Subjective:    Pain:  [x] Yes  [] No Location: right hip into quad     Pain Rating: (0-10 scale) 0/10  Pain altered Tx:  [x] No  [] Yes  Action:  Comments: Patient arrived stating she was very sore from last visit but she feels the right muscles were targeted.      Objective:  Modalities:   Precautions [] No  [x] Yes: s/p right FARHAN revision and femur ORIF    Exercises:   Exercise       Reps/ Time Weight/ Level Comments             NuStep   10'  L3               Calf Stretch   30\"x3       Hamstring Stretch   30\"x3       Hip flexor stretch- lunge at step   30\"x3           Hip ER stretch   HEP     Sidelying Clamshells  x20 A HEP   Sidelying hip abduction   x10 A    Bridges   x20   HEP   Posterior pelvic tilts with march   x10     Posterior pelvic tilts with heel walkouts          SLR- small range   2x10           4-way hip BLE  10x ea  [place]    Squats  2x10     Heel Raises  2x10       Step ups  2x10  6\"    Step downs  2x10  4\"    Hip dips  2x10  4\"    Mini Lunges               Specific Instruction for next session:      Treatment Charges: Mins Units   []  Modalities       [x]  Ther Exercise 40 3   []  Neuromuscular Re-ed     []  Gait Training     []  Manual Therapy     []  Ther Activities     []  Aquatics     []  Vasocompression

## 2025-07-17 ENCOUNTER — HOSPITAL ENCOUNTER (OUTPATIENT)
Dept: PHYSICAL THERAPY | Facility: CLINIC | Age: 65
Setting detail: THERAPIES SERIES
Discharge: HOME | End: 2025-07-17
Attending: ORTHOPAEDIC SURGERY
Payer: MEDICARE

## 2025-07-17 NOTE — FLOWSHEET NOTE
[] [facility]  Outpatient Rehabilitation &  Therapy  [address]  [phone]  [phone] [] [facility]  Outpatient Rehabilitation &  Therapy  [address]  P: [phone]  F: [phone] [] [facility]  Outpatient Rehabilitation &  Therapy  [address]  P: [phone]  F: [phone] [] [facility]  Outpatient Rehabilitation &  Therapy  [address]  [phone]  [phone] [x] [facility]  Outpatient Rehabilitation &  Therapy  [address]   P: [phone]  F: [phone]  [] [facility]  Outpatient Rehabilitation &  Therapy  [address]  P: [phone]  F: [phone] [] [facility]  Outpatient Rehabilitation &  Therapy  [address]  P: [phone]  F: [phone] [] [facility]  Outpatient Rehabilitation &  Therapy  [address]  P: [phone]  F: [phone] [] [facility]  Outpatient Rehabilitation &  Therapy  [address]  P: [phone]  F: [phone]  [] [facility] Outpatient Rehabilitation &  Therapy  [address]  P: [phone]  F: [phone] [] [facility] Outpatient Rehabilitation &  Therapy  [address]  P: [phone]   F: [phone]      Therapy Cancel/No Show note    Date: 2025  Patient: Sduha Aguilera  : 1960  MRN: [id number]    Cancels/No Shows to date: 3/3    For today's appointment patient:    []  Cancelled    [] Rescheduled appointment    [x] No-show     Reason given by patient:    []  Patient ill    []  Conflicting appointment    [] No transportation      [] Conflict with work    [] No reason given    [] Weather related    [] COVID-19    [] Other:      Comments:        [] Next appointment was

## 2025-08-19 ENCOUNTER — HOSPITAL ENCOUNTER (OUTPATIENT)
Dept: PHYSICAL THERAPY | Facility: CLINIC | Age: 65
Setting detail: THERAPIES SERIES
Discharge: HOME | End: 2025-08-19
Attending: ORTHOPAEDIC SURGERY
Payer: MEDICARE

## 2025-08-19 PROCEDURE — 97110 THERAPEUTIC EXERCISES: CPT

## 2025-08-28 ENCOUNTER — HOSPITAL ENCOUNTER (OUTPATIENT)
Dept: PHYSICAL THERAPY | Facility: CLINIC | Age: 65
Setting detail: THERAPIES SERIES
Discharge: HOME | End: 2025-08-28
Attending: ORTHOPAEDIC SURGERY
Payer: MEDICARE

## 2025-08-28 PROCEDURE — 97110 THERAPEUTIC EXERCISES: CPT

## 2025-09-02 ENCOUNTER — HOSPITAL ENCOUNTER (OUTPATIENT)
Dept: PHYSICAL THERAPY | Facility: CLINIC | Age: 65
Setting detail: THERAPIES SERIES
Discharge: HOME | End: 2025-09-02
Attending: ORTHOPAEDIC SURGERY

## 2025-09-04 ENCOUNTER — TELEPHONE (OUTPATIENT)
Dept: FAMILY MEDICINE CLINIC | Age: 65
End: 2025-09-04

## 2025-09-04 ENCOUNTER — HOSPITAL ENCOUNTER (OUTPATIENT)
Dept: PHYSICAL THERAPY | Facility: CLINIC | Age: 65
Setting detail: THERAPIES SERIES
Discharge: HOME | End: 2025-09-04
Attending: ORTHOPAEDIC SURGERY

## (undated) DEVICE — TUBING, SUCTION, 1/4" X 12', STRAIGHT: Brand: MEDLINE

## (undated) DEVICE — Device: Brand: BOOT LINER, DISPOSABLE

## (undated) DEVICE — SYRINGE MED 30ML STD CLR PLAS LUERLOCK TIP N CTRL DISP

## (undated) DEVICE — YANKAUER,FLEXIBLE HANDLE,REGLR CAPACITY: Brand: MEDLINE INDUSTRIES, INC.

## (undated) DEVICE — Device: Brand: COVER, PERINEAL POST, 12 PK

## (undated) DEVICE — [AGGRESSIVE PLUS CUTTER, ARTHROSCOPIC SHAVER BLADE,  DO NOT RESTERILIZE,  DO NOT USE IF PACKAGE IS DAMAGED,  KEEP DRY,  KEEP AWAY FROM SUNLIGHT]: Brand: FORMULA

## (undated) DEVICE — SUTURE VICRYL SZ 0 L36IN ABSRB UD L36MM CT-1 1/2 CIR J946H

## (undated) DEVICE — SUTURE MONOCRYL SZ 3-0 L27IN ABSRB UD L24MM PS-1 3/8 CIR PRIM Y936H

## (undated) DEVICE — SMARTSTITCH PERFECTPASSER                                    MAGNUMWIRE SUTURE CARTRIDGES, BLUE CO-BRAID: Brand: SMARTSTITCH PERFECTPASSER

## (undated) DEVICE — BLADE ES L6IN ELASTOMERIC COAT EXT DURABLE BEND UPTO 90DEG

## (undated) DEVICE — COVER,MAYO STAND,STERILE: Brand: MEDLINE

## (undated) DEVICE — SUTURE STRATAFIX SPRL SZ 3-0 L5IN ABSRB UD FS L26MM 3/8 CIR SXMP2B411

## (undated) DEVICE — SOLUTION IRRIG 500ML 0.9% SOD CHLO USP POUR PLAS BTL

## (undated) DEVICE — SUTURE MONOCRYL SZ 2-0 L27IN ABSRB UD SH L26MM TAPERPOINT NDL Y417H

## (undated) DEVICE — DRAPE,U/ SHT,SPLIT,PLAS,STERIL: Brand: MEDLINE

## (undated) DEVICE — SOLUTION IRRIG 3000ML 0.9% SOD CHL USP UROMATIC PLAS CONT

## (undated) DEVICE — GLOVE SURG SZ 65 L12IN FNGR THK79MIL GRN LTX FREE

## (undated) DEVICE — TUBING FLD MGMT Y DBL SPIK DUALWAVE

## (undated) DEVICE — SURGICAL SUCTION CONNECTING TUBE WITH MALE CONNECTOR AND SUCTION CLAMP, 2 FT. LONG (.6 M), 5 MM I.D.: Brand: CONMED

## (undated) DEVICE — LIQUIBAND RAPID ADHESIVE 36/CS 0.8ML: Brand: MEDLINE

## (undated) DEVICE — CEMENT MIXING SYSTEM WITH FEMORAL BREAKWAY NOZZLE: Brand: REVOLUTION

## (undated) DEVICE — 3M™ IOBAN™ 2 ANTIMICROBIAL INCISE DRAPE 6651EZ: Brand: IOBAN™ 2

## (undated) DEVICE — SUTURE ABSORBABLE MONOFILAMENT 1 CTX 36 CM 48 MM VIO PDS +

## (undated) DEVICE — COVER,MAYO STAND,XL,STERILE: Brand: MEDLINE

## (undated) DEVICE — 1LYRTR 16FR10ML100%SIL UMS SNP: Brand: MEDLINE INDUSTRIES, INC.

## (undated) DEVICE — NEPTUNE E-SEP 165MM SUCTION SLEEVE: Brand: NEPTUNE E-SEP

## (undated) DEVICE — STRAP,POSITIONING,KNEE/BODY,FOAM,4X60": Brand: MEDLINE

## (undated) DEVICE — COVER LT HNDL BLU PLAS

## (undated) DEVICE — SUTURE ETHLN SZ 3-0 L18IN NONABSORBABLE BLK L24MM PS-1 3/8 1663G

## (undated) DEVICE — ZIPPERED TOGA, 2X LARGE: Brand: FLYTE, SURGICOOL

## (undated) DEVICE — STERILE PATIENT PROTECTIVE PAD FOR IMP® KNEE POSITIONERS & COHESIVE WRAP (10 / CASE): Brand: DE MAYO KNEE POSITIONER®

## (undated) DEVICE — SUTURE VICRYL ABSRB BRAID COAT UD CP NO 2 27IN  J195H

## (undated) DEVICE — ELECTRODE PT RET AD L9FT HI MOIST COND ADH HYDRGEL CORDED

## (undated) DEVICE — SYRINGE, LUER LOCK, 10ML: Brand: MEDLINE

## (undated) DEVICE — GLOVE SURG SZ 7 CRM LTX FREE POLYISOPRENE POLYMER BEAD ANTI

## (undated) DEVICE — 450 ML BOTTLE OF 0.05% CHLORHEXIDINE GLUCONATE IN 99.95% STERILE WATER FOR IRRIGATION, USP AND APPLICATOR.: Brand: IRRISEPT ANTIMICROBIAL WOUND LAVAGE

## (undated) DEVICE — GLOVE SURG SZ 85 L12IN FNGR THK79MIL GRN LTX FREE

## (undated) DEVICE — DISC SUCT FLR DLX QUICKSUITE

## (undated) DEVICE — APPLICATOR MEDICATED 26 CC SOLUTION HI LT ORNG CHLORAPREP

## (undated) DEVICE — IMMOBILIZER SHLDR L10.5-17IN D7IN SLNG W/ 15DEG ABD PLLW

## (undated) DEVICE — STRYKER PERFORMANCE SERIES SAGITTAL BLADE: Brand: STRYKER PERFORMANCE SERIES

## (undated) DEVICE — DRESSING FOAM W4XL10IN AG SIL ADH ANTIMIC POSTOP OPTIFOAM

## (undated) DEVICE — CYSTO/BLADDER IRRIGATION SET, REGULATING CLAMP

## (undated) DEVICE — SUTURE VICRYL + SZ 0 L18IN ABSRB UD L36MM CT-1 1/2 CIR VCP840D

## (undated) DEVICE — CONTAINER,SPECIMEN,OR STERILE,4OZ: Brand: MEDLINE

## (undated) DEVICE — DRAIN SURG 15FR SIL SMOOTH RND 1/8IN END PERF W/ TRCR RADPQ

## (undated) DEVICE — BANDAGE COBAN 6 IN WND 6INX5YD FOAM

## (undated) DEVICE — GLOVE SURG SZ 85 CRM LTX FREE POLYISOPRENE POLYMER BEAD ANTI

## (undated) DEVICE — GLOVE SURG SZ 8 CRM LTX FREE POLYISOPRENE POLYMER BEAD ANTI

## (undated) DEVICE — GLOVE ORANGE PI 8   MSG9080

## (undated) DEVICE — SUTURE ABSORBABLE MONOFILAMENT 2-0 CT-1 24 CM 36 MM VIO PDS+

## (undated) DEVICE — GOWN,AURORA,NON-REINFORCED,2XL: Brand: MEDLINE

## (undated) DEVICE — 6619 2 PTNT ISO SYS INCISE AREA&LT;(&GT;&&LT;)&GT;P: Brand: STERI-DRAPE™ IOBAN™ 2

## (undated) DEVICE — 10K ARTHROSCOPY INFLOW/OUTFLOW TUBE SET: Brand: 10K

## (undated) DEVICE — GLOVE SURG SZ 75 L12IN FNGR THK79MIL GRN LTX FREE

## (undated) DEVICE — COVER,TABLE,HEAVY DUTY,50"X90",STRL: Brand: MEDLINE

## (undated) DEVICE — SUTURE ETHIBOND EXCEL SZ 5 L30IN NONABSORBABLE GRN L48MM CCS MB47G

## (undated) DEVICE — OSCILLATING TIP SAW CARTRIDGE: Brand: PRECISION FALCON

## (undated) DEVICE — AMBIENT SUPER TURBOVAC 90: Brand: COBLATION

## (undated) DEVICE — SMARTSTITCH PERFECTPASSER                                    MAGNUMWIRE SUTURE CARTRIDGES, BLACK CO-BRAID: Brand: SMARTSTITCH PERFECTPASSER

## (undated) DEVICE — STRAP ARMBRD W1.5XL32IN FOAM STR YET SFT W/ HK AND LOOP

## (undated) DEVICE — SOLUTION IV 250ML 0.9% SOD CHL PH 5 INJ USP VIAFLX PLAS

## (undated) DEVICE — DRAPE,SHOULDER,BEACH CHAIR,STERILE: Brand: MEDLINE

## (undated) DEVICE — GLOVE SURG SZ 75 CRM LTX FREE POLYISOPRENE POLYMER BEAD ANTI

## (undated) DEVICE — BANDAGE COBAN 4 IN COMPR W4INXL5YD FOAM COHESIVE QUIK STK SELF ADH SFT

## (undated) DEVICE — 4-PORT MANIFOLD: Brand: NEPTUNE 2

## (undated) DEVICE — DRESSING FOAM W4XL4IN AG SIL FACE BORD IONIC ANTIMIC ADH

## (undated) DEVICE — TOWEL,OR,DSP,ST,BLUE,DLX,XR,4/PK,20PK/CS: Brand: MEDLINE

## (undated) DEVICE — BIPOLAR SEALER 23-112-1 AQM 6.0: Brand: AQUAMANTYS ®

## (undated) DEVICE — DRAPE,HIP,W/POUCHES,STERILE: Brand: MEDLINE

## (undated) DEVICE — STOCKINETTE,IMPERVIOUS,12X48,STERILE: Brand: MEDLINE

## (undated) DEVICE — C-ARM: Brand: UNBRANDED

## (undated) DEVICE — LIMB HOLDER, WRIST/ANKLE: Brand: DEROYAL

## (undated) DEVICE — CLEANER,CAUTERY TIP,2X2",STERILE: Brand: MEDLINE

## (undated) DEVICE — CANNULA ARTHSCP L7CM ID8.25MM TRNSLUC THRD FLX W/ NO SQUIRT

## (undated) DEVICE — SHOULDER STABILIZATION KIT,                                    DISPOSABLE 12 PER BOX

## (undated) DEVICE — INTENDED FOR TISSUE SEPARATION, AND OTHER PROCEDURES THAT REQUIRE A SHARP SURGICAL BLADE TO PUNCTURE OR CUT.: Brand: BARD-PARKER ® CARBON RIB-BACK BLADES

## (undated) DEVICE — GOWN,SIRUS,NONRNF,SETINSLV,XL,20/CS: Brand: MEDLINE

## (undated) DEVICE — SYRINGE IRRIG 60ML SFT PLIABLE BLB EZ TO GRP 1 HND USE W/

## (undated) DEVICE — Device

## (undated) DEVICE — SMARTSTITCH PERFECTPASSER CONNECTOR: Brand: PERFECTPASSER

## (undated) DEVICE — GLOVE ORTHO 8   MSG9480

## (undated) DEVICE — T-MAX DISPOSABLE FACE MASK 8 PER BOX

## (undated) DEVICE — GLOVE SURG SZ 65 CRM LTX FREE POLYISOPRENE POLYMER BEAD ANTI

## (undated) DEVICE — DECANTER BAG 9": Brand: MEDLINE INDUSTRIES, INC.

## (undated) DEVICE — STAZ MAJOR BASIN: Brand: MEDLINE INDUSTRIES, INC.

## (undated) DEVICE — BLADE LARYNSCP SZ 3 GVL STAT DISP FOR ADV VID LARYNGOSCOPY

## (undated) DEVICE — PROVE COVER: Brand: UNBRANDED

## (undated) DEVICE — NEEDLE SPNL 18GA L3.5IN W/ QNCKE SHARPER BVL DURA CLICK

## (undated) DEVICE — HANDPIECE SET WITH COAXIAL HIGH FLOW TIP AND SUCTION TUBE: Brand: INTERPULSE

## (undated) DEVICE — GOWN,AURORA,NONREINFORCED,LARGE: Brand: MEDLINE

## (undated) DEVICE — [STANDARD 12-FLUTE BARREL BUR, ARTHROSCOPIC SHAVER BLADE,  DO NOT RESTERILIZE,  DO NOT USE IF PACKAGE IS DAMAGED,  KEEP DRY,  KEEP AWAY FROM SUNLIGHT]: Brand: FORMULA